# Patient Record
Sex: MALE | Race: WHITE | NOT HISPANIC OR LATINO | Employment: OTHER | URBAN - METROPOLITAN AREA
[De-identification: names, ages, dates, MRNs, and addresses within clinical notes are randomized per-mention and may not be internally consistent; named-entity substitution may affect disease eponyms.]

---

## 2017-04-03 ENCOUNTER — ANESTHESIA (OUTPATIENT)
Dept: PERIOP | Facility: AMBULARY SURGERY CENTER | Age: 82
End: 2017-04-03
Payer: MEDICARE

## 2017-04-03 ENCOUNTER — ANESTHESIA EVENT (OUTPATIENT)
Dept: PERIOP | Facility: AMBULARY SURGERY CENTER | Age: 82
End: 2017-04-03
Payer: MEDICARE

## 2017-04-03 ENCOUNTER — HOSPITAL ENCOUNTER (OUTPATIENT)
Facility: AMBULARY SURGERY CENTER | Age: 82
Setting detail: OUTPATIENT SURGERY
Discharge: HOME | End: 2017-04-03
Attending: OPHTHALMOLOGY | Admitting: OPHTHALMOLOGY
Payer: MEDICARE

## 2017-04-03 VITALS
SYSTOLIC BLOOD PRESSURE: 169 MMHG | WEIGHT: 175 LBS | BODY MASS INDEX: 28.12 KG/M2 | DIASTOLIC BLOOD PRESSURE: 79 MMHG | HEART RATE: 52 BPM | HEIGHT: 66 IN | TEMPERATURE: 97.3 F | RESPIRATION RATE: 18 BRPM | OXYGEN SATURATION: 97 %

## 2017-04-03 PROCEDURE — V2632 POST CHMBR INTRAOCULAR LENS: HCPCS | Performed by: OPHTHALMOLOGY

## 2017-04-03 DEVICE — IOL SN60WF 18.5: Type: IMPLANTABLE DEVICE | Site: EYE | Status: FUNCTIONAL

## 2017-04-03 RX ORDER — BALANCED SALT SOLUTION 6.4; .75; .48; .3; 3.9; 1.7 MG/ML; MG/ML; MG/ML; MG/ML; MG/ML; MG/ML
SOLUTION OPHTHALMIC AS NEEDED
Status: DISCONTINUED | OUTPATIENT
Start: 2017-04-03 | End: 2017-04-03 | Stop reason: HOSPADM

## 2017-04-03 RX ORDER — GATIFLOXACIN 5 MG/ML
1 SOLUTION/ DROPS OPHTHALMIC 2 TIMES DAILY
Qty: 3 ML | Refills: 0
Start: 2017-04-03 | End: 2017-05-03

## 2017-04-03 RX ORDER — LIDOCAINE HYDROCHLORIDE 20 MG/ML
1 JELLY TOPICAL
Status: COMPLETED | OUTPATIENT
Start: 2017-04-03 | End: 2017-04-03

## 2017-04-03 RX ORDER — GATIFLOXACIN 5 MG/ML
SOLUTION/ DROPS OPHTHALMIC AS NEEDED
Status: DISCONTINUED | OUTPATIENT
Start: 2017-04-03 | End: 2017-04-03 | Stop reason: HOSPADM

## 2017-04-03 RX ORDER — TETRACAINE HYDROCHLORIDE 5 MG/ML
1 SOLUTION OPHTHALMIC ONCE
Status: COMPLETED | OUTPATIENT
Start: 2017-04-03 | End: 2017-04-03

## 2017-04-03 RX ORDER — LIDOCAINE HYDROCHLORIDE 10 MG/ML
INJECTION, SOLUTION EPIDURAL; INFILTRATION; INTRACAUDAL; PERINEURAL AS NEEDED
Status: DISCONTINUED | OUTPATIENT
Start: 2017-04-03 | End: 2017-04-03 | Stop reason: HOSPADM

## 2017-04-03 RX ORDER — KETOROLAC TROMETHAMINE 5 MG/ML
1 SOLUTION OPHTHALMIC
Status: DISCONTINUED | OUTPATIENT
Start: 2017-04-04 | End: 2017-04-03 | Stop reason: HOSPADM

## 2017-04-03 RX ORDER — CYCLOPENTOLATE HYDROCHLORIDE 10 MG/ML
1 SOLUTION/ DROPS OPHTHALMIC
Status: COMPLETED | OUTPATIENT
Start: 2017-04-03 | End: 2017-04-03

## 2017-04-03 RX ORDER — TETRACAINE HYDROCHLORIDE 5 MG/ML
SOLUTION OPHTHALMIC AS NEEDED
Status: DISCONTINUED | OUTPATIENT
Start: 2017-04-03 | End: 2017-04-03 | Stop reason: HOSPADM

## 2017-04-03 RX ORDER — KETOROLAC TROMETHAMINE 5 MG/ML
1 SOLUTION OPHTHALMIC 4 TIMES DAILY
Qty: 6 ML | Refills: 0
Start: 2017-04-03 | End: 2018-04-19 | Stop reason: HOSPADM

## 2017-04-03 RX ORDER — MIDAZOLAM HYDROCHLORIDE 1 MG/ML
INJECTION INTRAMUSCULAR; INTRAVENOUS AS NEEDED
Status: DISCONTINUED | OUTPATIENT
Start: 2017-04-03 | End: 2017-04-03 | Stop reason: SURG

## 2017-04-03 RX ORDER — PHENYLEPHRINE HCL 2.5 %
1 DROPS OPHTHALMIC (EYE)
Status: COMPLETED | OUTPATIENT
Start: 2017-04-03 | End: 2017-04-03

## 2017-04-03 RX ADMIN — PHENYLEPHRINE HYDROCHLORIDE 1 DROP: 25 SOLUTION/ DROPS OPHTHALMIC at 07:15

## 2017-04-03 RX ADMIN — KETOROLAC TROMETHAMINE 1 DROP: 5 SOLUTION OPHTHALMIC at 07:57

## 2017-04-03 RX ADMIN — TETRACAINE HYDROCHLORIDE 1 DROP: 5 SOLUTION OPHTHALMIC at 07:15

## 2017-04-03 RX ADMIN — LIDOCAINE HYDROCHLORIDE 1 APPLICATION: 20 JELLY TOPICAL at 07:45

## 2017-04-03 RX ADMIN — LIDOCAINE HYDROCHLORIDE 1 APPLICATION: 20 JELLY TOPICAL at 07:15

## 2017-04-03 RX ADMIN — KETOROLAC TROMETHAMINE 1 DROP: 5 SOLUTION OPHTHALMIC at 07:21

## 2017-04-03 RX ADMIN — MIDAZOLAM HYDROCHLORIDE 2 MG: 1 INJECTION, SOLUTION INTRAMUSCULAR; INTRAVENOUS at 08:08

## 2017-04-03 RX ADMIN — LIDOCAINE HYDROCHLORIDE 1 APPLICATION: 20 JELLY TOPICAL at 07:30

## 2017-04-03 RX ADMIN — CYCLOPENTOLATE HYDROCHLORIDE 1 DROP: 10 SOLUTION/ DROPS OPHTHALMIC at 07:44

## 2017-04-03 RX ADMIN — KETOROLAC TROMETHAMINE 1 DROP: 5 SOLUTION OPHTHALMIC at 07:45

## 2017-04-03 RX ADMIN — KETOROLAC TROMETHAMINE 1 DROP: 5 SOLUTION OPHTHALMIC at 07:14

## 2017-04-03 RX ADMIN — PHENYLEPHRINE HYDROCHLORIDE 1 DROP: 25 SOLUTION/ DROPS OPHTHALMIC at 07:30

## 2017-04-03 RX ADMIN — CYCLOPENTOLATE HYDROCHLORIDE 1 DROP: 10 SOLUTION/ DROPS OPHTHALMIC at 07:29

## 2017-04-03 RX ADMIN — PHENYLEPHRINE HYDROCHLORIDE 1 DROP: 25 SOLUTION/ DROPS OPHTHALMIC at 07:45

## 2017-04-03 RX ADMIN — PHENYLEPHRINE HYDROCHLORIDE 1 DROP: 25 SOLUTION/ DROPS OPHTHALMIC at 07:58

## 2017-04-03 RX ADMIN — LIDOCAINE HYDROCHLORIDE 1 APPLICATION: 20 JELLY TOPICAL at 07:58

## 2017-04-03 RX ADMIN — CYCLOPENTOLATE HYDROCHLORIDE 1 DROP: 10 SOLUTION/ DROPS OPHTHALMIC at 07:14

## 2017-04-03 RX ADMIN — CYCLOPENTOLATE HYDROCHLORIDE 1 DROP: 10 SOLUTION/ DROPS OPHTHALMIC at 07:56

## 2017-05-26 ENCOUNTER — ALLSCRIPTS OFFICE VISIT (OUTPATIENT)
Dept: OTHER | Facility: OTHER | Age: 82
End: 2017-05-26

## 2017-06-12 ENCOUNTER — APPOINTMENT (OUTPATIENT)
Dept: LAB | Facility: MEDICAL CENTER | Age: 82
End: 2017-06-12
Payer: MEDICARE

## 2017-06-12 ENCOUNTER — TRANSCRIBE ORDERS (OUTPATIENT)
Dept: ADMINISTRATIVE | Facility: HOSPITAL | Age: 82
End: 2017-06-12

## 2017-06-12 DIAGNOSIS — A69.20 LYME DISEASE: Primary | ICD-10-CM

## 2017-06-12 DIAGNOSIS — A69.20 LYME DISEASE: ICD-10-CM

## 2017-06-12 PROCEDURE — 36415 COLL VENOUS BLD VENIPUNCTURE: CPT

## 2017-06-12 PROCEDURE — 86618 LYME DISEASE ANTIBODY: CPT

## 2017-06-14 LAB
B BURGDOR IGG SER IA-ACNC: 0.22
B BURGDOR IGM SER IA-ACNC: 0.27

## 2017-11-09 ENCOUNTER — TRANSCRIBE ORDERS (OUTPATIENT)
Dept: ADMINISTRATIVE | Facility: HOSPITAL | Age: 82
End: 2017-11-09

## 2017-11-09 ENCOUNTER — APPOINTMENT (OUTPATIENT)
Dept: LAB | Facility: MEDICAL CENTER | Age: 82
End: 2017-11-09
Payer: MEDICARE

## 2017-11-09 DIAGNOSIS — I10 ESSENTIAL HYPERTENSION: ICD-10-CM

## 2017-11-09 DIAGNOSIS — I48.91 NEW ONSET ATRIAL FIBRILLATION (HCC): Primary | ICD-10-CM

## 2017-11-09 DIAGNOSIS — I48.91 NEW ONSET ATRIAL FIBRILLATION (HCC): ICD-10-CM

## 2017-11-09 LAB
ALBUMIN SERPL BCP-MCNC: 3.6 G/DL (ref 3.5–5)
ALP SERPL-CCNC: 76 U/L (ref 46–116)
ALT SERPL W P-5'-P-CCNC: 76 U/L (ref 12–78)
ANION GAP SERPL CALCULATED.3IONS-SCNC: 6 MMOL/L (ref 4–13)
AST SERPL W P-5'-P-CCNC: 35 U/L (ref 5–45)
BILIRUB SERPL-MCNC: 1.03 MG/DL (ref 0.2–1)
BUN SERPL-MCNC: 19 MG/DL (ref 5–25)
CALCIUM SERPL-MCNC: 8.6 MG/DL (ref 8.3–10.1)
CHLORIDE SERPL-SCNC: 106 MMOL/L (ref 100–108)
CHOLEST SERPL-MCNC: 148 MG/DL (ref 50–200)
CO2 SERPL-SCNC: 28 MMOL/L (ref 21–32)
CREAT SERPL-MCNC: 0.76 MG/DL (ref 0.6–1.3)
ERYTHROCYTE [DISTWIDTH] IN BLOOD BY AUTOMATED COUNT: 17.7 % (ref 11.6–15.1)
GFR SERPL CREATININE-BSD FRML MDRD: 86 ML/MIN/1.73SQ M
GLUCOSE P FAST SERPL-MCNC: 109 MG/DL (ref 65–99)
HCT VFR BLD AUTO: 33.8 % (ref 36.5–49.3)
HDLC SERPL-MCNC: 78 MG/DL (ref 40–60)
HGB BLD-MCNC: 10.8 G/DL (ref 12–17)
LDLC SERPL CALC-MCNC: 53 MG/DL (ref 0–100)
MCH RBC QN AUTO: 20.7 PG (ref 26.8–34.3)
MCHC RBC AUTO-ENTMCNC: 32 G/DL (ref 31.4–37.4)
MCV RBC AUTO: 65 FL (ref 82–98)
PLATELET # BLD AUTO: 187 THOUSANDS/UL (ref 149–390)
PMV BLD AUTO: 9.5 FL (ref 8.9–12.7)
POTASSIUM SERPL-SCNC: 4.1 MMOL/L (ref 3.5–5.3)
PROT SERPL-MCNC: 6.8 G/DL (ref 6.4–8.2)
RBC # BLD AUTO: 5.22 MILLION/UL (ref 3.88–5.62)
SODIUM SERPL-SCNC: 140 MMOL/L (ref 136–145)
TRIGL SERPL-MCNC: 83 MG/DL
TSH SERPL DL<=0.05 MIU/L-ACNC: 1.64 UIU/ML (ref 0.36–3.74)
WBC # BLD AUTO: 6.51 THOUSAND/UL (ref 4.31–10.16)

## 2017-11-09 PROCEDURE — 85027 COMPLETE CBC AUTOMATED: CPT

## 2017-11-09 PROCEDURE — 36415 COLL VENOUS BLD VENIPUNCTURE: CPT

## 2017-11-09 PROCEDURE — 80053 COMPREHEN METABOLIC PANEL: CPT

## 2017-11-09 PROCEDURE — 80061 LIPID PANEL: CPT

## 2017-11-09 PROCEDURE — 84443 ASSAY THYROID STIM HORMONE: CPT

## 2018-01-11 NOTE — PROGRESS NOTES
History of Present Illness  Care Coordination Encounter Information:   Type of Encounter: Telephonic    Spoke to Patient  Care Coordination SL Nurse Claudia August: While speaking with patient regarding his wife's care, as part of BPCI program, patient began discussing his personal concerns regarding hernia and hydrocele  Recently evaluated by surgeon for hernia repair and was found to have hydrocele  Surgeon was ready to schedule patient for hernia repair, but patient has put off surgery until his wife is either placed or has other caregivers available  Patient states surgeon gave him the name of a urologist at [place] [id number]  I advised patient to either call urologist or PCP to schedule visit to evaluate hydrocele and give possible treatment options  Patient states it is usually painless, but does experience acute pain after too much physical activity  Encouraged patient to call and schedule an appointment for himself soon so that he can have treatment options explained to him  Patient stated understanding        Signatures   Electronically signed by : Vipul White RN; Jul 11 2016  2:58PM EST                       (Author)

## 2018-01-14 VITALS
SYSTOLIC BLOOD PRESSURE: 130 MMHG | HEART RATE: 74 BPM | HEIGHT: 64 IN | DIASTOLIC BLOOD PRESSURE: 86 MMHG | WEIGHT: 175.13 LBS | BODY MASS INDEX: 29.9 KG/M2

## 2018-04-14 ENCOUNTER — APPOINTMENT (EMERGENCY)
Dept: RADIOLOGY | Facility: HOSPITAL | Age: 83
DRG: 242 | End: 2018-04-14
Payer: MEDICARE

## 2018-04-14 ENCOUNTER — APPOINTMENT (INPATIENT)
Dept: RADIOLOGY | Facility: HOSPITAL | Age: 83
DRG: 242 | End: 2018-04-14
Payer: MEDICARE

## 2018-04-14 ENCOUNTER — HOSPITAL ENCOUNTER (INPATIENT)
Facility: HOSPITAL | Age: 83
LOS: 5 days | Discharge: HOME HEALTH | DRG: 242 | End: 2018-04-19
Attending: SURGERY | Admitting: SURGERY
Payer: MEDICARE

## 2018-04-14 DIAGNOSIS — G93.89 PNEUMOCEPHALUS: ICD-10-CM

## 2018-04-14 DIAGNOSIS — R00.1 BRADYCARDIA: ICD-10-CM

## 2018-04-14 DIAGNOSIS — I48.0 PAROXYSMAL ATRIAL FIBRILLATION (HCC): ICD-10-CM

## 2018-04-14 DIAGNOSIS — S62.501A CLOSED NONDISPLACED FRACTURE OF PHALANX OF RIGHT THUMB, UNSPECIFIED PHALANX, INITIAL ENCOUNTER: ICD-10-CM

## 2018-04-14 DIAGNOSIS — I48.20 CHRONIC ATRIAL FIBRILLATION (HCC): ICD-10-CM

## 2018-04-14 DIAGNOSIS — S06.5X9A SDH (SUBDURAL HEMATOMA) (HCC): Primary | ICD-10-CM

## 2018-04-14 LAB
ALBUMIN SERPL BCP-MCNC: 3.9 G/DL (ref 3.5–5)
ALP SERPL-CCNC: 83 U/L (ref 46–116)
ALT SERPL W P-5'-P-CCNC: 26 U/L (ref 12–78)
ANION GAP BLD CALC-SCNC: 12 MMOL/L (ref 4–13)
ANION GAP BLD CALC-SCNC: 13 MMOL/L (ref 4–13)
ANION GAP SERPL CALCULATED.3IONS-SCNC: 5 MMOL/L (ref 4–13)
APTT PPP: 31 SECONDS (ref 23–35)
AST SERPL W P-5'-P-CCNC: 32 U/L (ref 5–45)
BASE EXCESS BLDA CALC-SCNC: 5 MMOL/L (ref -2–3)
BASOPHILS # BLD AUTO: 0.01 THOUSANDS/ΜL (ref 0–0.1)
BASOPHILS NFR BLD AUTO: 0 % (ref 0–1)
BILIRUB SERPL-MCNC: 1.04 MG/DL (ref 0.2–1)
BUN BLD-MCNC: 17 MG/DL (ref 5–25)
BUN BLD-MCNC: 17 MG/DL (ref 5–25)
BUN SERPL-MCNC: 14 MG/DL (ref 5–25)
CA-I BLD-SCNC: 1.15 MMOL/L (ref 1.12–1.32)
CA-I BLD-SCNC: 1.18 MMOL/L (ref 1.12–1.32)
CA-I BLD-SCNC: 1.19 MMOL/L (ref 1.12–1.32)
CALCIUM SERPL-MCNC: 9.2 MG/DL
CHLORIDE BLD-SCNC: 103 MMOL/L (ref 100–108)
CHLORIDE BLD-SCNC: 104 MMOL/L (ref 100–108)
CHLORIDE SERPL-SCNC: 109 MMOL/L (ref 100–108)
CK MB SERPL-MCNC: 1.6 % (ref 0–2.5)
CK MB SERPL-MCNC: 7.3 NG/ML (ref 0–5)
CK SERPL-CCNC: 457 U/L (ref 39–308)
CO2 SERPL-SCNC: 28 MMOL/L (ref 21–32)
CREAT BLD-MCNC: 0.9 MG/DL (ref 0.6–1.3)
CREAT SERPL-MCNC: 1 MG/DL (ref 0.6–1.3)
EOSINOPHIL # BLD AUTO: 0.03 THOUSAND/ΜL (ref 0–0.61)
EOSINOPHIL NFR BLD AUTO: 0 % (ref 0–6)
ERYTHROCYTE [DISTWIDTH] IN BLOOD BY AUTOMATED COUNT: 16.4 % (ref 11.6–15.1)
GFR SERPL CREATININE-BSD FRML MDRD: 70 ML/MIN/1.73SQ M
GFR SERPL CREATININE-BSD FRML MDRD: 80 ML/MIN/1.73SQ M
GLUCOSE SERPL-MCNC: 127 MG/DL (ref 65–140)
GLUCOSE SERPL-MCNC: 133 MG/DL (ref 65–140)
GLUCOSE SERPL-MCNC: 136 MG/DL (ref 65–140)
GLUCOSE SERPL-MCNC: 145 MG/DL (ref 65–140)
HCO3 BLDA-SCNC: 29.3 MMOL/L (ref 24–30)
HCT VFR BLD AUTO: 34.5 % (ref 36.5–49.3)
HCT VFR BLD CALC: 39 % (ref 36.5–49.3)
HCT VFR BLD CALC: 39 % (ref 36.5–49.3)
HCT VFR BLD CALC: 40 % (ref 36.5–49.3)
HGB BLD-MCNC: 11.3 G/DL (ref 12–17)
HGB BLDA-MCNC: 13.3 G/DL (ref 12–17)
HGB BLDA-MCNC: 13.3 G/DL (ref 12–17)
HGB BLDA-MCNC: 13.6 G/DL (ref 12–17)
INR PPP: 1.05 (ref 0.86–1.16)
LACTATE SERPL-SCNC: 1.7 MMOL/L (ref 0.5–2)
LYMPHOCYTES # BLD AUTO: 0.99 THOUSANDS/ΜL (ref 0.6–4.47)
LYMPHOCYTES NFR BLD AUTO: 11 % (ref 14–44)
MAGNESIUM SERPL-MCNC: 2.2 MG/DL (ref 1.6–2.6)
MCH RBC QN AUTO: 21 PG (ref 26.8–34.3)
MCHC RBC AUTO-ENTMCNC: 32.8 G/DL (ref 31.4–37.4)
MCV RBC AUTO: 64 FL (ref 82–98)
MONOCYTES # BLD AUTO: 0.87 THOUSAND/ΜL (ref 0.17–1.22)
MONOCYTES NFR BLD AUTO: 10 % (ref 4–12)
NEUTROPHILS # BLD AUTO: 6.96 THOUSANDS/ΜL (ref 1.85–7.62)
NEUTS SEG NFR BLD AUTO: 79 % (ref 43–75)
NRBC BLD AUTO-RTO: 0 /100 WBCS
PCO2 BLD: 29 MMOL/L (ref 21–32)
PCO2 BLD: 30 MMOL/L (ref 21–32)
PCO2 BLD: 31 MMOL/L (ref 21–32)
PCO2 BLD: 41.3 MM HG (ref 42–50)
PH BLD: 7.46 [PH] (ref 7.3–7.4)
PHOSPHATE SERPL-MCNC: 2.6 MG/DL (ref 2.3–4.1)
PLATELET # BLD AUTO: 141 THOUSANDS/UL (ref 149–390)
PMV BLD AUTO: 9.5 FL (ref 8.9–12.7)
PO2 BLD: 31 MM HG (ref 35–45)
POTASSIUM BLD-SCNC: 4.5 MMOL/L (ref 3.5–5.3)
POTASSIUM BLD-SCNC: 4.5 MMOL/L (ref 3.5–5.3)
POTASSIUM BLD-SCNC: 4.7 MMOL/L (ref 3.5–5.3)
POTASSIUM SERPL-SCNC: 4.2 MMOL/L (ref 3.5–5.3)
PROT SERPL-MCNC: 7.4 G/DL (ref 6.4–8.2)
PROTHROMBIN TIME: 13.7 SECONDS (ref 12.1–14.4)
RBC # BLD AUTO: 5.39 MILLION/UL (ref 3.88–5.62)
SAO2 % BLD FROM PO2: 63 % (ref 95–98)
SODIUM BLD-SCNC: 140 MMOL/L (ref 136–145)
SODIUM SERPL-SCNC: 142 MMOL/L (ref 136–145)
SPECIMEN SOURCE: ABNORMAL
SPECIMEN SOURCE: NORMAL
SPECIMEN SOURCE: NORMAL
TROPONIN I SERPL-MCNC: <0.02 NG/ML
WBC # BLD AUTO: 8.88 THOUSAND/UL (ref 4.31–10.16)

## 2018-04-14 PROCEDURE — 85025 COMPLETE CBC W/AUTO DIFF WBC: CPT | Performed by: PHYSICIAN ASSISTANT

## 2018-04-14 PROCEDURE — 85610 PROTHROMBIN TIME: CPT | Performed by: PHYSICIAN ASSISTANT

## 2018-04-14 PROCEDURE — 82550 ASSAY OF CK (CPK): CPT | Performed by: PHYSICIAN ASSISTANT

## 2018-04-14 PROCEDURE — 71260 CT THORAX DX C+: CPT

## 2018-04-14 PROCEDURE — 71045 X-RAY EXAM CHEST 1 VIEW: CPT

## 2018-04-14 PROCEDURE — 84484 ASSAY OF TROPONIN QUANT: CPT | Performed by: EMERGENCY MEDICINE

## 2018-04-14 PROCEDURE — 82553 CREATINE MB FRACTION: CPT | Performed by: PHYSICIAN ASSISTANT

## 2018-04-14 PROCEDURE — 82330 ASSAY OF CALCIUM: CPT

## 2018-04-14 PROCEDURE — 74177 CT ABD & PELVIS W/CONTRAST: CPT

## 2018-04-14 PROCEDURE — 73130 X-RAY EXAM OF HAND: CPT

## 2018-04-14 PROCEDURE — 82803 BLOOD GASES ANY COMBINATION: CPT

## 2018-04-14 PROCEDURE — 80047 BASIC METABLC PNL IONIZED CA: CPT

## 2018-04-14 PROCEDURE — 82947 ASSAY GLUCOSE BLOOD QUANT: CPT

## 2018-04-14 PROCEDURE — 80053 COMPREHEN METABOLIC PANEL: CPT | Performed by: PHYSICIAN ASSISTANT

## 2018-04-14 PROCEDURE — 99285 EMERGENCY DEPT VISIT HI MDM: CPT

## 2018-04-14 PROCEDURE — 84100 ASSAY OF PHOSPHORUS: CPT | Performed by: PHYSICIAN ASSISTANT

## 2018-04-14 PROCEDURE — 83605 ASSAY OF LACTIC ACID: CPT | Performed by: PHYSICIAN ASSISTANT

## 2018-04-14 PROCEDURE — 90715 TDAP VACCINE 7 YRS/> IM: CPT | Performed by: PHYSICIAN ASSISTANT

## 2018-04-14 PROCEDURE — 84295 ASSAY OF SERUM SODIUM: CPT

## 2018-04-14 PROCEDURE — 85730 THROMBOPLASTIN TIME PARTIAL: CPT | Performed by: EMERGENCY MEDICINE

## 2018-04-14 PROCEDURE — 83735 ASSAY OF MAGNESIUM: CPT | Performed by: PHYSICIAN ASSISTANT

## 2018-04-14 PROCEDURE — 85014 HEMATOCRIT: CPT

## 2018-04-14 PROCEDURE — 84132 ASSAY OF SERUM POTASSIUM: CPT

## 2018-04-14 PROCEDURE — 72125 CT NECK SPINE W/O DYE: CPT

## 2018-04-14 PROCEDURE — 70450 CT HEAD/BRAIN W/O DYE: CPT

## 2018-04-14 PROCEDURE — 90471 IMMUNIZATION ADMIN: CPT

## 2018-04-14 PROCEDURE — 2W3GX1Z IMMOBILIZATION OF RIGHT THUMB USING SPLINT: ICD-10-PCS | Performed by: SURGERY

## 2018-04-14 PROCEDURE — 99291 CRITICAL CARE FIRST HOUR: CPT | Performed by: SURGERY

## 2018-04-14 RX ORDER — LABETALOL HYDROCHLORIDE 5 MG/ML
INJECTION, SOLUTION INTRAVENOUS
Status: DISPENSED
Start: 2018-04-14 | End: 2018-04-15

## 2018-04-14 RX ORDER — ONDANSETRON 2 MG/ML
4 INJECTION INTRAMUSCULAR; INTRAVENOUS EVERY 6 HOURS PRN
Status: DISCONTINUED | OUTPATIENT
Start: 2018-04-14 | End: 2018-04-19 | Stop reason: HOSPADM

## 2018-04-14 RX ORDER — OXYCODONE HYDROCHLORIDE 5 MG/1
2.5 TABLET ORAL EVERY 4 HOURS PRN
Status: DISCONTINUED | OUTPATIENT
Start: 2018-04-14 | End: 2018-04-19 | Stop reason: HOSPADM

## 2018-04-14 RX ORDER — ACETAMINOPHEN 325 MG/1
975 TABLET ORAL EVERY 6 HOURS PRN
Status: DISCONTINUED | OUTPATIENT
Start: 2018-04-14 | End: 2018-04-17

## 2018-04-14 RX ORDER — OXYCODONE HYDROCHLORIDE 5 MG/1
5 TABLET ORAL EVERY 4 HOURS PRN
Status: DISCONTINUED | OUTPATIENT
Start: 2018-04-14 | End: 2018-04-19 | Stop reason: HOSPADM

## 2018-04-14 RX ORDER — LABETALOL HYDROCHLORIDE 5 MG/ML
10 INJECTION, SOLUTION INTRAVENOUS ONCE
Status: COMPLETED | OUTPATIENT
Start: 2018-04-14 | End: 2018-04-14

## 2018-04-14 RX ORDER — LABETALOL HYDROCHLORIDE 5 MG/ML
10 INJECTION, SOLUTION INTRAVENOUS EVERY 4 HOURS PRN
Status: DISCONTINUED | OUTPATIENT
Start: 2018-04-14 | End: 2018-04-19 | Stop reason: HOSPADM

## 2018-04-14 RX ORDER — SODIUM CHLORIDE 9 MG/ML
100 INJECTION, SOLUTION INTRAVENOUS CONTINUOUS
Status: DISCONTINUED | OUTPATIENT
Start: 2018-04-14 | End: 2018-04-16

## 2018-04-14 RX ADMIN — SODIUM CHLORIDE 100 ML/HR: 0.9 INJECTION, SOLUTION INTRAVENOUS at 20:56

## 2018-04-14 RX ADMIN — LABETALOL 20 MG/4 ML (5 MG/ML) INTRAVENOUS SYRINGE 10 MG: at 20:52

## 2018-04-14 RX ADMIN — LABETALOL HYDROCHLORIDE 10 MG: 5 INJECTION, SOLUTION INTRAVENOUS at 21:50

## 2018-04-14 RX ADMIN — TETANUS TOXOID, REDUCED DIPHTHERIA TOXOID AND ACELLULAR PERTUSSIS VACCINE, ADSORBED 0.5 ML: 5; 2.5; 8; 8; 2.5 SUSPENSION INTRAMUSCULAR at 20:57

## 2018-04-14 RX ADMIN — IOHEXOL 100 ML: 350 INJECTION, SOLUTION INTRAVENOUS at 21:40

## 2018-04-15 ENCOUNTER — APPOINTMENT (INPATIENT)
Dept: RADIOLOGY | Facility: HOSPITAL | Age: 83
DRG: 242 | End: 2018-04-15
Payer: MEDICARE

## 2018-04-15 ENCOUNTER — APPOINTMENT (INPATIENT)
Dept: NON INVASIVE DIAGNOSTICS | Facility: HOSPITAL | Age: 83
DRG: 242 | End: 2018-04-15
Payer: MEDICARE

## 2018-04-15 PROBLEM — S06.5XAA SUBDURAL HEMATOMA: Status: ACTIVE | Noted: 2018-04-15

## 2018-04-15 PROBLEM — I10 ESSENTIAL HYPERTENSION: Status: ACTIVE | Noted: 2017-11-08

## 2018-04-15 PROBLEM — S06.5X9A SUBDURAL HEMATOMA (HCC): Status: ACTIVE | Noted: 2018-04-15

## 2018-04-15 PROBLEM — S62.524A CLOSED NONDISPLACED FRACTURE OF DISTAL PHALANX OF RIGHT THUMB: Status: ACTIVE | Noted: 2018-04-15

## 2018-04-15 PROBLEM — I48.91 ATRIAL FIBRILLATION (HCC): Status: ACTIVE | Noted: 2018-04-15

## 2018-04-15 PROBLEM — G93.89 PNEUMOCEPHALUS: Status: ACTIVE | Noted: 2018-04-15

## 2018-04-15 PROBLEM — M25.512 ACUTE PAIN OF LEFT SHOULDER: Status: ACTIVE | Noted: 2018-04-15

## 2018-04-15 PROBLEM — S62.509A THUMB FRACTURE: Status: ACTIVE | Noted: 2018-04-15

## 2018-04-15 LAB
ANION GAP SERPL CALCULATED.3IONS-SCNC: 3 MMOL/L (ref 4–13)
ATRIAL RATE: 53 BPM
BASOPHILS # BLD AUTO: 0.01 THOUSANDS/ΜL (ref 0–0.1)
BASOPHILS NFR BLD AUTO: 0 % (ref 0–1)
BUN SERPL-MCNC: 12 MG/DL (ref 5–25)
CALCIUM SERPL-MCNC: 8.4 MG/DL
CHLORIDE SERPL-SCNC: 107 MMOL/L (ref 100–108)
CK MB SERPL-MCNC: 1.2 % (ref 0–2.5)
CK MB SERPL-MCNC: 3.5 NG/ML (ref 0–5)
CK SERPL-CCNC: 304 U/L (ref 39–308)
CO2 SERPL-SCNC: 28 MMOL/L (ref 21–32)
CREAT SERPL-MCNC: 0.86 MG/DL (ref 0.6–1.3)
EOSINOPHIL # BLD AUTO: 0.04 THOUSAND/ΜL (ref 0–0.61)
EOSINOPHIL NFR BLD AUTO: 1 % (ref 0–6)
ERYTHROCYTE [DISTWIDTH] IN BLOOD BY AUTOMATED COUNT: 16.3 % (ref 11.6–15.1)
GFR SERPL CREATININE-BSD FRML MDRD: 81 ML/MIN/1.73SQ M
GLUCOSE SERPL-MCNC: 122 MG/DL (ref 65–140)
HCT VFR BLD AUTO: 33.9 % (ref 36.5–49.3)
HGB BLD-MCNC: 10.7 G/DL (ref 12–17)
LYMPHOCYTES # BLD AUTO: 1.49 THOUSANDS/ΜL (ref 0.6–4.47)
LYMPHOCYTES NFR BLD AUTO: 20 % (ref 14–44)
MAGNESIUM SERPL-MCNC: 2 MG/DL (ref 1.6–2.6)
MCH RBC QN AUTO: 20.6 PG (ref 26.8–34.3)
MCHC RBC AUTO-ENTMCNC: 31.6 G/DL (ref 31.4–37.4)
MCV RBC AUTO: 65 FL (ref 82–98)
MONOCYTES # BLD AUTO: 0.79 THOUSAND/ΜL (ref 0.17–1.22)
MONOCYTES NFR BLD AUTO: 10 % (ref 4–12)
NEUTROPHILS # BLD AUTO: 5.23 THOUSANDS/ΜL (ref 1.85–7.62)
NEUTS SEG NFR BLD AUTO: 69 % (ref 43–75)
NRBC BLD AUTO-RTO: 0 /100 WBCS
P AXIS: 41 DEGREES
PHOSPHATE SERPL-MCNC: 3.1 MG/DL (ref 2.3–4.1)
PLATELET # BLD AUTO: 130 THOUSANDS/UL (ref 149–390)
PMV BLD AUTO: 9 FL (ref 8.9–12.7)
POTASSIUM SERPL-SCNC: 4 MMOL/L (ref 3.5–5.3)
PR INTERVAL: 238 MS
QRS AXIS: 30 DEGREES
QRSD INTERVAL: 88 MS
QT INTERVAL: 454 MS
QTC INTERVAL: 427 MS
RBC # BLD AUTO: 5.19 MILLION/UL (ref 3.88–5.62)
SODIUM SERPL-SCNC: 138 MMOL/L (ref 136–145)
T WAVE AXIS: 15 DEGREES
VENTRICULAR RATE: 53 BPM
WBC # BLD AUTO: 7.58 THOUSAND/UL (ref 4.31–10.16)

## 2018-04-15 PROCEDURE — 73030 X-RAY EXAM OF SHOULDER: CPT

## 2018-04-15 PROCEDURE — 84100 ASSAY OF PHOSPHORUS: CPT | Performed by: EMERGENCY MEDICINE

## 2018-04-15 PROCEDURE — 83735 ASSAY OF MAGNESIUM: CPT | Performed by: EMERGENCY MEDICINE

## 2018-04-15 PROCEDURE — 85025 COMPLETE CBC W/AUTO DIFF WBC: CPT | Performed by: EMERGENCY MEDICINE

## 2018-04-15 PROCEDURE — 99222 1ST HOSP IP/OBS MODERATE 55: CPT | Performed by: ORTHOPAEDIC SURGERY

## 2018-04-15 PROCEDURE — 82550 ASSAY OF CK (CPK): CPT | Performed by: EMERGENCY MEDICINE

## 2018-04-15 PROCEDURE — 93010 ELECTROCARDIOGRAM REPORT: CPT | Performed by: INTERNAL MEDICINE

## 2018-04-15 PROCEDURE — 93306 TTE W/DOPPLER COMPLETE: CPT

## 2018-04-15 PROCEDURE — 82553 CREATINE MB FRACTION: CPT | Performed by: EMERGENCY MEDICINE

## 2018-04-15 PROCEDURE — 80048 BASIC METABOLIC PNL TOTAL CA: CPT | Performed by: EMERGENCY MEDICINE

## 2018-04-15 PROCEDURE — 93005 ELECTROCARDIOGRAM TRACING: CPT

## 2018-04-15 PROCEDURE — 99232 SBSQ HOSP IP/OBS MODERATE 35: CPT | Performed by: SURGERY

## 2018-04-15 PROCEDURE — 26750 TREAT FINGER FRACTURE EACH: CPT | Performed by: ORTHOPAEDIC SURGERY

## 2018-04-15 RX ORDER — ATROPINE SULFATE 1 MG/ML
0.5 INJECTION, SOLUTION INTRAMUSCULAR; INTRAVENOUS; SUBCUTANEOUS AS NEEDED
Status: DISCONTINUED | OUTPATIENT
Start: 2018-04-15 | End: 2018-04-15

## 2018-04-15 RX ORDER — ATROPINE SULFATE 0.1 MG/ML
INJECTION INTRAVENOUS
Status: DISPENSED
Start: 2018-04-15 | End: 2018-04-16

## 2018-04-15 RX ORDER — ATROPINE SULFATE 0.1 MG/ML
0.5 INJECTION INTRAVENOUS AS NEEDED
Status: DISCONTINUED | OUTPATIENT
Start: 2018-04-15 | End: 2018-04-19 | Stop reason: HOSPADM

## 2018-04-15 RX ADMIN — LEVETIRACETAM 500 MG: 100 INJECTION, SOLUTION INTRAVENOUS at 08:18

## 2018-04-15 RX ADMIN — SODIUM CHLORIDE 100 ML/HR: 0.9 INJECTION, SOLUTION INTRAVENOUS at 15:40

## 2018-04-15 RX ADMIN — LEVETIRACETAM 500 MG: 100 INJECTION, SOLUTION INTRAVENOUS at 20:14

## 2018-04-15 RX ADMIN — SODIUM CHLORIDE 100 ML/HR: 0.9 INJECTION, SOLUTION INTRAVENOUS at 04:57

## 2018-04-15 RX ADMIN — OXYCODONE HYDROCHLORIDE 2.5 MG: 5 TABLET ORAL at 22:32

## 2018-04-15 RX ADMIN — OXYCODONE HYDROCHLORIDE 5 MG: 5 TABLET ORAL at 17:41

## 2018-04-15 RX ADMIN — LEVETIRACETAM 500 MG: 100 INJECTION, SOLUTION INTRAVENOUS at 02:30

## 2018-04-15 RX ADMIN — ACETAMINOPHEN 975 MG: 325 TABLET, FILM COATED ORAL at 10:13

## 2018-04-15 RX ADMIN — ACETAMINOPHEN 975 MG: 325 TABLET, FILM COATED ORAL at 20:13

## 2018-04-15 NOTE — PROGRESS NOTES
Patient c/o left shoulder pain most of nite, now laying on  Right side with relief, refusing pain meds   Reported to ortho surgeon, will xray  shoulder
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
## 2018-04-15 NOTE — PHYSICAL THERAPY NOTE
Physical Therapy Cancellation Note      Orders noted and chart reviewed  Pt currently pending neurosurgery consult  Will hold PT evaluation at this time  Will continue to follow and evaluate as appropriate     John Mantilla, PT,DPT
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
## 2018-04-15 NOTE — TERTIARY TRAUMA SURVEY
Progress Note - Tertiary Trauma Survery   April Saavedra 81 y o  male MRN: [id number]  Unit/Bed#: ICU 03 Encounter: [id number]    Summary of Diagnosed Injuries:   - Acute R subdural hematoma w/ pneumocephalus  - R 1st digit phalanx fracture    Clinical Plan:   Neuro:              - GCS 15              - CT Head: Acute R sided subdural hematoma w/ scattered pneumocephalus extending into posterior falx  Several foci of air also seen tracking along styloid process  No mass effect/shift              - CT C-spine negative for fracture  No C spine tenderness  C- collar removed              - neurosurgery consulted, recommend seizure prophylaxis w/ Keppra at 500mg BID              - Q 1 hour neuro checks  CT head if abrupt change in neuro status                  CV:               - hemodynamically stable  - cardiac workup for possible syncope  Troponin < 02  EKG: sinus rhythm w/ sinus arrhythmia at 70 bpm   Incomplete RBBB              - Echocardiogram ordered              - Goal SBP < 185  Labetalol prn  Not on antihypertensives at home                                Lung:               - Patient sating 96% RA              - CT Chest/Abdomen/Pelvis:                           -No acute visceral and/or osseous injury in the chest, abdomen, or pelvis              GI:               - CT chest/abdomen/pelvis: Bowel containing umbilical and left inguinal hernias with no secondary obstruction               - NPO                 FEN:              -  NS 100cc/hr given NPO status and elevated CK at 437  Most recent      - Defer to neurosurgery for advancement of diet  Continue fluids while NPO               - replete electrolytes as needed                  :               - No acute issues              - Monitor I/Os: + 183 (725 cc urine overnight)                 ID:               - No acute issues                  - Monitor temperature and WBC                 Heme: - Hgb  10 7 <11 3  Continue to trend              - PTT 31, INR 1 05              - Hold AC given subdural hematoma                 Endo:               - No acute issues                            Msk/Skin:               -  < 457  NS @ 100/hr  Continue to monitor               - X ray R hand:  Nondisplaced fracture midshaft of the distal 1st phalanx with regional soft tissue swelling                           -Placed in thumb spica splint by ortho  Follow up in office in 2 week                 Disposition:              - ICU    Mechanism of Injury: Other: Unknown     Transfer from:   Outside Films Received: no  Tertiary Exam Due on:4/15/18  Vitals: Blood pressure 144/58, pulse 58, temperature 99 °F (37 2 °C), resp  rate (!) 23, height 5' 6" (1 676 m), weight 76 2 kg (167 lb 15 9 oz), SpO2 97 %  ,Body mass index is 27 11 kg/m²  CT / RADIOGRAPHS: ALL RESULTS MUST BE CONFIRMED BY FACULTY OR PRINTED REPORT    CT HEAD: Acute right subdural hematoma with scattered pneumocephalus throughout the extra-axial collection extending into the posterior falx  Several foci of air also seen tracking along the left styloid process      Midline ventricles with no mass effect or shift     CT CHEST:    CT FACE:  CT CHEST/ABDOMEN / PELVIS:   No acute visceral and/or osseous injury in the chest, abdomen, or pelvis      Bowel containing umbilical and left inguinal hernias with no secondary obstruction  CT CERVICAL SPINE: No cervical spine fracture or traumatic malalignment      Multilevel spondylosis and degenerative disc disease      Several foci of air tracking along the left styloid process     XR PELVIS:    CT THORACIC / LUMBAR SPINE:  CXR CHEST:    OTHER: XR HAND R: Nondisplaced fracture midshaft of the distal 1st phalanx with regional soft tissue swelling      Multifocal osteoarthritis   OTHER:    OTHER:  OTHER:    OTHER: OTHER:    OTHER:  OTHER:    OTHER:  OTHER:      Consultants - List Service/ Faculty and Date: - Neurosurgery  - Surgical critical care     Active medications:           Current Facility-Administered Medications:     acetaminophen (TYLENOL) tablet 975 mg, 975 mg, Oral, Q6H PRN    labetalol (NORMODYNE) injection 10 mg, 10 mg, Intravenous, Q4H PRN, 10 mg at 04/14/18 2150    levETIRAcetam (KEPPRA) 500 mg in sodium chloride 0 9 % 100 mL IVPB, 500 mg, Intravenous, Q12H [address], Stopped at 04/15/18 0300    ondansetron (ZOFRAN) injection 4 mg, 4 mg, Intravenous, Q6H PRN    oxyCODONE (ROXICODONE) IR tablet 2 5 mg, 2 5 mg, Oral, Q4H PRN    oxyCODONE (ROXICODONE) IR tablet 5 mg, 5 mg, Oral, Q4H PRN    sodium chloride 0 9 % infusion, 100 mL/hr, Intravenous, Continuous, 100 mL/hr at 04/15/18 0457      Intake/Output Summary (Last 24 hours) at 04/15/18 0655  Last data filed at 04/15/18 0401   Gross per 24 hour   Intake           908 34 ml   Output              725 ml   Net           183 34 ml       Invasive Devices     Peripheral Intravenous Line            Peripheral IV 04/14/18 Left Antecubital 1 day    Peripheral IV 04/14/18 Right Antecubital less than 1 day                CAGE-AID Questionnaire:    Was the patient able to participate in the CAGE-AID screening questions on admission? Yes    Is the patient 65 years or older: YES:    1  Before the illness or injury that brought you to the Emergency, did you need someone to help you on a regular basis? 0=No   2  Since the illness or injury that brought you to the Emergency, have you needed more help than usual to take care of yourself? 0=No   3  Have you been hospitalized for one or more nights during the past 6 months (excluding a stay in the Emergency Department)? 0=No   4  In general, do you see well? 1=No   5  In general, do you have serious problems with your memory? 0=No   6  Do you take more than three different medications everyday? 0=No   TOTAL   2     Did you order a geriatric consult if the score was 2 or greater?: no    1  GCS:  GCS Total:  15  2   Head: Scrape to L temporal area, blood on external L ear  3  Neck:   d   C-spine cleared radiographically:  yes and e   C-spine cleared clinically:  yes  4  Chest:   WNL  5  Abdomen/Pelvis:   WNL  6  Back (log roll with spinal immobilization unless cleared radiographically):   b   Palpate for tenderness and deformity:  vertebrae (T-L-S spine):  None  7  Extremities:   Lacs, abrasions, swelling, ecchymosis: abrasion to L temporal area   Tenderness, pain with motor, instability: Pain and tenderness to R thumb  8  Peripheral Nerves: WNL    Do NOT use the following abbreviations: DTO, gr, Roberth, MS, MSO4, MgSO4, Nitro, QD, QID, QOD, u, , ?, ?g or trailing zeros   Always use a zero before a decimal     Labs:   CBC:   Lab Results   Component Value Date    WBC 7 58 04/15/2018    HGB 10 7 (L) 04/15/2018    HCT 33 9 (L) 04/15/2018    MCV 65 (L) 04/15/2018     (L) 04/15/2018    MCH 20 6 (L) 04/15/2018    MCHC 31 6 04/15/2018    RDW 16 3 (H) 04/15/2018    MPV 9 0 04/15/2018    NRBC 0 04/15/2018     CMP:   Lab Results   Component Value Date     04/15/2018     04/15/2018    CO2 28 04/15/2018    ANIONGAP 3 (L) 04/15/2018    BUN 12 04/15/2018    CREATININE 0 86 04/15/2018    GLUCOSE 122 04/15/2018    GLUCOSE 136 04/14/2018    CALCIUM 8 4 04/15/2018    AST 32 04/14/2018    ALT 26 04/14/2018    ALKPHOS 83 04/14/2018    PROT 7 4 04/14/2018    BILITOT 1 04 (H) 04/14/2018    EGFR 81 04/15/2018    EGFR 80 04/14/2018     Phosphorus:   Lab Results   Component Value Date    PHOS 3 1 04/15/2018     Magnesium:   Lab Results   Component Value Date    MG 2 0 04/15/2018     Ionized Calcium: No results found for: CAION  Coagulation:   Lab Results   Component Value Date    INR 1 05 04/14/2018     Troponin:   Lab Results   Component Value Date    TROPONINI <0 02 04/14/2018     ABG: No results found for: PHART, RKH2ITY, PO2ART, BUN9GRN, A2UXRQIL, BEART, SOURCE  Radiology review:
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
## 2018-04-15 NOTE — OCCUPATIONAL THERAPY NOTE
Occupational Therapy Cancellation Notice     OT orders received and chart review completed-Pt currently pending neurosurgery consult  Spoke w/ RN Cyndy Calderon  Will hold therapy at this time       Lorna Magaña MS, OTR/L

## 2018-04-15 NOTE — ED PROVIDER NOTES
Emergency Department Airway Evaluation and Management Form    History  Obtained from: EMS and patient   Patient has no known allergies  No chief complaint on file  HPI    No past medical history on file  Past Surgical History:   Procedure Laterality Date    APPENDECTOMY      age 33    COLONOSCOPY      OH REMV CATARACT EXTRACAP,INSERT LENS Right 4/3/2017    Procedure: EXTRACTION EXTRACAPSULAR CATARACT PHACO INTRAOCULAR LENS (IOL); Surgeon: Skyler Rodney MD;  Location: [facility] MAIN OR;  Service: Ophthalmology    TONSILLECTOMY      age 12     Family History   Problem Relation Age of Onset    Cancer Mother      exp age 80    Heart disease Father     Heart disease Brother     Hypertension Brother      Social History   Substance Use Topics    Smoking status: Never Smoker    Smokeless tobacco: Never Used    Alcohol use No     I have reviewed and agree with the history as documented  Review of Systems    Physical Exam  There were no vitals taken for this visit  Physical Exam    ED Medications  Medications - No data to display    Intubation  Procedures    Notes  Patient unclear of trauma, but has bleeding from left ear and brusing along left temperal and parietal region  Slightly confused but ansering questions, no airway intervention necessary at this time    Will continue to monitor along with trauma team     CritCare Time    Final Diagnosis  Final diagnoses:   None       ED Provider  Electronically Signed by     Eli Stokes DO  04/14/18 2022
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
## 2018-04-16 ENCOUNTER — APPOINTMENT (INPATIENT)
Dept: RADIOLOGY | Facility: HOSPITAL | Age: 83
DRG: 242 | End: 2018-04-16
Payer: MEDICARE

## 2018-04-16 LAB
ANION GAP SERPL CALCULATED.3IONS-SCNC: 5 MMOL/L (ref 4–13)
BASOPHILS # BLD AUTO: 0.01 THOUSANDS/ΜL (ref 0–0.1)
BASOPHILS NFR BLD AUTO: 0 % (ref 0–1)
BUN SERPL-MCNC: 12 MG/DL (ref 5–25)
CALCIUM SERPL-MCNC: 8 MG/DL
CHLORIDE SERPL-SCNC: 107 MMOL/L (ref 100–108)
CO2 SERPL-SCNC: 26 MMOL/L (ref 21–32)
CREAT SERPL-MCNC: 0.77 MG/DL (ref 0.6–1.3)
EOSINOPHIL # BLD AUTO: 0.07 THOUSAND/ΜL (ref 0–0.61)
EOSINOPHIL NFR BLD AUTO: 1 % (ref 0–6)
ERYTHROCYTE [DISTWIDTH] IN BLOOD BY AUTOMATED COUNT: 16.5 % (ref 11.6–15.1)
GFR SERPL CREATININE-BSD FRML MDRD: 85 ML/MIN/1.73SQ M
GLUCOSE SERPL-MCNC: 105 MG/DL (ref 65–140)
HCT VFR BLD AUTO: 35 % (ref 36.5–49.3)
HGB BLD-MCNC: 11.2 G/DL (ref 12–17)
LYMPHOCYTES # BLD AUTO: 1.21 THOUSANDS/ΜL (ref 0.6–4.47)
LYMPHOCYTES NFR BLD AUTO: 16 % (ref 14–44)
MAGNESIUM SERPL-MCNC: 2 MG/DL (ref 1.6–2.6)
MCH RBC QN AUTO: 20.5 PG (ref 26.8–34.3)
MCHC RBC AUTO-ENTMCNC: 32 G/DL (ref 31.4–37.4)
MCV RBC AUTO: 64 FL (ref 82–98)
MONOCYTES # BLD AUTO: 0.63 THOUSAND/ΜL (ref 0.17–1.22)
MONOCYTES NFR BLD AUTO: 9 % (ref 4–12)
NEUTROPHILS # BLD AUTO: 5.43 THOUSANDS/ΜL (ref 1.85–7.62)
NEUTS SEG NFR BLD AUTO: 74 % (ref 43–75)
NRBC BLD AUTO-RTO: 0 /100 WBCS
PLATELET # BLD AUTO: 132 THOUSANDS/UL (ref 149–390)
PMV BLD AUTO: 9.2 FL (ref 8.9–12.7)
POTASSIUM SERPL-SCNC: 4.1 MMOL/L (ref 3.5–5.3)
RBC # BLD AUTO: 5.46 MILLION/UL (ref 3.88–5.62)
SODIUM SERPL-SCNC: 138 MMOL/L (ref 136–145)
WBC # BLD AUTO: 7.38 THOUSAND/UL (ref 4.31–10.16)

## 2018-04-16 PROCEDURE — 97167 OT EVAL HIGH COMPLEX 60 MIN: CPT

## 2018-04-16 PROCEDURE — 93306 TTE W/DOPPLER COMPLETE: CPT | Performed by: INTERNAL MEDICINE

## 2018-04-16 PROCEDURE — 99232 SBSQ HOSP IP/OBS MODERATE 35: CPT | Performed by: SURGERY

## 2018-04-16 PROCEDURE — G8978 MOBILITY CURRENT STATUS: HCPCS | Performed by: PHYSICAL THERAPIST

## 2018-04-16 PROCEDURE — G8987 SELF CARE CURRENT STATUS: HCPCS

## 2018-04-16 PROCEDURE — 99222 1ST HOSP IP/OBS MODERATE 55: CPT | Performed by: INTERNAL MEDICINE

## 2018-04-16 PROCEDURE — 97163 PT EVAL HIGH COMPLEX 45 MIN: CPT | Performed by: PHYSICAL THERAPIST

## 2018-04-16 PROCEDURE — 99223 1ST HOSP IP/OBS HIGH 75: CPT | Performed by: PHYSICIAN ASSISTANT

## 2018-04-16 PROCEDURE — 85025 COMPLETE CBC W/AUTO DIFF WBC: CPT | Performed by: NURSE PRACTITIONER

## 2018-04-16 PROCEDURE — 80048 BASIC METABOLIC PNL TOTAL CA: CPT | Performed by: NURSE PRACTITIONER

## 2018-04-16 PROCEDURE — 83735 ASSAY OF MAGNESIUM: CPT | Performed by: NURSE PRACTITIONER

## 2018-04-16 PROCEDURE — G8988 SELF CARE GOAL STATUS: HCPCS

## 2018-04-16 PROCEDURE — 70450 CT HEAD/BRAIN W/O DYE: CPT

## 2018-04-16 PROCEDURE — G8979 MOBILITY GOAL STATUS: HCPCS | Performed by: PHYSICAL THERAPIST

## 2018-04-16 PROCEDURE — 99223 1ST HOSP IP/OBS HIGH 75: CPT | Performed by: INTERNAL MEDICINE

## 2018-04-16 RX ORDER — LEVETIRACETAM 500 MG/1
500 TABLET ORAL EVERY 12 HOURS SCHEDULED
Status: DISCONTINUED | OUTPATIENT
Start: 2018-04-16 | End: 2018-04-19 | Stop reason: HOSPADM

## 2018-04-16 RX ADMIN — SODIUM CHLORIDE 100 ML/HR: 0.9 INJECTION, SOLUTION INTRAVENOUS at 02:17

## 2018-04-16 RX ADMIN — ONDANSETRON 4 MG: 2 INJECTION INTRAMUSCULAR; INTRAVENOUS at 07:57

## 2018-04-16 RX ADMIN — LEVETIRACETAM 500 MG: 100 INJECTION, SOLUTION INTRAVENOUS at 08:13

## 2018-04-16 RX ADMIN — LEVETIRACETAM 500 MG: 500 TABLET ORAL at 21:54

## 2018-04-16 NOTE — CONSULTS
Consultation - Electrophysiology-Cardiology (EP)   Junior Varela 81 y o  male MRN: [id number]  Unit/Bed#: ICU 03 Encounter: [id number]      Inpatient consult to Electrophysiology  Consult performed by: PIPE METZGER  Consult ordered by: Cristi Ramirez          Assessment/Plan     Assessment:  1  Syncope versus mechanical fall/trauma  2  Subdural hematoma with pneumocephalus  3  History of paroxysmal atrial fibrillation,    A ) diagnosed 11/2017   B ) prescribed metoprolol and Eliquis, however had not been taking  4  Sick sinus syndrome,  Sinus bradycardia at rest, with heart rate 38 bpm  5  Hypertension  6  Preserved LV systolic function per echo this admission   A ) mild to moderate pulmonary hypertension, peak PA pressure 48 mmHg  7  Chronotropic incompetence - New exertional intolerance - 6 months - cannot keep up with children when walking to [employer] to meet wife  8  History of falls - history suggestive of narcolepsy; rule out cataplexy- review by sleep medicine         Plan:  Considering Sick sinus syndrome, PAF and need for rate lowering medications, chronotropic incompetence -  patient is recommended a pacemaker    Set up for sometime this week  Left sided device, vancomycin, contrast  He is left handed      Telemetry strips while awake:                  History of Present Illness   Physician Requesting Consult: Lucius Cisneros MD  Reason for Consult / Principal Problem: possible syncope    HPI: Junior Varela is a 81y o  year old male with a history of PAF diagnosed 11/2017 at that time a carpal tunnel surgery and hypertension  He follow up with cardiology at [facility] at the time of his AFib diagnosis, it was recommended that he start on Eliquis and metoprolol however per reports the patient was not taking this at home  He was also ordered a stress test and echocardiogram, however this was also not completed  He presented to the hospital under unclear circumstances    Per reports, his daughter called him earlier on the day of admission and he stated that he had bleeding from his left ear however could not recall what happened  He then reportedly left his house when he came home he remained confused  His daughter saw him later that day and also noted blood on his ear  He was then brought to the emergency room for further evaluation  He was found to have an acute right traumatic whole hemispheric subdural hematoma with scattered pneumocephalus, as well as right thumb distal phalanx fracture  Well be monitored on telemetry, he was found to have resting sinus bradycardia with heart rate of 35 beats per minute for which he was asymptomatic  He was seen in consultation by Cardiology, we have been asked to see him now in consultation regarding possible EP study or pacemaker  His son and daughter are present today, and her helping to provide history  His daughter called him at approximately 2:45 a m  on Saturday afternoon and he did not answer  He called her back several minutes later and she states he sounded confused  She asked him to take out her brothers dogs and he replied that he did not remember where he lived  He also told her that his ear was bleeding, but cannot recall why  Upon piecing together history, it appears that he was out at a residence dropping off a vehicle at the time of this phone call  The person at that resident was a nurse, and saw him when she gave him her keys  She denies that he seemed confused and she noticed no visible trauma  It appears that he suffered some injury in between then and when he called his daughter back a short time later  He does not recall talking to his daughter at that time  He then drove home and went inside  Several hours later he called his daughter again, and he told her that he thought he was beaten up    He also thought his thumb have been run over    He then told her again that his ear was bleeding, at which time his daughter came home and brought him to the hospital  They deny history of syncope, but state that he falls asleep very easily, and has fallen out of his kitchen chairs in the past  He denies symptoms throughout this hospitalization  Review of Systems  ROS as noted above, otherwise 12 point review of systems was performed and is negative  Historical Information   Past Medical History:   Diagnosis Date    A-fib ([address] )     Hernia, abdominal     Hypertension      Past Surgical History:   Procedure Laterality Date    APPENDECTOMY      age 33   Lacy [place] CARPAL TUNNEL RELEASE      COLONOSCOPY       [facility] [address] CATARACT EXTRACAP,INSERT LENS Right 4/3/2017    Procedure: EXTRACTION EXTRACAPSULAR CATARACT PHACO INTRAOCULAR LENS (IOL);   Surgeon: Jessica Quiroga MD;  Location: [facility] MAIN OR;  Service: Ophthalmology    TONSILLECTOMY      age 12     History   Alcohol Use No     History   Drug Use No     History   Smoking Status    Never Smoker   Smokeless Tobacco    Never Used     Family History:   Family History   Problem Relation Age of Onset    Cancer Mother      exp age 80    Heart disease Father     Heart disease Brother     Hypertension Brother        Meds/Allergies   Hospital Medications: Current Facility-Administered Medications   Medication Dose Route Frequency    acetaminophen (TYLENOL) tablet 975 mg  975 mg Oral Q6H PRN    atropine 1 mg/10 mL injection 0 5 mg  0 5 mg Intravenous PRN    labetalol (NORMODYNE) injection 10 mg  10 mg Intravenous Q4H PRN    levETIRAcetam (KEPPRA) 500 mg in sodium chloride 0 9 % 100 mL IVPB  500 mg Intravenous Q12H [address]    ondansetron (ZOFRAN) injection 4 mg  4 mg Intravenous Q6H PRN    oxyCODONE (ROXICODONE) IR tablet 2 5 mg  2 5 mg Oral Q4H PRN    oxyCODONE (ROXICODONE) IR tablet 5 mg  5 mg Oral Q4H PRN    sodium chloride 0 9 % infusion  100 mL/hr Intravenous Continuous     Home Medications:   Prescriptions Prior to Admission   Medication    ketorolac (ACULAR) 0 5 % ophthalmic solution       No Known Allergies    Objective   Vitals: Blood pressure (!) 173/83, pulse 82, temperature 97 5 °F (36 4 °C), temperature source Oral, resp  rate 14, height 5' 6" (1 676 m), weight 76 5 kg (168 lb 10 4 oz), SpO2 97 %  Orthostatic Blood Pressures    Flowsheet Row Most Recent Value   Blood Pressure   173/83 filed at 04/16/2018 1000   Patient Position - Orthostatic VS  Lying filed at 04/16/2018 0800            Intake/Output Summary (Last 24 hours) at 04/16/18 1139  Last data filed at 04/16/18 1001   Gross per 24 hour   Intake          2818 33 ml   Output             1060 ml   Net          1758 33 ml       Invasive Devices     Peripheral Intravenous Line            Peripheral IV 04/14/18 Left Antecubital 2 days                Physical Exam       The patient is currently not in any acute distress  No pallor cyanosis or icterus  Large cut over left ear with scar and ecchymosis over temple  Normal oral mucosa, mucosa is moist, no central cyanosis or icterus, posterior pharynx is not well visualized  Neck examined, no jugular lymphadenopathy, no thyromegaly,short thick neck  No significant chest wall deformity  Bilateral air entry is normal, id somewhat diminished at the bases, no significant crackles, no rhonchi  S1-S2 currently regular, no S3-S4 or murmur, no rubs or gallops  Abdomen is soft and nontender, normal bowel sounds,   Awake alert and oriented, facial symmetry is retained, extraocular movements are full and symmetric, head neck down and palate movement is bilateral and symmetric, speech is normal, follows commands and moves limbs  Normal mood and affect  No peripheral cyanosis or clubbing, no significant peripheral edema  No obvious rash ulcer nodules on exposed area of the skin  No jugular lymphadenopathy          Lab Results: I have personally reviewed pertinent lab results        Results from last 7 days  Lab Units 04/16/18  0439 04/15/18  0439 04/14/18  2156   WBC Thousand/uL 7 38 7 58 8 88 HEMOGLOBIN g/dL 11 2* 10 7* 11 3*   HEMATOCRIT % 35 0* 33 9* 34 5*   PLATELETS Thousands/uL 132* 130* 141*       Results from last 7 days  Lab Units 04/16/18  0439 04/15/18  0439 04/14/18  2156   SODIUM mmol/L 138 138 142   POTASSIUM mmol/L 4 1 4 0 4 2   CHLORIDE mmol/L 107 107 109*   CO2 mmol/L 26 28 28   BUN mg/dL 12 12 14   CREATININE mg/dL 0 77 0 86 1 00   GLUCOSE RANDOM mg/dL 105 122 127   CALCIUM mg/dL 8 0 8 4 9 2       Results from last 7 days  Lab Units 18   INR  1 05   PTT seconds 31       Results from last 7 days  Lab Units 04/16/18  0439 04/15/18  0439 04/14/18  2156   MAGNESIUM mg/dL 2 0 2 0 2 2       Imaging: I have personally reviewed pertinent reports  ECHO:   Results for orders placed during the hospital encounter of 18   Echo complete with contrast if indicated    Narrative [address] [facility], [address] [facility]  [phone]    Transthoracic Echocardiogram  2D, M-mode, Doppler, and Color Doppler    Study date:  15-Apr-2018    Patient: Jose Lockhart  MR number: [id number]  Account number: [de-identified]  : [id number]  Age: 81 years  Gender: Male  Status: Inpatient  Location: Bedside  Height:  Weight:  BP: 125/ 52 mmHg    Indications: Syncope  Diagnoses: R55  - Syncope and collapse    Sonographer:  Alfonso Lyles RDCS  Primary Physician:  DO Juan Miguel  Referring Physician:  Myriam Hankins MD  Group:  [facility]  Cardiology Fellow:  Aung Cage MD  Interpreting Physician:  Parmjit Parra MD    SUMMARY    LEFT VENTRICLE:  Systolic function was normal  Ejection fraction was estimated to be 55 %  There were no regional wall motion abnormalities  Wall thickness was mildly to moderately increased  The changes were consistent with concentric remodeling (increased wall thickness with normal wall mass)  MITRAL VALVE:  There was mild annular calcification    There was mild regurgitation  AORTIC VALVE:  The valve was trileaflet  Leaflets exhibited sclerosis  There was mild regurgitation  TRICUSPID VALVE:  There was mild to moderate regurgitation  Estimated peak PA pressure was 48 mmHg  The findings suggest mild to moderate pulmonary hypertension  IVC, HEPATIC VEINS:  Respirophasic changes were blunted (less than 50% variation)  HISTORY: PRIOR HISTORY: Atrial fibrillation, Hypertension  PROCEDURE: The procedure was performed at the bedside  This was a routine study  The transthoracic approach was used  The study included complete 2D imaging, M-mode, complete spectral Doppler, and color Doppler  The heart rate was 50 bpm,  at the start of the study  Images were obtained from the parasternal, apical, subcostal, and suprasternal notch acoustic windows  Image quality was adequate  LEFT VENTRICLE: Size was normal  Systolic function was normal  Ejection fraction was estimated to be 55 %  There were no regional wall motion abnormalities  Wall thickness was mildly to moderately increased  The changes were consistent  with concentric remodeling (increased wall thickness with normal wall mass)  DOPPLER: Left ventricular diastolic function parameters were normal     RIGHT VENTRICLE: The size was normal  Systolic function was normal     LEFT ATRIUM: Size was normal     RIGHT ATRIUM: Size was normal     MITRAL VALVE: There was mild annular calcification  Valve structure was normal  There was normal leaflet separation  DOPPLER: The transmitral velocity was within the normal range  There was no evidence for stenosis  There was mild  regurgitation  AORTIC VALVE: The valve was trileaflet  Leaflets exhibited sclerosis  DOPPLER: Transaortic velocity was within the normal range  There was no evidence for stenosis  There was mild regurgitation  TRICUSPID VALVE: The valve structure was normal  There was normal leaflet separation   DOPPLER: The transtricuspid velocity was within the normal range  There was no evidence for stenosis  There was mild to moderate regurgitation  The  regurgitant jet was directed centrally  Estimated peak PA pressure was 48 mmHg  The findings suggest mild to moderate pulmonary hypertension  PULMONIC VALVE: DOPPLER: The transpulmonic velocity was within the normal range  There was no evidence for stenosis  There was trace regurgitation  PERICARDIUM: There was no pericardial effusion  AORTA: The root exhibited normal size  SYSTEMIC VEINS: IVC: The inferior vena cava was normal in size  Respirophasic changes were blunted (less than 50% variation)      SYSTEM MEASUREMENT TABLES    2D  %FS: 27 33 %  Ao Diam: 3 74 cm  EDV(Teich): 95 08 ml  EF(Teich): 53 26 %  ESV(Teich): 44 44 ml  IVSd: 1 31 cm  LA Area: 18 96 cm2  LA Diam: 3 73 cm  LVEDV MOD A4C: 97 77 ml  LVEF MOD A4C: 57 52 %  LVESV MOD A4C: 41 53 ml  LVIDd: 4 55 cm  LVIDs: 3 31 cm  LVLd A4C: 8 11 cm  LVLs A4C: 6 6 cm  LVOT Diam: 2 21 cm  LVPWd: 0 92 cm  RA Area: 15 37 cm2  RVIDd: 3 7 cm  SV MOD A4C: 56 23 ml  SV(Teich): 50 63 ml    CW  AR Dec Owsley: 2 11 m/s2  AR Dec Time: 1731 3 ms  AR PHT: 502 08 ms  AR Vmax: 3 66 m/s  AR maxP 49 mmHg  TR Vmax: 3 09 m/s  TR maxP 29 mmHg    MM  TAPSE: 2 42 cm    PW  E': 0 07 m/s  E/E': 14 56  MV A Ilan: 0 69 m/s  MV Dec Owsley: 4 62 m/s2  MV DecT: 228 93 ms  MV E Ilan: 1 06 m/s  MV E/A Ratio: 1 53  MV PHT: 66 39 ms  MVA By PHT: 3 31 cm2    [employer] Accredited Echocardiography Laboratory    Prepared and electronically signed by    Vel Liu MD  Signed 2018 11:02:16         CATH/STRESS TEST: No prior studies     EKG:  Sinus bradycardia with heart rate 53 beats per minute, PAC noted, prolonged KS interval      VTE Prophylaxis: Sequential compression device (Venodyne)

## 2018-04-16 NOTE — PROGRESS NOTES
Pt transported to [facility], [place] room 906 with RN and PCA on monitor  Report given to Mir Musa RN
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
## 2018-04-16 NOTE — PLAN OF CARE
Problem: DISCHARGE PLANNING - CARE MANAGEMENT  Goal: Discharge to post-acute care or home with appropriate resources  INTERVENTIONS:  - Conduct assessment to determine patient/family and health care team treatment goals, and need for post-acute services based on payer coverage, community resources, and patient preferences, and barriers to discharge  - Address psychosocial, clinical, and financial barriers to discharge as identified in assessment in conjunction with the patient/family and health care team  - Arrange appropriate level of post-acute services according to patient's   needs and preference and payer coverage in collaboration with the physician and health care team  - Communicate with and update the patient/family, physician, and health care team regarding progress on the discharge plan  - Arrange appropriate transportation to post-acute venues  Assist pt and family with referrals to appropriate rehab facilities  Outcome: Progressing
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
## 2018-04-16 NOTE — PLAN OF CARE
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
## 2018-04-16 NOTE — PLAN OF CARE
SAFETY,RESTRAINT: NV/NON-SELF DESTRUCTIVE BEHAVIOR     Remains free of harm/injury (restraint for non violent/non self-detsructive behavior) Completed     Returns to optimal restraint-free functioning Completed          DISCHARGE PLANNING     Discharge to home or other facility with appropriate resources Progressing        DISCHARGE PLANNING - CARE MANAGEMENT     Discharge to post-acute care or home with appropriate resources Progressing        INFECTION - ADULT     Absence or prevention of progression during hospitalization Progressing     Absence of fever/infection during neutropenic period Progressing        Knowledge Deficit     Patient/family/caregiver demonstrates understanding of disease process, treatment plan, medications, and discharge instructions Progressing        PAIN - ADULT     Verbalizes/displays adequate comfort level or baseline comfort level Progressing        Potential for Falls     Patient will remain free of falls Progressing        Prexisting or High Potential for Compromised Skin Integrity     Skin integrity is maintained or improved Progressing        SAFETY ADULT     Patient will remain free of falls Progressing     Maintain or return to baseline ADL function Progressing     Maintain or return mobility status to optimal level Progressing
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
## 2018-04-16 NOTE — SOCIAL WORK
SOULEYMANE met with pt pts daughter and pts son to discuss d/c planning  CM informed pt and family that PT and OT's recommendation is STR  CM gave pt a list of STR referral  Pt stated that he is not interested but will think about it  SOULEYMANE updated SOULEYMANE RN Elen

## 2018-04-17 ENCOUNTER — APPOINTMENT (INPATIENT)
Dept: RADIOLOGY | Facility: HOSPITAL | Age: 83
DRG: 242 | End: 2018-04-17
Payer: MEDICARE

## 2018-04-17 PROCEDURE — 99232 SBSQ HOSP IP/OBS MODERATE 35: CPT | Performed by: INTERNAL MEDICINE

## 2018-04-17 PROCEDURE — 97535 SELF CARE MNGMENT TRAINING: CPT

## 2018-04-17 PROCEDURE — 99232 SBSQ HOSP IP/OBS MODERATE 35: CPT | Performed by: PHYSICIAN ASSISTANT

## 2018-04-17 PROCEDURE — 70480 CT ORBIT/EAR/FOSSA W/O DYE: CPT

## 2018-04-17 PROCEDURE — 99233 SBSQ HOSP IP/OBS HIGH 50: CPT | Performed by: PHYSICIAN ASSISTANT

## 2018-04-17 PROCEDURE — 99232 SBSQ HOSP IP/OBS MODERATE 35: CPT | Performed by: EMERGENCY MEDICINE

## 2018-04-17 RX ORDER — LIDOCAINE 50 MG/G
1 PATCH TOPICAL DAILY
Status: DISCONTINUED | OUTPATIENT
Start: 2018-04-17 | End: 2018-04-17

## 2018-04-17 RX ORDER — VANCOMYCIN HYDROCHLORIDE 1 G/200ML
1000 INJECTION, SOLUTION INTRAVENOUS ONCE
Status: COMPLETED | OUTPATIENT
Start: 2018-04-18 | End: 2018-04-18

## 2018-04-17 RX ORDER — LIDOCAINE 50 MG/G
2 PATCH TOPICAL DAILY
Status: DISCONTINUED | OUTPATIENT
Start: 2018-04-18 | End: 2018-04-19 | Stop reason: HOSPADM

## 2018-04-17 RX ORDER — LIDOCAINE 50 MG/G
1 PATCH TOPICAL ONCE
Status: COMPLETED | OUTPATIENT
Start: 2018-04-17 | End: 2018-04-17

## 2018-04-17 RX ORDER — ACETAMINOPHEN 325 MG/1
975 TABLET ORAL EVERY 6 HOURS SCHEDULED
Status: DISCONTINUED | OUTPATIENT
Start: 2018-04-17 | End: 2018-04-19 | Stop reason: HOSPADM

## 2018-04-17 RX ORDER — AMLODIPINE BESYLATE 5 MG/1
5 TABLET ORAL DAILY
Status: DISCONTINUED | OUTPATIENT
Start: 2018-04-17 | End: 2018-04-19 | Stop reason: HOSPADM

## 2018-04-17 RX ORDER — HEPARIN SODIUM 5000 [USP'U]/ML
5000 INJECTION, SOLUTION INTRAVENOUS; SUBCUTANEOUS EVERY 8 HOURS SCHEDULED
Status: DISCONTINUED | OUTPATIENT
Start: 2018-04-17 | End: 2018-04-18

## 2018-04-17 RX ADMIN — ACETAMINOPHEN 975 MG: 325 TABLET, FILM COATED ORAL at 18:05

## 2018-04-17 RX ADMIN — LIDOCAINE 1 PATCH: 50 PATCH TOPICAL at 08:52

## 2018-04-17 RX ADMIN — HEPARIN SODIUM 5000 UNITS: 5000 INJECTION, SOLUTION INTRAVENOUS; SUBCUTANEOUS at 21:26

## 2018-04-17 RX ADMIN — ACETAMINOPHEN 975 MG: 325 TABLET, FILM COATED ORAL at 11:16

## 2018-04-17 RX ADMIN — LEVETIRACETAM 500 MG: 500 TABLET ORAL at 21:26

## 2018-04-17 RX ADMIN — LIDOCAINE 1 PATCH: 50 PATCH TOPICAL at 14:47

## 2018-04-17 RX ADMIN — AMLODIPINE BESYLATE 5 MG: 5 TABLET ORAL at 10:09

## 2018-04-17 RX ADMIN — LEVETIRACETAM 500 MG: 500 TABLET ORAL at 08:03

## 2018-04-17 RX ADMIN — HEPARIN SODIUM 5000 UNITS: 5000 INJECTION, SOLUTION INTRAVENOUS; SUBCUTANEOUS at 14:02
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
## 2018-04-17 NOTE — PROGRESS NOTES
Progress Note - Electrophysiology-Cardiology (EP)   April Saavedra 81 y o  male MRN: [id number]  Unit/Bed#: [facility] 906-01 Encounter: [id number]      Assessment:  1  Syncope versus mechanical fall/trauma  2  Subdural hematoma with pneumocephalus  3  History of paroxysmal atrial fibrillation              A ) diagnosed 11/2017              B ) prescribed metoprolol and Eliquis, however had not been taking  4  Sick sinus syndrome,  Sinus bradycardia at rest, with heart rate 38 bpm  5  Hypertension  6  Preserved LV systolic function per echo this admission              A ) mild to moderate pulmonary hypertension, peak PA pressure 48 mmHg  7  Chronotropic incompetence - New exertional intolerance - 6 months - cannot keep up with children when walking to [facility] to meet wife  8  History of falls - history suggestive of narcolepsy; rule out cataplexy- review by sleep medicine     Plan:  1  Pacemaker has been recommended given his history of sinus bradycardia and chronotropic incompetence  Will plan to do this tomorrow, NPO at midnight, check a m  labs  2   Patient is left handed, and prefers a right sided pacemaker implant  Right-sided IV, IV Vanco on-call to be given at time of procedure tomorrow  Please hold a m  blood thinners in preparation for procedure  3   All questions answered, patient in agreement to proceed  Subjective/Objective   Chief Complaint: no complaints    Subjective: Patient states he is currently feeling well, denies chest pain, dyspnea, palpitations, dizziness or lightheadedness  His pain is well controlled       Objective:     Vitals: /60 (BP Location: Right arm)   Pulse 74   Temp 98 4 °F (36 9 °C) (Oral)   Resp 18   Ht 5' 6" (1 676 m)   Wt 76 5 kg (168 lb 10 4 oz)   SpO2 96%   BMI 27 22 kg/m²   Vitals:    04/16/18 0600 04/16/18 1446   Weight: 76 5 kg (168 lb 10 4 oz) 76 5 kg (168 lb 10 4 oz)     Orthostatic Blood Pressures    Flowsheet Row Most Recent Value   Blood Pressure  160/60 filed at 04/17/2018 1100   Patient Position - Orthostatic VS  Sitting filed at 04/17/2018 1100            Intake/Output Summary (Last 24 hours) at 04/17/18 1335  Last data filed at 04/17/18 0900   Gross per 24 hour   Intake              420 ml   Output              200 ml   Net              220 ml       Invasive Devices     Peripheral Intravenous Line            Peripheral IV 04/14/18 Left Antecubital 3 days                            Scheduled Meds:  Current Facility-Administered Medications:  acetaminophen 975 mg Oral Q6H [address] Kassy Thakkar MD   amLODIPine 5 mg Oral Daily Kassy Thakkar MD   atropine 0 5 mg Intravenous PRN ARPIT Vicente   heparin (porcine) 5,000 Units Subcutaneous Q8H [address] Kassy Thakkar MD   labetalol 10 mg Intravenous Q4H PRN Lucina Turner MD   levETIRAcetam 500 mg Oral Q12H [address] Tl Collins PA-C   [START ON 4/18/2018] lidocaine 2 patch Transdermal Daily [place] Tim Flaherty PA-C   ondansetron 4 mg Intravenous Q6H PRN Lucina Turner MD   oxyCODONE 2 5 mg Oral Q4H PRN Lucina Turner MD   oxyCODONE 5 mg Oral Q4H PRN Lucina Turner MD     Continuous Infusions:   PRN Meds: atropine    labetalol    ondansetron    oxyCODONE    oxyCODONE    Review of Systems: Cardiovascular ROS: negative for - chest pain, dyspnea on exertion or palpitations    Physical Exam:   GEN: NAD, alert and oriented, well appearing  SKIN: dry without significant lesions or rashes  HEENT: PERRL, EOMs intact  NECK: No JVD appreciated  CARDIOVASCULAR: RRR, normal S1, S2 without murmurs, rubs, or gallops appreciated  LUNGS: Clear to auscultation bilaterally without wheezes, rhonchi, or rales  ABDOMEN: Soft, nontender, nondistended  EXTREMITIES/VASCULAR: LE perfused without clubbing, cyanosis, or edema b/l  PSYCH: Normal mood and affect  NEURO: CN ll-Xll grossly intact                Lab Results: I have personally reviewed pertinent lab results        Results from last 7 days  Lab Units 189 04/15/18  0439 18  2156   WBC Thousand/uL 7 38 7 58 8 88   HEMOGLOBIN g/dL 11 2* 10 7* 11 3*   HEMATOCRIT % 35 0* 33 9* 34 5*   PLATELETS Thousands/uL 132* 130* 141*       Results from last 7 days  Lab Units 18  0439 04/15/18  0439 18  2156   SODIUM mmol/L 138 138 142   POTASSIUM mmol/L 4 1 4 0 4 2   CHLORIDE mmol/L 107 107 109*   CO2 mmol/L 26 28 28   BUN mg/dL 12 12 14   CREATININE mg/dL 0 77 0 86 1 00   GLUCOSE RANDOM mg/dL 105 122 127   CALCIUM mg/dL 8 0 8 4 9 2       Results from last 7 days  Lab Units 18  215   INR  1 05   PTT seconds 31       Results from last 7 days  Lab Units 04/16/18  0439 04/15/18  0439 04/14/18  2156   MAGNESIUM mg/dL 2 0 2 0 2 2         Imaging: I have personally reviewed pertinent reports  Results for orders placed during the hospital encounter of 18   Echo complete with contrast if indicated    Narrative [facility] [address]  [facility] [place], [address] [facility]  [phone]    Transthoracic Echocardiogram  2D, M-mode, Doppler, and Color Doppler    Study date:  15-Apr-2018    Patient: Jovi Johnson  MR number: [id number]  Account number: [de-identified]  : [id number]  Age: 81 years  Gender: Male  Status: Inpatient  Location: Bedside  Height:  Weight:  BP: 125/ 52 mmHg    Indications: Syncope  Diagnoses: R55  - Syncope and collapse    Sonographer:  Salina Michelle RDCS  Primary Physician:  Melia Ching DO  Referring Physician:  Lissette Mcdonough MD  Group:  [facility]  Cardiology Fellow:  Jarrett Reynolds MD  Interpreting Physician:  Adrián Elias MD    SUMMARY    LEFT VENTRICLE:  Systolic function was normal  Ejection fraction was estimated to be 55 %  There were no regional wall motion abnormalities  Wall thickness was mildly to moderately increased  The changes were consistent with concentric remodeling (increased wall thickness with normal wall mass)      MITRAL VALVE:  There was mild annular calcification  There was mild regurgitation  AORTIC VALVE:  The valve was trileaflet  Leaflets exhibited sclerosis  There was mild regurgitation  TRICUSPID VALVE:  There was mild to moderate regurgitation  Estimated peak PA pressure was 48 mmHg  The findings suggest mild to moderate pulmonary hypertension  IVC, HEPATIC VEINS:  Respirophasic changes were blunted (less than 50% variation)  HISTORY: PRIOR HISTORY: Atrial fibrillation, Hypertension  PROCEDURE: The procedure was performed at the bedside  This was a routine study  The transthoracic approach was used  The study included complete 2D imaging, M-mode, complete spectral Doppler, and color Doppler  The heart rate was 50 bpm,  at the start of the study  Images were obtained from the parasternal, apical, subcostal, and suprasternal notch acoustic windows  Image quality was adequate  LEFT VENTRICLE: Size was normal  Systolic function was normal  Ejection fraction was estimated to be 55 %  There were no regional wall motion abnormalities  Wall thickness was mildly to moderately increased  The changes were consistent  with concentric remodeling (increased wall thickness with normal wall mass)  DOPPLER: Left ventricular diastolic function parameters were normal     RIGHT VENTRICLE: The size was normal  Systolic function was normal     LEFT ATRIUM: Size was normal     RIGHT ATRIUM: Size was normal     MITRAL VALVE: There was mild annular calcification  Valve structure was normal  There was normal leaflet separation  DOPPLER: The transmitral velocity was within the normal range  There was no evidence for stenosis  There was mild  regurgitation  AORTIC VALVE: The valve was trileaflet  Leaflets exhibited sclerosis  DOPPLER: Transaortic velocity was within the normal range  There was no evidence for stenosis  There was mild regurgitation      TRICUSPID VALVE: The valve structure was normal  There was normal leaflet separation  DOPPLER: The transtricuspid velocity was within the normal range  There was no evidence for stenosis  There was mild to moderate regurgitation  The  regurgitant jet was directed centrally  Estimated peak PA pressure was 48 mmHg  The findings suggest mild to moderate pulmonary hypertension  PULMONIC VALVE: DOPPLER: The transpulmonic velocity was within the normal range  There was no evidence for stenosis  There was trace regurgitation  PERICARDIUM: There was no pericardial effusion  AORTA: The root exhibited normal size  SYSTEMIC VEINS: IVC: The inferior vena cava was normal in size  Respirophasic changes were blunted (less than 50% variation)      SYSTEM MEASUREMENT TABLES    2D  %FS: 27 33 %  Ao Diam: 3 74 cm  EDV(Teich): 95 08 ml  EF(Teich): 53 26 %  ESV(Teich): 44 44 ml  IVSd: 1 31 cm  LA Area: 18 96 cm2  LA Diam: 3 73 cm  LVEDV MOD A4C: 97 77 ml  LVEF MOD A4C: 57 52 %  LVESV MOD A4C: 41 53 ml  LVIDd: 4 55 cm  LVIDs: 3 31 cm  LVLd A4C: 8 11 cm  LVLs A4C: 6 6 cm  LVOT Diam: 2 21 cm  LVPWd: 0 92 cm  RA Area: 15 37 cm2  RVIDd: 3 7 cm  SV MOD A4C: 56 23 ml  SV(Teich): 50 63 ml    CW  AR Dec Grady: 2 11 m/s2  AR Dec Time: 1731 3 ms  AR PHT: 502 08 ms  AR Vmax: 3 66 m/s  AR maxP 49 mmHg  TR Vmax: 3 09 m/s  TR maxP 29 mmHg    MM  TAPSE: 2 42 cm    PW  E': 0 07 m/s  E/E': 14 56  MV A Ilan: 0 69 m/s  MV Dec Grady: 4 62 m/s2  MV DecT: 228 93 ms  MV E Lian: 1 06 m/s  MV E/A Ratio: 1 53  MV PHT: 66 39 ms  MVA By PHT: 3 31 cm2    [employer] Accredited Echocardiography Laboratory    Prepared and electronically signed by    Sarahy Saeed MD  Signed 2018 11:02:16         EKG/telemetry: NSR with periods of sinus bradycardia    VTE Pharmacologic Prophylaxis: Heparin subQ

## 2018-04-17 NOTE — ASSESSMENT & PLAN NOTE
PRN labetalol    -see if pt   Has home regimen of antihypertensives    -attempt to swtich to po meds

## 2018-04-17 NOTE — ASSESSMENT & PLAN NOTE
Seen by EP  Will get pacer later this week for sick sinus syndrome     -discuss with cardiology if pt   Should be on anticoagulation long term 2/2 afib

## 2018-04-17 NOTE — PLAN OF CARE
Problem: OCCUPATIONAL THERAPY ADULT  Goal: Performs self-care activities at highest level of function for planned discharge setting  See evaluation for individualized goals  Treatment Interventions: ADL retraining, Functional transfer training, Endurance training, Cognitive reorientation, Patient/family training, Equipment evaluation/education, Compensatory technique education, Continued evaluation, Activityengagement, Energy conservation          See flowsheet documentation for full assessment, interventions and recommendations  Outcome: Progressing  Limitation: Decreased ADL status, Decreased cognition, Decreased Safe judgement during ADL, Decreased endurance, Decreased self-care trans, Decreased high-level ADLs (BALANCE, MEDICAL STATUS)  Prognosis: Fair  Assessment: PT SEEN FOR OT TX SESSION FOCUSING ON LB ADLS, FUNCTIONAL TRANSFERS/MOBILITY AND BED MOBILITY  PT REQUIRED MOD A TO DON R-SOCK WITH UNSAFE TECHNIQUE, THROWING BODY BACKWARDS ONTO BED  PT EDUCATED ON SAFE COMPENSATORY TECHNIQUE OF BRINGING FOOT TO OPPOSITE KNEE  PT DEMOSNTRATED G CARRY OVER TO DON L-SOCK  PT REQUIRES MIN A FOR SIT<->STAND TRANSFERS AND LIMITED FUNCTIONAL MOBILITY WITHOUT AD  PT IMPULSIVE WITH LIMITED SAFETY AWARENESS, REQUIRING CUES FOR SLOWING OF PACE T/O SESSION  OT SESSION LIMITED 2' PT BEING TRANSPORTED TO CAT SCAN  PT REQUIRED MOD A FOR SIT->SUPINE TRANSFER ONTO STRETCHER FOR TRANSPORT  PT IS PROGRESSING, HOWEVER REMAINS LIMITED  CONT TO RECOMMEND INPT REHAB UPON D/C  IF PT CONTS TO REFUSE, RECOMMEND HOME OT, INCREASED FAMILY SUPPORT AND FORMAL COGNITIVE ASSESSMENT PRIOR TO D/C  WILL CONT TO FOLLOW        OT Discharge Recommendation: Short Term Rehab  OT - OK to Discharge:  (TO STR AT THIS TIME)

## 2018-04-17 NOTE — OCCUPATIONAL THERAPY NOTE
OccupationalTherapy Progress Note     Patient Name: Junior REY Date: 4/17/2018  Problem List  Patient Active Problem List   Diagnosis    Subdural hematoma ([facility] [address] )    Pneumocephalus    Closed nondisplaced fracture of distal phalanx of right thumb    Essential hypertension    Atrial fibrillation ([facility] [address] )    Acute pain of left shoulder           04/17/18 1721   Restrictions/Precautions   Weight Bearing Precautions Per Order Yes   RUE Weight Bearing Per Order NWB   Other Precautions Cognitive; Chair Alarm; Bed Alarm;WBS;Fall Risk;Pain   Pain Assessment   Pain Assessment 0-10   Pain Score 7   Pain Location Rib cage; Shoulder   Pain Orientation [facility] Pain Intervention(s) Repositioned; Ambulation/increased activity; Distraction; Emotional support   Response to Interventions TOLERATED    ADL   LB Dressing Assistance 3  Moderate Assistance   LB Dressing Deficit Don/doff R sock;Steadying   Bed Mobility   Supine to Sit 3  Moderate assistance   Additional items Assist x 1; Increased time required;Verbal cues;LE management   Transfers   Sit to Stand 4  Minimal assistance   Additional items Assist x 1; Increased time required;Verbal cues   Stand to Sit 4  Minimal assistance   Additional items Assist x 1; Increased time required;Verbal cues   Functional Mobility   Functional Mobility 4  Minimal assistance   Additional Comments WITHOUT AD    Cognition   Overall Cognitive Status Impaired   Arousal/Participation Alert; Cooperative   Attention Attends with cues to redirect   Orientation Level Oriented to person;Oriented to place;Oriented to time;Disoriented to situation   Memory Decreased recall of precautions;Decreased recall of recent events;Decreased short term memory   Following Commands Follows one step commands without difficulty   Activity Tolerance   Activity Tolerance Patient limited by fatigue;Treatment limited secondary to medical complications (Comment)   Assessment   Assessment PT SEEN FOR OT TX SESSION FOCUSING ON LB ADLS, FUNCTIONAL TRANSFERS/MOBILITY AND BED MOBILITY  PT REQUIRED MOD A TO DON R-SOCK WITH UNSAFE TECHNIQUE, THROWING BODY BACKWARDS ONTO BED  PT EDUCATED ON SAFE COMPENSATORY TECHNIQUE OF BRINGING FOOT TO OPPOSITE KNEE  PT DEMOSNTRATED G CARRY OVER TO DON L-SOCK  PT REQUIRES MIN A FOR SIT<->STAND TRANSFERS AND LIMITED FUNCTIONAL MOBILITY WITHOUT AD  PT IMPULSIVE WITH LIMITED SAFETY AWARENESS, REQUIRING CUES FOR SLOWING OF PACE T/O SESSION  OT SESSION LIMITED 2' PT BEING TRANSPORTED TO CAT SCAN  PT REQUIRED MOD A FOR SIT->SUPINE TRANSFER ONTO STRETCHER FOR TRANSPORT  PT IS PROGRESSING, HOWEVER REMAINS LIMITED  CONT TO RECOMMEND INPT REHAB UPON D/C  IF PT CONTS TO REFUSE, RECOMMEND HOME OT, INCREASED FAMILY SUPPORT AND FORMAL COGNITIVE ASSESSMENT PRIOR TO D/C  WILL CONT TO FOLLOW  Plan   Treatment Interventions ADL retraining;Functional transfer training; Endurance training;Cognitive reorientation;Equipment evaluation/education;Patient/family training; Compensatory technique education; Energy conservation   Goal Expiration Date 04/30/18   Treatment Day 1   OT Frequency 3-5x/wk   Barthel Index   Feeding 10   Bathing 0   Grooming Score 5   Dressing Score 5   Bladder Score 10   Bowels Score 10   Toilet Use Score 5   Transfers (Bed/Chair) Score 10   Mobility (Level Surface) Score 0   Stairs Score 0   Barthel Index Score 55   Modified Luce Scale   Modified Luce Scale 4       Documentation completed by ZENON Berkowitz, OTR/L

## 2018-04-17 NOTE — SOCIAL WORK
CM met with pt to discuss d/c plan  Pt was recommended for IP rehab but is refusing  He and his dtr would prefer the pt go home with [facility] rather than IP rehab   CM will place referrals

## 2018-04-17 NOTE — PROGRESS NOTES
Progress Note - Junior Varela 1935, 81 y o  male MRN: [id number]    Unit/Bed#: [facility] 906-01 Encounter: [id number]    Primary Care Provider: Jasmin Copeland DO   Date and time admitted to hospital: 4/14/2018  8:13 PM        Acute pain of left shoulder   Assessment & Plan    - no fracture  -seen by ortho  -outpatient f/u        Atrial fibrillation [facility])   Assessment & Plan    Seen by EP  Will get pacer later this week for sick sinus syndrome     -discuss with cardiology if pt  Should be on anticoagulation long term 2/2 afib        Essential hypertension   Assessment & Plan    PRN labetalol    -see if pt  Has home regimen of antihypertensives    -attempt to swtich to po meds        Closed nondisplaced fracture of distal phalanx of right thumb   Assessment & Plan    Maintain finger splint  Outpatient f/u ortho  Pneumocephalus   Assessment & Plan    Stable  Continue to monitor        * Subdural hematoma (HCC)   Assessment & Plan    -stable on repeat ct  -keppra x1 week  -start chem prophylaxis                  Subjective/Objective   Chief Complaint: L shoulder pain    Subjective/Objective: no issues overnight    Meds/Allergies   Prescriptions Prior to Admission   Medication Sig Dispense Refill Last Dose    ketorolac (ACULAR) 0 5 % ophthalmic solution Administer 1 drop to the right eye 4 (four) times a day for 30 days 6 mL 0        Vitals: Blood pressure 162/78, pulse 65, temperature 98 6 °F (37 °C), temperature source Oral, resp  rate 18, height 5' 6" (1 676 m), weight 76 5 kg (168 lb 10 4 oz), SpO2 96 %  Body mass index is 27 22 kg/m²   SpO2: SpO2: 96 %    ABG: No results found for: PHART, JJP2BCP, PO2ART, XJP2OUO, D7BQRKTN, BEART, SOURCE      Intake/Output Summary (Last 24 hours) at 04/17/18 0935  Last data filed at 04/17/18 0900   Gross per 24 hour   Intake           621 67 ml   Output              200 ml   Net           421 67 ml       Invasive Devices     Peripheral Intravenous Line Peripheral IV 04/14/18 Left Antecubital 3 days                Nutrition/GI Proph/Bowel Reg: diet    Physical Exam:   GENERAL APPEARANCE: nad aaox3  HEENT: ncat GUEVARA MMM  CV: irreg irreg  No m/r/g  LUNGS: cta bl  ABD: soft nt nd bs+  EXT: no c/c/e  Intact distal pulses  NEURO: GCS15   Nonfocal  Normal speech  SKIN: warm, dry    Lab Results: BMP/CMP: No results found for: NA, K, CL, CO2, ANIONGAP, BUN, CREATININE, GLUCOSE, CALCIUM, AST, ALT, ALKPHOS, PROT, ALBUMIN, BILITOT, EGFR and CBC: No results found for: WBC, HGB, HCT, MCV, PLT, ADJUSTEDWBC, MCH, MCHC, RDW, MPV, NRBC  Imaging/EKG Studies:   Other Studies:   VTE Prophylaxis: SQH

## 2018-04-17 NOTE — PROGRESS NOTES
ENT    Chart reviewed  Understand there is a question of hemotympanum/pneumocephalus  Please obtained dedicated temporal bone CT  We will see and evaluate the patient after this study is completed

## 2018-04-18 ENCOUNTER — APPOINTMENT (OUTPATIENT)
Dept: NON INVASIVE DIAGNOSTICS | Facility: HOSPITAL | Age: 83
DRG: 242 | End: 2018-04-18
Payer: MEDICARE

## 2018-04-18 ENCOUNTER — APPOINTMENT (INPATIENT)
Dept: RADIOLOGY | Facility: HOSPITAL | Age: 83
DRG: 242 | End: 2018-04-18
Payer: MEDICARE

## 2018-04-18 PROBLEM — S02.19XA TEMPORAL BONE FRACTURE (HCC): Status: ACTIVE | Noted: 2018-04-18

## 2018-04-18 LAB
ANION GAP SERPL CALCULATED.3IONS-SCNC: 7 MMOL/L (ref 4–13)
BUN SERPL-MCNC: 13 MG/DL (ref 5–25)
CALCIUM SERPL-MCNC: 8.4 MG/DL (ref 8.3–10.1)
CHLORIDE SERPL-SCNC: 108 MMOL/L (ref 100–108)
CO2 SERPL-SCNC: 27 MMOL/L (ref 21–32)
CREAT SERPL-MCNC: 0.74 MG/DL (ref 0.6–1.3)
ERYTHROCYTE [DISTWIDTH] IN BLOOD BY AUTOMATED COUNT: 16.3 % (ref 11.6–15.1)
GFR SERPL CREATININE-BSD FRML MDRD: 87 ML/MIN/1.73SQ M
GLUCOSE SERPL-MCNC: 111 MG/DL (ref 65–140)
HCT VFR BLD AUTO: 32.7 % (ref 36.5–49.3)
HGB BLD-MCNC: 10.8 G/DL (ref 12–17)
MCH RBC QN AUTO: 21.1 PG (ref 26.8–34.3)
MCHC RBC AUTO-ENTMCNC: 33 G/DL (ref 31.4–37.4)
MCV RBC AUTO: 64 FL (ref 82–98)
PLATELET # BLD AUTO: 153 THOUSANDS/UL (ref 149–390)
PMV BLD AUTO: 9.5 FL (ref 8.9–12.7)
POTASSIUM SERPL-SCNC: 3.5 MMOL/L (ref 3.5–5.3)
RBC # BLD AUTO: 5.13 MILLION/UL (ref 3.88–5.62)
SODIUM SERPL-SCNC: 142 MMOL/L (ref 136–145)
WBC # BLD AUTO: 4.28 THOUSAND/UL (ref 4.31–10.16)

## 2018-04-18 PROCEDURE — C1892 INTRO/SHEATH,FIXED,PEEL-AWAY: HCPCS | Performed by: PHYSICIAN ASSISTANT

## 2018-04-18 PROCEDURE — C1898 LEAD, PMKR, OTHER THAN TRANS: HCPCS

## 2018-04-18 PROCEDURE — 02HK3JZ INSERTION OF PACEMAKER LEAD INTO RIGHT VENTRICLE, PERCUTANEOUS APPROACH: ICD-10-PCS | Performed by: INTERNAL MEDICINE

## 2018-04-18 PROCEDURE — G0515 COGNITIVE SKILLS DEVELOPMENT: HCPCS

## 2018-04-18 PROCEDURE — 33208 INSRT HEART PM ATRIAL & VENT: CPT | Performed by: INTERNAL MEDICINE

## 2018-04-18 PROCEDURE — 02H63JZ INSERTION OF PACEMAKER LEAD INTO RIGHT ATRIUM, PERCUTANEOUS APPROACH: ICD-10-PCS | Performed by: INTERNAL MEDICINE

## 2018-04-18 PROCEDURE — 71045 X-RAY EXAM CHEST 1 VIEW: CPT

## 2018-04-18 PROCEDURE — 80048 BASIC METABOLIC PNL TOTAL CA: CPT | Performed by: PHYSICIAN ASSISTANT

## 2018-04-18 PROCEDURE — 93005 ELECTROCARDIOGRAM TRACING: CPT | Performed by: PHYSICIAN ASSISTANT

## 2018-04-18 PROCEDURE — C1785 PMKR, DUAL, RATE-RESP: HCPCS

## 2018-04-18 PROCEDURE — 33208 INSRT HEART PM ATRIAL & VENT: CPT | Performed by: PHYSICIAN ASSISTANT

## 2018-04-18 PROCEDURE — 0JH606Z INSERTION OF PACEMAKER, DUAL CHAMBER INTO CHEST SUBCUTANEOUS TISSUE AND FASCIA, OPEN APPROACH: ICD-10-PCS | Performed by: INTERNAL MEDICINE

## 2018-04-18 PROCEDURE — 85027 COMPLETE CBC AUTOMATED: CPT | Performed by: PHYSICIAN ASSISTANT

## 2018-04-18 PROCEDURE — 99232 SBSQ HOSP IP/OBS MODERATE 35: CPT | Performed by: SURGERY

## 2018-04-18 PROCEDURE — 97530 THERAPEUTIC ACTIVITIES: CPT

## 2018-04-18 RX ORDER — PROPOFOL 10 MG/ML
INJECTION, EMULSION INTRAVENOUS AS NEEDED
Status: DISCONTINUED | OUTPATIENT
Start: 2018-04-18 | End: 2018-04-18 | Stop reason: SURG

## 2018-04-18 RX ORDER — FENTANYL CITRATE 50 UG/ML
INJECTION, SOLUTION INTRAMUSCULAR; INTRAVENOUS AS NEEDED
Status: DISCONTINUED | OUTPATIENT
Start: 2018-04-18 | End: 2018-04-18 | Stop reason: SURG

## 2018-04-18 RX ORDER — GENTAMICIN SULFATE 40 MG/ML
INJECTION, SOLUTION INTRAMUSCULAR; INTRAVENOUS CODE/TRAUMA/SEDATION MEDICATION
Status: COMPLETED | OUTPATIENT
Start: 2018-04-18 | End: 2018-04-18

## 2018-04-18 RX ORDER — HEPARIN SODIUM 5000 [USP'U]/ML
5000 INJECTION, SOLUTION INTRAVENOUS; SUBCUTANEOUS EVERY 8 HOURS SCHEDULED
Status: DISCONTINUED | OUTPATIENT
Start: 2018-04-18 | End: 2018-04-19 | Stop reason: HOSPADM

## 2018-04-18 RX ORDER — LIDOCAINE HYDROCHLORIDE 10 MG/ML
INJECTION, SOLUTION INFILTRATION; PERINEURAL CODE/TRAUMA/SEDATION MEDICATION
Status: COMPLETED | OUTPATIENT
Start: 2018-04-18 | End: 2018-04-18

## 2018-04-18 RX ORDER — PROPOFOL 10 MG/ML
INJECTION, EMULSION INTRAVENOUS CONTINUOUS PRN
Status: DISCONTINUED | OUTPATIENT
Start: 2018-04-18 | End: 2018-04-18 | Stop reason: SURG

## 2018-04-18 RX ORDER — SODIUM CHLORIDE 9 MG/ML
INJECTION, SOLUTION INTRAVENOUS CONTINUOUS PRN
Status: DISCONTINUED | OUTPATIENT
Start: 2018-04-18 | End: 2018-04-18 | Stop reason: SURG

## 2018-04-18 RX ADMIN — FENTANYL CITRATE 25 MCG: 50 INJECTION, SOLUTION INTRAMUSCULAR; INTRAVENOUS at 16:21

## 2018-04-18 RX ADMIN — GENTAMICIN SULFATE 80 MG: 40 INJECTION, SOLUTION INTRAMUSCULAR; INTRAVENOUS at 17:06

## 2018-04-18 RX ADMIN — ACETAMINOPHEN 975 MG: 325 TABLET, FILM COATED ORAL at 11:43

## 2018-04-18 RX ADMIN — PROPOFOL 70 MCG/KG/MIN: 10 INJECTION, EMULSION INTRAVENOUS at 16:22

## 2018-04-18 RX ADMIN — LEVETIRACETAM 500 MG: 500 TABLET ORAL at 08:16

## 2018-04-18 RX ADMIN — PROPOFOL 40 MG: 10 INJECTION, EMULSION INTRAVENOUS at 16:21

## 2018-04-18 RX ADMIN — IOHEXOL 20 ML: 350 INJECTION, SOLUTION INTRAVENOUS at 16:42

## 2018-04-18 RX ADMIN — LIDOCAINE 2 PATCH: 50 PATCH TOPICAL at 08:16

## 2018-04-18 RX ADMIN — LIDOCAINE HYDROCHLORIDE 17 ML: 10 INJECTION, SOLUTION INFILTRATION; PERINEURAL at 16:38

## 2018-04-18 RX ADMIN — VANCOMYCIN HYDROCHLORIDE 1000 MG: 1 INJECTION, SOLUTION INTRAVENOUS at 15:21

## 2018-04-18 RX ADMIN — LEVETIRACETAM 500 MG: 500 TABLET ORAL at 21:45

## 2018-04-18 RX ADMIN — AMLODIPINE BESYLATE 5 MG: 5 TABLET ORAL at 08:16

## 2018-04-18 RX ADMIN — SODIUM CHLORIDE: 0.9 INJECTION, SOLUTION INTRAVENOUS at 16:03

## 2018-04-18 RX ADMIN — ACETAMINOPHEN 975 MG: 325 TABLET, FILM COATED ORAL at 18:52

## 2018-04-18 RX ADMIN — ACETAMINOPHEN 975 MG: 325 TABLET, FILM COATED ORAL at 05:26

## 2018-04-18 NOTE — PROGRESS NOTES
Progress Note - Jin Chappell 1935, 81 y o  male MRN: [id number]    Unit/Bed#: [facility] 906-01 Encounter: [id number]    Primary Care Provider: Rosalind Grier DO   Date and time admitted to hospital: 4/14/2018  8:13 PM        Temporal bone fracture [facility])   Assessment & Plan    Outpatient ENT f/u        Acute pain of left shoulder   Assessment & Plan    - no fracture  -seen by ortho  -outpatient f/u        Atrial fibrillation [facility])   Assessment & Plan    Seen by EP  Will get pacer today for sick sinus syndrome  -NPO sips with meds        Essential hypertension   Assessment & Plan    norvasc        Closed nondisplaced fracture of distal phalanx of right thumb   Assessment & Plan    Maintain finger splint  Outpatient f/u ortho  Pneumocephalus   Assessment & Plan    -likely 2/2 temporal bone fx  Outpatient ENT f/u for audiogram        * Subdural hematoma (HCC)   Assessment & Plan    -stable on repeat ct  -keppra x1 week  -SQH                  Subjective/Objective   Chief Complaint: no complaints    Subjective/Objective: pt scheduled for pacer today  NPO sips with meds    Meds/Allergies   Prescriptions Prior to Admission   Medication Sig Dispense Refill Last Dose    ketorolac (ACULAR) 0 5 % ophthalmic solution Administer 1 drop to the right eye 4 (four) times a day for 30 days 6 mL 0        Vitals: Blood pressure 145/69, pulse (!) 54, temperature 98 °F (36 7 °C), temperature source Oral, resp  rate 18, height 5' 6" (1 676 m), weight 76 5 kg (168 lb 10 4 oz), SpO2 96 %  Body mass index is 27 22 kg/m²   SpO2: SpO2: 96 %    ABG: No results found for: PHART, YNG4AFC, PO2ART, HPX9MPG, M2HOGJAZ, BEART, SOURCE      Intake/Output Summary (Last 24 hours) at 04/18/18 0737  Last data filed at 04/18/18 0634   Gross per 24 hour   Intake              900 ml   Output                0 ml   Net              900 ml       Invasive Devices     Peripheral Intravenous Line            Peripheral IV 04/14/18 Left Antecubital 4 days                Nutrition/GI Proph/Bowel Reg: diet    Physical Exam:   GENERAL APPEARANCE: nad aaox3  HEENT: NCAT  Scar on L ear c/d/i  L hemotympanum stable  CV: rrr no m/r/g  LUNGS: cta bl  ABD: soft nt nd bs+  EXT: no c/c/e  Intact distal pulses  NEURO: GCS 15  Nonfocal  Normal strength  SKIN: warm, dry    Lab Results:   BMP/CMP:   Lab Results   Component Value Date     04/18/2018    K 3 5 04/18/2018     04/18/2018    CO2 27 04/18/2018    ANIONGAP 7 04/18/2018    BUN 13 04/18/2018    CREATININE 0 74 04/18/2018    GLUCOSE 111 04/18/2018    CALCIUM 8 4 04/18/2018    EGFR 87 04/18/2018    and CBC:   Lab Results   Component Value Date    WBC 4 28 (L) 04/18/2018    HGB 10 8 (L) 04/18/2018    HCT 32 7 (L) 04/18/2018    MCV 64 (L) 04/18/2018     04/18/2018    MCH 21 1 (L) 04/18/2018    MCHC 33 0 04/18/2018    RDW 16 3 (H) 04/18/2018    MPV 9 5 04/18/2018     Imaging/EKG Studies:   Other Studies:   VTE Prophylaxis: SQH  Will hold in prep of pacer later today

## 2018-04-18 NOTE — OCCUPATIONAL THERAPY NOTE
Occupational Therapy Treatment Note:       04/18/18 1059   Pain Assessment   Pain Assessment No/denies pain   Pain Score No Pain   ADL   Where Assessed Supine, bed   Grooming Assistance 5  Supervision/Setup   Grooming Deficit Setup; Wash/dry hands; Wash/dry face   UB Bathing Assistance 4  Minimal Assistance   UB Bathing Deficit Setup;Supervision/safety; Increased time to complete   LB Bathing Assistance 4  Minimal Assistance   LB Bathing Deficit Setup;Verbal cueing;Supervision/safety; Increased time to complete   Transfers   Sit to Stand 4  Minimal assistance   Additional items Assist x 1; Increased time required;Verbal cues   Stand to Sit 4  Minimal assistance   Additional items Assist x 1; Increased time required;Verbal cues   Cognition   Overall Cognitive Status Impaired   Arousal/Participation Alert; Cooperative   Attention Attends with cues to redirect   Orientation Level Oriented X4   Memory Decreased short term memory;Decreased recall of recent events;Decreased recall of precautions   Following Commands Follows one step commands without difficulty   Comments Decreased insight into results of "injury"; decreased judgment with dynamic functional balance   Cognition Assessment Tools ACLS   Score 5 4   Activity Tolerance   Activity Tolerance Patient tolerated treatment well   Medical Staff Made Aware ok to see per MAKENNA Preciado   Assessment   Assessment Patient participated in skilled OT with focus on formal cognitive skills assessment utilzing the ACLS  Patient scored a 5 4 with results as indicated with attachment  Patient cooperative and patient throughtout with some repetition regarding "what happened that day"  Patient focused on "I can walk fine, my house is set up, I can do what I need to"  Some decreased insight noted with residual deficits with "head injury" and decreased judgment into need to utilize some compensatory techniques for higher level functional task performance and dynamic functional mobility   Patient would benefit from continued rehab on either an inpt or Home OT pending final PT recommendation regarding functional mobility ie stairs, etc  See detailed results of ACLS attached below  Plan   Treatment Interventions ADL retraining;Functional transfer training; Endurance training;Cognitive reorientation;Patient/family training; Compensatory technique education   Goal Expiration Date 04/30/18   Treatment Day 2   OT Frequency 3-5x/wk   Recommendation   OT Discharge Recommendation Short Term Rehab   OT - OK to Discharge Yes  (when medically cleared)   Barthel Index   Feeding 10   Bathing 0   Grooming Score 5   Dressing Score 5   Bladder Score 10   Bowels Score 10   Toilet Use Score 5   Transfers (Bed/Chair) Score 10   Mobility (Level Surface) Score 0   Stairs Score 0   Barthel Index Score 55   Modified Charles Scale   Modified Charles Scale 4   5 4    Administered Herb Cognitive Level Screen (ACLS)  The patient scored 5 4/6 0 indicating that they may live alone and work in  a job with a wide margin of error  Behavior:  Insists on own methods  may be inflexible  Chooses to follow or disregard schedules  Argues with request to change  May choose to abandon tasks if too tedious  May act self centered  May be perceived as being difficult to work with  Grooming:  Makes adjustments with new grooming tasks  Varies routines and products for new results  May not consider social standards  Dressing:  Amy Tammi items freely however results may be odd or socially inappropriate  Effectively manages zippers or buttons in hard to reach places  Bathing:  Independent in all routine bathing  May not read labels on products  May fail to consider passage of time or anticipate hazards of new environment  Walking/exercising: Ambulates to familiar and new locations independently  Discovers new routines and remembers them  May refer to a map for guidance but may not use effectively      Eating:  May be aware of socially acceptable table manners but choose not to alter behavior  May seek variety in diet  May strongly express food preferences  Toileting:  Independently performs all toileting  Can explore a new environment to locate a bathroom without assistance  Medication:  May consider the effects of medication on reducing functional difficulties  May elect not to comply with medical advice  May alter dose to personal understanding of needs  Use of adaptive equipment:  Able to maneuver wheelchair in tight spaces  May not anticipate routine maintenance of equipment  May chose not to use equipment when it requires too much energy  Housekeeping:  Solves problems in housekeeping  Searches in drawers and moves objects when cleaning  Puts things away  Remembers new solutions to problems  May abandon tedious tasks  Sets own standards for cleanliness and organization  Food preparation:  May state importance of food budget but may not follow it  Searches cabinet for food needs  May alter combinations of ingredients without consideration of consequences  Spending money:  Generates a monthly budget including routine fees and may purposefully vary methods of banking  Problems may be excused or minimized  Does not anticipate changes in expenses but adjusts as soon as they occur  Shopping:  May investigate new stores or purchase new products to see what they are like  Searches in stores to locate desired items  May compare prices  May have to return purchases frequently  May overspend  Laundry:  Varies from usual routine to explore better benefits  May question the need to do tedious steps  Aware of spatial capacity of washing machine and adjusts load size  May anticipate effects of bleach  Traveling:  Considers spatial relations of the streets  aware of distance traveled  Has trouble estimating time requirements to reach destination  May get lost frequently    May try to use a map without success  Telephone:  May consider telephone etiquette including use of special services such as 911  May use directories successfully  May not anticipate telephone needs in special situations such as travel  Driving:  Restrict access to driving    JAS Spears

## 2018-04-18 NOTE — PLAN OF CARE
Problem: OCCUPATIONAL THERAPY ADULT  Goal: Performs self-care activities at highest level of function for planned discharge setting  See evaluation for individualized goals  Treatment Interventions: ADL retraining, Functional transfer training, Endurance training, Cognitive reorientation, Patient/family training, Equipment evaluation/education, Compensatory technique education, Continued evaluation, Activityengagement, Energy conservation          See flowsheet documentation for full assessment, interventions and recommendations  Outcome: Progressing  Limitation: Decreased ADL status, Decreased cognition, Decreased Safe judgement during ADL, Decreased endurance, Decreased self-care trans, Decreased high-level ADLs (BALANCE, MEDICAL STATUS)  Prognosis: Fair  Assessment: Patient participated in skilled OT with focus on formal cognitive skills assessment utilzing the ACLS  Patient scored a 5 4 with results as indicated with attachment  Patient cooperative and patient throughtout with some repetition regarding "what happened that day"  Patient focused on "I can walk fine, my house is set up, I can do what I need to"  Some decreased insight noted with residual deficits with "head injury" and decreased judgment into need to utilize some compensatory techniques for higher level functional task performance and dynamic functional mobility  Patient would benefit from continued rehab on either an inpt or Home OT pending final PT recommendation regarding functional mobility ie stairs, etc  See detailed results of ACLS attached below       OT Discharge Recommendation: Short Term Rehab vs HOME OT   OT - OK to Discharge: Yes (when medically cleared)   Sima Nolasco

## 2018-04-18 NOTE — ANESTHESIA POSTPROCEDURE EVALUATION
Post-Op Assessment Note      CV Status:  Stable    Mental Status:  Awake (Drowsy but awakens easily to voice and follows commands)    Hydration Status:  Euvolemic and stable    PONV Controlled:  None    Airway Patency:  Patent    Post Op Vitals Reviewed: Yes          Staff: Anesthesiologist           /85 (04/18/18 1741)    Temp 98 3 °F (36 8 °C) (04/18/18 1741)    Pulse 73 (04/18/18 1741)   Resp 16 (04/18/18 1741)    SpO2 100 % (04/18/18 1741)

## 2018-04-18 NOTE — DISCHARGE INSTRUCTIONS
Do not take any blood thinning medications (ie  No Advil  No motrin  No ibuprofen  No Aleve  No Aspirin  No fishoil  No heparin  No antiplatelet / no anticoagulation medication)  Refrain from activity that increases chance of trauma to head or falls  Recommend you take fall precaution  No strenuous activity or sports  Follow up with the neurosurgical group in 2 weeks with a repeat CT scan completed 2-3 days prior to your appointment  To schedule a CT scan call [phone]    Return to hospital Emergency Room if you experience worsening / new headache, nausea/vomiting, speech/vision change, seizure, confusion / mental status change, weakness, or other neurological changes  Please take amiodarone 200 mg 3 times daily for 2 weeks, then reduce to 200 mg daily thereafter  Please contact Dr Mishel Salmeron office with any questions or concerns  PACEMAKER INSTRUCTIONS    Please refer to post pacemaker implantation discharge instructions and restrictions and your pacemaker booklet/temporary card  Keep incision dry for one week  Do not use lotions/powders/creams on incision  Remove outer bandage 48 hours after implantation  Leave underlying steri-strips in place, they will either fall off on their own or will be removed at 2 week follow up appointment  No overhead reaching/pushing/pulling/lifting greater than 5-10lbs with right arm for one month  Please call the office if you notice redness, swelling, bleeding, or drainage from incision or if you develop fevers  AFTER PACEMAKER CARE:    If you have any questions, please call [phone] to speak with a nurse (8:30am-4pm, or [phone] after hours)  For appointments, please call [phone]  WHAT YOU SHOULD KNOW:   A pacemaker is a small, battery-powered device that is placed under your skin in your upper chest area with wires placed through a vein that lead directly into the heart   It helps regulate your heart rate and prevent your heart from beating too slowly                  AFTER YOU LEAVE:     Medicines:     · Pain medicine: You may need medicine to take away or decrease pain  ¨ Learn how to take your medicine  Ask what medicine and how much you should take  Be sure you know how, when, and how often to take it  Usually Over the counter pain medicine is sufficient to control pain (Acetominophen or Ibuprofen) Ask your doctor if you may take these  If this does not control your pain, narcotic pain killers may be prescribed, please call if you need prescription  ¨ Do not wait until the pain is severe before you take your medicine  Tell caregivers if your pain does not decrease  ¨ Pain medicine can make you dizzy or sleepy  Prevent falls by calling someone when you get out of bed or if you need help  Take your medicine as directed  Call your healthcare provider if you think your medicine is not helping or if you have side effects  Tell him if you are allergic to any medicine  Follow up with your cardiologist after your procedure: You will need a follow-up visit approximately 2 weeks after you leave the hospital  Your cardiologist will check your wound and make sure that your pacemaker is working correctly  Follow the instructions to check your pacemaker: Your cardiologist or primary healthcare provider will check your pacemaker and the battery regularly  He will use a computer to check your pacemaker over the telephone or wireless device which will be given to you  Pacemaker batteries usually last 5 to 10 years  The pacemaker unit will be replaced when the battery gets low  This is a simpler procedure than the original one to implant your pacemaker  Wound care:  Keep your incision dry for one week  Sponge/tub baths are preferred, try to avoid a shower for 7 days  Do not use lotions/powders/creams on incision  Remove outer bandage 48 hours after implantation   Leave underlying steri-strips in place, they will either fall off on their own or will be removed at 2 week follow up appointment  Please call the office if you notice redness, swelling, bleeding, or drainage from incision or if you develop fevers  Activity:   · Arm movement and lifting:  Be careful using the arm on the side of your pacemaker  Do not move your arm for the first 24 hours after your procedure  Do not  lift your arm above your shoulder or lift more than 10 pounds for one month after your procedure  Avoid pushing, pulling, or repetitive arm movements for one month  This helps the leads stay in place and helps your wound heal  Ask your caregiver when you can drive after your procedure  You may move your arm side to side without lifting above your shoulder, and do not need to wear a sling at home  · Typically driving is allowed after 1 week post pacemaker if you are stable, however in some cases it may be longer and you should discuss with your doctor before proceeding  · Sports:  Ask your caregiver when it is okay to play tennis, golf, basketball, or any sport that requires you to lift your arms  Do not play full contact sports, such as football, that could damage your pacemaker  Ask your cardiologist or primary healthcare provider how much and what kinds of physical activity are safe for you  Living with a pacemaker:   · Tell all caregivers you have a pacemaker: This includes surgeons, radiologists, and medical technicians  You may want to wear a medical alert ID bracelet or necklace that states that you have a pacemaker  · Carry your pacemaker ID card: Make sure you receive a pacemaker ID card  Carry it with you at all times  It lists important information about your pacemaker  Show it to airport security if you travel  · Avoid electrical interference:  Avoid welding equipment and other equipment with large magnets or electric fields  These things could interfere with how your pacemaker works   Use your cell phone on the ear opposite from your pacemaker  Do not carry your cell phone in your shirt pocket over your chest      · Some Pacemakers are MRI safe  Ask you doctor if it is safe to proceed with MRI and let the radiologist and staff know you have a pacemaker  · Do not touch the skin around your pacemaker: This can cause damage to the lead wires or move the pacemaker unit from where it should be  Contact your cardiologist or primary healthcare provider if:   · The area around your pacemaker has increasing amount of pain after surgery  The pain should improve over first few days after implantation  · The skin around your stitches has increasing redness, swelling, or has drainage  This may mean that you have an infection  · You have a fever  · You have chills, a cough, and feel weak or achy  These are also signs of infection  · Your feet or ankles are more swollen than your baseline  · Your Heart rate is less than 50 beats per minute     Seek care immediately if:   · Your bandage becomes soaked with blood  · Your pacemaker is swelling rapidly    · Your stitches open up  · You feel your heart suddenly beating very slowly or quickly  · You become too weak or dizzy to stand, or you pass out  · Your arm or leg feels warm, tender, and painful  It may look swollen and red  · You have chest pain that does not go away with rest or medicine  · You feel lightheaded, short of breath, and have chest pain  · You cough up blood  © 2014 [address] is for End User's use only and may not be sold, redistributed or otherwise used for commercial purposes  All illustrations and images included in CareNotes® are the copyrighted property of [employer]  or Bruce Edwards  The above information is an  only  It is not intended as medical advice for individual conditions or treatments   Talk to your doctor, nurse or pharmacist before following any medical regimen to see if it is safe and effective for you

## 2018-04-18 NOTE — ANESTHESIA PREPROCEDURE EVALUATION
Review of Systems/Medical History  Patient summary reviewed  Chart reviewed  No history of anesthetic complications     Cardiovascular  EKG reviewed, Hypertension controlled, Valvular heart disease (mild-moderate TR) , tricuspid regurgitation, Dysrhythmias (paroxysmal A  fib; SSS), atrial fibrillation and shiela dysrhythmia,    Pulmonary  Negative pulmonary ROS        GI/Hepatic  Negative GI/hepatic ROS          Negative  ROS        Endo/Other    Comment: Impaired fasting glucose (A1C 5 8 on 4/12/18)    GYN       Hematology  Anemia ,     Musculoskeletal    Comment: Right thumb fracture  Left temporal bone fracture      Neurology    Cerebral bleeding with side effects (acute right SDH) , subdural hemorrhage,    Psychology   Negative psychology ROS              Physical Exam    Airway    Mallampati score: III  TM Distance: >3 FB  Neck ROM: full     Dental   Comment: Edentulous upper and lower,     Cardiovascular  Rhythm: irregular, Rate: normal,     Pulmonary  Pulmonary exam normal Breath sounds clear to auscultation,     Other Findings        Anesthesia Plan  ASA Score- 3     Anesthesia Type- IV sedation with anesthesia with ASA Monitors  Additional Monitors:   Airway Plan:         Plan Factors-    Induction- intravenous  Postoperative Plan-     Informed Consent- Anesthetic plan and risks discussed with patient  I personally reviewed this patient with the CRNA  Discussed and agreed on the Anesthesia Plan with the CRNA             NPO verified  NKDA  Plan:  IV sedation; GA as backup    Risks and benefits discussed with pt  Questions answered  Pt consented

## 2018-04-18 NOTE — OCCUPATIONAL THERAPY NOTE
Occupational Therapy Treatment Note:       04/18/18 1059   Pain Assessment   Pain Assessment No/denies pain   Pain Score No Pain   ADL   Where Assessed Supine, bed   Grooming Assistance 5  Supervision/Setup   Grooming Deficit Setup; Wash/dry hands; Wash/dry face   UB Bathing Assistance 4  Minimal Assistance   UB Bathing Deficit Setup;Supervision/safety; Increased time to complete   LB Bathing Assistance 4  Minimal Assistance   LB Bathing Deficit Setup;Verbal cueing;Supervision/safety; Increased time to complete   Transfers   Sit to Stand 4  Minimal assistance   Additional items Assist x 1; Increased time required;Verbal cues   Stand to Sit 4  Minimal assistance   Additional items Assist x 1; Increased time required;Verbal cues   Cognition   Overall Cognitive Status Impaired   Arousal/Participation Alert; Cooperative   Attention Attends with cues to redirect   Orientation Level Oriented X4   Memory Decreased short term memory;Decreased recall of recent events;Decreased recall of precautions   Following Commands Follows one step commands without difficulty   Comments Decreased insight into results of "injury"; decreased judgment with dynamic functional balance   Cognition Assessment Tools ACLS   Score 5 4   Activity Tolerance   Activity Tolerance Patient tolerated treatment well   Medical Staff Made Aware ok to see per MAKENNA Marlow   Assessment   Assessment Patient participated in skilled OT with focus on formal cognitive skills assessment utilzing the ACLS  Patient scored a 5 4 with results as indicated with attachment  Patient cooperative and patient throughtout with some repetition regarding "what happened that day"  Patient focused on "I can walk fine, my house is set up, I can do what I need to"  Some decreased insight noted with residual deficits with "head injury" and decreased judgment into need to utilize some compensatory techniques for higher level functional task performance and dynamic functional mobility   Patient would benefit from continued rehab on either an inpt or Home OT pending final PT recommendation regarding functional mobility ie stairs, etc  See detailed results of ACLS attached below  Plan   Treatment Interventions ADL retraining;Functional transfer training; Endurance training;Cognitive reorientation;Patient/family training; Compensatory technique education   Goal Expiration Date 04/30/18   Treatment Day 2   OT Frequency 3-5x/wk   Recommendation   OT Discharge Recommendation Home OT  (vs STR pending PT assessment with stairs, etc)   OT - OK to Discharge Yes  (when medically cleared)   Barthel Index   Feeding 10   Bathing 0   Grooming Score 5   Dressing Score 5   Bladder Score 10   Bowels Score 10   Toilet Use Score 5   Transfers (Bed/Chair) Score 10   Mobility (Level Surface) Score 0   Stairs Score 0   Barthel Index Score 55   Modified Wheatland Scale   Modified Wheatland Scale 4   5 4    Administered Herb Cognitive Level Screen (ACLS)  The patient scored 5 4/6 0 indicating that they may live alone and work in  a job with a wide margin of error  Behavior:  Insists on own methods - may be inflexible  Chooses to follow or disregard schedules  Argues with request to change  May choose to abandon tasks if too tedious  May act self centered  May be perceived as being difficult to work with  Grooming:  Makes adjustments with new grooming tasks  Varies routines and products for new results  May not consider social standards  Dressing:  Geradine Grim items freely however results may be odd or socially inappropriate  Effectively manages zippers or buttons in hard to reach places  Bathing:  Independent in all routine bathing  May not read labels on products  May fail to consider passage of time or anticipate hazards of new environment  Walking/exercising: Ambulates to familiar and new locations independently  Discovers new routines and remembers them    May refer to a map for guidance but may not use effectively  Eating:  May be aware of socially acceptable table manners but choose not to alter behavior  May seek variety in diet  May strongly express food preferences  Toileting:  Independently performs all toileting  Can explore a new environment to locate a bathroom without assistance  Medication:  May consider the effects of medication on reducing functional difficulties  May elect not to comply with medical advice  May alter dose to personal understanding of needs  Use of adaptive equipment:  Able to maneuver wheelchair in tight spaces  May not anticipate routine maintenance of equipment  May chose not to use equipment when it requires too much energy  Housekeeping:  Solves problems in housekeeping  Searches in drawers and moves objects when cleaning  Puts things away  Remembers new solutions to problems  May abandon tedious tasks  Sets own standards for cleanliness and organization  Food preparation:  May state importance of food budget but may not follow it  Searches cabinet for food needs  May alter combinations of ingredients without consideration of consequences  Spending money:  Generates a monthly budget including routine fees and may purposefully vary methods of banking  Problems may be excused or minimized  Does not anticipate changes in expenses but adjusts as soon as they occur  Shopping:  May investigate new stores or purchase new products to see what they are like  Searches in stores to locate desired items  May compare prices  May have to return purchases frequently  May overspend  Laundry:  Varies from usual routine to explore better benefits  May question the need to do tedious steps  Aware of spatial capacity of washing machine and adjusts load size  May anticipate effects of bleach  Traveling:  Considers spatial relations of the streets - aware of distance traveled  Has trouble estimating time requirements to reach destination  May get lost frequently  May try to use a map without success  Telephone:  May consider telephone etiquette including use of special services such as 911  May use directories successfully  May not anticipate telephone needs in special situations such as travel  Driving:  Restrict access to driving    JAS Samuel

## 2018-04-18 NOTE — PHYSICAL THERAPY NOTE
PT CANCEL    PATIENT UNAVAILABLE FOR PT TREATMENT SESSION AS HE IS IN CARDIAC CATH LAB FOR PACEMAKER  SPOKE WITH Tr Orr RN WHO REPORTS PATIENT LIKELY UNABLE TO BE SEEN THIS DATE  WILL CHECK BACK TOMORROW      Juan Mejia PT

## 2018-04-18 NOTE — CASE MANAGEMENT
Continued Stay Review    Date: 4-18=18     Vital Signs: /72 (BP Location: Left arm)   Pulse 64   Temp 98 2 °F (36 8 °C) (Oral)   Resp 18   Ht 5' 6" (1 676 m)   Wt 76 5 kg (168 lb 10 4 oz)   SpO2 96%   BMI 27 22 kg/m²     Medications:   Scheduled Meds:   Current Facility-Administered Medications:  acetaminophen 975 mg Oral Q6H [address]   amLODIPine 5 mg Oral Daily   atropine 0 5 mg Intravenous PRN   heparin (porcine) 5,000 Units Subcutaneous Q8H [address]   labetalol 10 mg Intravenous Q4H PRN   levETIRAcetam 500 mg Oral Q12H STELLA   lidocaine 2 patch Transdermal Daily   ondansetron 4 mg Intravenous Q6H PRN   oxyCODONE 2 5 mg Oral Q4H PRN   oxyCODONE 5 mg Oral Q4H PRN   vancomycin 1,000 mg Intravenous Once     Continuous Infusions:    PRN Meds: atropine    labetalol    ondansetron    oxyCODONE    oxyCODONE    Abnormal Labs/Diagnostic Results:   Component Value Date     WBC 4 28 (L) 04/18/2018     HGB 10 8 (L) 04/18/2018     HCT 32 7 (L) 04/18/2018     MCV 64 (L) 04/18/2018      04/18/2018     MCH 21 1 (L) 04/18/2018     MCHC 33 0 04/18/2018     RDW 16 3 (H) 04/18/2018     MPV 9 5 04/18/2018           Age/Sex: 81 y o  male     Assessment/Plan:              Temporal bone fracture (HCC)   Assessment & Plan     Outpatient ENT f/u          Acute pain of left shoulder   Assessment & Plan     - no fracture  -seen by ortho  -outpatient f/u          Atrial fibrillation (HCC)   Assessment & Plan     Seen by EP  Will get pacer today for sick sinus syndrome      -NPO sips with meds          Essential hypertension   Assessment & Plan     norvasc          Closed nondisplaced fracture of distal phalanx of right thumb   Assessment & Plan     Maintain finger splint  Outpatient f/u ortho             Pneumocephalus   Assessment & Plan     -likely 2/2 temporal bone fx  Outpatient ENT f/u for audiogram          * Subdural hematoma (HCC)   Assessment & Plan     -stable on repeat ct  -keppra x1 week  -SQH        Discharge Plan:  HOME
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
## 2018-04-19 ENCOUNTER — APPOINTMENT (INPATIENT)
Dept: RADIOLOGY | Facility: HOSPITAL | Age: 83
DRG: 242 | End: 2018-04-19
Payer: MEDICARE

## 2018-04-19 VITALS
OXYGEN SATURATION: 93 % | TEMPERATURE: 97.6 F | HEIGHT: 66 IN | HEART RATE: 69 BPM | RESPIRATION RATE: 18 BRPM | SYSTOLIC BLOOD PRESSURE: 154 MMHG | DIASTOLIC BLOOD PRESSURE: 80 MMHG | BODY MASS INDEX: 27.1 KG/M2 | WEIGHT: 168.65 LBS

## 2018-04-19 PROBLEM — M25.512 ACUTE PAIN OF LEFT SHOULDER: Status: RESOLVED | Noted: 2018-04-15 | Resolved: 2018-04-19

## 2018-04-19 LAB
ANION GAP SERPL CALCULATED.3IONS-SCNC: 8 MMOL/L (ref 4–13)
ATRIAL RATE: 61 BPM
BUN SERPL-MCNC: 8 MG/DL (ref 5–25)
CALCIUM SERPL-MCNC: 8.6 MG/DL (ref 8.3–10.1)
CHLORIDE SERPL-SCNC: 107 MMOL/L (ref 100–108)
CO2 SERPL-SCNC: 26 MMOL/L (ref 21–32)
CREAT SERPL-MCNC: 0.73 MG/DL (ref 0.6–1.3)
ERYTHROCYTE [DISTWIDTH] IN BLOOD BY AUTOMATED COUNT: 16.3 % (ref 11.6–15.1)
GFR SERPL CREATININE-BSD FRML MDRD: 87 ML/MIN/1.73SQ M
GLUCOSE SERPL-MCNC: 138 MG/DL (ref 65–140)
HCT VFR BLD AUTO: 35.8 % (ref 36.5–49.3)
HGB BLD-MCNC: 11.9 G/DL (ref 12–17)
MCH RBC QN AUTO: 21 PG (ref 26.8–34.3)
MCHC RBC AUTO-ENTMCNC: 33.2 G/DL (ref 31.4–37.4)
MCV RBC AUTO: 63 FL (ref 82–98)
P AXIS: 64 DEGREES
PLATELET # BLD AUTO: 152 THOUSANDS/UL (ref 149–390)
PMV BLD AUTO: 8.9 FL (ref 8.9–12.7)
POTASSIUM SERPL-SCNC: 3.3 MMOL/L (ref 3.5–5.3)
PR INTERVAL: 172 MS
QRS AXIS: -74 DEGREES
QRSD INTERVAL: 162 MS
QT INTERVAL: 460 MS
QTC INTERVAL: 499 MS
RBC # BLD AUTO: 5.67 MILLION/UL (ref 3.88–5.62)
SODIUM SERPL-SCNC: 141 MMOL/L (ref 136–145)
T WAVE AXIS: 97 DEGREES
VENTRICULAR RATE: 71 BPM
WBC # BLD AUTO: 4.85 THOUSAND/UL (ref 4.31–10.16)

## 2018-04-19 PROCEDURE — 71046 X-RAY EXAM CHEST 2 VIEWS: CPT

## 2018-04-19 PROCEDURE — 99024 POSTOP FOLLOW-UP VISIT: CPT | Performed by: PHYSICIAN ASSISTANT

## 2018-04-19 PROCEDURE — 85027 COMPLETE CBC AUTOMATED: CPT | Performed by: PHYSICIAN ASSISTANT

## 2018-04-19 PROCEDURE — 93010 ELECTROCARDIOGRAM REPORT: CPT | Performed by: INTERNAL MEDICINE

## 2018-04-19 PROCEDURE — 99238 HOSP IP/OBS DSCHRG MGMT 30/<: CPT | Performed by: SURGERY

## 2018-04-19 PROCEDURE — 80048 BASIC METABOLIC PNL TOTAL CA: CPT | Performed by: PHYSICIAN ASSISTANT

## 2018-04-19 RX ORDER — LIDOCAINE 50 MG/G
2 PATCH TOPICAL DAILY
Qty: 6 PATCH | Refills: 0 | Status: SHIPPED | OUTPATIENT
Start: 2018-04-20 | End: 2018-05-09

## 2018-04-19 RX ORDER — AMIODARONE HYDROCHLORIDE 200 MG/1
200 TABLET ORAL
Qty: 42 TABLET | Refills: 0 | Status: SHIPPED | OUTPATIENT
Start: 2018-04-19 | End: 2018-04-30

## 2018-04-19 RX ORDER — LEVETIRACETAM 500 MG/1
500 TABLET ORAL EVERY 12 HOURS SCHEDULED
Qty: 8 TABLET | Refills: 0 | Status: SHIPPED | OUTPATIENT
Start: 2018-04-19 | End: 2018-04-30

## 2018-04-19 RX ORDER — POTASSIUM CHLORIDE 20 MEQ/1
40 TABLET, EXTENDED RELEASE ORAL ONCE
Status: COMPLETED | OUTPATIENT
Start: 2018-04-19 | End: 2018-04-19

## 2018-04-19 RX ORDER — OXYCODONE HYDROCHLORIDE 5 MG/1
2.5 TABLET ORAL EVERY 4 HOURS PRN
Qty: 15 TABLET | Refills: 0 | Status: CANCELLED | OUTPATIENT
Start: 2018-04-19 | End: 2018-04-29

## 2018-04-19 RX ORDER — AMIODARONE HYDROCHLORIDE 200 MG/1
200 TABLET ORAL DAILY
Qty: 30 TABLET | Refills: 3 | Status: SHIPPED | OUTPATIENT
Start: 2018-05-03 | End: 2018-05-02

## 2018-04-19 RX ORDER — AMLODIPINE BESYLATE 5 MG/1
5 TABLET ORAL DAILY
Qty: 30 TABLET | Refills: 0 | Status: SHIPPED | OUTPATIENT
Start: 2018-04-20 | End: 2018-05-16 | Stop reason: SDUPTHER

## 2018-04-19 RX ORDER — AMIODARONE HYDROCHLORIDE 200 MG/1
200 TABLET ORAL
Status: DISCONTINUED | OUTPATIENT
Start: 2018-04-19 | End: 2018-04-19 | Stop reason: HOSPADM

## 2018-04-19 RX ADMIN — AMLODIPINE BESYLATE 5 MG: 5 TABLET ORAL at 08:01

## 2018-04-19 RX ADMIN — LEVETIRACETAM 500 MG: 500 TABLET ORAL at 08:01

## 2018-04-19 RX ADMIN — LIDOCAINE 2 PATCH: 50 PATCH TOPICAL at 08:01

## 2018-04-19 RX ADMIN — POTASSIUM CHLORIDE 40 MEQ: 1500 TABLET, EXTENDED RELEASE ORAL at 12:30

## 2018-04-19 RX ADMIN — HEPARIN SODIUM 5000 UNITS: 5000 INJECTION, SOLUTION INTRAVENOUS; SUBCUTANEOUS at 05:31

## 2018-04-19 RX ADMIN — OXYCODONE HYDROCHLORIDE 5 MG: 5 TABLET ORAL at 12:31

## 2018-04-19 RX ADMIN — ACETAMINOPHEN 975 MG: 325 TABLET, FILM COATED ORAL at 05:31

## 2018-04-19 NOTE — ASSESSMENT & PLAN NOTE
-Repeat head CT in 2 weeks prior to f/u with neurosurgery   -Continue Keppra for seizure prophylaxis
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
## 2018-04-19 NOTE — ASSESSMENT & PLAN NOTE
-Dedicated CT confirming temporal bone fracture with mastoid sinus involvement  -Will f/u with ENT as outpatient

## 2018-04-19 NOTE — PROGRESS NOTES
Progress Note - Bartolo Lockhart 1935, 81 y o  male MRN: [id number]    Unit/Bed#: [facility] 906-01 Encounter: [id number]    Primary Care Provider: Robin Cobian DO   Date and time admitted to hospital: 4/14/2018  8:13 PM    Temporal bone fracture [facility])   Assessment & Plan    -Dedicated CT confirming temporal bone fracture with mastoid sinus involvement  -Will f/u with ENT as outpatient         Atrial fibrillation [facility])   Assessment & Plan    - with SSS dual chamber PPM placed yesterday by EP  -Will f/u with Cardiology Dr Alee Ambrose as outpatient  -Patient rec to be on Eliquis by his PCP but refused at that time, will discuss risks/benefits of full [facility] with patient and neurosurgery        Closed nondisplaced fracture of distal phalanx of right thumb   Assessment & Plan    -Maintain finger splint  -Outpatient f/u ortho  Pneumocephalus   Assessment & Plan    -likely 2/2 temporal bone fx  -Will f/u with ENT as outpatient for audiogram         * Subdural hematoma (HCC)   Assessment & Plan    -stable on repeat ct  -keppra x1 week  -SQH        Acute pain of left shoulder-resolved as of 4/19/2018   Assessment & Plan    - no fracture  -seen by ortho  -outpatient f/u            Subjective/Objective   Chief Complaint: "I want to go home"    Subjective: 80 y/o with SDH, pneumocephalus, and temporal bone fracture now with SSS  Patient had pacemaker placed by EP yesterday  OT cleared for discharge to home with home OT physical therapy will eval patient today  The patient has no complaints at this time  Objective:     Meds/Allergies   Prescriptions Prior to Admission   Medication Sig Dispense Refill Last Dose    ketorolac (ACULAR) 0 5 % ophthalmic solution Administer 1 drop to the right eye 4 (four) times a day for 30 days 6 mL 0        Vitals: Blood pressure (!) 178/92, pulse 71, temperature 98 1 °F (36 7 °C), temperature source Oral, resp   rate 18, height 5' 6" (1 676 m), weight 76 5 kg (168 lb 10 4 oz), SpO2 94 %  Body mass index is 27 22 kg/m²  SpO2: SpO2: 93 %    ABG: No results found for: PHART, KMA5KRC, PO2ART, CEC7SSU, W5BUZFCT, BEART, SOURCE      Intake/Output Summary (Last 24 hours) at 04/19/18 0711  Last data filed at 04/19/18 0500   Gross per 24 hour   Intake              438 ml   Output              300 ml   Net              138 ml       Invasive Devices     Peripheral Intravenous Line            Peripheral IV 04/14/18 Left Antecubital 5 days    Peripheral IV 04/18/18 Right Forearm less than 1 day                Nutrition/GI Proph/Bowel Reg: Regular diet     Physical Exam:   GENERAL APPEARANCE: WNWD, NAD   HEENT: PERRLA, EOMs intact   CV: Irregularly irregular, no m/r/g   LUNGS: CTAB full and equal expansion bilaterally   ABD: Soft, non-tender, non-distended   EXT: Warm, dry, 5/5 strength, L thumb in static splint   NEURO: AAOx4, DTR and sensation intact   SKIN: Warm, dry, strong peripheral pulses     Lab Results:   Results: I have personally reviewed pertinent reports  and I have personally reviewed pertinent films in PACS, BMP/CMP:   Lab Results   Component Value Date     04/19/2018    K 3 3 (L) 04/19/2018     04/19/2018    CO2 26 04/19/2018    ANIONGAP 8 04/19/2018    BUN 8 04/19/2018    CREATININE 0 73 04/19/2018    GLUCOSE 138 04/19/2018    CALCIUM 8 6 04/19/2018    EGFR 87 04/19/2018   , CBC:   Lab Results   Component Value Date    WBC 4 85 04/19/2018    HGB 11 9 (L) 04/19/2018    HCT 35 8 (L) 04/19/2018    MCV 63 (L) 04/19/2018     04/19/2018    MCH 21 0 (L) 04/19/2018    MCHC 33 2 04/19/2018    RDW 16 3 (H) 04/19/2018    MPV 8 9 04/19/2018    and Coagulation: No results found for: PT, INR, APTT  Imaging/EKG Studies: Results: I have personally reviewed pertinent reports     and I have personally reviewed pertinent films in PACS  Other Studies: None   VTE Prophylaxis: SQH

## 2018-04-19 NOTE — PROCEDURES
DEVICE -  DUAL  CHAMBER    History and physical were reviewed  Patient was examined and history was reviewed  No change in patient's condition Since history and physical has been completed        The pre- operative diagnosis:  Sick sinus syndrome  Symptomatic sinus bradycardia  Chronotropic incompetence      Postoperative diagnosis:  Sick sinus syndrome  Symptomatic sinus bradycardia  Chronotropic incompetence      Procedure:  Dual chamber PPM        Surgeon: [id number] [facility] [address] -none    Specimens - none    Estimated blood loss- 10 ml    Findings-none    Complications none    Anesthesia-  Anesthesia by anaesthesiologist , local lidocaine by myself      Details of the device          Description of procedure: The patient was seen before the procedure  The details of the procedure was explained and patient agreed to the same  Appropriate consent was signed  The patient was brought to the electrophysiologic laboratory  Proper time out was done  Sterile dressing and draping was done  Local lidocaine was infiltrated, In the infraclavicular region at the site of device implant  Device implantation on right side as he is left handed    Initial incision was made by a sharp number 10 blade  Thereafter electrocautery and blunt dissection was used to form a prepectoral pocket  Appropriate hemostasis was taken care of      20 mL of radiocontrast, followed by 20 mL of saline, was injected in a peripheral vein on the side of the implant  Under direct visualization, the axillary vein was  Punctured,extra-thoracic  A single guidewire was inserted, and taking all the way to the inferior vena cava to confirm that it is on the right side  We also confirmed by the left anterior oblique view that the wire is in the right side  Thereafter a second access was obtained using the fluoroscopic image of the venogram as a roadmap   Wire was once again taken to the inferior vena cava, and position checked in Syriac views To confirm the appropriate position  A sheath was passed over the wire  The right ventricular lead was taken through the sheath  In MILLER view the lead was taken to the right ventricular outflow tract  It was then dropped to the apex  Position of the lead was confirmed in both MILLER and B[place]udian views that it was apical and septal  Appropriate sensing and thresholds were noted  The lead was screwed in  Once again the lead was tested for appropriate sensing, impedance and threshold  The sheath was removed  The lead was sutured to the prepectoral fascia and muscle  A 7 Gabonese sheath was passed over the second wire  The dilator and wire were removed  An atrial lead with the curved stylet in it was taken through this sheath into the right atrium  It was maneuvered into the right atrial appendage  Appropriate sensing and thresholds were noted  The lead was screwed in  The sheath was removed  The lead was sutured to the pectoral muscle and fascia    The pocket was washed with large amounts of antibiotic saline  Appropriate hemostasis was taken care of  The lead was connected to the generator  The generator and leads were placed inside the pocket  The generator was tied down  Thereafter the device was interrogated and proper sensing and thresholds through the device were confirmed  The pocket was closed in 3 separate layers with intermittent sutures  Dressing was done with Steri-Strips, Telfa and Tegaderm        Summary of the procedure: The patient came in to the laboratory for dual -chamber device placement   The device has been placed and patient tolerated the procedure well

## 2018-04-19 NOTE — ASSESSMENT & PLAN NOTE
- with SSS dual chamber PPM placed yesterday by EP  -Will f/u with Cardiology Dr Marcia Davis as outpatient  -Patient rec to be on Eliquis by his PCP but patient refuses, will defer [facility] 2/2 frequent falls

## 2018-04-19 NOTE — ASSESSMENT & PLAN NOTE
- with SSS dual chamber PPM placed yesterday by EP  -Will f/u with Cardiology Dr Smith Loya as outpatient  -Patient rec to be on Eliquis by his PCP but refused at that time, will discuss risks/benefits of full [facility] with patient and neurosurgery

## 2018-04-19 NOTE — PROGRESS NOTES
Progress Note - Electrophysiology-Cardiology (EP)   Lew Crystal 81 y o  male MRN: [id number]  Unit/Bed#: [facility] 906-01 Encounter: [id number]      Assessment:  1   Syncope versus mechanical fall/trauma  2   Subdural hematoma with pneumocephalus  3   History of paroxysmal atrial fibrillation              A ) diagnosed 11/2017              B ) prescribed metoprolol and Eliquis, however had not been taking  4  Sick sinus syndrome,  Sinus bradycardia at rest, with heart rate 38 bpm  5   Hypertension  6   Preserved LV systolic function per echo this admission              A ) mild to moderate pulmonary hypertension, peak PA pressure 48 mmHg  7  Chronotropic incompetence - New exertional intolerance - 6 months - cannot keep up [facility] children [facility]  8  History of falls - history suggestive of narcolepsy; rule out cataplexy- review by sleep medicine     Plan:  1  Chest x-ray shows appropriate lead placement, no pneumothorax  2   Device interrogation shows appropriate device function including lead sensing, thresholds, impedances  He went back into atrial fibrillation earlier this morning  As he is unable to be on a blood thinners given his recent subdural hematoma, recommend starting amiodarone 200 mg t i d  x2 weeks then 200 mg daily try to maintain normal sinus rhythm  He had a CMP this admission that showed stable LFTs, and TSH 11/2017 was normal   The studies can be repeated as an outpatient for ongoing monitoring  3   Discharge instructions and restrictions reviewed with patient and his daughter in detail  All questions answered  Follow-up has been arranged  4   Recommend repleting potassium prior to discharge  5   Otherwise, stable for discharge from an EP standpoint  Subjective/Objective   Chief Complaint:  No complaints    Subjective:  Patient currently feeling well, denies significant incisional pain    Denies chest pain, shortness of breath, dizziness, or lightheadedness  He is unaware of palpitations      Objective:     Vitals: /80 (BP Location: Left arm)   Pulse 69   Temp 97 6 °F (36 4 °C) (Oral)   Resp 18   Ht 5' 6" (1 676 m)   Wt 76 5 kg (168 lb 10 4 oz)   SpO2 93%   BMI 27 22 kg/m²   Vitals:    04/16/18 0600 04/16/18 1446   Weight: 76 5 kg (168 lb 10 4 oz) 76 5 kg (168 lb 10 4 oz)     Orthostatic Blood Pressures    Flowsheet Row Most Recent Value   Blood Pressure  154/80 filed at 04/19/2018 1275   Patient Position - Orthostatic VS  Lying filed at 04/19/2018 7642            Intake/Output Summary (Last 24 hours) at 04/19/18 1108  Last data filed at 04/19/18 1001   Gross per 24 hour   Intake              678 ml   Output              600 ml   Net               78 ml       Invasive Devices     Peripheral Intravenous Line            Peripheral IV 04/18/18 Right Forearm less than 1 day                            Scheduled Meds:  Current Facility-Administered Medications:  acetaminophen 975 mg Oral Q6H [address] Marisela Mcmillan MD   amiodarone 200 mg Oral TID With Meals Sanket Gamboa PA-C   amLODIPine 5 mg Oral Daily Marisela Mcmillan MD   atropine 0 5 mg Intravenous PRN ARPIT Holt   heparin (porcine) 5,000 Units Subcutaneous Q8H [address] Marisela Mcmillan MD   labetalol 10 mg Intravenous Q4H PRN Jan Agustin MD   levETIRAcetam 500 mg Oral Q12H [address] Germain Palacios PA-C   lidocaine 2 patch Transdermal Daily Ramona Flaherty PA-C   ondansetron 4 mg Intravenous Q6H PRN Jan Agustin MD   oxyCODONE 2 5 mg Oral Q4H PRN Jan Agustin MD   oxyCODONE 5 mg Oral Q4H PRN Jan Agustin MD     Continuous Infusions:   PRN Meds: atropine    labetalol    ondansetron    oxyCODONE    oxyCODONE    Review of Systems: Cardiovascular ROS: negative for - chest pain, dyspnea on exertion or palpitations    Physical Exam:   GEN: NAD, alert and oriented, well appearing  SKIN: dry without significant lesions or rashes  HEENT: NCAT, PERRL, EOMs intact  NECK: No JVD appreciated  CARDIOVASCULAR:  Irregular, normal S1, S2 without murmurs, rubs, or gallops appreciated  LUNGS: Clear to auscultation bilaterally without wheezes, rhonchi, or rales  ABDOMEN: Soft, nontender, nondistended  EXTREMITIES/VASCULAR: perfused without clubbing, cyanosis, or edema b/l  PSYCH: Normal mood and affect  NEURO: CN ll-Xll grossly intact                Lab Results: I have personally reviewed pertinent lab results  Results from last 7 days  Lab Units 18  0439 183 18  0439   WBC Thousand/uL 4 85 4 28* 7 38   HEMOGLOBIN g/dL 11 9* 10 8* 11 2*   HEMATOCRIT % 35 8* 32 7* 35 0*   PLATELETS Thousands/uL 152 153 132*       Results from last 7 days  Lab Units 18  0439 18  0443 18  0439   SODIUM mmol/L 141 142 138   POTASSIUM mmol/L 3 3* 3 5 4 1   CHLORIDE mmol/L 107 108 107   CO2 mmol/L 26 27 26   BUN mg/dL 8 13 12   CREATININE mg/dL 0 73 0 74 0 77   GLUCOSE RANDOM mg/dL 138 111 105   CALCIUM mg/dL 8 6 8 4 8 0       Results from last 7 days  Lab Units 18  2156   INR  1 05   PTT seconds 31       Results from last 7 days  Lab Units 18  0439 04/15/18  0439 18  2156   MAGNESIUM mg/dL 2 0 2 0 2 2         Imaging: I have personally reviewed pertinent reports  Results for orders placed during the hospital encounter of 18   Echo complete with contrast if indicated    Narrative [address]  [phone]    Transthoracic Echocardiogram  2D, M-mode, Doppler, and Color Doppler    Study date:  15-Apr-2018    Patient: Maryse Giron  MR number: [id number]  Account number: [de-identified]  : [id number]  Age: 81 years  Gender: Male  Status: Inpatient  Location: Bedside  Height:  Weight:  BP: 125/ 52 mmHg    Indications: Syncope      Diagnoses: R55  - Syncope and collapse    Sonographer:  Az Dsouza RDCS  Primary Physician:  Micky Syed DO  Referring Physician:  Lizy Solares Rosalind Sanon MD  Group:  [id number]  Cardiology Fellow:  Nikky Burroughs MD  Interpreting Physician:  Dustin Linn MD    SUMMARY    LEFT VENTRICLE:  Systolic function was normal  Ejection fraction was estimated to be 55 %  There were no regional wall motion abnormalities  Wall thickness was mildly to moderately increased  The changes were consistent with concentric remodeling (increased wall thickness with normal wall mass)  MITRAL VALVE:  There was mild annular calcification  There was mild regurgitation  AORTIC VALVE:  The valve was trileaflet  Leaflets exhibited sclerosis  There was mild regurgitation  TRICUSPID VALVE:  There was mild to moderate regurgitation  Estimated peak PA pressure was 48 mmHg  The findings suggest mild to moderate pulmonary hypertension  IVC, HEPATIC VEINS:  Respirophasic changes were blunted (less than 50% variation)  HISTORY: PRIOR HISTORY: Atrial fibrillation, Hypertension  PROCEDURE: The procedure was performed at the bedside  This was a routine study  The transthoracic approach was used  The study included complete 2D imaging, M-mode, complete spectral Doppler, and color Doppler  The heart rate was 50 bpm,  at the start of the study  Images were obtained from the parasternal, apical, subcostal, and suprasternal notch acoustic windows  Image quality was adequate  LEFT VENTRICLE: Size was normal  Systolic function was normal  Ejection fraction was estimated to be 55 %  There were no regional wall motion abnormalities  Wall thickness was mildly to moderately increased  The changes were consistent  with concentric remodeling (increased wall thickness with normal wall mass)   DOPPLER: Left ventricular diastolic function parameters were normal     RIGHT VENTRICLE: The size was normal  Systolic function was normal     LEFT ATRIUM: Size was normal     RIGHT ATRIUM: Size was normal     MITRAL VALVE: There was mild annular calcification  Valve structure was normal  There was normal leaflet separation  DOPPLER: The transmitral velocity was within the normal range  There was no evidence for stenosis  There was mild  regurgitation  AORTIC VALVE: The valve was trileaflet  Leaflets exhibited sclerosis  DOPPLER: Transaortic velocity was within the normal range  There was no evidence for stenosis  There was mild regurgitation  TRICUSPID VALVE: The valve structure was normal  There was normal leaflet separation  DOPPLER: The transtricuspid velocity was within the normal range  There was no evidence for stenosis  There was mild to moderate regurgitation  The  regurgitant jet was directed centrally  Estimated peak PA pressure was 48 mmHg  The findings suggest mild to moderate pulmonary hypertension  PULMONIC VALVE: DOPPLER: The transpulmonic velocity was within the normal range  There was no evidence for stenosis  There was trace regurgitation  PERICARDIUM: There was no pericardial effusion  AORTA: The root exhibited normal size  SYSTEMIC VEINS: IVC: The inferior vena cava was normal in size  Respirophasic changes were blunted (less than 50% variation)      SYSTEM MEASUREMENT TABLES    2D  %FS: 27 33 %  Ao Diam: 3 74 cm  EDV(Teich): 95 08 ml  EF(Teich): 53 26 %  ESV(Teich): 44 44 ml  IVSd: 1 31 cm  LA Area: 18 96 cm2  LA Diam: 3 73 cm  LVEDV MOD A4C: 97 77 ml  LVEF MOD A4C: 57 52 %  LVESV MOD A4C: 41 53 ml  LVIDd: 4 55 cm  LVIDs: 3 31 cm  LVLd A4C: 8 11 cm  LVLs A4C: 6 6 cm  LVOT Diam: 2 21 cm  LVPWd: 0 92 cm  RA Area: 15 37 cm2  RVIDd: 3 7 cm  SV MOD A4C: 56 23 ml  SV(Teich): 50 63 ml    CW  AR Dec [place]: 2 11 m/s2  AR Dec Time: 1731 3 ms  AR PHT: 502 08 ms  AR Vmax: 3 66 m/s  AR maxP 49 mmHg  TR Vmax: 3 09 m/s  TR maxP 29 mmHg    MM  TAPSE: 2 42 cm    PW  E': 0 07 m/s  E/E': 14 56  MV A Ilna: 0 69 m/s  MV Dec [place]: 4 62 m/s2  MV DecT: 228 93 ms  MV E Ilan: 1 06 m/s  MV E/A Ratio: 1 53  MV PHT: 66 39 ms  MVA By PHT: 3 31 cm2    [facility]    Prepared and electronically signed by    Jossie Lantigua MD  Signed 16-Apr-2018 11:02:16         EKG/telemetry: It appears he was in atrial fibrillation yesterday for a period prior to the procedure, then converted back to normal sinus rhythm  It appears he went back into atrial fibrillation earlier this morning      VTE Pharmacologic Prophylaxis: Reason for no pharmacologic prophylaxis subdural hematoma  VTE Mechanical Prophylaxis: sequential compression device

## 2018-04-19 NOTE — SOCIAL WORK
Pt cleared to d/c home today  Pt will d/c home with [facility] providing [facility] services   Pt's dtr will transport

## 2018-04-26 ENCOUNTER — TELEPHONE (OUTPATIENT)
Dept: CARDIOLOGY CLINIC | Facility: CLINIC | Age: 83
End: 2018-04-26

## 2018-04-26 RX ORDER — KETOROLAC TROMETHAMINE 5 MG/ML
1 SOLUTION OPHTHALMIC
COMMUNITY
Start: 2017-04-03 | End: 2018-04-30

## 2018-04-26 RX ORDER — HYDROCODONE BITARTRATE AND ACETAMINOPHEN 5; 325 MG/1; MG/1
1 TABLET ORAL
COMMUNITY
Start: 2017-11-06 | End: 2018-04-30

## 2018-04-26 NOTE — TELEPHONE ENCOUNTER
Patient's daughter called, patient having GI upset from Amiodarone, started while I/P by Dr Kena Del Toro at 200mg t I d  X2 week and then to 200mg daily  At this point, per Dr Kena Del Toro, patient to decrease Amiodarone to 200mg q d  Patient's daughter aware and will notify patient  Told them to call office Monday if no improvement

## 2018-04-30 ENCOUNTER — APPOINTMENT (OUTPATIENT)
Dept: RADIOLOGY | Facility: CLINIC | Age: 83
End: 2018-04-30
Payer: MEDICARE

## 2018-04-30 ENCOUNTER — OFFICE VISIT (OUTPATIENT)
Dept: OBGYN CLINIC | Facility: CLINIC | Age: 83
End: 2018-04-30
Payer: MEDICARE

## 2018-04-30 ENCOUNTER — OFFICE VISIT (OUTPATIENT)
Dept: OCCUPATIONAL THERAPY | Facility: CLINIC | Age: 83
End: 2018-04-30
Payer: MEDICARE

## 2018-04-30 VITALS
HEIGHT: 65 IN | WEIGHT: 170 LBS | BODY MASS INDEX: 28.32 KG/M2 | DIASTOLIC BLOOD PRESSURE: 76 MMHG | SYSTOLIC BLOOD PRESSURE: 163 MMHG | HEART RATE: 71 BPM

## 2018-04-30 DIAGNOSIS — S62.524A CLOSED NONDISPLACED FRACTURE OF DISTAL PHALANX OF RIGHT THUMB, INITIAL ENCOUNTER: Primary | ICD-10-CM

## 2018-04-30 DIAGNOSIS — T14.8XXA FRACTURE: ICD-10-CM

## 2018-04-30 PROCEDURE — G8991 OTHER PT/OT GOAL STATUS: HCPCS | Performed by: OCCUPATIONAL THERAPIST

## 2018-04-30 PROCEDURE — G8992 OTHER PT/OT  D/C STATUS: HCPCS | Performed by: OCCUPATIONAL THERAPIST

## 2018-04-30 PROCEDURE — 73140 X-RAY EXAM OF FINGER(S): CPT

## 2018-04-30 PROCEDURE — G8990 OTHER PT/OT CURRENT STATUS: HCPCS | Performed by: OCCUPATIONAL THERAPIST

## 2018-04-30 PROCEDURE — 97760 ORTHOTIC MGMT&TRAING 1ST ENC: CPT | Performed by: OCCUPATIONAL THERAPIST

## 2018-04-30 PROCEDURE — 99024 POSTOP FOLLOW-UP VISIT: CPT | Performed by: ORTHOPAEDIC SURGERY

## 2018-04-30 NOTE — PROGRESS NOTES
Splint     Diagnosis:   1  Closed nondisplaced fracture of distal phalanx of right thumb, initial encounter       Indication: Fracture    Location: Right  thumb  Supplies: NA  Splint type: custom stax  Wearing Schedule: Remove for hygiene only  Describe Position: resting    Precautions: Fracture    Patient or Caregiver expresses understanding of wearing Schedule and Precautions? Yes  Patient or Caregiver able to don/doff orthotic independently? Yes    Written orders provided to patient?  Yes  Orders Obtained: Written  Orders Obtained from: Dr Kendrick Montiel      Pt seen for orthosis only - d/c

## 2018-04-30 NOTE — PROGRESS NOTES
ASSESSMENT/PLAN:    Diagnoses and all orders for this visit:    Closed nondisplaced fracture of distal phalanx of right thumb, initial encounter    Fracture  -     XR thumb right first digit-min 2v; Future    Other orders  -     Discontinue: apixaban (ELIQUIS) 5 mg; Take 5 mg by mouth  -     Discontinue: HYDROcodone-acetaminophen (NORCO) 5-325 mg per tablet; Take 1 tablet by mouth  -     Discontinue: ketorolac (ACULAR) 0 5 % ophthalmic solution; Apply 1 drop to eye  -     metoprolol tartrate (LOPRESSOR) 25 mg tablet; Take 25 mg by mouth        Assessment:   Closed nondisplaced fracture of distal phalanx of R thumb    Plan:   stax splint will be supplied to the patient today in the OT office today  Pt was advised to wear this all the time expect for hygiene purposes  Follow Up:  3  week(s)    To Do Next Visit:  X-rays of the  right  thumb    General Discussions:  Fracture - Nonoperative Care: The physiology of a fractured bone was discussed with the patient today  With nondisplaced or minimally displaced fractures, conservative treatment often results in a functional recovery  Typically, these fractures are immobilized in either a cast or splint depending on the pattern  Radiographs are typically taken at intervals throughout the fracture healing to ensure that muscular forces do not cause loss of reduction or alignment  If the fracture loses its alignment, surgical intervention may be required to stabilize it  Medical conditions such as diabetes, osteoporosis, vitamin D deficiency, and a history of or exposure to smoking may delay or prevent fracture healing  Operative Discussions:         _____________________________________________________  CHIEF COMPLAINT:  Chief Complaint   Patient presents with    Right Thumb - Fracture         SUBJECTIVE:  Jon Willson is a 81y o  year old male who presents with Fracture to the right thumb  This started  11 day(s) ago as Sudden    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  Pt states that is when he sustained his R thumb fracture  Radiation: None  Previous Treatments: splinting with only partial relief  Associated symptoms: No Complaints    PAST MEDICAL HISTORY:  Past Medical History:   Diagnosis Date    A-fib ([address] )     Hernia, abdominal     Hypertension        PAST SURGICAL HISTORY:  Past Surgical History:   Procedure Laterality Date    APPENDECTOMY      age 33   Elisabeth Kwon CARPAL TUNNEL RELEASE      COLONOSCOPY       [facility] CATARACT EXTRACAP,INSERT LENS Right 4/3/2017    Procedure: EXTRACTION EXTRACAPSULAR CATARACT PHACO INTRAOCULAR LENS (IOL); Surgeon: Adrian Stoll MD;  Location: [facility] MAIN OR;  Service: Ophthalmology    TONSILLECTOMY      age 12       FAMILY HISTORY:  Family History   Problem Relation Age of Onset    Cancer Mother      exp age 80    Heart disease Father     Heart disease Brother     Hypertension Brother        SOCIAL HISTORY:  Social History   Substance Use Topics    Smoking status: Never Smoker    Smokeless tobacco: Never Used    Alcohol use No       MEDICATIONS:    Current Outpatient Prescriptions:     [START ON 5/3/2018] amiodarone 200 mg tablet, Take 1 tablet (200 mg total) by mouth daily, Disp: 30 tablet, Rfl: 3    amLODIPine (NORVASC) 5 mg tablet, Take 1 tablet (5 mg total) by mouth daily, Disp: 30 tablet, Rfl: 0    lidocaine (LIDODERM) 5 %, Place 2 patches on the skin daily Remove & Discard patch within 12 hours or as directed by MD, Disp: 6 patch, Rfl: 0    metoprolol tartrate (LOPRESSOR) 25 mg tablet, Take 25 mg by mouth, Disp: , Rfl:     ALLERGIES:  No Known Allergies    REVIEW OF SYSTEMS:  Pertinent items are noted in HPI      LABS:  HgA1c:   Lab Results   Component Value Date    HGBA1C 5 8 (H) 04/12/2016     BMP:   Lab Results   Component Value Date    GLUCOSE 138 04/19/2018    CALCIUM 8 6 04/19/2018     04/19/2018    K 3 3 (L) 04/19/2018    CO2 26 04/19/2018     04/19/2018    BUN 8 04/19/2018    CREATININE 0 73 04/19/2018         _____________________________________________________  PHYSICAL EXAMINATION:  General: well developed and well nourished, alert, oriented times 3 and appears comfortable  Psychiatric: Normal  HEENT: Trachea Midline, No torticollis  Cardiovascular: No discernable arrhythmia  Pulmonary: No wheezing or stridor  Skin: No masses, erthema, lacerations, fluctation, ulcerations  Neurovascular: Sensation Intact to the Median, Ulnar, Radial Nerve, Motor Intact to the Median, Ulnar, Radial Nerve and Pulses Intact    MUSCULOSKELETAL EXAMINATION:  RIGHT SIDE:  Finger:  minimal tenderness  fpl and epl are intact      _____________________________________________________  STUDIES REVIEWED:  I have personally reviewed pertinent films in PACS and my interpretation is nondisplaced fracture of R thumb distal phalanx  PROCEDURES PERFORMED:  Fracture / Dislocation Treatment  Date/Time: 4/30/2018 2:49 PM  Performed by: Merline Cuellar  Authorized by: Merline Cuellar   Consent: Verbal consent obtained    Consent given by: patient  Injury location: finger  Location details: right thumb  Injury type: fracture  Fracture type: distal phalanx  MCP joint involved: no  IP joint involved: no  Pre-procedure neurovascular assessment: neurovascularly intact  Manipulation performed: no  Immobilization: splint  Splint type: finger splint, static  Post-procedure neurovascular assessment: post-procedure neurovascularly intact  Patient tolerance: Patient tolerated the procedure well with no immediate complications            Scribe Attestation    I,:   Grace Nunez am acting as a scribe while in the presence of the attending physician :        I,:   Lian Zuniga MD personally performed the services described in this documentation    as scribed in my presence :

## 2018-05-01 ENCOUNTER — TELEPHONE (OUTPATIENT)
Dept: NEUROSURGERY | Facility: CLINIC | Age: 83
End: 2018-05-01

## 2018-05-02 ENCOUNTER — HOSPITAL ENCOUNTER (EMERGENCY)
Facility: HOSPITAL | Age: 83
Discharge: HOME | End: 2018-05-02
Attending: EMERGENCY MEDICINE
Payer: MEDICARE

## 2018-05-02 ENCOUNTER — HOSPITAL ENCOUNTER (OUTPATIENT)
Dept: RADIOLOGY | Facility: MEDICAL CENTER | Age: 83
Discharge: HOME | End: 2018-05-02
Payer: MEDICARE

## 2018-05-02 ENCOUNTER — TELEPHONE (OUTPATIENT)
Dept: NEUROSURGERY | Facility: CLINIC | Age: 83
End: 2018-05-02

## 2018-05-02 VITALS
OXYGEN SATURATION: 97 % | BODY MASS INDEX: 28.32 KG/M2 | DIASTOLIC BLOOD PRESSURE: 69 MMHG | SYSTOLIC BLOOD PRESSURE: 135 MMHG | HEART RATE: 71 BPM | WEIGHT: 170 LBS | TEMPERATURE: 97.9 F | HEIGHT: 65 IN | RESPIRATION RATE: 19 BRPM

## 2018-05-02 DIAGNOSIS — G93.5 BRAIN COMPRESSION (HCC): ICD-10-CM

## 2018-05-02 DIAGNOSIS — S06.5X9A SDH (SUBDURAL HEMATOMA) (HCC): ICD-10-CM

## 2018-05-02 DIAGNOSIS — S06.5X9A SUBDURAL HEMATOMA (HCC): Primary | ICD-10-CM

## 2018-05-02 PROCEDURE — 99284 EMERGENCY DEPT VISIT MOD MDM: CPT

## 2018-05-02 PROCEDURE — 99215 OFFICE O/P EST HI 40 MIN: CPT | Performed by: NEUROLOGICAL SURGERY

## 2018-05-02 PROCEDURE — 70450 CT HEAD/BRAIN W/O DYE: CPT

## 2018-05-02 NOTE — DISCHARGE INSTR - AVS FIRST PAGE
Neurosurgical discharge instructions:     Do not take any blood thinning medications (ie  No Advil  No motrin  No ibuprofen  No Aleve  No Aspirin  No fishoil  No heparin  No antiplatelet / no anticoagulation medication)  Refrain from activity that increases chance of trauma to head or falls  Recommend you take fall precaution  No strenuous activity or sports  No driving    Return to hospital Emergency Room if you experience worsening / new headache, nausea/vomiting, speech/vision change, seizure, confusion / mental status change, increased fatigue, walking difficulties, weakness, or other neurological changes  Recommend frequent checks from family at least 3 times per day

## 2018-05-02 NOTE — CONSULTS
Consultation - Neurosurgery   Corin Pulse 81 y o  male MRN: [id number]  Unit/Bed#: ED 06 Encounter: [id number]      Inpatient consult to Neurosurgery  Consult performed by: Nancy Mccabe ordered by: Harjeet Singh          Assessment/Plan     Assessment:  1  Chronic right traumatic holohemispheric subdural hematoma - increased  2  Brain compression evident by 9 mm midline shift and effacement of right lateral ventricle  3  Sick sinus syndrome status post PPM  4  Hypertension  5  Atrial fibrillation    Plan:  · Exam reveal GCS 15  Non focal  RIOS 5/5  No drift  Good coordination  · Images reviewed personally and by attending  Final result as below  · CT head without contrast 5/2/18: Right holohemispheric with evolution  Less dense collection but now measuring about 9mm with new 9mm MLS and effacement of right lateral ventricle  No areas of hyperdensity concerning for acute hemorrhage  · Patient states his energy level and headaches are significantly improved today along with a nonfocal neurological exam  We discussed close monitoring versus surgical intervention  · Discussed in detail with patient and his son surgical options along with postoperative course including lucia holes for subdural evacuation versus mini craniotomy  · Patient would prefer to defer surgery if able  · Given nonfocal exam risk of surgery at this time outweigh benefit  · Will plan discharge home from emergency department with close interval follow-up  Patient should have a repeat CT head completed in one week with follow-up on Wednesday May 9th  · Discussed restrictions with patient along with his daughter Germain Blake and son Dc Celestin  He is to refrain from any heavy lifting or strenuous activities  Patient is not to drive until cleared by neurosurgery  · Patient lives alone but has three children who live close  They will provide frequent checks    · Signs and symptoms increasing subdural hematoma and brain compression reviewed with the patient and his children  · All questions were answered  They are to call the office or present to the emergency room with any neurological changes, questions or concerns  History of Present Illness     HPI: Doroteo Ochoa is a 81 y o  male with PMH including hypertension, atrial fibrillation, recent syncope with known right-sided subdural hematoma secondary to sick sinus syndrome requiring permanent pacemaker 4/2018 who presents with worsening CT scan  Patient was admitted to the hospital April 14th April 18th for was diagnosed as syncope secondary to sick sinus syndrome requiring permanent pacemaker  Patient was found to have a right whole hemispheric been acute subdural hematoma  He was GCS 15 and nonfocal   Repeat CT imaging during the admission showed redistribution of hemorrhage  Patient was discharged home with close neurosurgical follow-up  Today he went for his routine surveillance CT head which revealed increased subdural hematoma and new 9 mm midline shift  For this reason, he was sent to the emergency room for evaluation  Patient admits to a fairly constant waxing and weaning right frontal achy mild-to-moderate headache  He admits to ongoing fatigue and lack of energy to complete chores around the house  He is also complaining of nausea with one episode of vomiting at least one week ago  He felt that this reason the lead to his cardiac medication and called for changes  He has been contact with his cardiology team would decrease his amiodarone from 200 mg t i d  to 200 mg daily  Patient states today was the 1st day he had increased energy and has been without headache  He is currently without complaints  As discussed with patient's son and daughter, patient has been at his baseline  They had no concerns for confusion or mental status changes  Patient lives home alone but has three children who live within 10 minutes    They admit to frequent checks daily  Patient remains ambulatory without the use of assistive device and independent of all ADLs  Review of Systems   Constitutional: Positive for fatigue  Negative for activity change and fever  HENT: Negative for trouble swallowing and voice change  Eyes: Negative for photophobia and visual disturbance  Respiratory: Negative for cough and shortness of breath  Cardiovascular: Negative for chest pain and leg swelling  Gastrointestinal: Positive for nausea (felt to be related to cardiac pills)  Negative for abdominal pain and vomiting  Genitourinary: Negative for decreased urine volume and difficulty urinating  Musculoskeletal: Negative for back pain, gait problem and neck pain  Skin: Negative for pallor, rash and wound  Neurological: Positive for headaches  Negative for dizziness, tremors, seizures, speech difficulty, weakness, light-headedness and numbness  Chronic memory difficulties with names   Psychiatric/Behavioral: Negative for agitation and confusion  Historical Information   Past Medical History:   Diagnosis Date    A-fib ([address] )     Hernia, abdominal     Hypertension      Past Surgical History:   Procedure Laterality Date    APPENDECTOMY      age 33    CARDIAC PACEMAKER PLACEMENT      CARPAL TUNNEL RELEASE      COLONOSCOPY       [facility] CATARACT EXTRACAP,INSERT LENS Right 4/3/2017    Procedure: EXTRACTION EXTRACAPSULAR CATARACT PHACO INTRAOCULAR LENS (IOL);   Surgeon: Mariella Vargas MD;  Location: [facility] MAIN OR;  Service: Ophthalmology    TONSILLECTOMY      age 12     History   Alcohol Use No     History   Drug Use No     History   Smoking Status    Never Smoker   Smokeless Tobacco    Never Used     Family History   Problem Relation Age of Onset    Cancer Mother      exp age 80    Heart disease Father     Heart disease Brother     Hypertension Brother        Meds/Allergies   all current active meds have been reviewed, current meds:   No current facility-administered medications for this encounter  and PTA meds:   Prior to Admission Medications   Prescriptions Last Dose Informant Patient Reported? Taking? amLODIPine (NORVASC) 5 mg tablet   No Yes   Sig: Take 1 tablet (5 mg total) by mouth daily   lidocaine (LIDODERM) 5 %   No Yes   Sig: Place 2 patches on the skin daily Remove & Discard patch within 12 hours or as directed by MD   metoprolol tartrate (LOPRESSOR) 25 mg tablet   Yes Yes   Sig: Take 25 mg by mouth      Facility-Administered Medications: None     No Known Allergies    Objective   I/O     None          Physical Exam   Constitutional: He is oriented to person, place, and time  He appears well-developed and well-nourished  No distress  HENT:   Head: Normocephalic  Nose: Nose normal    Eyes: Conjunctivae and EOM are normal  Pupils are equal, round, and reactive to light  No scleral icterus  Neck: Normal range of motion  Neck supple  Cardiovascular: Normal rate  Pulmonary/Chest: Effort normal  No respiratory distress  Abdominal: Soft  He exhibits no distension  There is no tenderness  Musculoskeletal: He exhibits no tenderness  Neurological: He is oriented to person, place, and time  He has normal strength  He has a normal Finger-Nose-Finger Test    Reflex Scores:       Bicep reflexes are 2+ on the right side and 2+ on the left side  Brachioradialis reflexes are 2+ on the right side and 2+ on the left side  Patellar reflexes are 1+ on the right side and 1+ on the left side  Skin: Skin is warm and dry  Psychiatric: He has a normal mood and affect  His speech is normal and behavior is normal  Judgment and thought content normal      Neurologic Exam     Mental Status   Oriented to person, place, and time  Follows 3 step commands  Attention: normal  Concentration: normal    Speech: speech is normal   Level of consciousness: alert  Knowledge: good  Able to perform simple calculations  Normal comprehension  Cranial Nerves     CN II   Visual fields full to confrontation  CN III, IV, VI   Pupils are equal, round, and reactive to light  Extraocular motions are normal    Right pupil: Size: 3 mm  Shape: regular  Reactivity: brisk  Left pupil: Size: 3 mm  Shape: regular  Reactivity: brisk  Nystagmus: none   Upgaze: normal  Conjugate gaze absent: Intermittent dysconjugate gaze  CN V   Facial sensation intact  CN VII   Facial expression full, symmetric  CN VIII   Hearing: intact    CN XI   Right trapezius strength: normal  Left trapezius strength: normal    CN XII   Tongue: not atrophic  Fasciculations: absent  Tongue deviation: none    Motor Exam   Muscle bulk: normal  Overall muscle tone: normal  Right arm pronator drift: absent  Left arm pronator drift: absent    Strength   Strength 5/5 throughout  Sensory Exam   Light touch normal    Pinprick normal      Gait, Coordination, and Reflexes     Coordination   Finger to nose coordination: normal    Tremor   Resting tremor: absent  Intention tremor: absent  Action tremor: absent    Reflexes   Right brachioradialis: 2+  Left brachioradialis: 2+  Right biceps: 2+  Left biceps: 2+  Right patellar: 1+  Left patellar: 1+  Right Mullins: absent  Left Mullins: absent  Right ankle clonus: absent  Left ankle clonus: absent      Vitals:Blood pressure 138/85, pulse 77, temperature 97 9 °F (36 6 °C), temperature source Oral, resp  rate 19, height 5' 5" (1 651 m), weight 77 1 kg (170 lb), SpO2 97 %  ,Body mass index is 28 29 kg/m²  Lab Results:         Invalid input(s):  EOSPCT        Invalid input(s): LABALBU              No results found for: TROPONINT  ABG:No results found for: PHART, BQC2NJM, PO2ART, XND3ZCX, Q2XOECIR, BEART, SOURCE    Imaging Studies: I have personally reviewed pertinent reports     and I have personally reviewed pertinent films in PACS    Xr Chest Portable    Result Date: 4/18/2018  Narrative: CHEST INDICATION:   Patient s/p Pacemaker/ICD Insertion  COMPARISON:  None EXAM PERFORMED/VIEWS:  XR CHEST PORTABLE FINDINGS:  Right ICD with leads overlying the right atrium and right ventricle  No pneumothorax  Cardiomediastinal silhouette appears unremarkable  The lungs are clear  No pneumothorax or pleural effusion  Osseous structures appear within normal limits for patient age  Impression: Right ICD with leads overlying the right atrium and right ventricle  No pneumothorax  Workstation performed: [id number]     Xr Chest 2 Views    Result Date: 4/19/2018  Narrative: CHEST INDICATION:   Patient s/p Pacemaker/ICD Insertion  COMPARISON:  4/14/2018 EXAM PERFORMED/VIEWS:  XR CHEST PA & LATERAL  The frontal view was performed utilizing dual energy radiographic technique  Images: 4 FINDINGS:  Right-sided chest wall pacemaker is identified  Pacemaker leads are intact  Cardiomediastinal silhouette appears unremarkable  Minimal right basilar atelectasis  No lobar consolidation or interstitial edema  No pneumothorax or pleural effusion  Osseous structures appear within normal limits for patient age  Impression: No pneumothorax status post pacemaker insertion  Workstation performed: [id number]     Xr Shoulder 2+ Vw Left    Result Date: 4/15/2018  Narrative: LEFT SHOULDER INDICATION:   Left shoulder pain  COMPARISON:  None VIEWS:  XR SHOULDER 2+ VW LEFT FINDINGS: There is no acute fracture or dislocation  There is mild acromioclavicular joint degenerative change  No lytic or blastic lesions are seen  Soft tissues are unremarkable  Impression: No acute osseous abnormality  Mild acromioclavicular joint degenerative change  Workstation performed: [id number]     Xr Hand 3+ Vw Right    Result Date: 4/14/2018  Narrative: RIGHT HAND INDICATION:   R hand pain   COMPARISON:  None VIEWS:  XR HAND 3+ VW RIGHT Images: 3 For the purposes of institution wide universal language the following terms will apply: (thumb=1st digit/finger, index finger=2nd digit/finger, long finger=3rd digit/finger, ring=4th digit/finger and small finger=5th digit/finger) FINDINGS: A nondisplaced fracture through the midshaft of the distal 1st phalanx noted  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  No lytic or blastic lesions are seen  Soft tissue swelling of the 1st digit noted  Impression: Nondisplaced fracture midshaft of the distal 1st phalanx with regional soft tissue swelling  Multifocal osteoarthritis  Workstation performed: [id number]     Ct Head Wo Contrast    Result Date: 5/2/2018  Narrative: CT BRAIN - WITHOUT CONTRAST INDICATION:   I62 00: Nontraumatic subdural hemorrhage, unspecified  COMPARISON:  CT head 4/16/2018 TECHNIQUE:  CT examination of the brain was performed  In addition to axial images, coronal 2D reformatted images were created and submitted for interpretation  Radiation dose length product (DLP) for this visit:  1002 mGy-cm   This examination, like all CT scans performed in the [facility], was performed utilizing techniques to minimize radiation dose exposure, including the use of iterative reconstruction and automated exposure control  IMAGE QUALITY:  Diagnostic  FINDINGS: PARENCHYMA:  There is interval enlargement of the right subdural collection with increasing mass effect upon the underlying brain parenchyma  This now measures 14 mm in maximum transverse diameter (versus 7 mm on prior examination)  There is interval development of 9 mm of right-to-left midline shift with partial effacement of the right lateral ventricle  The collection is predominantly hypodense without significant high density component to suggest recent hemorrhage  There are no new areas of intraparenchymal hemorrhage  VENTRICLES AND EXTRA-AXIAL SPACES:  There is mass effect upon the right lateral ventricle with complete effacement of the posterior horn of the lateral ventricle on the right   VISUALIZED ORBITS AND PARANASAL SINUSES:  No acute abnormality involving the orbits  Mild scattered sinus mucosal thickening is noted  No fluid levels are seen  CALVARIUM AND EXTRACRANIAL SOFT TISSUES:  Normal      Impression: Enlarging right subdural collection with increasing mass effect on the underlying Brain parenchyma and interval development of 9 mm of right to left midline shift with complete effacement of the posterior horn of the right lateral ventricle  Findings are concerning for subfalcine herniation  Neurosurgical is recommended     I personally discussed this study with physician assistant Dieter Holland on 5/2/2018 at 12:02 PM  Workstation performed: [id number]     Ct Head Wo Contrast    Result Date: 4/16/2018  Narrative: CT BRAIN - WITHOUT CONTRAST INDICATION:   Right subdural hematoma  COMPARISON:  April 14, 2018 TECHNIQUE:  CT examination of the brain was performed  In addition to axial images, coronal 2D reformatted images were created and submitted for interpretation  Radiation dose length product (DLP) for this visit:  1046 44 mGy-cm   This examination, like all CT scans performed in the [facility], was performed utilizing techniques to minimize radiation dose exposure, including the use of iterative reconstruction and automated exposure control  IMAGE QUALITY:  Diagnostic  FINDINGS: PARENCHYMA:  Again noted is a right subdural hematoma in the right frontal, parietal and temporal region with extension of the hemorrhage along the right tentorial leaf and within the falx  The maximal thickness of this hematoma is 5 mm  On the previous study the maximal thickness was about 7 3 mm   The pneumocephalus seen on the previous study is resolving VENTRICLES AND EXTRA-AXIAL SPACES:  There is no increase in mass effect seen VISUALIZED ORBITS AND PARANASAL SINUSES:  There is partial opacification of the left mastoid air maneuver from the previous study Mild mucosal thickening seen in the left ethmoidal air cells and left maxillary sinus CALVARIUM AND EXTRACRANIAL SOFT TISSUES:  Normal      Impression: Acute subdural hematoma in the right frontal, temporal and parietal region with extension of hemorrhage within the falx and along the right tentorial leaf is decreasing  Resolving right-sided pneumocephalus No worsening seen Partial opacification of the left mastoid air cells, new from the previous study Workstation performed: [id number]     Trauma - Ct Head Wo Contrast    Result Date: 4/14/2018  Narrative: CT BRAIN - WITHOUT CONTRAST INDICATION:   trauma  COMPARISON:  None  TECHNIQUE:  CT examination of the brain was performed  In addition to axial images, coronal 2D reformatted images were created and submitted for interpretation  Radiation dose length product (DLP) for this visit:  1103 mGy-cm   This examination, like all CT scans performed in the [facility], was performed utilizing techniques to minimize radiation dose exposure, including the use of iterative reconstruction and automated exposure control  IMAGE QUALITY:  Diagnostic  FINDINGS: PARENCHYMA:  A small to moderate-sized right subdural hematoma is identified with a maximum thickness of 7 mm  In addition, there are scattered foci of air throughout the extra-axial collection extending along the posterior falx consistent with pneumocephalus  The ventricles are midline without mass effect or shift with minimal scattered small vessel ischemic changes  VENTRICLES AND EXTRA-AXIAL SPACES:  Normal for the patient's age  VISUALIZED ORBITS AND PARANASAL SINUSES:  No acute abnormality involving the orbits  Mild scattered sinus mucosal thickening is noted  No fluid levels are seen  CALVARIUM AND EXTRACRANIAL SOFT TISSUES:  There are scattered foci of air seen coursing along the left styloid process with no depressed skull fracture including at the base of the skull       Impression: Acute right subdural hematoma with scattered pneumocephalus throughout the extra-axial collection extending into the posterior falx  Several foci of air also seen tracking along the left styloid process  Midline ventricles with no mass effect or shift  I personally discussed this study with Dr Daphne Pruitt on 4/14/2018 at 8:44 PM  Workstation performed: [id number]       EKG, Pathology, and Other Studies: I have personally reviewed pertinent reports  VTE Prophylaxis: Reason for no pharmacologic prophylaxis increased SDH    Code Status: Prior  Advance Directive and Living Will:      Power of :    POLST:      Counseling / Coordination of Care  I spent 45 minutes with the patient

## 2018-05-02 NOTE — ED RE-EVALUATION NOTE
Pt seen by neurosurgery, recommend outpt f/u and repeat CT scan as scheduled by them   Will d/c     Deborah Aly DO  05/02/18 [id number]

## 2018-05-02 NOTE — TELEPHONE ENCOUNTER
Dr Ina Buckley (Radiology) called with results of CT head 5/2/18, he reports worsening subdural hematoma on the right, midline shift of about 6 mm, compression of the left lateral ventricle, concerns for herniation  Patient study was done at [address] [facility]  Patient has already left  He Advises additional evaluation of this patient at this time  Patient will be contacted go to emergency department [facility] for reassessment by Neurosurgery  [address] Neurosurgery nursing will attempt to contact patient/daughter SONYA MORENO JA [place], [place]
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
## 2018-05-02 NOTE — TELEPHONE ENCOUNTER
Unable to contact pt    Spoke with daughter Kemi Damian who will continue to try to contact the pt and will bring him to [facility] ED

## 2018-05-02 NOTE — ED RE-EVALUATION NOTE
Spoke to trauma surgery, will defer management to neurosurgery       Radha Or Sheeba,   05/02/18 8407

## 2018-05-03 ENCOUNTER — IN-CLINIC DEVICE VISIT (OUTPATIENT)
Dept: CARDIOLOGY CLINIC | Facility: CLINIC | Age: 83
End: 2018-05-03
Payer: MEDICARE

## 2018-05-03 DIAGNOSIS — Z95.0 PRESENCE OF PERMANENT CARDIAC PACEMAKER: ICD-10-CM

## 2018-05-03 DIAGNOSIS — I49.5 SSS (SICK SINUS SYNDROME) (HCC): Primary | ICD-10-CM

## 2018-05-03 PROCEDURE — 93280 PM DEVICE PROGR EVAL DUAL: CPT | Performed by: INTERNAL MEDICINE

## 2018-05-03 NOTE — PROGRESS NOTES
MDT DUALCHAMBER PACEMAKER  PACEMAKER INTERROGATED IN THE [place] OFFICE  BATTERY VOLTAGE ADEQUATE (10 1 YRS)  AP 66 4%;  86 6%  ALL LEAD PARAMETERS WITHIN NORMAL LIMITS  5 AF EPISODES LASTING UP TO 9 HRS ON 4/19/18 (0 9%)  SEEN BY EP PA NO AC DUE TO RECENT SUBDURAL HEMATOMA  LONGEST EPISODE SINCE 2 3 MNTS  PATIENT ON NORVASC AND LOPRESSOR   NO PROGRAMMING CHANGES MADE TO DEVICE PARAMETERS  NORMAL DEVICE FUNCTION  WOUND CHECK: INCISION CLEAN AND DRY WITH EDGES APPROXIMATED; WOUND CARE AND RESTRICTIONS REVIEWED WITH PATIENT   PL/RG

## 2018-05-09 ENCOUNTER — HOSPITAL ENCOUNTER (OUTPATIENT)
Dept: RADIOLOGY | Facility: MEDICAL CENTER | Age: 83
Discharge: HOME | End: 2018-05-09
Payer: MEDICARE

## 2018-05-09 ENCOUNTER — OFFICE VISIT (OUTPATIENT)
Dept: NEUROSURGERY | Facility: CLINIC | Age: 83
End: 2018-05-09
Payer: MEDICARE

## 2018-05-09 VITALS
HEIGHT: 65 IN | WEIGHT: 166.2 LBS | HEART RATE: 73 BPM | TEMPERATURE: 97.7 F | SYSTOLIC BLOOD PRESSURE: 130 MMHG | DIASTOLIC BLOOD PRESSURE: 70 MMHG | BODY MASS INDEX: 27.69 KG/M2 | RESPIRATION RATE: 16 BRPM

## 2018-05-09 DIAGNOSIS — S06.5X9A SUBDURAL HEMATOMA (HCC): Primary | ICD-10-CM

## 2018-05-09 DIAGNOSIS — S06.5X9A SUBDURAL HEMATOMA (HCC): ICD-10-CM

## 2018-05-09 DIAGNOSIS — G93.5 BRAIN COMPRESSION (HCC): ICD-10-CM

## 2018-05-09 PROCEDURE — 70450 CT HEAD/BRAIN W/O DYE: CPT

## 2018-05-09 PROCEDURE — 99213 OFFICE O/P EST LOW 20 MIN: CPT | Performed by: PHYSICIAN ASSISTANT
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
## 2018-05-09 NOTE — PATIENT INSTRUCTIONS
Patient advised not to take any antiplatelet / anticoagulants or other blood thinning medication (ie  No Warfarin, No Plavix, No Aspirin  No Fishoil,  No NSAIDs - ie  No Advil, No Ibuprofen, No Motrin, No Naproxen  Etc)  Patient is to refrain from strenuous activity  Patient advised to take fall precautions and refrain from activity that increases chance of fall or injury to head  Recommend patient continue to refrain from driving  Recommend you continue to follow up with ENT specialist for history of temporal bone fracture  Patient is to call our office or present to Emergency room if experience new or worsening headache, mental status change, seizure,  nausea /vomiting, speech / vision change, motor or sensory change, or other neurological changes

## 2018-05-09 NOTE — PROGRESS NOTES
Assessment/Plan:     Diagnoses and all orders for this visit:    Subdural hematoma ([address] )  -     CT head without contrast; Future          Discussion / Summary  This is a 81 y o  male who presents for hospital consult follow up (5/2/18; 4/16/18) for subdural hematoma  Mr Luis Houston reports he "feels good"  He denies taking any blood thinning medications  CT head ( 5/9/18) was reviewed in detail by Dr Cee Gonzalez and demonstrates slightly diminishished right subacute-chronic subdural hemorrhage and diminishing mass effect on right lateral ventricle and diminishing subfalcine shift in comparison to previous CT head (5/9/18)    Continued surveillance of SDH is advised with follow up in 2 weeks with CT head  (Of note -- patient reports he does not want any surgical intervention unless absolutely necessary)  Patient advised not to take any antiplatelet / anticoagulants or other blood thinning medication (ie  No Warfarin, No Plavix, No Aspirin  No Fishoil,  No NSAIDs - ie  No Advil, No Ibuprofen, No Motrin, No Naproxen  Etc)  Patient is to refrain from strenuous activity  Patient advised to take fall precautions and refrain from activity that increases chance of fall or injury to head  Recommend patient continue to refrain from driving  Recommend patient continue follow up with ENT specialist for history of temporal bone fracture  He reports he saw Dr Steffanie Powell of ENT a little over a week ago and underwent audiology testing  Patient denies otorrhea  Patient advised to call our office or present to Emergency room if experience new or worsening headache, mental status change, seizure,  nausea /vomiting, speech / vision change, motor or sensory change, or other neurological changes        Patient and grandson Stephanie Hurst) expressed understanding and agreement     ____________________________________________________________________  Subjective:      Patient ID: Ignacia Villa is a 81 y o  male who presents for hospital consult follow up (5/2/18; 4/16/18) for subdural hematoma  Mr Estuardo Doe is accompanied with his grandson Martina Posada)  HPI   In review -- this is an 81year old male who was hospitalized at [place] [address] 4/14/18 following likely syncopal episode and fall  He was amnestic for the exact event  He was found by his daughter confused with blood coming from the left ear  Apparently he treatment option earlier in the day  Did not remember how he had gotten back  Workup showed acute right-sided frontotemporoparietal subdural hematoma  He had left temporal fracture and likely counter to subdural hematoma  He was found to have bradycardia and sick sinus syndrome require permanent pacemaker during that admission in April 2018  He was discharged home  He had routine surveillance CT head 5/2/18 that reported enlarged subdural collection  He was directed to ER on 5/2/18 and was seen by Dr Leslie Nguyen who noted CT head demonstrated expected breakdown and evolution in acute subdural to chronic isodense subdural with mass effect and midline shift  It was noted patient's exam was non-focal    Patient did not want any surgical intervention unless absolutely necessary  Patient did not require neurosurgical intervention at that juncture  He was discharged from ER and advised follow up in 1 week with CT head  He had CT head completed today (5/9/18) and presents for follow up  Patient reports he "feels good"  Patient denies taking any blood thinning medications in the interval since hospital discharge  Patient reports he has been doing well  He reported "slight" headache this morning for about 1 minute and then headache resolved  He reports headaches have overall improved since hospitalization  He denies headache currently  Reports he feels like he is on a "see-saw" when he first lays down but reports this resolves when he closes eyes     He denies changes chronic hearing difficulty of both ears which proceeded his head injury for several years  Patient reports he was evaluated by Dr Franklyn Cleaning (ENT) a little over a week ago and underwent audiology testing and indicates he may get hearing aids  He denies otorrhea  He denies visual complaints at this time  No blurry vision currently as he experienced in past   Reports had right eye cataract surgery but no cataract surgery on left eye  Reports he vomited once when he was initially discharge hospital   He denies any nausea or vomiting currently  He denies any speech dysfunction  Reports cognitive status/memory is at his chronic baseline with history of memory difficulty which proceeded his head trauma  He denies numbness, tingling, weakness  He ambulates independent  He denies any fall or injury to the head in interval since hospital discharge  The following portions of the patient's history were reviewed and updated as appropriate: allergies, current medications, past family history, past medical history, past social history and past surgical history  Review of Systems   Constitutional: Negative for fatigue  HENT:        Denies otorrhea   Eyes: Negative for visual disturbance  Respiratory: Negative for shortness of breath  Gastrointestinal: Negative for nausea and vomiting  Genitourinary: Negative for difficulty urinating  Neurological: Negative for speech difficulty, weakness and numbness  See HPI   Psychiatric/Behavioral: Negative for agitation  Objective:      /70 (BP Location: Left arm, Patient Position: Sitting, Cuff Size: Standard)   Pulse 73   Temp 97 7 °F (36 5 °C) (Oral)   Resp 16   Ht 5' 5" (1 651 m)   Wt 75 4 kg (166 lb 3 2 oz)   BMI 27 66 kg/m²          Physical Exam   Constitutional: He is oriented to person, place, and time  He appears well-developed and well-nourished  No distress     HENT:   Mouth/Throat: Oropharynx is clear and moist    Eyes: Conjunctivae and EOM are normal  Pupils are equal, round, and reactive to light  Pulmonary/Chest: Effort normal    Neurological: He is alert and oriented to person, place, and time  He has a normal Finger-Nose-Finger Test  Gait normal    Psychiatric: He has a normal mood and affect  His speech is normal        Neurologic Exam     Mental Status   Oriented to person, place, and time  Speech: speech is normal   Level of consciousness: alert  Alert, oriented 3, thought content appropriate  GCS15  Briskly follows commands  Id's pen, the point, and function to write correct  Id's colors x 3 correct  Names 3 cities in [place] correct  Counts fingers correct  Shows correct number fingers both hands  Names [place] president x 3 correct in descending order correct and recalled Mane w/ option  Cranial Nerves     CN III, IV, VI   Pupils are equal, round, and reactive to light  Extraocular motions are normal      CN V   Facial sensation intact  CN VII   Facial expression full, symmetric       CN IX, X   CN IX normal      CN XI   CN XI normal      CN XII   CN XII normal      Motor Exam   Right arm pronator drift: absent  Left arm pronator drift: absent    Strength   Right deltoid: 5/5  Left deltoid: 5/5  Right biceps: 5/5  Left biceps: 5/5  Right triceps: 5/5  Left triceps: 5/5  Right wrist flexion: 5/5  Left wrist flexion: 5/5  Right wrist extension: 5/5  Left wrist extension: 5/5  Right interossei: 5/5  Left interossei: 5/5  Right iliopsoas: 5/5  Left iliopsoas: 5/5  Right quadriceps: 5/5  Left quadriceps: 5/5  Right hamstrin/5  Left hamstrin/5  Right anterior tibial: 5/5  Left anterior tibial: 5/5  Right gastroc: 5/5  Left gastroc: 5/5    Sensory Exam   Light touch normal      Gait, Coordination, and Reflexes     Gait  Gait: normal (Independent  )    Coordination   Finger to nose coordination: normal      Imaging study:    18 [facility] CT head -- per report: "  CT BRAIN - WITHOUT CONTRAST     INDICATION:   I62 00: Nontraumatic subdural hemorrhage, unspecified  G93 5: Compression of brain      COMPARISON:  Follow-up right subdural collection      TECHNIQUE:  CT examination of the brain was performed  In addition to axial images, coronal 2D reformatted images were created and submitted for interpretation        Radiation dose length product (DLP) for this visit:  1002 mGy-cm   This examination, like all CT scans performed in the [facility], was performed utilizing techniques to minimize radiation dose exposure, including the use of iterative   reconstruction and automated exposure control        IMAGE QUALITY:  Diagnostic      FINDINGS:     PARENCHYMA, VENTRICLES AND EXTRA-AXIAL SPACES:   On the current exam, there is again an intermediate attenuation subdural collection along the entire right cerebral hemisphere  On coronal reconstructed images, the collection measures up to 9 mm in depth   image 400/44, previously 12 mm in depth image 400/43  There continues to be mass effect on the right cerebral hemisphere with some effacement of the sulci, very similar to prior exam   5 mm leftward subfalcine shift observed on current exam, previously   9 mm  The collection is very slightly denser in the right frontal region, similar to prior exam      No acute intraparenchymal hemorrhage, subarachnoid hemorrhage, epidural hemorrhage, or territorial infarct  Mild chronic microvascular white matter ischemic changes are stable  Mild underlying atrophy is also stable      The right lateral ventricle is less effaced on the current exam, right frontal horn measuring up to 8mm in transverse dimension, previously 5 mm  The atrium of right lateral ventricle continues to be effaced, the temporal horn is not entrapped  3rd   ventricle and 4th ventricle are patent  No tonsillar herniation  The right uncus protrudes medially but does not appear grossly herniated    No hydrocephalus      VISUALIZED ORBITS AND PARANASAL SINUSES:  No acute abnormality involving the orbits  Mild scattered sinus mucosal thickening is noted  No fluid levels are seen      CALVARIUM AND EXTRACRANIAL SOFT TISSUES:  Calvarium is intact         IMPRESSION:  Diminishing right subacute-chronic subdural hemorrhage  Diminishing mass effect on right lateral ventricle and diminishing subfalcine shift    The right uncus protrudes medially slightly, but is not herniating      The study was marked in [facility] for immediate notification      Workstation performed: [id number] "

## 2018-05-16 DIAGNOSIS — S06.5X9A SDH (SUBDURAL HEMATOMA) (HCC): ICD-10-CM

## 2018-05-16 RX ORDER — AMLODIPINE BESYLATE 5 MG/1
TABLET ORAL
Qty: 30 TABLET | Refills: 0 | Status: SHIPPED | OUTPATIENT
Start: 2018-05-16 | End: 2018-07-02 | Stop reason: SDUPTHER

## 2018-05-22 ENCOUNTER — OFFICE VISIT (OUTPATIENT)
Dept: CARDIOLOGY CLINIC | Facility: CLINIC | Age: 83
End: 2018-05-22
Payer: MEDICARE

## 2018-05-22 VITALS
HEIGHT: 65 IN | BODY MASS INDEX: 28.76 KG/M2 | WEIGHT: 172.6 LBS | SYSTOLIC BLOOD PRESSURE: 120 MMHG | DIASTOLIC BLOOD PRESSURE: 84 MMHG | HEART RATE: 71 BPM

## 2018-05-22 DIAGNOSIS — Z79.899 ON AMIODARONE THERAPY: ICD-10-CM

## 2018-05-22 DIAGNOSIS — S06.5X9A SUBDURAL HEMATOMA (HCC): ICD-10-CM

## 2018-05-22 DIAGNOSIS — I48.91 ATRIAL FIBRILLATION, UNSPECIFIED TYPE (HCC): Primary | ICD-10-CM

## 2018-05-22 DIAGNOSIS — I10 ESSENTIAL HYPERTENSION: ICD-10-CM

## 2018-05-22 PROCEDURE — 93000 ELECTROCARDIOGRAM COMPLETE: CPT | Performed by: INTERNAL MEDICINE

## 2018-05-22 PROCEDURE — 99024 POSTOP FOLLOW-UP VISIT: CPT | Performed by: INTERNAL MEDICINE

## 2018-05-22 RX ORDER — AMIODARONE HYDROCHLORIDE 200 MG/1
200 TABLET ORAL DAILY
COMMUNITY
End: 2018-05-22 | Stop reason: SDUPTHER

## 2018-05-22 NOTE — PROGRESS NOTES
Cardiology Follow Up    Silvia Shah  1935  [id number]  [address] [facility]  [address] [place] [address] Street [address]    1  Atrial fibrillation, unspecified type ([facility] )  POCT ECG    Spirometry with diffusing capacity    TSH, 3rd generation with T4 reflex    Comprehensive metabolic panel    Ambulatory referral to Ophthalmology    metoprolol succinate (TOPROL-XL) 25 mg 24 hr tablet   2  On amiodarone therapy  Ambulatory referral to Ophthalmology   3  Essential hypertension     4  Subdural hematoma (HCC)         Interval History:    Patient well known to me from hospital stay  Had syncope, fall, subdural bleed  Has sick sinus syndrome, and underwent PPM      Had worsening headache in the midst and was found to have worsening hematoma  This is currently getting better       The patient is not complaining of angina like chest pain or chest pressure  There is no worsening orthopnea, paroxysmal nocturnal dyspnea  There is no leg swelling    There is no history of presyncope or syncope  There is no history of transient  Lightheadedness after PPM  There is no exertional intolerance      There is history of snoring at night  There is history of morning fatigability  There is occasional history of daytime sleepiness            Patient Active Problem List   Diagnosis    Subdural hematoma (HCC)    Pneumocephalus    Closed nondisplaced fracture of distal phalanx of right thumb    Essential hypertension    Atrial fibrillation (HCC)    Temporal bone fracture (HCC)     Past Medical History:   Diagnosis Date    A-fib ([facility] )     Hernia, abdominal     Hypertension      Social History     Social History    Marital status: /Civil Union     Spouse name: N/A    Number of children: N/A    Years of education: N/A     Occupational History    Not on file       Social History Main Topics    Smoking status: Never Smoker    Smokeless tobacco: Never Used    Alcohol use No    Drug use: No    Sexual activity: Not on file     Other Topics Concern    Not on file     Social History Narrative    No narrative on file      Family History   Problem Relation Age of Onset    Cancer Mother      exp age 80    Heart disease Father     Heart disease Brother     Hypertension Brother      Past Surgical History:   Procedure Laterality Date    APPENDECTOMY      age 33    CARDIAC PACEMAKER PLACEMENT      CARPAL TUNNEL RELEASE      COLONOSCOPY       [facility] CATARACT EXTRACAP,INSERT LENS Right 4/3/2017    Procedure: EXTRACTION EXTRACAPSULAR CATARACT PHACO INTRAOCULAR LENS (IOL); Surgeon: Fannie Gu MD;  Location: [facility] MAIN OR;  Service: Ophthalmology    TONSILLECTOMY      age 12       Current Outpatient Prescriptions:     amiodarone 200 mg tablet, Take 200 mg by mouth daily, Disp: , Rfl:     amLODIPine (NORVASC) 5 mg tablet, TAKE ONE TABLET BY MOUTH DAILY, Disp: 30 tablet, Rfl: 0  No Known Allergies    Labs:  Lab Results   Component Value Date     04/19/2018    K 3 3 (L) 04/19/2018     04/19/2018    CO2 26 04/19/2018    BUN 8 04/19/2018    CREATININE 0 73 04/19/2018    GLUCOSE 138 04/19/2018    GLUCOSE 136 04/14/2018    CALCIUM 8 6 04/19/2018     Lab Results   Component Value Date    CKTOTAL 304 04/15/2018    CKMBINDEX 1 2 04/15/2018    TROPONINI <0 02 04/14/2018     Lab Results   Component Value Date    WBC 4 85 04/19/2018    HGB 11 9 (L) 04/19/2018    HCT 35 8 (L) 04/19/2018    MCV 63 (L) 04/19/2018     04/19/2018     Lab Results   Component Value Date    CHOL 148 11/09/2017    TRIG 83 11/09/2017    HDL 78 (H) 11/09/2017     Imaging:   Ct Head Wo Contrast  Result Date: 5/9/2018  Narrative: CT BRAIN - WITHOUT CONTRAST INDICATION:   I62 00: Nontraumatic subdural hemorrhage, unspecified G93 5: Compression of brain   Impression: Diminishing right subacute-chronic subdural hemorrhage Diminishing mass effect on right lateral ventricle and diminishing subfalcine shift  The right uncus protrudes medially slightly, but is not herniating  The study was marked in [facility] for immediate notification  Workstation performed: [id number]         Ct Head Wo Contrast  Result Date: 5/2/2018  Narrative: CT BRAIN - WITHOUT CONTRAST INDICATION:   I62 00: Nontraumatic subdural hemorrhage, unspecified  COMPARISON:  Impression: Enlarging right subdural collection with increasing mass effect on the underlying Brain parenchyma and interval development of 9 mm of right to left midline shift with complete effacement of the posterior horn of the right lateral ventricle  Findings are concerning for subfalcine herniation  Neurosurgical is recommended     I personally discussed this study with physician assistant Anay Palacios on 5/2/2018 at 12:02 PM  Workstation performed: [id number]       Review of Systems:  Review of Systems   All other systems reviewed and are negative  as described in my history of present illness        Physical Exam:  Physical Exam   Constitutional: He is oriented to person, place, and time  He appears well-developed and well-nourished  No distress  Not in any distress at the current time   HENT:   Head: Normocephalic and atraumatic  Right Ear: External ear normal    Left Ear: External ear normal    Nose: Nose normal    Mouth/Throat: Uvula is midline and mucous membranes are normal    Posterior pharynx is crowded   Eyes: Conjunctivae, EOM and lids are normal  Pupils are equal, round, and reactive to light  No scleral icterus  No pallor  No cyanosis  No icterus   Neck: Trachea normal and normal range of motion  No JVD present  Carotid bruit is not present  No thyromegaly present  No jugular lymphadenopathy   Cardiovascular: Normal rate, regular rhythm, S1 normal, S2 normal, normal heart sounds, intact distal pulses and normal pulses  PMI is not displaced  Exam reveals no gallop, no S3, no S4 and no friction rub      No murmur heard   Pulmonary/Chest: Effort normal and breath sounds normal  No accessory muscle usage  No respiratory distress  He has no decreased breath sounds  He has no wheezes  He has no rhonchi  He has no rales  He exhibits no tenderness  Abdominal: Soft  Normal appearance and bowel sounds are normal  He exhibits no distension and no mass  There is no splenomegaly or hepatomegaly  There is no tenderness  Musculoskeletal: Normal range of motion  He exhibits no edema, tenderness or deformity  Lymphadenopathy:     He has no cervical adenopathy  Neurological: He is alert and oriented to person, place, and time  Facial symmetry is retained  Extraocular movements are retained  Head neck tongue and palate movement are retained and symmetric   Skin: Skin is intact  No abrasion, no lesion and no rash noted  No erythema  Nails show no clubbing  Psychiatric: He has a normal mood and affect  His speech is normal and behavior is normal  Thought content normal        Discussion/Summary:    1  Syncope, sick sinus syndrome, chronotropic incompetence   Falls  Post PPM  CHB, 100% paced  No further episodes          2   PAF  Has subdural hematoma - secondary to fall due to syncope - recent worsening but now resolving  No anticoagulation  Currently in rhythm    We had a detailed discussion about atrial fibrillation        1 - natural history of disease   atrial fibrillation is a disease of age   prevalence is 1% in the 4th decade , 2-3% by age 72 and 12-13% by age 80   since it increases with age , definitive therapy is indicated         2 - sleep apnea   untreated sleep apnea is the common cause of failure of medication as well as ablation   success rate of paroxysmal atrial fibrillation ablation falls from 80% in the 1st year, to 15% in the 1st year, for untreated severe sleep apnea   the patient has a history of snoring, morning fatigue at times and daytime sleepiness at times  physical examination for short thick neck and crowded posterior pharynx is -positive  patient is recommended to follow up with Pulmonary and Sleep Medicine - for BETH        3 - thyroid function   hyperthyroidism is a common precipitator of atrial fibrillation   Check TSH        4 -Aggressive management of  hypertension           5 - anticoagulation   patient's chads Vasc score is -3  hypertension   age   But has head bleed and cannot be on anticoagulation till cleared by neuro-surgery        6 - rate control medication   beta-blocker           7 - rhythm control medication      class 1 C agent - flecainide and propafenone   patient will need a stress study to rule out any underlying ischemia before these can be started   flecainide will necessitate use of an additional beta-blocker -   propafenone has intrinsic beta-blocker activity          class 3 agent - Sotalol and dofetilide   both will need hospital admission to monitor first 5 doses over the initial 2 and half days     sotalol has intrinsic beta-blocker properties   dofetilide may need an additional beta-blocker along with it for rate control  Keep potassium between 4 and 4 5   keep magnesium between 2 and 2 5   follow QTC   Follow creatinine          amiodarone   around this is very effective antiarrhythmic but has significantly long term toxicity   Hence certain basic studies are needed at baseline and thereafter at yearly intervals or   -hypo or hyperthyroidism , Check TSH    -can precipitate significant interstitial lung disease and hence DLCO at baseline and thereafter yearly   -long-term use causes cumulative toxicity in the liver- check  CMP   -corneal deposits advice and hence is ophthalmology evaluation        On amiodarone as most effective medication to control PAF, since cannot be on anticoagulation     8 - ablation   this is the most effective method to achieve and maintain sinus rhythm      paroxysmal atrial fibrillation - 70-80% chance in the 1st year  and falls to 50% by 5 years persistent atrial fibrillation - 50-60% chance in the 1st year and falls to 30% or less by 5 years      we use a combination of cryo and radiofrequency ablation      there is a 2-5% chance of serious complication which include - bleeding around access site, heart attack , stroke , bleeding around the heart requiring drainage , perforation requiring open heart surgery , phrenic nerve paralysis causing diaphragmatic paralysis, atrial esophageal fistula   we do deployed multiple methods to prevent such complication :  - heparin anticoagulation with ACT between 350 and 400s to prevent stroke   - coronary angiography if we are going to ablate close to any major blood vessel   -access to the pericardial drain if pericardial effusion were to happen   - pressure sensing catheters to reduce excessive pressure and chances of perforation   - esophageal temperature monitoring to reduce chances of injury            Summary of recommendation:  - check for amiodarone toxicity  - evaluate for BETH  - once neurosurgery clears for anticoagulation can start same

## 2018-05-22 NOTE — LETTER
May 27, 2018     Juan Miguel Ander [address] [address]    Patient: Herminia Moore   YOB: 1935   Date of Visit: 5/22/2018       Dear Dr Nevaeh Peña:    Thank you for referring Warren Estes to me for evaluation  Below are my notes for this consultation  If you have questions, please do not hesitate to call me  I look forward to following your patient along with you  Sincerely,        Girish Mon MD        CC: Fernando Betancur MD  5/27/2018  7:45 AM  Incomplete                                             Cardiology Follow Up    Herminia Moore  1935  [id number]  [address] [facility] [address]    1  Atrial fibrillation, unspecified type ([address] )  POCT ECG    Spirometry with diffusing capacity    TSH, 3rd generation with T4 reflex    Comprehensive metabolic panel    Ambulatory referral to Ophthalmology    metoprolol succinate (TOPROL-XL) 25 mg 24 hr tablet   2  On amiodarone therapy  Ambulatory referral to Ophthalmology   3  Essential hypertension     4   Subdural hematoma (HCC)         Interval History:    Patient well known to me from hospital stay  Had syncope, fall, subdural bleed  Has sick sinus syndrome, and underwent PPM      Had worsening headache in the midst and was found to have worsening hematoma  This is currently getting better       The patient is not complaining of angina like chest pain or chest pressure  There is no worsening orthopnea, paroxysmal nocturnal dyspnea  There is no leg swelling    There is no history of presyncope or syncope  There is no history of transient  Lightheadedness after PPM  There is no exertional intolerance      There is history of snoring at night  There is history of morning fatigability  There is occasional history of daytime sleepiness            Patient Active Problem List   Diagnosis    Subdural hematoma (HCC)    Pneumocephalus    Closed nondisplaced fracture of distal phalanx of right thumb    Essential hypertension    Atrial fibrillation (HCC)    Temporal bone fracture (HCC)     Past Medical History:   Diagnosis Date    A-fib ([address] )     Hernia, abdominal     Hypertension      Social History     Social History    Marital status: /Civil Union     Spouse name: N/A    Number of children: N/A    Years of education: N/A     Occupational History    Not on file  Social History Main Topics    Smoking status: Never Smoker    Smokeless tobacco: Never Used    Alcohol use No    Drug use: No    Sexual activity: Not on file     Other Topics Concern    Not on file     Social History Narrative    No narrative on file      Family History   Problem Relation Age of Onset    Cancer Mother      exp age 80    Heart disease Father     Heart disease Brother     Hypertension Brother      Past Surgical History:   Procedure Laterality Date    APPENDECTOMY      age 33    CARDIAC PACEMAKER PLACEMENT      CARPAL TUNNEL RELEASE      COLONOSCOPY       [facility] CATARACT EXTRACAP,INSERT LENS Right 4/3/2017    Procedure: EXTRACTION EXTRACAPSULAR CATARACT PHACO INTRAOCULAR LENS (IOL);   Surgeon: Josh Chong MD;  Location: [facility] MAIN OR;  Service: Ophthalmology    TONSILLECTOMY      age 12       Current Outpatient Prescriptions:     amiodarone 200 mg tablet, Take 200 mg by mouth daily, Disp: , Rfl:     amLODIPine (NORVASC) 5 mg tablet, TAKE ONE TABLET BY MOUTH DAILY, Disp: 30 tablet, Rfl: 0  No Known Allergies    Labs:  Lab Results   Component Value Date     04/19/2018    K 3 3 (L) 04/19/2018     04/19/2018    CO2 26 04/19/2018    BUN 8 04/19/2018    CREATININE 0 73 04/19/2018    GLUCOSE 138 04/19/2018    GLUCOSE 136 04/14/2018    CALCIUM 8 6 04/19/2018     Lab Results   Component Value Date    CKTOTAL 304 04/15/2018    CKMBINDEX 1 2 04/15/2018    TROPONINI <0 02 04/14/2018     Lab Results   Component Value Date    WBC 4 85 04/19/2018    HGB 11 9 (L) 04/19/2018    HCT 35 8 (L) 04/19/2018    MCV 63 (L) 04/19/2018     04/19/2018     Lab Results   Component Value Date    CHOL 148 11/09/2017    TRIG 83 11/09/2017    HDL 78 (H) 11/09/2017     Imaging:   Ct Head Wo Contrast  Result Date: 5/9/2018  Narrative: CT BRAIN - WITHOUT CONTRAST INDICATION:   I62 00: Nontraumatic subdural hemorrhage, unspecified G93 5: Compression of brain  Impression: Diminishing right subacute-chronic subdural hemorrhage Diminishing mass effect on right lateral ventricle and diminishing subfalcine shift  The right uncus protrudes medially slightly, but is not herniating  The study was marked in [facility] for immediate notification  Workstation performed: [id number]         Ct Head Wo Contrast  Result Date: 5/2/2018  Narrative: CT BRAIN - WITHOUT CONTRAST INDICATION:   I62 00: Nontraumatic subdural hemorrhage, unspecified  COMPARISON:  Impression: Enlarging right subdural collection with increasing mass effect on the underlying Brain parenchyma and interval development of 9 mm of right to left midline shift with complete effacement of the posterior horn of the right lateral ventricle  Findings are concerning for subfalcine herniation  Neurosurgical is recommended     I personally discussed this study with physician assistant Natanael Maynard on 5/2/2018 at 12:02 PM  Workstation performed: [id number]       Review of Systems:  Review of Systems   All other systems reviewed and are negative  as described in my history of present illness        Physical Exam:  Physical Exam   Constitutional: He is oriented to person, place, and time  He appears well-developed and well-nourished  No distress  Not in any distress at the current time   HENT:   Head: Normocephalic and atraumatic     Right Ear: External ear normal    Left Ear: External ear normal    Nose: Nose normal    Mouth/Throat: Uvula is midline and mucous membranes are normal    Posterior pharynx is crowded   Eyes: Conjunctivae, EOM and lids are normal  Pupils are equal, round, and reactive to light  No scleral icterus  No pallor  No cyanosis  No icterus   Neck: Trachea normal and normal range of motion  No JVD present  Carotid bruit is not present  No thyromegaly present  No jugular lymphadenopathy   Cardiovascular: Normal rate, regular rhythm, S1 normal, S2 normal, normal heart sounds, intact distal pulses and normal pulses  PMI is not displaced  Exam reveals no gallop, no S3, no S4 and no friction rub  No murmur heard  Pulmonary/Chest: Effort normal and breath sounds normal  No accessory muscle usage  No respiratory distress  He has no decreased breath sounds  He has no wheezes  He has no rhonchi  He has no rales  He exhibits no tenderness  Abdominal: Soft  Normal appearance and bowel sounds are normal  He exhibits no distension and no mass  There is no splenomegaly or hepatomegaly  There is no tenderness  Musculoskeletal: Normal range of motion  He exhibits no edema, tenderness or deformity  Lymphadenopathy:     He has no cervical adenopathy  Neurological: He is alert and oriented to person, place, and time  Facial symmetry is retained  Extraocular movements are retained  Head neck tongue and palate movement are retained and symmetric   Skin: Skin is intact  No abrasion, no lesion and no rash noted  No erythema  Nails show no clubbing  Psychiatric: He has a normal mood and affect  His speech is normal and behavior is normal  Thought content normal        Discussion/Summary:    1  Syncope, sick sinus syndrome, chronotropic incompetence   Falls  Post PPM  CHB, 100% paced  No further episodes          2   PAF  Has subdural hematoma - secondary to fall due to syncope - recent worsening but now resolving  No anticoagulation  Currently in rhythm    We had a detailed discussion about atrial fibrillation        1 - natural history of disease   atrial fibrillation is a disease of age   prevalence is 1% in the 4th decade , 2-3% by age 72 and 12-13% by age 80   since it increases with age , definitive therapy is indicated         2 - sleep apnea   untreated sleep apnea is the common cause of failure of medication as well as ablation   success rate of paroxysmal atrial fibrillation ablation falls from 80% in the 1st year, to 15% in the 1st year, for untreated severe sleep apnea   the patient has a history of snoring, morning fatigue at times and daytime sleepiness at times  physical examination for short thick neck and crowded posterior pharynx is -positive  patient is recommended to follow up with Pulmonary and Sleep Medicine - for BETH        3 - thyroid function   hyperthyroidism is a common precipitator of atrial fibrillation   Check TSH        4 -Aggressive management of  hypertension           5 - anticoagulation   patient's chads Vasc score is -3  hypertension   age   But has head bleed and cannot be on anticoagulation till cleared by neuro-surgery        6 - rate control medication   beta-blocker           7 - rhythm control medication      class 1 C agent - flecainide and propafenone   patient will need a stress study to rule out any underlying ischemia before these can be started   flecainide will necessitate use of an additional beta-blocker -   propafenone has intrinsic beta-blocker activity          class 3 agent - Sotalol and dofetilide   both will need hospital admission to monitor first 5 doses over the initial 2 and half days     sotalol has intrinsic beta-blocker properties   dofetilide may need an additional beta-blocker along with it for rate control  Keep potassium between 4 and 4 5   keep magnesium between 2 and 2 5   follow QTC   Follow creatinine          amiodarone   around this is very effective antiarrhythmic but has significantly long term toxicity   Hence certain basic studies are needed at baseline and thereafter at yearly intervals or   -hypo or hyperthyroidism , Check TSH    -can precipitate significant interstitial lung disease and hence DLCO at baseline and thereafter yearly   -long-term use causes cumulative toxicity in the liver- check  CMP   -corneal deposits advice and hence is ophthalmology evaluation        On amiodarone as most effective medication to control PAF, since cannot be on anticoagulation     8 - ablation   this is the most effective method to achieve and maintain sinus rhythm      paroxysmal atrial fibrillation - 70-80% chance in the 1st year  and falls to 50% by 5 years   persistent atrial fibrillation - 50-60% chance in the 1st year and falls to 30% or less by 5 years      we use a combination of cryo and radiofrequency ablation      there is a 2-5% chance of serious complication which include - bleeding around access site, heart attack , stroke , bleeding around the heart requiring drainage , perforation requiring open heart surgery , phrenic nerve paralysis causing diaphragmatic paralysis, atrial esophageal fistula   we do deployed multiple methods to prevent such complication :  - heparin anticoagulation with ACT between 350 and 400s to prevent stroke   - coronary angiography if we are going to ablate close to any major blood vessel   -access to the pericardial drain if pericardial effusion were to happen   - pressure sensing catheters to reduce excessive pressure and chances of perforation   - esophageal temperature monitoring to reduce chances of injury            Summary of recommendation:  - check for amiodarone toxicity  - evaluate for BETH  - once neurosurgery clears for anticoagulation can start same       Fidel Templeton MD  5/22/2018  1:36 PM  Incomplete                                             Cardiology Follow Up    Rahda Johnson  1935  [id number]  HEART & VASCULAR [facility] [address] [facility] [address]    1   Atrial fibrillation, unspecified type ([address] )  POCT ECG    Spirometry with diffusing capacity    TSH, 3rd generation with T4 reflex    Comprehensive metabolic panel    Ambulatory referral to Ophthalmology    metoprolol succinate (TOPROL-XL) 25 mg 24 hr tablet   2  On amiodarone therapy  Ambulatory referral to Ophthalmology   3  Essential hypertension     4  Subdural hematoma (HCC)         Interval History: ***    Patient Active Problem List   Diagnosis    Subdural hematoma (HCC)    Pneumocephalus    Closed nondisplaced fracture of distal phalanx of right thumb    Essential hypertension    Atrial fibrillation (HCC)    Temporal bone fracture (HCC)     Past Medical History:   Diagnosis Date    [facility] [address]     Hernia, abdominal     Hypertension      Social History     Social History    Marital status: /Civil Union     Spouse name: N/A    Number of children: N/A    Years of education: N/A     Occupational History    Not on file  Social History Main Topics    Smoking status: Never Smoker    Smokeless tobacco: Never Used    Alcohol use No    Drug use: No    Sexual activity: Not on file     Other Topics Concern    Not on file     Social History Narrative    No narrative on file      Family History   Problem Relation Age of Onset    Cancer Mother      exp age 80    Heart disease Father     Heart disease Brother     Hypertension Brother      Past Surgical History:   Procedure Laterality Date    APPENDECTOMY      age 33    CARDIAC PACEMAKER PLACEMENT      CARPAL TUNNEL RELEASE      COLONOSCOPY       [facility] CATARACT EXTRACAP,INSERT LENS Right 4/3/2017    Procedure: EXTRACTION EXTRACAPSULAR CATARACT PHACO INTRAOCULAR LENS (IOL);   Surgeon: Skyler Rodney MD;  Location: [facility] MAIN OR;  Service: Ophthalmology    TONSILLECTOMY      age 12       Current Outpatient Prescriptions:     amiodarone 200 mg tablet, Take 200 mg by mouth daily, Disp: , Rfl:     amLODIPine (NORVASC) 5 mg tablet, TAKE ONE TABLET BY MOUTH DAILY, Disp: 30 tablet, Rfl: 0  No Known Allergies    Labs:  Lab Results   Component Value Date     04/19/2018    K 3 3 (L) 04/19/2018     04/19/2018    CO2 26 04/19/2018    BUN 8 04/19/2018    CREATININE 0 73 04/19/2018    GLUCOSE 138 04/19/2018    GLUCOSE 136 04/14/2018    CALCIUM 8 6 04/19/2018     Lab Results   Component Value Date    CKTOTAL 304 04/15/2018    CKMBINDEX 1 2 04/15/2018    TROPONINI <0 02 04/14/2018     Lab Results   Component Value Date    WBC 4 85 04/19/2018    HGB 11 9 (L) 04/19/2018    HCT 35 8 (L) 04/19/2018    MCV 63 (L) 04/19/2018     04/19/2018     Lab Results   Component Value Date    CHOL 148 11/09/2017    TRIG 83 11/09/2017    HDL 78 (H) 11/09/2017     Imaging:   Ct Head Wo Contrast  Result Date: 5/9/2018  Narrative: CT BRAIN - WITHOUT CONTRAST INDICATION:   I62 00: Nontraumatic subdural hemorrhage, unspecified G93 5: Compression of brain  Impression: Diminishing right subacute-chronic subdural hemorrhage Diminishing mass effect on right lateral ventricle and diminishing subfalcine shift  The right uncus protrudes medially slightly, but is not herniating  The study was marked in [facility] for immediate notification  Workstation performed: [id number]         Ct Head Wo Contrast  Result Date: 5/2/2018  Narrative: CT BRAIN - WITHOUT CONTRAST INDICATION:   I62 00: Nontraumatic subdural hemorrhage, unspecified  COMPARISON:  Impression: Enlarging right subdural collection with increasing mass effect on the underlying Brain parenchyma and interval development of 9 mm of right to left midline shift with complete effacement of the posterior horn of the right lateral ventricle  Findings are concerning for subfalcine herniation  Neurosurgical is recommended     I personally discussed this study with physician assistant Danny Breaux on 5/2/2018 at 12:02 PM  Workstation performed: [id number]       Review of Systems:  Review of Systems    Physical Exam:  Physical Exam    Discussion/Summary:***

## 2018-05-23 ENCOUNTER — HOSPITAL ENCOUNTER (OUTPATIENT)
Dept: RADIOLOGY | Facility: MEDICAL CENTER | Age: 83
Discharge: HOME | End: 2018-05-23
Payer: MEDICARE

## 2018-05-23 DIAGNOSIS — S06.5X9A SUBDURAL HEMATOMA (HCC): ICD-10-CM

## 2018-05-23 PROCEDURE — 70450 CT HEAD/BRAIN W/O DYE: CPT

## 2018-05-25 ENCOUNTER — OFFICE VISIT (OUTPATIENT)
Dept: NEUROSURGERY | Facility: CLINIC | Age: 83
End: 2018-05-25
Payer: MEDICARE

## 2018-05-25 VITALS
SYSTOLIC BLOOD PRESSURE: 128 MMHG | HEIGHT: 65 IN | TEMPERATURE: 96 F | WEIGHT: 171.6 LBS | RESPIRATION RATE: 18 BRPM | DIASTOLIC BLOOD PRESSURE: 58 MMHG | BODY MASS INDEX: 28.59 KG/M2 | HEART RATE: 78 BPM

## 2018-05-25 DIAGNOSIS — S06.5X9A SUBDURAL HEMATOMA (HCC): Primary | ICD-10-CM

## 2018-05-25 DIAGNOSIS — S02.19XD CLOSED FRACTURE OF TEMPORAL BONE WITH ROUTINE HEALING, SUBSEQUENT ENCOUNTER: ICD-10-CM

## 2018-05-25 PROCEDURE — 99213 OFFICE O/P EST LOW 20 MIN: CPT | Performed by: PHYSICIAN ASSISTANT

## 2018-05-25 RX ORDER — METOPROLOL SUCCINATE 25 MG/1
25 TABLET, EXTENDED RELEASE ORAL DAILY
COMMUNITY
End: 2018-11-13 | Stop reason: SDUPTHER

## 2018-05-25 NOTE — PROGRESS NOTES
Assessment/Plan:    Very pleasant 61-year-old male, accompanied by his daughter, returns for follow-up, approximately 1 month, history of a probable syncopal episode, resulting in a fall, with subdural hematoma, and left temporal bone fracture  Patient returns today with updated CT head 5/23/18  This study is carefully reviewed in detail by Dr Vjiaya Aviles and compared with prior studies of 5/9/18, 5/2/18, and 4/14/18 the prior identified subdural he hematoma noted on the right with minimal interval decrease in volume/size  A minimal right-to-left midline shift also persists  Clinically the patient remains asymptomatic of this finding, and according to his daughter has return to baseline  On examination there are no focal neurologic deficits appreciated  Motor and sensory examination the upper lower extremities is 5 x 5, gait and balance is within normal limits, Romberg is negative, cranial nerves are intact throughout  He had history of bleeding from his left ear that has since discontinued, he has followed with Dr Richardson Vargas of ENT for his temporal bone fracture  He had been advised by cardiology in the fall of 2017 to start on anti-platelet agent (Eliquis) however he never started that medication  He has recently had a pacemaker placed for atrial fibrillation  It is planned he and his daughter report when cleared to initiate anti-platelet agents  He does understand at this time he is to continue to abstain from aspirin, NSAIDs or other anti-platelet agents  He expresses understanding of this  In addition he is advised not to drive at this time  He understands to continue to remain on restricted activities, lifting no more than 10 lb, ambulation as tolerated, and avoid frequent bending is relative to fall risk  Further follow-up with Neurosurgery is planned in approximately 1 month with repeat CT head prior to visit      These findings, impressions and recommendations are reviewed in great detail with the patient, he expressed understanding and agreement, his questions were answered completely and to his satisfaction  Follow up has been scheduled  Diagnoses and all orders for this visit:    Subdural hematoma ([address] )  -     CT head without contrast; Future    Closed fracture of temporal bone with routine healing, subsequent encounter          Return in about 4 weeks (around 6/22/2018)  Subjective:      Patient ID: Jin Chappell is a 81 y o  male  Very pleasant 46-year-old male, accompanied by his daughter, returns for 2 week follow-up, with updated CT head 5/23/18 as requested  He reports he is doing very well he offers no specific complaints he denies headache  According to his daughter he has returned to baseline  He has a history of a unwitnessed fall thought to be related to a syncopal episode 4/14/18  His daughter reports she called him on the telephone earlier in that day and he was confused at that time, a couple hours later she called him back and at that point he said he felt as though he had beat up  The ambulance squad was called and the patient was transported to [facility] Emergency Department for evaluation  Imaging at that time revealed a subdural hematoma, in additional on examination was bleeding from his left ear subsequent examination revealed a temporal bone fracture  He also had a fracture of 1 of his fingers  He had inpatient hospital stay from 4/14/18 through 4/19/18 at which point he was discharged to home, where he remains  His daughter reports that in the fall of 2017 he had been seen by cardiology and irregular heartbeat, and probable atrial fibrillation was diagnosed, Eliquis was advised however the patient was very resistant to medication it was never started  He has continued to follow with Cardiology  And has had a pacemaker placed 4/18/18, during his admission      At this point he continues reports not taking aspirin, other NSAIDs or anti-platelet agents  He takes very occasional Tylenol  He has follow-up with ENT Dr Sully Parikh for his temporal bone fracture, he reports he has been discharged however he has planned follow-up in the future for hearing aid evaluation  No other interval changes in medical surgical history reported  The following portions of the patient's history were reviewed and updated as appropriate: allergies, current medications, past family history, past medical history, past social history and past surgical history  Review of Systems   Constitutional: Negative  Eyes: Negative  Respiratory: Negative  Cardiovascular: Negative  Gastrointestinal: Negative  Endocrine: Negative  Genitourinary: Negative  Musculoskeletal: Negative  Skin: Negative  Allergic/Immunologic: Negative  Neurological: Positive for headaches  Negative for dizziness, tremors, seizures, syncope, facial asymmetry, speech difficulty, weakness, light-headedness and numbness  Hematological: Negative  Psychiatric/Behavioral: Negative  Objective:    Physical Exam   Constitutional: He is oriented to person, place, and time  He appears well-developed and well-nourished  HENT:   Head: Normocephalic and atraumatic  Left and right tympanic membranes are intact with no evidence of hemotympanum or other effusion  Eyes: Pupils are equal, round, and reactive to light  Cardiovascular: Normal rate, regular rhythm and normal heart sounds  Pulmonary/Chest: Effort normal and breath sounds normal    Musculoskeletal: Normal range of motion  Neurological: He is alert and oriented to person, place, and time  He has a normal Finger-Nose-Finger Test, a normal Romberg Test and a normal Tandem Gait Test  Gait normal    Reflex Scores:       Patellar reflexes are 2+ on the right side and 2+ on the left side  Skin: Skin is warm and dry  Psychiatric: He has a normal mood and affect  Neurologic Exam     Mental Status   Oriented to person, place, and time  Level of consciousness: alert    Cranial Nerves   Cranial nerves II through XII intact  CN III, IV, VI   Pupils are equal, round, and reactive to light  Motor Exam   Muscle bulk: normal  Overall muscle tone: normal  Right arm pronator drift: absent  Left arm pronator drift: absent    Strength   Right neck flexion: 5/5  Left neck flexion: 5/5  Right neck extension: 5/5  Left neck extension: 5/5  Right deltoid: 5/5  Left deltoid: 5/5  Right biceps: 5/5  Left biceps: 5/5  Right triceps: 5/5  Left triceps: 5/5  Right wrist flexion: 5/5  Left wrist flexion: 5/5  Right wrist extension: 5/5  Left wrist extension: 5/5  Right interossei: 5/5  Left interossei: 5/5  Right abdominals: 5/5  Left abdominals: 5/5  Right iliopsoas: 5/5  Left iliopsoas: 5/5  Right quadriceps: 5/5  Left quadriceps: 5/5  Right hamstrin/5  Left hamstrin/5  Right glutei: 5/5  Left glutei: 5/5  Right anterior tibial: 5/5  Left anterior tibial: 5/5  Right posterior tibial: 5/5  Left posterior tibial: 5/5  Right peroneal: 5/5  Left peroneal: 5/5  Right gastroc: 5/5  Left gastroc: 5/5    Sensory Exam   Light touch normal      Gait, Coordination, and Reflexes     Gait  Gait: normal    Coordination   Romberg: negative  Finger to nose coordination: normal  Tandem walking coordination: normal    Reflexes   Right patellar: 2+  Left patellar: 2+     CT BRAIN - WITHOUT CONTRAST   18     INDICATION:   I62 00: Nontraumatic subdural hemorrhage, unspecified  Follow-up      COMPARISON:  CT brain dated May 9, 2018      TECHNIQUE:  CT examination of the brain was performed  In addition to axial images, coronal 2D reformatted images were created and submitted for interpretation        Radiation dose length product (DLP) for this visit:  1004 mGy-cm     This examination, like all CT scans performed in the [facility], was performed utilizing techniques to minimize radiation dose exposure, including the use of iterative   reconstruction and automated exposure control        IMAGE QUALITY:  Diagnostic      FINDINGS:     PARENCHYMA:  Again noted is a subacute to chronic appearing subdural collection at the right cerebral hemisphere which measures 7 mm in greatest width, minimally decreased in size from the prior exam   There is also 4 mm of right-to-left midline shift   also decreased from the prior exam   There are stable mass effect on the right lateral ventricular system  There is stable effacement of the sulci involving the right cerebral hemisphere  No new hemorrhage or ischemia is identified  There is mild periventricular white matter low attenuation which is nonspecific and most likely related to chronic small vessel ischemic changes      VENTRICLES AND EXTRA-AXIAL SPACES:  Stable  No hydrocephalus      VISUALIZED ORBITS AND PARANASAL SINUSES:  There is mild mucosal thickening of the bilateral ethmoid air cells and left frontal sinus      CALVARIUM AND EXTRACRANIAL SOFT TISSUES:  Normal      IMPRESSION:     Minimal interval decrease in size of a previously described subacute to chronic appearing right subdural hematoma  Minimal interval decrease in right to left midline shift      No new hemorrhage or ischemia       Mild chronic small vessel ischemic changes

## 2018-05-25 NOTE — PATIENT INSTRUCTIONS
Continue with restricted activities, lifting no more than 10 lb, ambulation as tolerated, avoid frequent bending  Operation of a motor vehicle discouraged at this time  And the patient does understand when released from Neurosurgery he will need to discuss this with his primary care provider  Further follow-up with Neurosurgery is planned in approximately 1 month with repeat CT head prior to visit  And as noted he is to continue to avoid aspirin, NSAIDs or other anti-platelet agents

## 2018-05-25 NOTE — LETTER
May 25, 2018     [place] [id number]    Patient: Twyla Ace   YOB: 1935   Date of Visit: 5/25/2018       Dear Dr Cornel Irene:    Thank you for referring Dallin Queen to me for evaluation  Below are my notes for this consultation  If you have questions, please do not hesitate to call me  I look forward to following your patient along with you  Sincerely,        Zulema Rene MD        CC: MD Hever Ross PA-C  5/25/2018 12:11 PM  Sign at close encounter  Assessment/Plan:    Very pleasant 23-year-old male, accompanied by his daughter, returns for follow-up, approximately 1 month, history of a probable syncopal episode, resulting in a fall, with subdural hematoma, and left temporal bone fracture  Patient returns today with updated CT head 5/23/18  This study is carefully reviewed in detail by Dr Fabian Reyes and compared with prior studies of 5/9/18, 5/2/18, and 4/14/18 the prior identified subdural he hematoma noted on the right with minimal interval decrease in volume/size  A minimal right-to-left midline shift also persists  Clinically the patient remains asymptomatic of this finding, and according to his daughter has return to baseline  On examination there are no focal neurologic deficits appreciated  Motor and sensory examination the upper lower extremities is 5 x 5, gait and balance is within normal limits, Romberg is negative, cranial nerves are intact throughout  He had history of bleeding from his left ear that has since discontinued, he has followed with Dr Fareed Hernandez of ENT for his temporal bone fracture  He had been advised by cardiology in the fall of 2017 to start on anti-platelet agent (Eliquis) however he never started that medication  He has recently had a pacemaker placed for atrial fibrillation  It is planned he and his daughter report when cleared to initiate anti-platelet agents      He does understand at this time he is to continue to abstain from aspirin, NSAIDs or other anti-platelet agents  He expresses understanding of this  In addition he is advised not to drive at this time  He understands to continue to remain on restricted activities, lifting no more than 10 lb, ambulation as tolerated, and avoid frequent bending is relative to fall risk  Further follow-up with Neurosurgery is planned in approximately 1 month with repeat CT head prior to visit  These findings, impressions and recommendations are reviewed in great detail with the patient, he expressed understanding and agreement, his questions were answered completely and to his satisfaction  Follow up has been scheduled  Diagnoses and all orders for this visit:    Subdural hematoma ([address] )  -     CT head without contrast; Future    Closed fracture of temporal bone with routine healing, subsequent encounter          Return in about 4 weeks (around 6/22/2018)  Subjective:      Patient ID: Leo Chamorro is a 81 y o  male  Very pleasant 50-year-old male, accompanied by his daughter, returns for 2 week follow-up, with updated CT head 5/23/18 as requested  He reports he is doing very well he offers no specific complaints he denies headache  According to his daughter he has returned to baseline  He has a history of a unwitnessed fall thought to be related to a syncopal episode 4/14/18  His daughter reports she called him on the telephone earlier in that day and he was confused at that time, a couple hours later she called him back and at that point he said he felt as though he had beat up  The ambulance squad was called and the patient was transported to [facility] Emergency Department for evaluation  Imaging at that time revealed a subdural hematoma, in additional on examination was bleeding from his left ear subsequent examination revealed a temporal bone fracture    He also had a fracture of 1 of his fingers  He had inpatient hospital stay from 4/14/18 through 4/19/18 at which point he was discharged to home, where he remains  His daughter reports that in the fall of 2017 he had been seen by cardiology and irregular heartbeat, and probable atrial fibrillation was diagnosed, Eliquis was advised however the patient was very resistant to medication it was never started  He has continued to follow with Cardiology  And has had a pacemaker placed 4/18/18, during his admission  At this point he continues reports not taking aspirin, other NSAIDs or anti-platelet agents  He takes very occasional Tylenol  He has follow-up with ENT Dr Alejandrina Rosa for his temporal bone fracture, he reports he has been discharged however he has planned follow-up in the future for hearing aid evaluation  No other interval changes in medical surgical history reported  The following portions of the patient's history were reviewed and updated as appropriate: allergies, current medications, past family history, past medical history, past social history and past surgical history  Review of Systems   Constitutional: Negative  Eyes: Negative  Respiratory: Negative  Cardiovascular: Negative  Gastrointestinal: Negative  Endocrine: Negative  Genitourinary: Negative  Musculoskeletal: Negative  Skin: Negative  Allergic/Immunologic: Negative  Neurological: Positive for headaches  Negative for dizziness, tremors, seizures, syncope, facial asymmetry, speech difficulty, weakness, light-headedness and numbness  Hematological: Negative  Psychiatric/Behavioral: Negative  Objective:    Physical Exam   Constitutional: He is oriented to person, place, and time  He appears well-developed and well-nourished  HENT:   Head: Normocephalic and atraumatic  Left and right tympanic membranes are intact with no evidence of hemotympanum or other effusion     Eyes: Pupils are equal, round, and reactive to light  Cardiovascular: Normal rate, regular rhythm and normal heart sounds  Pulmonary/Chest: Effort normal and breath sounds normal    Musculoskeletal: Normal range of motion  Neurological: He is alert and oriented to person, place, and time  He has a normal Finger-Nose-Finger Test, a normal Romberg Test and a normal Tandem Gait Test  Gait normal    Reflex Scores:       Patellar reflexes are 2+ on the right side and 2+ on the left side  Skin: Skin is warm and dry  Psychiatric: He has a normal mood and affect  Neurologic Exam     Mental Status   Oriented to person, place, and time  Level of consciousness: alert    Cranial Nerves   Cranial nerves II through XII intact  CN III, IV, VI   Pupils are equal, round, and reactive to light      Motor Exam   Muscle bulk: normal  Overall muscle tone: normal  Right arm pronator drift: absent  Left arm pronator drift: absent    Strength   Right neck flexion: 5/5  Left neck flexion: 5/5  Right neck extension: 5/5  Left neck extension: 5/5  Right deltoid: 5/5  Left deltoid: 5/5  Right biceps: 5/5  Left biceps: 5/5  Right triceps: 5/5  Left triceps: 5/5  Right wrist flexion: 5/5  Left wrist flexion: 5/5  Right wrist extension: 5/5  Left wrist extension: 5/5  Right interossei: 5/5  Left interossei: 5/5  Right abdominals: 5/5  Left abdominals: 5/5  Right iliopsoas: 5/5  Left iliopsoas: 5/5  Right quadriceps: 5/5  Left quadriceps: 5/5  Right hamstrin/5  Left hamstrin/5  Right glutei: 5/5  Left glutei: 5/5  Right anterior tibial: 5/5  Left anterior tibial: 5/5  Right posterior tibial: 5/5  Left posterior tibial: 5/5  Right peroneal: 5/5  Left peroneal: 5/5  Right gastroc: 5/5  Left gastroc: 5/5    Sensory Exam   Light touch normal      Gait, Coordination, and Reflexes     Gait  Gait: normal    Coordination   Romberg: negative  Finger to nose coordination: normal  Tandem walking coordination: normal    Reflexes   Right patellar: 2+  Left patellar: 2+     CT BRAIN - WITHOUT CONTRAST   5/23/18     INDICATION:   I62 [id number]: Nontraumatic subdural hemorrhage, unspecified  Follow-up      COMPARISON:  CT brain dated May 9, 2018      TECHNIQUE:  CT examination of the brain was performed  In addition to axial images, coronal 2D reformatted images were created and submitted for interpretation        Radiation dose length product (DLP) for this visit:  1004 mGy-cm   This examination, like all CT scans performed in the [facility], was performed utilizing techniques to minimize radiation dose exposure, including the use of iterative   reconstruction and automated exposure control        IMAGE QUALITY:  Diagnostic      FINDINGS:     PARENCHYMA:  Again noted is a subacute to chronic appearing subdural collection at the right cerebral hemisphere which measures 7 mm in greatest width, minimally decreased in size from the prior exam   There is also 4 mm of right-to-left midline shift   also decreased from the prior exam   There are stable mass effect on the right lateral ventricular system  There is stable effacement of the sulci involving the right cerebral hemisphere  No new hemorrhage or ischemia is identified  There is mild periventricular white matter low attenuation which is nonspecific and most likely related to chronic small vessel ischemic changes      VENTRICLES AND EXTRA-AXIAL SPACES:  Stable  No hydrocephalus      VISUALIZED ORBITS AND PARANASAL SINUSES:  There is mild mucosal thickening of the bilateral ethmoid air cells and left frontal sinus      CALVARIUM AND EXTRACRANIAL SOFT TISSUES:  Normal      IMPRESSION:     Minimal interval decrease in size of a previously described subacute to chronic appearing right subdural hematoma  Minimal interval decrease in right to left midline shift      No new hemorrhage or ischemia       Mild chronic small vessel ischemic changes

## 2018-05-27 PROBLEM — Z79.899 ON AMIODARONE THERAPY: Status: ACTIVE | Noted: 2018-05-27

## 2018-05-27 RX ORDER — METOPROLOL SUCCINATE 25 MG/1
25 TABLET, EXTENDED RELEASE ORAL DAILY
Qty: 30 TABLET | Refills: 5 | OUTPATIENT
Start: 2018-05-27 | End: 2018-11-25 | Stop reason: SDUPTHER

## 2018-05-27 RX ORDER — AMIODARONE HYDROCHLORIDE 200 MG/1
200 TABLET ORAL DAILY
Qty: 30 TABLET | Refills: 5 | OUTPATIENT
Start: 2018-05-27 | End: 2018-11-13 | Stop reason: ALTCHOICE

## 2018-05-29 ENCOUNTER — APPOINTMENT (OUTPATIENT)
Dept: LAB | Facility: MEDICAL CENTER | Age: 83
End: 2018-05-29
Payer: MEDICARE

## 2018-05-29 LAB
ALBUMIN SERPL BCP-MCNC: 4 G/DL (ref 3.5–5)
ALP SERPL-CCNC: 101 U/L (ref 46–116)
ALT SERPL W P-5'-P-CCNC: 27 U/L (ref 12–78)
ANION GAP SERPL CALCULATED.3IONS-SCNC: 7 MMOL/L (ref 4–13)
AST SERPL W P-5'-P-CCNC: 20 U/L (ref 5–45)
BILIRUB SERPL-MCNC: 0.67 MG/DL (ref 0.2–1)
BUN SERPL-MCNC: 23 MG/DL (ref 5–25)
CALCIUM SERPL-MCNC: 8.8 MG/DL (ref 8.3–10.1)
CHLORIDE SERPL-SCNC: 105 MMOL/L (ref 100–108)
CO2 SERPL-SCNC: 27 MMOL/L (ref 21–32)
CREAT SERPL-MCNC: 1.13 MG/DL (ref 0.6–1.3)
GFR SERPL CREATININE-BSD FRML MDRD: 61 ML/MIN/1.73SQ M
GLUCOSE P FAST SERPL-MCNC: 105 MG/DL (ref 65–99)
POTASSIUM SERPL-SCNC: 4.2 MMOL/L (ref 3.5–5.3)
PROT SERPL-MCNC: 7.1 G/DL (ref 6.4–8.2)
SODIUM SERPL-SCNC: 139 MMOL/L (ref 136–145)
T4 FREE SERPL-MCNC: 1.01 NG/DL (ref 0.76–1.46)
TSH SERPL DL<=0.05 MIU/L-ACNC: 3.91 UIU/ML (ref 0.36–3.74)

## 2018-05-29 PROCEDURE — 80053 COMPREHEN METABOLIC PANEL: CPT | Performed by: INTERNAL MEDICINE

## 2018-05-29 PROCEDURE — 36415 COLL VENOUS BLD VENIPUNCTURE: CPT | Performed by: INTERNAL MEDICINE

## 2018-05-29 PROCEDURE — 84439 ASSAY OF FREE THYROXINE: CPT | Performed by: INTERNAL MEDICINE

## 2018-05-29 PROCEDURE — 84443 ASSAY THYROID STIM HORMONE: CPT | Performed by: INTERNAL MEDICINE

## 2018-05-30 ENCOUNTER — HOSPITAL ENCOUNTER (OUTPATIENT)
Dept: PULMONOLOGY | Facility: HOSPITAL | Age: 83
Discharge: HOME | End: 2018-05-30
Attending: INTERNAL MEDICINE
Payer: MEDICARE

## 2018-05-30 DIAGNOSIS — I48.91 ATRIAL FIBRILLATION, UNSPECIFIED TYPE (HCC): ICD-10-CM

## 2018-05-30 PROCEDURE — 94729 DIFFUSING CAPACITY: CPT

## 2018-05-30 PROCEDURE — 94729 DIFFUSING CAPACITY: CPT | Performed by: INTERNAL MEDICINE

## 2018-05-30 PROCEDURE — 94010 BREATHING CAPACITY TEST: CPT | Performed by: INTERNAL MEDICINE

## 2018-05-30 PROCEDURE — 94760 N-INVAS EAR/PLS OXIMETRY 1: CPT

## 2018-05-30 PROCEDURE — 94010 BREATHING CAPACITY TEST: CPT

## 2018-05-31 ENCOUNTER — TELEPHONE (OUTPATIENT)
Dept: CARDIOLOGY CLINIC | Facility: CLINIC | Age: 83
End: 2018-05-31

## 2018-05-31 NOTE — TELEPHONE ENCOUNTER
Pt called and stated that he needed to stop the amiodarone because he was having to many side effects from it  He complains of muscle aches especially in his shoulders, had a hard time walking with pain in his ankles and couldn't get his eyes to focus to read the daily paper  He stopped the amiodarone 2 days ago on 05/29/2018  Spoke to Romelia DILLARD, she stated pt should F/U with Dr Ankur Cardenas at next available appt and monitor pacer  Pt is scheduled for July 2nd for F/U

## 2018-05-31 NOTE — TELEPHONE ENCOUNTER
Called pt, stated he was having too many side effects from the amiodarone and needed to stop taking it  Will speak to Dr Eva Mulligan or Perez DILLARD to find out what we will do next

## 2018-06-04 DIAGNOSIS — I48.91 ATRIAL FIBRILLATION, UNSPECIFIED TYPE (HCC): Primary | ICD-10-CM

## 2018-06-22 ENCOUNTER — HOSPITAL ENCOUNTER (OUTPATIENT)
Dept: RADIOLOGY | Facility: MEDICAL CENTER | Age: 83
Discharge: HOME | End: 2018-06-22
Payer: MEDICARE

## 2018-06-22 DIAGNOSIS — S06.5X9A SUBDURAL HEMATOMA (HCC): ICD-10-CM

## 2018-06-22 PROCEDURE — 70450 CT HEAD/BRAIN W/O DYE: CPT

## 2018-06-25 ENCOUNTER — OFFICE VISIT (OUTPATIENT)
Dept: NEUROSURGERY | Facility: CLINIC | Age: 83
End: 2018-06-25
Payer: MEDICARE

## 2018-06-25 VITALS
RESPIRATION RATE: 16 BRPM | HEART RATE: 76 BPM | SYSTOLIC BLOOD PRESSURE: 120 MMHG | TEMPERATURE: 98.1 F | DIASTOLIC BLOOD PRESSURE: 74 MMHG | HEIGHT: 65 IN | BODY MASS INDEX: 28.82 KG/M2 | WEIGHT: 173 LBS

## 2018-06-25 DIAGNOSIS — S06.5X9A SUBDURAL HEMATOMA (HCC): Primary | ICD-10-CM

## 2018-06-25 PROCEDURE — 99213 OFFICE O/P EST LOW 20 MIN: CPT | Performed by: PHYSICIAN ASSISTANT

## 2018-06-25 RX ORDER — ENALAPRIL MALEATE 5 MG/1
5 TABLET ORAL DAILY
Qty: 30 TABLET | Refills: 0 | Status: SHIPPED | OUTPATIENT
Start: 2018-06-25 | End: 2018-07-27 | Stop reason: SDUPTHER

## 2018-06-25 NOTE — LETTER
June 25, 2018     Ander Bullard [address] [place], [place] [id number]    Patient: Leo Chamorro   YOB: 1935   Date of Visit: 6/25/2018       Dear Dr Herminio Yin:    Thank you for referring Cailin Moses to me for evaluation  Below are my notes for this consultation  If you have questions, please do not hesitate to call me  I look forward to following your patient along with you  Sincerely,        Lavonne Aase, MD        CC: MD Anny Lima PA-C  6/25/2018  3:41 PM  Sign at close encounter  Assessment/Plan:    Very pleasant 79-year-old male, returns for follow-up approximately 1 month has had updated CT head as requested 6/22/18  The study was carefully reviewed in detail by Dr Barbara Campuzano and compared with prior studies of 5/23/18, 5/9/18, and 4/16/18, the subdural hematoma is slightly increased when compared with the most recent study of 5/23/18  The patient clinically remains asymptomatic of these findings, he reports he continues with all usual activities, he denies gait or balance disturbance bowel or bladder incontinence, difficulty starting his stream or stopping stream, incomplete emptying  Or difficulty with memory or mentation,, or executive function       There are no focal neurologic deficits appreciated on exam, there is no pronator drift, or neglect  Motor examination the upper lower extremities is 5 x 5, gait and balance is unremarkable  Reflexes are intact and symmetric for the upper lower extremities  At this time options for management was reviewed with the patient, lucia holes for evacuation and drainage, versus continued observation  The patient this time would like to continue with observation as he remains asymptomatic of this  I did discuss with him the thought that more than likely this eventually will creep up in size to the point where it would be large enough, that he would be symptomatic      At this time we will start Enalapril 5 mg once daily as an adjunct to assist with resolution  A prescription was called to his pharmacy and when gap 1 tablet daily and 30 tablets were dispensed  Further follow-up is planned 1 month with repeat CT head, and clinical visit  He does have a history of a recently placed pacemaker although cardiology would still like to have an started back on blood thinners such as Coumadin or similar, he understands the need to avoid all these medications including aspirin, NSAIDs or other anti-platelet agents  This would be pending of his subdural hematoma  These findings, impressions and recommendations are reviewed in great detail with the patient, he expressed understanding and agreement, his questions were answered completely and to his satisfaction  Follow up has been scheduled  Diagnoses and all orders for this visit:    Subdural hematoma ([address] )  -     CT head without contrast; Future  -     enalapril (VASOTEC) 5 mg tablet; Take 1 tablet (5 mg total) by mouth daily            Return in about 4 weeks (around 7/23/2018) with CT head a few days prior to follow-up  Subjective:      Patient ID: Frances Martin is a 81 y o  male  HPI    The following portions of the patient's history were reviewed and updated as appropriate: allergies, current medications, past family history, past medical history, past social history and past surgical history  Review of Systems   Constitutional: Negative  HENT: Negative  Eyes: Negative  Respiratory: Negative  Cardiovascular: Negative  Gastrointestinal: Negative  Endocrine: Negative  Genitourinary: Negative  Musculoskeletal: Negative  Skin: Negative  Allergic/Immunologic: Negative  Neurological: Negative  Hematological: Negative  Psychiatric/Behavioral: Negative  Objective:    Physical Exam   Constitutional: He is oriented to person, place, and time  He appears well-developed and well-nourished     HENT:   Head: Normocephalic and atraumatic  Cardiovascular: Normal rate, regular rhythm and normal heart sounds  Pulmonary/Chest: Effort normal and breath sounds normal    Musculoskeletal: Normal range of motion  Neurological: He is alert and oriented to person, place, and time  He has normal reflexes  He has a normal Finger-Nose-Finger Test and a normal Romberg Test  Gait normal    Reflex Scores:       Bicep reflexes are 2+ on the right side and 2+ on the left side  Skin: Skin is warm and dry  Neurologic Exam     Mental Status   Oriented to person, place, and time  Level of consciousness: alert    Motor Exam   Muscle bulk: normal  Right arm tone: normal  Right arm pronator drift: absent  Left arm pronator drift: absent    Strength   Right deltoid: 5/5  Left deltoid: 5/5  Right biceps: 5/5  Left biceps: 5/5  Right quadriceps: 5/5  Left quadriceps: 5/5  Right hamstrin/5  Left hamstrin/5    Sensory Exam   Light touch normal      Gait, Coordination, and Reflexes     Gait  Gait: normal    Coordination   Romberg: negative  Finger to nose coordination: normal    Reflexes   Right biceps: 2+  Left biceps: 2+     CT BRAIN - WITHOUT CONTRAST   18     INDICATION:   Reassessment of right subdural hematoma      COMPARISON:  2018     TECHNIQUE:  CT examination of the brain was performed  In addition to axial images, coronal 2D reformatted images were created and submitted for interpretation        Radiation dose length product (DLP) for this visit:  454 2949 mGy-cm   This examination, like all CT scans performed in the [facility], was performed utilizing techniques to minimize radiation dose exposure, including the use of iterative   reconstruction and automated exposure control        IMAGE QUALITY:  Diagnostic      FINDINGS:     PARENCHYMA:  Persistent right subdural hematoma identified with subacute to chronic appearing density  The collection is mildly enlarged from the prior study    For example on image 2/21 measured posteriorly, the thickness is 13 mm and earlier 11 mm  However, there is no evidence of acute hemorrhage within the collection  Slight right to left midline shift has not significantly changed from the prior study  Degree of right lateral ventricular effacement has not significantly changed      VENTRICLES AND EXTRA-AXIAL SPACES:  Unchanged in size     VISUALIZED ORBITS AND PARANASAL SINUSES:  There is mild mucosal thickening within the ethmoid air cells without air-fluid levels present      CALVARIUM AND EXTRACRANIAL SOFT TISSUES:  Normal      IMPRESSION:     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

## 2018-06-25 NOTE — PATIENT INSTRUCTIONS
Continue with restricted activities, advised not to lift greater than 10 lb, ambulate as tolerated  Continue off aspirin, NSAIDs or other anti-platelet agents  Proceed for CT head without contrast approximately 2-3 days prior to follow-up visit  Further follow-up with Neurosurgery is planned in approximately 4 weeks, Dr Helga Kim, [facility])    Start enalapril, 5 mg 1 tablet daily, prescription has been called to your pharmacy is [facility] for 30 days

## 2018-06-25 NOTE — PROGRESS NOTES
Assessment/Plan:    Very pleasant 27-year-old male, returns for follow-up approximately 1 month has had updated CT head as requested 6/22/18  The study was carefully reviewed in detail by Dr Kinga English and compared with prior studies of 5/23/18, 5/9/18, and 4/16/18, the subdural hematoma is slightly increased when compared with the most recent study of 5/23/18  The patient clinically remains asymptomatic of these findings, he reports he continues with all usual activities, he denies gait or balance disturbance bowel or bladder incontinence, difficulty starting his stream or stopping stream, incomplete emptying  Or difficulty with memory or mentation,, or executive function       There are no focal neurologic deficits appreciated on exam, there is no pronator drift, or neglect  Motor examination the upper lower extremities is 5 x 5, gait and balance is unremarkable  Reflexes are intact and symmetric for the upper lower extremities  At this time options for management was reviewed with the patient, lucia holes for evacuation and drainage, versus continued observation  The patient this time would like to continue with observation as he remains asymptomatic of this  I did discuss with him the thought that more than likely this eventually will creep up in size to the point where it would be large enough, that he would be symptomatic  At this time we will start Enalapril 5 mg once daily as an adjunct to assist with resolution  A prescription was called to his pharmacy and when gap 1 tablet daily and 30 tablets were dispensed  Further follow-up is planned 1 month with repeat CT head, and clinical visit  He does have history of a recently placed pacemaker, although cardiology would also like to have him started back on a blood thinners such as Coumadin or similar, he understands the need to avoid all these medications including aspirin, NSAIDs or other anti-platelet agents    This would be pending resolution of his subdural hematoma  These findings, impressions and recommendations are reviewed in great detail with the patient, he expressed understanding and agreement, his questions were answered completely and to his satisfaction  Follow up has been scheduled  Diagnoses and all orders for this visit:    Subdural hematoma ([address] )  -     CT head without contrast; Future  -     enalapril (VASOTEC) 5 mg tablet; Take 1 tablet (5 mg total) by mouth daily            Return in about 4 weeks (around 7/23/2018) with CT head a few days prior to follow-up  Subjective:      Patient ID: Doroteo Ochoa is a 81 y o  male  Very pleasant 56-year-old male, returns for follow-up  Has had updated CT head as requested  He has a history of an unwitnessed fall 4/14/18 this was thought to be related to a syncopal episode  As noted in the past his daughter reports she contacted him by telephone earlier in the day and she reports he was confused at that time  Couple hours later she called him back at that point he said he felt as though he had been beat up  He was transferred by ambulance to [facility] for further evaluation  He had inpatient hospital stay from 4/14/18 through 4/19/18 which time he was discharged to home  He has been followed by cardiology for an irregular heartbeat probably secondary to atrial fibrillation he was advised to start Eliquis however the patient was very resistant to this medication and was never started  He does continue to follow with Cardiology and has recently had a pacemaker placed for 18 18 during that admission  He reports no interval changes in medical or surgical history  He reports a he has return to all usual activities including splitting wood          The following portions of the patient's history were reviewed and updated as appropriate: allergies, current medications, past family history, past medical history, past social history and past surgical history  Review of Systems   Constitutional: Negative  HENT: Negative  Eyes: Negative  Respiratory: Negative  Cardiovascular: Negative  Gastrointestinal: Negative  Endocrine: Negative  Genitourinary: Negative  Musculoskeletal: Negative  Skin: Negative  Allergic/Immunologic: Negative  Neurological: Negative  Hematological: Negative  Psychiatric/Behavioral: Negative  Objective:    Physical Exam   Constitutional: He is oriented to person, place, and time  He appears well-developed and well-nourished  HENT:   Head: Normocephalic and atraumatic  Cardiovascular: Normal rate, regular rhythm and normal heart sounds  Pulmonary/Chest: Effort normal and breath sounds normal    Musculoskeletal: Normal range of motion  Neurological: He is alert and oriented to person, place, and time  He has normal reflexes  He has a normal Finger-Nose-Finger Test and a normal Romberg Test  Gait normal    Reflex Scores:       Bicep reflexes are 2+ on the right side and 2+ on the left side  Skin: Skin is warm and dry  Neurologic Exam     Mental Status   Oriented to person, place, and time  Level of consciousness: alert    Motor Exam   Muscle bulk: normal  Right arm tone: normal  Right arm pronator drift: absent  Left arm pronator drift: absent    Strength   Right deltoid: 5/5  Left deltoid: 5/5  Right biceps: 5/5  Left biceps: 5/5  Right quadriceps: 5/5  Left quadriceps: 5/5  Right hamstrin/5  Left hamstrin/5    Sensory Exam   Light touch normal      Gait, Coordination, and Reflexes     Gait  Gait: normal    Coordination   Romberg: negative  Finger to nose coordination: normal    Reflexes   Right biceps: 2+  Left biceps: 2+     CT BRAIN - WITHOUT CONTRAST   18     INDICATION:   Reassessment of right subdural hematoma      COMPARISON:  2018     TECHNIQUE:  CT examination of the brain was performed    In addition to axial images, coronal 2D reformatted images were created and submitted for interpretation        Radiation dose length product (DLP) for this visit:  454 2949 mGy-cm   This examination, like all CT scans performed in the [facility], was performed utilizing techniques to minimize radiation dose exposure, including the use of iterative   reconstruction and automated exposure control        IMAGE QUALITY:  Diagnostic      FINDINGS:     PARENCHYMA:  Persistent right subdural hematoma identified with subacute to chronic appearing density  The collection is mildly enlarged from the prior study  For example on image 2/21 measured posteriorly, the thickness is 13 mm and earlier 11 mm  However, there is no evidence of acute hemorrhage within the collection  Slight right to left midline shift has not significantly changed from the prior study  Degree of right lateral ventricular effacement has not significantly changed      VENTRICLES AND EXTRA-AXIAL SPACES:  Unchanged in size     VISUALIZED ORBITS AND PARANASAL SINUSES:  There is mild mucosal thickening within the ethmoid air cells without air-fluid levels present      CALVARIUM AND EXTRACRANIAL SOFT TISSUES:  Normal      IMPRESSION:     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

## 2018-06-25 NOTE — LETTER
June 25, 2018     [place] [id number]    Patient: Alecia Villasenor   YOB: 1935   Date of Visit: 6/25/2018       Dear Dr Sophie Rodriguez:    Thank you for referring Tamiko Rivas to me for evaluation  Below are my notes for this consultation  If you have questions, please do not hesitate to call me  I look forward to following your patient along with you  Sincerely,        Luciana Upton MD        CC: MD Pablo Wheatley PA-C  6/25/2018  3:46 PM  Sign at close encounter  Assessment/Plan:    Very pleasant 60-year-old male, returns for follow-up approximately 1 month has had updated CT head as requested 6/22/18  The study was carefully reviewed in detail by Dr Anabela Wayne and compared with prior studies of 5/23/18, 5/9/18, and 4/16/18, the subdural hematoma is slightly increased when compared with the most recent study of 5/23/18  The patient clinically remains asymptomatic of these findings, he reports he continues with all usual activities, he denies gait or balance disturbance bowel or bladder incontinence, difficulty starting his stream or stopping stream, incomplete emptying  Or difficulty with memory or mentation,, or executive function       There are no focal neurologic deficits appreciated on exam, there is no pronator drift, or neglect  Motor examination the upper lower extremities is 5 x 5, gait and balance is unremarkable  Reflexes are intact and symmetric for the upper lower extremities  At this time options for management was reviewed with the patient, lucia holes for evacuation and drainage, versus continued observation  The patient this time would like to continue with observation as he remains asymptomatic of this  I did discuss with him the thought that more than likely this eventually will creep up in size to the point where it would be large enough, that he would be symptomatic      At this time we will start Enalapril 5 mg once daily as an adjunct to assist with resolution  A prescription was called to his pharmacy and when gap 1 tablet daily and 30 tablets were dispensed  Further follow-up is planned 1 month with repeat CT head, and clinical visit  He does have history of a recently placed pacemaker, although cardiology would also like to have him started back on a blood thinners such as Coumadin or similar, he understands the need to avoid all these medications including aspirin, NSAIDs or other anti-platelet agents  This would be pending resolution of his subdural hematoma  These findings, impressions and recommendations are reviewed in great detail with the patient, he expressed understanding and agreement, his questions were answered completely and to his satisfaction  Follow up has been scheduled  Diagnoses and all orders for this visit:    Subdural hematoma ([address] )  -     CT head without contrast; Future  -     enalapril (VASOTEC) 5 mg tablet; Take 1 tablet (5 mg total) by mouth daily            Return in about 4 weeks (around 7/23/2018) with CT head a few days prior to follow-up  Subjective:      Patient ID: Johanna Schreiber is a 81 y o  male  Very pleasant 60-year-old male, returns for follow-up  Has had updated CT head as requested  He has a history of an unwitnessed fall 4/14/18 this was thought to be related to a syncopal episode  As noted in the past his daughter reports she contacted him by telephone earlier in the day and she reports he was confused at that time  Couple hours later she called him back at that point he said he felt as though he had been beat up  He was transferred by ambulance to [facility] for further evaluation  He had inpatient hospital stay from 4/14/18 through 4/19/18 which time he was discharged to home      He has been followed by cardiology for an irregular heartbeat probably secondary to atrial fibrillation he was advised to start Eliquis however the patient was very resistant to this medication and was never started  He does continue to follow with Cardiology and has recently had a pacemaker placed for 18 18 during that admission  He reports no interval changes in medical or surgical history  He reports a he has return to all usual activities including splitting wood  The following portions of the patient's history were reviewed and updated as appropriate: allergies, current medications, past family history, past medical history, past social history and past surgical history  Review of Systems   Constitutional: Negative  HENT: Negative  Eyes: Negative  Respiratory: Negative  Cardiovascular: Negative  Gastrointestinal: Negative  Endocrine: Negative  Genitourinary: Negative  Musculoskeletal: Negative  Skin: Negative  Allergic/Immunologic: Negative  Neurological: Negative  Hematological: Negative  Psychiatric/Behavioral: Negative  Objective:    Physical Exam   Constitutional: He is oriented to person, place, and time  He appears well-developed and well-nourished  HENT:   Head: Normocephalic and atraumatic  Cardiovascular: Normal rate, regular rhythm and normal heart sounds  Pulmonary/Chest: Effort normal and breath sounds normal    Musculoskeletal: Normal range of motion  Neurological: He is alert and oriented to person, place, and time  He has normal reflexes  He has a normal Finger-Nose-Finger Test and a normal Romberg Test  Gait normal    Reflex Scores:       Bicep reflexes are 2+ on the right side and 2+ on the left side  Skin: Skin is warm and dry  Neurologic Exam     Mental Status   Oriented to person, place, and time     Level of consciousness: alert    Motor Exam   Muscle bulk: normal  Right arm tone: normal  Right arm pronator drift: absent  Left arm pronator drift: absent    Strength   Right deltoid: 5/5  Left deltoid: 5/5  Right biceps: 5/5  Left biceps: 5/5  Right quadriceps: 5/5  Left quadriceps: 5/5  Right hamstrin/5  Left hamstrin/5    Sensory Exam   Light touch normal      Gait, Coordination, and Reflexes     Gait  Gait: normal    Coordination   Romberg: negative  Finger to nose coordination: normal    Reflexes   Right biceps: 2+  Left biceps: 2+     CT BRAIN - WITHOUT CONTRAST   18     INDICATION:   Reassessment of right subdural hematoma      COMPARISON:  2018     TECHNIQUE:  CT examination of the brain was performed  In addition to axial images, coronal 2D reformatted images were created and submitted for interpretation        Radiation dose length product (DLP) for this visit:  454 2949 mGy-cm   This examination, like all CT scans performed in the [facility], was performed utilizing techniques to minimize radiation dose exposure, including the use of iterative   reconstruction and automated exposure control        IMAGE QUALITY:  Diagnostic      FINDINGS:     PARENCHYMA:  Persistent right subdural hematoma identified with subacute to chronic appearing density  The collection is mildly enlarged from the prior study  For example on image 2/21 measured posteriorly, the thickness is 13 mm and earlier 11 mm  However, there is no evidence of acute hemorrhage within the collection  Slight right to left midline shift has not significantly changed from the prior study  Degree of right lateral ventricular effacement has not significantly changed      VENTRICLES AND EXTRA-AXIAL SPACES:  Unchanged in size     VISUALIZED ORBITS AND PARANASAL SINUSES:  There is mild mucosal thickening within the ethmoid air cells without air-fluid levels present      CALVARIUM AND EXTRACRANIAL SOFT TISSUES:  Normal      IMPRESSION:     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

## 2018-06-26 ENCOUNTER — TRANSCRIBE ORDERS (OUTPATIENT)
Dept: ADMINISTRATIVE | Facility: HOSPITAL | Age: 83
End: 2018-06-26

## 2018-06-26 ENCOUNTER — APPOINTMENT (OUTPATIENT)
Dept: LAB | Facility: MEDICAL CENTER | Age: 83
End: 2018-06-26
Payer: MEDICARE

## 2018-06-26 DIAGNOSIS — I48.91 ATRIAL FIBRILLATION, UNSPECIFIED TYPE (HCC): ICD-10-CM

## 2018-06-26 LAB — TSH SERPL DL<=0.05 MIU/L-ACNC: 2.76 UIU/ML (ref 0.36–3.74)

## 2018-06-26 PROCEDURE — 36415 COLL VENOUS BLD VENIPUNCTURE: CPT

## 2018-06-26 PROCEDURE — 84443 ASSAY THYROID STIM HORMONE: CPT

## 2018-07-02 ENCOUNTER — OFFICE VISIT (OUTPATIENT)
Dept: CARDIOLOGY CLINIC | Facility: CLINIC | Age: 83
End: 2018-07-02
Payer: MEDICARE

## 2018-07-02 VITALS
DIASTOLIC BLOOD PRESSURE: 82 MMHG | WEIGHT: 173.7 LBS | HEIGHT: 65 IN | SYSTOLIC BLOOD PRESSURE: 130 MMHG | HEART RATE: 70 BPM | BODY MASS INDEX: 28.94 KG/M2

## 2018-07-02 DIAGNOSIS — S06.5X9A SDH (SUBDURAL HEMATOMA) (HCC): ICD-10-CM

## 2018-07-02 DIAGNOSIS — S06.5X9A SUBDURAL HEMATOMA (HCC): ICD-10-CM

## 2018-07-02 DIAGNOSIS — I48.91 ATRIAL FIBRILLATION, UNSPECIFIED TYPE (HCC): Primary | ICD-10-CM

## 2018-07-02 DIAGNOSIS — I10 ESSENTIAL HYPERTENSION: ICD-10-CM

## 2018-07-02 PROCEDURE — 99024 POSTOP FOLLOW-UP VISIT: CPT | Performed by: INTERNAL MEDICINE

## 2018-07-02 PROCEDURE — 93000 ELECTROCARDIOGRAM COMPLETE: CPT | Performed by: INTERNAL MEDICINE

## 2018-07-02 NOTE — LETTER
July 7, 2018     Referral [address] [facility] [address]    Patient: April Saavedra   YOB: 1935   Date of Visit: 7/2/2018       Dear Dr Momin :    Thank you for referring Hardik Toribio to me for evaluation  Below are my notes for this consultation  If you have questions, please do not hesitate to call me  I look forward to following your patient along with you  Sincerely,        Nicolas Fuentes MD        CC: DO Kelechi Bullard MD  7/7/2018  6:11 PM  Sign at close encounter                                             Cardiology Follow Up    April Saavedra  1935  [id number]  [facility]  [address] [place] [address] [address]    1  Atrial fibrillation, unspecified type ([facility] )  POCT ECG   2  SDH (subdural hematoma) (HCC)  amLODIPine (NORVASC) 5 mg tablet   3  Essential hypertension     4   Subdural hematoma (HCC)         Interval History:    New worsening of subdural on imaging  No worsening symptoms  Was lifting logs as heavy as 200 pounds    Patient well known to me from hospital stay  Had syncope, fall, subdural bleed  Has sick sinus syndrome, and underwent PPM     The patient is not complaining of angina like chest pain or chest pressure  There is no worsening orthopnea, paroxysmal nocturnal dyspnea  There is no leg swelling     There is no history of presyncope or syncope  There is no history of transient  Lightheadedness after PPM  There is no exertional intolerance        There is history of snoring at night  There is history of morning fatigability  There is occasional history of daytime sleepiness       Patient Active Problem List   Diagnosis    Subdural hematoma (HCC)    Pneumocephalus    Closed nondisplaced fracture of distal phalanx of right thumb    Essential hypertension    Atrial fibrillation (HCC)    Temporal bone fracture ([facility] )    On amiodarone therapy     Past Medical History:   Diagnosis Date    A-fib ([facility] )     Hernia, abdominal     Hypertension      Social History     Social History    Marital status: /Civil Union     Spouse name: N/A    Number of children: N/A    Years of education: N/A     Occupational History    Not on file  Social History Main Topics    Smoking status: Never Smoker    Smokeless tobacco: Never Used    Alcohol use No    Drug use: No    Sexual activity: Not on file     Other Topics Concern    Not on file     Social History Narrative    No narrative on file      Family History   Problem Relation Age of Onset    Cancer Mother         exp age 80    Heart disease Father     Heart disease Brother     Hypertension Brother      Past Surgical History:   Procedure Laterality Date    APPENDECTOMY      age 33    CARDIAC PACEMAKER PLACEMENT      CARPAL TUNNEL RELEASE      COLONOSCOPY       [facility] [address] CATARACT EXTRACAP,INSERT LENS Right 4/3/2017    Procedure: EXTRACTION EXTRACAPSULAR CATARACT PHACO INTRAOCULAR LENS (IOL);   Surgeon: Jaret Wills MD;  Location: [facility] MAIN OR;  Service: Ophthalmology    TONSILLECTOMY      age 12       Current Outpatient Prescriptions:     amiodarone 200 mg tablet, Take 1 tablet (200 mg total) by mouth daily, Disp: 30 tablet, Rfl: 5    enalapril (VASOTEC) 5 mg tablet, Take 1 tablet (5 mg total) by mouth daily, Disp: 30 tablet, Rfl: 0    metoprolol succinate (TOPROL-XL) 25 mg 24 hr tablet, Take 1 tablet (25 mg total) by mouth daily, Disp: 30 tablet, Rfl: 5    amLODIPine (NORVASC) 5 mg tablet, TAKE ONE TABLET BY MOUTH DAILY, Disp: 30 tablet, Rfl: 0    metoprolol succinate (TOPROL-XL) 25 mg 24 hr tablet, Take 25 mg by mouth daily, Disp: , Rfl:   No Known Allergies    Labs:  Lab Results   Component Value Date     05/29/2018    K 4 2 05/29/2018     05/29/2018    CO2 27 05/29/2018    BUN 23 05/29/2018    CREATININE 1 13 05/29/2018    GLUCOSE 138 04/19/2018    GLUCOSE 136 04/14/2018    CALCIUM 8 8 05/29/2018     Lab Results   Component Value Date    CKTOTAL 304 04/15/2018    CKMBINDEX 1 2 04/15/2018    TROPONINI <0 02 04/14/2018     Lab Results   Component Value Date    WBC 4 85 04/19/2018    HGB 11 9 (L) 04/19/2018    HCT 35 8 (L) 04/19/2018    MCV 63 (L) 04/19/2018     04/19/2018     Lab Results   Component Value Date    CHOL 148 11/09/2017    TRIG 83 11/09/2017    HDL 78 (H) 11/09/2017     Imaging: Ct Head Without Contrast    Result Date: 6/22/2018  Narrative: CT BRAIN - WITHOUT CONTRAST INDICATION:   Reassessment of right subdural hematoma  COMPARISON:  5/13/2018 TECHNIQUE:  CT examination of the brain was performed  In addition to axial images, coronal 2D reformatted images were created and submitted for interpretation  Radiation dose length product (DLP) for this visit:  1047 mGy-cm   This examination, like all CT scans performed in the [facility], was performed utilizing techniques to minimize radiation dose exposure, including the use of iterative reconstruction and automated exposure control  IMAGE QUALITY:  Diagnostic  FINDINGS: PARENCHYMA:  Persistent right subdural hematoma identified with subacute to chronic appearing density  The collection is mildly enlarged from the prior study  For example on image 2/21 measured posteriorly, the thickness is 13 mm and earlier 11 mm  However, there is no evidence of acute hemorrhage within the collection  Slight right to left midline shift has not significantly changed from the prior study  Degree of right lateral ventricular effacement has not significantly changed  VENTRICLES AND EXTRA-AXIAL SPACES:  Unchanged in size VISUALIZED ORBITS AND PARANASAL SINUSES:  There is mild mucosal thickening within the ethmoid air cells without air-fluid levels present   CALVARIUM AND EXTRACRANIAL SOFT TISSUES:  Normal      Impression: 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, mildly increased in size since prior study but without evidence of acute hemorrhage  Degree of right to left midline shift and effacement of right lateral ventricle has not significantly changed  The examination demonstrates a significant  finding and was documented as such in [facility] for liaison and referring practitioner notification  Workstation performed: [id number]       Review of Systems:  Review of Systems   All other systems reviewed and are negative  as described in my history of present illness        Physical Exam:  Physical Exam   Constitutional: He is oriented to person, place, and time  He appears well-developed and well-nourished  No distress  Not in any distress at the current time   HENT:   Head: Normocephalic and atraumatic  Right Ear: External ear normal    Left Ear: External ear normal    Nose: Nose normal    Mouth/Throat: Uvula is midline and mucous membranes are normal    Posterior pharynx is crowded   Eyes: Conjunctivae, EOM and lids are normal  Pupils are equal, round, and reactive to light  No scleral icterus  No pallor  No cyanosis  No icterus   Neck: Trachea normal and normal range of motion  Neck supple  No JVD present  Carotid bruit is not present  No thyromegaly present  No jugular lymphadenopathy   Cardiovascular: Normal rate, regular rhythm, S1 normal, S2 normal, normal heart sounds, intact distal pulses and normal pulses  PMI is not displaced  Exam reveals no gallop, no S3, no S4 and no friction rub  No murmur heard  Pulmonary/Chest: Effort normal and breath sounds normal  No accessory muscle usage  No respiratory distress  He has no decreased breath sounds  He has no wheezes  He has no rhonchi  He has no rales  He exhibits no tenderness  Abdominal: Soft  Normal appearance and bowel sounds are normal  He exhibits no distension and no mass  There is no splenomegaly or hepatomegaly  There is no tenderness  Musculoskeletal: Normal range of motion  He exhibits no edema, tenderness or deformity     Lymphadenopathy:     He has no cervical adenopathy  Neurological: He is alert and oriented to person, place, and time  Facial symmetry is retained  Extraocular movements are retained  Head neck tongue and palate movement are retained and symmetric   Skin: Skin is intact  No abrasion, no lesion and no rash noted  No erythema  Nails show no clubbing  Psychiatric: He has a normal mood and affect  His speech is normal and behavior is normal  Thought content normal        Discussion/Summary:  1  Syncope, sick sinus syndrome, chronotropic incompetence   Falls  Post PPM  CHB, 100% paced  No further episodes         2  Subdural hematoma  Off anticoagulation  Some increase in size  Was lifting 200 lb logs  Advised to stop all such lifting  Avoid valsalva maneuvers  No symptoms from same          3   PAF  Has subdural hematoma - secondary to fall due to syncope - recent worsening   No anticoagulation  Currently in rhythm     We had a detailed discussion about atrial fibrillation        1 - natural history of disease   since it increases with age , definitive therapy is indicated         2 - sleep apnea   patient is recommended to follow up with Pulmonary and Sleep Medicine - for BETH        3 - thyroid function   hyperthyroidism is a common precipitator of atrial fibrillation   Check TSH        4 -Aggressive management of  hypertension            5 - anticoagulation   patient's chads Vasc score is -3  hypertension   age   But has head bleed and cannot be on anticoagulation till cleared by neuro-surgery        6 - rate control medication   beta-blocker            7 - rhythm control medication      class 1 C agent - flecainide and propafenone   patient will need a stress study to rule out any underlying ischemia before these can be started   flecainide will necessitate use of an additional beta-blocker -   propafenone has intrinsic beta-blocker activity          class 3 agent - Sotalol and dofetilide   both will need hospital admission to monitor first 5 doses over the initial 2 and half days     sotalol has intrinsic beta-blocker properties   dofetilide may need an additional beta-blocker along with it for rate control  Keep potassium between 4 and 4 5   keep magnesium between 2 and 2 5   follow QTC   Follow creatinine          amiodarone   around this is very effective antiarrhythmic but has significantly long term toxicity   Hence certain basic studies are needed at baseline and thereafter at yearly intervals or   -hypo or hyperthyroidism , Check TSH    -can precipitate significant interstitial lung disease and hence DLCO at baseline and thereafter yearly   -long-term use causes cumulative toxicity in the liver- check  CMP   -corneal deposits advice and hence is ophthalmology evaluation         On amiodarone as most effective medication to control PAF, since cannot be on anticoagulation     8 - ablation   this is the most effective method to achieve and maintain sinus rhythm      paroxysmal atrial fibrillation - 70-80% chance in the 1st year  and falls to 50% by 5 years   persistent atrial fibrillation - 50-60% chance in the 1st year and falls to 30% or less by 5 years      we use a combination of cryo and radiofrequency ablation      there is a 2-5% chance of serious complication which include - bleeding around access site, heart attack , stroke , bleeding around the heart requiring drainage , perforation requiring open heart surgery , phrenic nerve paralysis causing diaphragmatic paralysis, atrial esophageal fistula   we do deployed multiple methods to prevent such complication :  - heparin anticoagulation with ACT between 350 and 400s to prevent stroke   - coronary angiography if we are going to ablate close to any major blood vessel   -access to the pericardial drain if pericardial effusion were to happen   - pressure sensing catheters to reduce excessive pressure and chances of perforation   - esophageal temperature monitoring to reduce chances of injury            Summary of recommendation:  - check for amiodarone toxicity  - evaluate for BETH  - once neurosurgery clears for anticoagulation can start same

## 2018-07-03 DIAGNOSIS — I48.91 ATRIAL FIBRILLATION, UNSPECIFIED TYPE (HCC): ICD-10-CM

## 2018-07-04 RX ORDER — AMLODIPINE BESYLATE 5 MG/1
TABLET ORAL
Qty: 30 TABLET | Refills: 0 | Status: SHIPPED | OUTPATIENT
Start: 2018-07-04 | End: 2018-11-13 | Stop reason: SDUPTHER

## 2018-07-07 RX ORDER — AMLODIPINE BESYLATE 5 MG/1
5 TABLET ORAL DAILY
Qty: 30 TABLET | Refills: 11 | Status: SHIPPED | OUTPATIENT
Start: 2018-07-07 | End: 2019-07-26 | Stop reason: SDUPTHER

## 2018-07-07 NOTE — PROGRESS NOTES
Cardiology Follow Up    Gianna Lyon  1935  [id number]  [address] [facility]  [address] [place] [address] [address]    1  At[facility], unspecified type ([facility] )  POCT ECG   2  SDH (subdural hematoma) (HCC)  amLODIPine (NORVASC) 5 mg tablet   3  Essential hypertension     4  Subdural hematoma (HCC)         Interval History:    New worsening of subdural on imaging  No worsening symptoms  Was lifting logs as heavy as 200 pounds    Patient well known to me from hospital stay  Had syncope, fall, subdural bleed  Has sick sinus syndrome, and underwent PPM     The patient is not complaining of angina like chest pain or chest pressure  There is no worsening orthopnea, paroxysmal nocturnal dyspnea  There is no leg swelling     There is no history of presyncope or syncope  There is no history of transient  Lightheadedness after PPM  There is no exertional intolerance        There is history of snoring at night  There is history of morning fatigability  There is occasional history of daytime sleepiness       Patient Active Problem List   Diagnosis    Subdural hematoma (HCC)    Pneumocephalus    Closed nondisplaced fracture of distal phalanx of right thumb    Essential hypertension    Atrial fibrillation (HCC)    Temporal bone fracture (HCC)    On amiodarone therapy     Past Medical History:   Diagnosis Date    A-fib ([place] [address] )     Hernia, abdominal     Hypertension      Social History     Social History    Marital status: /Civil Union     Spouse name: N/A    Number of children: N/A    Years of education: N/A     Occupational History    Not on file       Social History Main Topics    Smoking status: Never Smoker    Smokeless tobacco: Never Used    Alcohol use No    Drug use: No    Sexual activity: Not on file     Other Topics Concern    Not on file     Social History Narrative    No narrative on file Family History   Problem Relation Age of Onset    Cancer Mother         exp age 80    Heart disease Father     Heart disease Brother     Hypertension Brother      Past Surgical History:   Procedure Laterality Date    APPENDECTOMY      age 33    CARDIAC PACEMAKER PLACEMENT      CARPAL TUNNEL RELEASE      COLONOSCOPY       [address] CATARACT EXTRACAP,INSERT LENS Right 4/3/2017    Procedure: EXTRACTION EXTRACAPSULAR CATARACT PHACO INTRAOCULAR LENS (IOL);   Surgeon: Cullen Helms MD;  Location: [facility] MAIN OR;  Service: Ophthalmology    TONSILLECTOMY      age 12       Current Outpatient Prescriptions:     amiodarone 200 mg tablet, Take 1 tablet (200 mg total) by mouth daily, Disp: 30 tablet, Rfl: 5    enalapril (VASOTEC) 5 mg tablet, Take 1 tablet (5 mg total) by mouth daily, Disp: 30 tablet, Rfl: 0    metoprolol succinate (TOPROL-XL) 25 mg 24 hr tablet, Take 1 tablet (25 mg total) by mouth daily, Disp: 30 tablet, Rfl: 5    amLODIPine (NORVASC) 5 mg tablet, TAKE ONE TABLET BY MOUTH DAILY, Disp: 30 tablet, Rfl: 0    metoprolol succinate (TOPROL-XL) 25 mg 24 hr tablet, Take 25 mg by mouth daily, Disp: , Rfl:   No Known Allergies    Labs:  Lab Results   Component Value Date     05/29/2018    K 4 2 05/29/2018     05/29/2018    CO2 27 05/29/2018    BUN 23 05/29/2018    CREATININE 1 13 05/29/2018    GLUCOSE 138 04/19/2018    GLUCOSE 136 04/14/2018    CALCIUM 8 8 05/29/2018     Lab Results   Component Value Date    CKTOTAL 304 04/15/2018    CKMBINDEX 1 2 04/15/2018    TROPONINI <0 02 04/14/2018     Lab Results   Component Value Date    WBC 4 85 04/19/2018    HGB 11 9 (L) 04/19/2018    HCT 35 8 (L) 04/19/2018    MCV 63 (L) 04/19/2018     04/19/2018     Lab Results   Component Value Date    CHOL 148 11/09/2017    TRIG 83 11/09/2017    HDL 78 (H) 11/09/2017     Imaging: Ct Head Without Contrast    Result Date: 6/22/2018  Narrative: CT BRAIN - WITHOUT CONTRAST INDICATION:   Reassessment of right subdural hematoma  COMPARISON:  5/13/2018 TECHNIQUE:  CT examination of the brain was performed  In addition to axial images, coronal 2D reformatted images were created and submitted for interpretation  Radiation dose length product (DLP) for this visit:  1047 mGy-cm   This examination, like all CT scans performed in the [facility], was performed utilizing techniques to minimize radiation dose exposure, including the use of iterative reconstruction and automated exposure control  IMAGE QUALITY:  Diagnostic  FINDINGS: PARENCHYMA:  Persistent right subdural hematoma identified with subacute to chronic appearing density  The collection is mildly enlarged from the prior study  For example on image 2/21 measured posteriorly, the thickness is 13 mm and earlier 11 mm  However, there is no evidence of acute hemorrhage within the collection  Slight right to left midline shift has not significantly changed from the prior study  Degree of right lateral ventricular effacement has not significantly changed  VENTRICLES AND EXTRA-AXIAL SPACES:  Unchanged in size VISUALIZED ORBITS AND PARANASAL SINUSES:  There is mild mucosal thickening within the ethmoid air cells without air-fluid levels present  CALVARIUM AND EXTRACRANIAL SOFT TISSUES:  Normal      Impression: 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, mildly increased in size since prior study but without evidence of acute hemorrhage  Degree of right to left midline shift and effacement of right lateral ventricle has not significantly changed  The examination demonstrates a significant  finding and was documented as such in [facility] for liaison and referring practitioner notification  Workstation performed: [id number]       Review of Systems:  Review of Systems   All other systems reviewed and are negative    as described in my history of present illness        Physical Exam:  Physical Exam   Constitutional: He is oriented to person, place, and time  He appears well-developed and well-nourished  No distress  Not in any distress at the current time   HENT:   Head: Normocephalic and atraumatic  Right Ear: External ear normal    Left Ear: External ear normal    Nose: Nose normal    Mouth/Throat: Uvula is midline and mucous membranes are normal    Posterior pharynx is crowded   Eyes: Conjunctivae, EOM and lids are normal  Pupils are equal, round, and reactive to light  No scleral icterus  No pallor  No cyanosis  No icterus   Neck: Trachea normal and normal range of motion  Neck supple  No JVD present  Carotid bruit is not present  No thyromegaly present  No jugular lymphadenopathy   Cardiovascular: Normal rate, regular rhythm, S1 normal, S2 normal, normal heart sounds, intact distal pulses and normal pulses  PMI is not displaced  Exam reveals no gallop, no S3, no S4 and no friction rub  No murmur heard  Pulmonary/Chest: Effort normal and breath sounds normal  No accessory muscle usage  No respiratory distress  He has no decreased breath sounds  He has no wheezes  He has no rhonchi  He has no rales  He exhibits no tenderness  Abdominal: Soft  Normal appearance and bowel sounds are normal  He exhibits no distension and no mass  There is no splenomegaly or hepatomegaly  There is no tenderness  Musculoskeletal: Normal range of motion  He exhibits no edema, tenderness or deformity  Lymphadenopathy:     He has no cervical adenopathy  Neurological: He is alert and oriented to person, place, and time  Facial symmetry is retained  Extraocular movements are retained  Head neck tongue and palate movement are retained and symmetric   Skin: Skin is intact  No abrasion, no lesion and no rash noted  No erythema  Nails show no clubbing  Psychiatric: He has a normal mood and affect  His speech is normal and behavior is normal  Thought content normal        Discussion/Summary:  1   Syncope, sick sinus syndrome, chronotropic incompetence   Falls  Post PPM  CHB, 100% paced  No further episodes         2  Subdural hematoma  Off anticoagulation  Some increase in size  Was lifting 200 lb logs  Advised to stop all such lifting  Avoid valsalva maneuvers  No symptoms from same          3   PAF  Has subdural hematoma - secondary to fall due to syncope - recent worsening   No anticoagulation  Currently in rhythm     We had a detailed discussion about atrial fibrillation        1 - natural history of disease   since it increases with age , definitive therapy is indicated         2 - sleep apnea   patient is recommended to follow up with Pulmonary and Sleep Medicine - for BETH        3 - thyroid function   hyperthyroidism is a common precipitator of atrial fibrillation   Check TSH        4 -Aggressive management of  hypertension            5 - anticoagulation   patient's chads Vasc score is -3  hypertension   age   But has head bleed and cannot be on anticoagulation till cleared by neuro-surgery        6 - rate control medication   beta-blocker            7 - rhythm control medication      class 1 C agent - flecainide and propafenone   patient will need a stress study to rule out any underlying ischemia before these can be started   flecainide will necessitate use of an additional beta-blocker -   propafenone has intrinsic beta-blocker activity          class 3 agent - Sotalol and dofetilide   both will need hospital admission to monitor first 5 doses over the initial 2 and half days     sotalol has intrinsic beta-blocker properties   dofetilide may need an additional beta-blocker along with it for rate control  Keep potassium between 4 and 4 5   keep magnesium between 2 and 2 5   follow QTC   Follow creatinine          amiodarone   around this is very effective antiarrhythmic but has significantly long term toxicity   Hence certain basic studies are needed at baseline and thereafter at yearly intervals or   -hypo or hyperthyroidism , Check TSH    -can precipitate significant interstitial lung disease and hence DLCO at baseline and thereafter yearly   -long-term use causes cumulative toxicity in the liver- check  CMP   -corneal deposits advice and hence is ophthalmology evaluation         On amiodarone as most effective medication to control PAF, since cannot be on anticoagulation     8 - ablation   this is the most effective method to achieve and maintain sinus rhythm      paroxysmal atrial fibrillation - 70-80% chance in the 1st year  and falls to 50% by 5 years   persistent atrial fibrillation - 50-60% chance in the 1st year and falls to 30% or less by 5 years      we use a combination of cryo and radiofrequency ablation      there is a 2-5% chance of serious complication which include - bleeding around access site, heart attack , stroke , bleeding around the heart requiring drainage , perforation requiring open heart surgery , phrenic nerve paralysis causing diaphragmatic paralysis, atrial esophageal fistula   we do deployed multiple methods to prevent such complication :  - heparin anticoagulation with ACT between 350 and 400s to prevent stroke   - coronary angiography if we are going to ablate close to any major blood vessel   -access to the pericardial drain if pericardial effusion were to happen   - pressure sensing catheters to reduce excessive pressure and chances of perforation   - esophageal temperature monitoring to reduce chances of injury            Summary of recommendation:  - check for amiodarone toxicity  - evaluate for BETH  - once neurosurgery clears for anticoagulation can start same

## 2018-07-18 ENCOUNTER — IN-CLINIC DEVICE VISIT (OUTPATIENT)
Dept: CARDIOLOGY CLINIC | Facility: CLINIC | Age: 83
End: 2018-07-18
Payer: MEDICARE

## 2018-07-18 DIAGNOSIS — Z95.0 CARDIAC PACEMAKER IN SITU: ICD-10-CM

## 2018-07-18 DIAGNOSIS — I48.0 PAROXYSMAL ATRIAL FIBRILLATION (HCC): Primary | ICD-10-CM

## 2018-07-18 DIAGNOSIS — I49.5 SICK SINUS SYNDROME (HCC): ICD-10-CM

## 2018-07-18 DIAGNOSIS — Z45.018 ADJUSTMENT AND MANAGEMENT OF CARDIAC PACEMAKER: ICD-10-CM

## 2018-07-18 PROCEDURE — 93280 PM DEVICE PROGR EVAL DUAL: CPT | Performed by: INTERNAL MEDICINE

## 2018-07-18 NOTE — PROGRESS NOTES
Results for orders placed or performed in visit on 07/18/18   Cardiac EP device report    Narrative    HAILEY Charles 33 INTERROGATED IN THE [facility] OFFICE  BATTERY VOLTAGE ADEQUATE (15 YRS)  AP-82%, -98% (>40% MVP ON @ 70PPM)  ALL AVAILABLE LEAD PARAMETERS WITHIN NORMAL LIMITS  ALL OTHER TESTING WITHIN NORMAL LIMITS  10 AF EPISODES TREATED W/ REACTIVE ATP MAX DURATION 25 MINS-70% PACE TERMINATED  7 MONITORED AT/AF EPISODES  PT ASYMPTOMATIC  AF BURDEN<0 1%  HX: PAF & ON AMIO & METOPROLOL; NO ASA/ANTICOAGULATION DUE TO SUBDURAL HEMATOMA  ST @ 104 BPM @ BEGINNING OF INTERRO; HR DECREASED TO 72 BPM AFTER A FEW MINS  DECREASE MADE TO RA & RV AMPLITUDE TO PROMOTE DEVICE LONGEVITY WHILE MAINTAINING AN APPROPRIATE SAFETY MARGIN  NORMAL DEVICE FUNCTION   GV

## 2018-07-25 ENCOUNTER — HOSPITAL ENCOUNTER (OUTPATIENT)
Dept: RADIOLOGY | Facility: MEDICAL CENTER | Age: 83
Discharge: HOME | End: 2018-07-25
Payer: MEDICARE

## 2018-07-25 DIAGNOSIS — S06.5X9A SUBDURAL HEMATOMA (HCC): ICD-10-CM

## 2018-07-25 PROCEDURE — 70450 CT HEAD/BRAIN W/O DYE: CPT

## 2018-07-27 ENCOUNTER — OFFICE VISIT (OUTPATIENT)
Dept: NEUROSURGERY | Facility: CLINIC | Age: 83
End: 2018-07-27
Payer: MEDICARE

## 2018-07-27 VITALS
HEART RATE: 95 BPM | WEIGHT: 172 LBS | HEIGHT: 65 IN | TEMPERATURE: 97.2 F | SYSTOLIC BLOOD PRESSURE: 111 MMHG | DIASTOLIC BLOOD PRESSURE: 66 MMHG | BODY MASS INDEX: 28.66 KG/M2 | RESPIRATION RATE: 16 BRPM

## 2018-07-27 DIAGNOSIS — S06.5X9A SUBDURAL HEMATOMA (HCC): Primary | ICD-10-CM

## 2018-07-27 PROCEDURE — 99213 OFFICE O/P EST LOW 20 MIN: CPT | Performed by: PHYSICIAN ASSISTANT

## 2018-07-27 RX ORDER — ENALAPRIL MALEATE 5 MG/1
5 TABLET ORAL DAILY
Qty: 30 TABLET | Refills: 0 | Status: SHIPPED | OUTPATIENT
Start: 2018-07-27 | End: 2018-08-26 | Stop reason: SDUPTHER

## 2018-07-27 NOTE — PATIENT INSTRUCTIONS
Continue enalapril 1 tablet daily  Planned follow-up with Neurosurgery in approximately 1 month, Dr Sherryle Court  CT head 2 or 3 days prior to visit  Again restricted activities as were reviewed, lifting no more than 10 lb, avoid frequent bending, ambulation as tolerated  In addition avoid medications such as aspirin, Motrin, Advil, Aleve, ibuprofen, facial well or other similar blood thinners      Return to emergency department should there be any new symptoms

## 2018-07-27 NOTE — LETTER
July 27, 2018     [place] [id number]    Patient: Farshad Shannon   YOB: 1935   Date of Visit: 7/27/2018       Dear Dr Montel Ormond:    Thank you for referring Citlaly Camilo to me for evaluation  Below are my notes for this consultation  If you have questions, please do not hesitate to call me  I look forward to following your patient along with you  Sincerely,        Rao Walters MD        CC: MD Suly Hester, PA-C  7/27/2018 11:26 AM  Sign at close encounter  Assessment/Plan:    Pleasant 69-year-old male, accompanied by his daughter, returns for one-month follow-up, history of a right-sided subdural hematoma  He has had updated CT head as requested 7/25/18, the study was carefully reviewed in detail by Dr Ravi Merchant, and compared with prior studies of 6/22/18, 5/23/18, and 4/14/18, over the last 3 visits there is essentially no change in his subdural hematoma  Clinically the patient remains asymptomatic of this finding  On examination there is no pronator drift, heel and toe gait is intact, there is no drift with ambulation, Romberg is negative, finger-nose is intact, reflexes are intact and symmetric, power to the upper lower extremities is 5 x 5, sensation is intact and symmetric  He again denies alcohol use, aspirin, NSAIDs, or fish oil  He does admit to continuing to work at home, he reports it is not unusual to 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  And he has plans to cut a tree down tomorrow  He does continue to drive  He denies headache, gait or balance disturbance, motor sensory difficulties in the upper lower extremities, difficulty with memory or mentation  His daughter confirms that she has noted no changes over the last 2 months    She does reported he falls asleep very easily when seated in a chair, particularly when he visits his wife at [address], although she admits this is not a change for him, he has always been this way    He continues to take enalapril 5 mg once daily as directed  He reports he needs a refill as is current Rx is completed this was provided today electronically  Management options including observation, and craniotomy/bur hole were discussed with the patient  At this time the patient is adamant that as he is not symptomatic and he will have no surgery  Further follow-up with Neurosurgery is planned in approximately 1 month with repeat CT head prior to visit  He was once again advised to try and decrease his work level as this may be part of the reason for persisted subdural   His daughter reports his cardiologist made the same recommendation  Diagnoses and all orders for this visit:    Subdural hematoma ([address] )  -     CT head without contrast; Future  -     enalapril (VASOTEC) 5 mg tablet; Take 1 tablet (5 mg total) by mouth daily          Return in about 4 weeks (around 8/24/2018) With CT head a few days prior to follow-up  Subjective:      Patient ID: Radha Johnson is a 81 y o  male  HPI    The following portions of the patient's history were reviewed and updated as appropriate: allergies, current medications, past family history, past medical history, past social history and past surgical history  Review of Systems   Constitutional: Negative for chills, fatigue and fever  Eyes: Negative for pain and visual disturbance  Respiratory: Negative for cough, shortness of breath and wheezing  Cardiovascular: Negative for chest pain and palpitations  Gastrointestinal: Negative for abdominal pain and nausea  Musculoskeletal: Negative for arthralgias, back pain, gait problem, neck pain and neck stiffness  Neurological: Negative for dizziness, speech difficulty, weakness, numbness and headaches         Objective:    Physical Exam    Neurologic Exam

## 2018-07-27 NOTE — PROGRESS NOTES
Assessment/Plan:    Pleasant 61-year-old male, accompanied by his daughter, returns for one-month follow-up, history of a right-sided subdural hematoma  He has had updated CT head as requested 7/25/18, the study was carefully reviewed in detail by Dr Marbella Lake, and compared with prior studies of 6/22/18, 5/23/18, and 4/14/18, over the last 3 visits there is essentially no change in his subdural hematoma  Clinically the patient remains asymptomatic of this finding  On examination there is no pronator drift, heel and toe gait is intact, there is no drift with ambulation, Romberg is negative, finger-nose is intact, reflexes are intact and symmetric, power to the upper lower extremities is 5 x 5, sensation is intact and symmetric  He again denies alcohol use, aspirin, NSAIDs, or fish oil  He does admit to continuing to work at home, he reports it is not unusual to 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  And he has plans to cut a tree down tomorrow  He does continue to drive  He denies headache, gait or balance disturbance, motor sensory difficulties in the upper lower extremities, difficulty with memory or mentation  His daughter confirms that she has noted no changes over the last 2 months  She does reported he falls asleep very easily when seated in a chair, particularly when he visits his wife at [address]  [facility], although she admits this is not a change for him, he has always been this way    He continues to take enalapril 5 mg once daily as directed  He reports he needs a refill as is current Rx is completed this was provided today electronically  Management options including observation, and craniotomy/bur hole were discussed with the patient  At this time the patient is adamant that as he is not symptomatic and he will have no surgery  Further follow-up with Neurosurgery is planned in approximately 1 month with repeat CT head prior to visit      He was once again advised to try and decrease his work level as this may be part of the reason for persisted subdural   His daughter reports his cardiologist made the same recommendation  Diagnoses and all orders for this visit:    Subdural hematoma ([address] )  -     CT head without contrast; Future  -     enalapril (VASOTEC) 5 mg tablet; Take 1 tablet (5 mg total) by mouth daily          Return in about 4 weeks (around 8/24/2018) With CT head a few days prior to follow-up  Subjective:      Patient ID: Luciano Lugo is a 81 y o  male  HPI    The following portions of the patient's history were reviewed and updated as appropriate: allergies, current medications, past family history, past medical history, past social history and past surgical history  Review of Systems   Constitutional: Negative for chills, fatigue and fever  Eyes: Negative for pain and visual disturbance  Respiratory: Negative for cough, shortness of breath and wheezing  Cardiovascular: Negative for chest pain and palpitations  Gastrointestinal: Negative for abdominal pain and nausea  Musculoskeletal: Negative for arthralgias, back pain, gait problem, neck pain and neck stiffness  Neurological: Negative for dizziness, speech difficulty, weakness, numbness and headaches         Objective:    Physical Exam    Neurologic Exam

## 2018-08-24 ENCOUNTER — HOSPITAL ENCOUNTER (OUTPATIENT)
Dept: RADIOLOGY | Facility: MEDICAL CENTER | Age: 83
Discharge: HOME | End: 2018-08-24
Payer: MEDICARE

## 2018-08-24 DIAGNOSIS — S06.5X9A SUBDURAL HEMATOMA (HCC): ICD-10-CM

## 2018-08-24 PROCEDURE — 70450 CT HEAD/BRAIN W/O DYE: CPT

## 2018-08-26 DIAGNOSIS — S06.5X9A SUBDURAL HEMATOMA (HCC): ICD-10-CM

## 2018-08-27 RX ORDER — ENALAPRIL MALEATE 5 MG/1
TABLET ORAL
Qty: 30 TABLET | Refills: 0 | Status: SHIPPED | OUTPATIENT
Start: 2018-08-27 | End: 2020-06-19 | Stop reason: ALTCHOICE

## 2018-10-23 ENCOUNTER — REMOTE DEVICE CLINIC VISIT (OUTPATIENT)
Dept: CARDIOLOGY CLINIC | Facility: CLINIC | Age: 83
End: 2018-10-23
Payer: MEDICARE

## 2018-10-23 DIAGNOSIS — Z95.0 CARDIAC PACEMAKER IN SITU: ICD-10-CM

## 2018-10-23 DIAGNOSIS — I48.0 PAROXYSMAL ATRIAL FIBRILLATION (HCC): Primary | ICD-10-CM

## 2018-10-23 PROCEDURE — 93294 REM INTERROG EVL PM/LDLS PM: CPT | Performed by: INTERNAL MEDICINE

## 2018-10-23 PROCEDURE — 93296 REM INTERROG EVL PM/IDS: CPT | Performed by: INTERNAL MEDICINE

## 2018-10-23 NOTE — PROGRESS NOTES
Results for orders placed or performed in visit on 10/23/18   Cardiac EP device report    Narrative    MDT [address]: BATTERY STATUS "OK"  AP 85%  99%  ALL AVAILABLE LEAD PARAMETERS WITHIN NORMAL LIMITS  1 TREATED AT/AF NOTED, 4:50 MINS LONG  EGRAM SHOWS AF  NO AC DUE TO SUBDURAL HEMATOMA  ON AMIO & METO SUCC  NORMAL DEVICE FUNCTION   NC

## 2018-11-13 ENCOUNTER — OFFICE VISIT (OUTPATIENT)
Dept: CARDIOLOGY CLINIC | Facility: CLINIC | Age: 83
End: 2018-11-13
Payer: MEDICARE

## 2018-11-13 VITALS
WEIGHT: 170.7 LBS | SYSTOLIC BLOOD PRESSURE: 120 MMHG | DIASTOLIC BLOOD PRESSURE: 78 MMHG | BODY MASS INDEX: 28.44 KG/M2 | HEIGHT: 65 IN | HEART RATE: 101 BPM | OXYGEN SATURATION: 99 %

## 2018-11-13 DIAGNOSIS — I48.0 PAROXYSMAL ATRIAL FIBRILLATION (HCC): Primary | ICD-10-CM

## 2018-11-13 DIAGNOSIS — S06.5X9A SUBDURAL HEMATOMA (HCC): ICD-10-CM

## 2018-11-13 DIAGNOSIS — Z79.899 ON AMIODARONE THERAPY: ICD-10-CM

## 2018-11-13 DIAGNOSIS — I10 ESSENTIAL HYPERTENSION: ICD-10-CM

## 2018-11-13 PROCEDURE — 99213 OFFICE O/P EST LOW 20 MIN: CPT | Performed by: INTERNAL MEDICINE

## 2018-11-13 PROCEDURE — 93000 ELECTROCARDIOGRAM COMPLETE: CPT | Performed by: INTERNAL MEDICINE

## 2018-11-13 NOTE — PROGRESS NOTES
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 in [employer]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   He is grateful for life and enjoys doing the same      Patient well known to me from hospital stay  Had syncope, fall, subdural bleed  Has sick sinus syndrome, and underwent PPM     The patient is not complaining of angina like chest pain or chest pressure  There is no worsening orthopnea, paroxysmal nocturnal dyspnea  There is no leg swelling     There is no history of presyncope or syncope  There is no history of transient  Lightheadedness after PPM  There is no exertional intolerance        There is history of snoring at night  There is history of morning fatigability  There is occasional history of daytime sleepiness            Patient Active Problem List   Diagnosis    Subdural hematoma (HCC)    Pneumocephalus    Closed nondisplaced fracture of distal phalanx of right thumb    Essential hypertension    Atrial fibrillation (HCC)    Temporal bone fracture (HCC)    On amiodarone therapy     Past Medical History:   Diagnosis Date    [facility]     Hernia, abdominal     Hypertension      Social History     Social History    Marital status: /Civil Union     Spouse name: N/A    Number of children: N/A    Years of education: N/A     Occupational History    Not on file  Social History Main Topics    Smoking status: Never Smoker    Smokeless tobacco: Never Used    Alcohol use No    Drug use:  No  Sexual activity: Not on file     Other Topics Concern    Not on file     Social History Narrative    No narrative on file      Family History   Problem Relation Age of Onset    Cancer Mother         exp age 80    Heart disease Father     Heart disease Brother     Hypertension Brother      Past Surgical History:   Procedure Laterality Date    APPENDECTOMY      age 33    CARDIAC PACEMAKER PLACEMENT      CARPAL TUNNEL RELEASE      COLONOSCOPY       [facility] [address] CATARACT EXTRACAP,INSERT LENS Right 4/3/2017    Procedure: EXTRACTION EXTRACAPSULAR CATARACT PHACO INTRAOCULAR LENS (IOL);   Surgeon: Bela Ortiz MD;  [facility] MAIN OR;  Service: Ophthalmology    TONSILLECTOMY      age 12       Current Outpatient Prescriptions:     amiodarone 200 mg tablet, Take 1 tablet (200 mg total) by mouth daily, Disp: 30 tablet, Rfl: 5    amLODIPine (NORVASC) 5 mg tablet, Take 1 tablet (5 mg total) by mouth daily, Disp: 30 tablet, Rfl: 11    amLODIPine (NORVASC) 5 mg tablet, TAKE ONE TABLET BY MOUTH DAILY, Disp: 30 tablet, Rfl: 0    enalapril (VASOTEC) 5 mg tablet, TAKE 1 TABLET BY MOUTH EVERY DAY, Disp: 30 tablet, Rfl: 0    metoprolol succinate (TOPROL-XL) 25 mg 24 hr tablet, Take 1 tablet (25 mg total) by mouth daily, Disp: 30 tablet, Rfl: 5    metoprolol succinate (TOPROL-XL) 25 mg 24 hr tablet, Take 25 mg by mouth daily, Disp: , Rfl:   No Known Allergies    Labs:  Lab Results   Component Value Date    K 4 2 05/29/2018     05/29/2018    CO2 27 05/29/2018    CO2 29 04/14/2018    BUN 23 05/29/2018    CREATININE 1 13 05/29/2018    GLUCOSE 136 04/14/2018    CALCIUM 8 8 05/29/2018     Lab Results   Component Value Date    CKTOTAL 304 04/15/2018    CKMB 3 5 04/15/2018    CKMBINDEX 1 2 04/15/2018    TROPONINI <0 02 04/14/2018     Lab Results   Component Value Date    WBC 4 85 04/19/2018    HGB 11 9 (L) 04/19/2018    HCT 35 8 (L) 04/19/2018    MCV 63 (L) 04/19/2018     04/19/2018     Lab Results   Component Value Date    TRIG 83 11/09/2017    HDL 78 (H) 11/09/2017     Imaging: No results found  Review of Systems:  Review of Systems   All other systems reviewed and are negative  as described in my history of present illness        Physical Exam:  Physical Exam   Constitutional: He is oriented to person, place, and time  He appears well-developed and well-nourished  No distress  Not in any distress at the current time   HENT:   Head: Normocephalic and atraumatic  Right Ear: External ear normal    Left Ear: External ear normal    Nose: Nose normal    Mouth/Throat: Uvula is midline and mucous membranes are normal    Posterior pharynx is crowded   Eyes: Pupils are equal, round, and reactive to light  Conjunctivae, EOM and lids are normal  No scleral icterus  No pallor  No cyanosis  No icterus   Neck: Trachea normal and normal range of motion  No JVD present  Carotid bruit is not present  No thyromegaly present  No jugular lymphadenopathy   Cardiovascular: Normal rate, regular rhythm, S1 normal, S2 normal, normal heart sounds, intact distal pulses and normal pulses  PMI is not displaced  Exam reveals no gallop, no S3, no S4 and no friction rub  No murmur heard  Pulmonary/Chest: Effort normal and breath sounds normal  No accessory muscle usage  No respiratory distress  He has no decreased breath sounds  He has no wheezes  He has no rhonchi  He has no rales  He exhibits no tenderness  Abdominal: Soft  Normal appearance and bowel sounds are normal  He exhibits no distension and no mass  There is no splenomegaly or hepatomegaly  There is no tenderness  Musculoskeletal: Normal range of motion  He exhibits no edema, tenderness or deformity  Lymphadenopathy:     He has no cervical adenopathy  Neurological: He is alert and oriented to person, place, and time     Facial symmetry is retained  Extraocular movements are retained  Head neck tongue and palate movement are retained and symmetric   Skin: Skin is intact  No abrasion, no lesion and no rash noted  No erythema  Nails show no clubbing  Psychiatric: He has a normal mood and affect  His speech is normal and behavior is normal  Thought content normal                   Discussion/Summary:  1  Syncope, sick sinus syndrome, chronotropic incompetence   Falls  Post PPM  CHB, 100% paced  No further episodes         2  Subdural hematoma  Off anticoagulation  Same size as last visit as per neurosurgery note   Does not lift as much, rolls heavier objects ,  for 60 years  Avoid valsalva maneuvers  No symptoms from same            3   PAF  Has subdural hematoma - secondary to fall due to syncope - recent worsening   No anticoagulation  Currently in rhythm     We had a detailed discussion about atrial fibrillation        1 - natural history of disease   since it increases with age , definitive therapy is indicated         2 - sleep apnea   patient is recommended to follow up with Pulmonary and Sleep Medicine - for BETH        3 - thyroid function   hyperthyroidism is a common precipitator of atrial fibrillation   Check TSH        4 -Aggressive management of  hypertension            5 - anticoagulation   patient's chads Vasc score is -3  hypertension   age   But has head bleed and cannot be on anticoagulation till cleared by neuro-surgery        6 - rate control medication   beta-blocker            7 - rhythm control medication      class 1 C agent - flecainide and propafenone   patient will need a stress study to rule out any underlying ischemia before these can be started   flecainide will necessitate use of an additional beta-blocker -   propafenone has intrinsic beta-blocker activity          class 3 agent - Sotalol and dofetilide   both will need hospital admission to monitor first 5 doses over the initial 2 and half days     sotalol has intrinsic beta-blocker properties   dofetilide may need an additional beta-blocker along with it for rate control  Keep potassium between 4 and 4 5   keep magnesium between 2 and 2 5   follow QTC   Follow creatinine          amiodarone   around this is very effective antiarrhythmic but has significantly long term toxicity   Hence certain basic studies are needed at baseline and thereafter at yearly intervals or   -hypo or hyperthyroidism , Check TSH    -can precipitate significant interstitial lung disease and hence DLCO at baseline and thereafter yearly   -long-term use causes cumulative toxicity in the liver- check  CMP   -corneal deposits advice and hence is ophthalmology evaluation     On amiodarone as most effective medication to control PAF, since cannot be on anticoagulation         8 - ablation   this is the most effective method to achieve and maintain sinus rhythm      paroxysmal atrial fibrillation - 70-80% chance in the 1st year  and falls to 50% by 5 years   persistent atrial fibrillation - 50-60% chance in the 1st year and falls to 30% or less by 5 years      we use a combination of cryo and radiofrequency ablation      there is a 2-5% chance of serious complication which include - bleeding around access site, heart attack , stroke , bleeding around the heart requiring drainage , perforation requiring open heart surgery , phrenic nerve paralysis causing diaphragmatic paralysis, atrial esophageal fistula   we do deployed multiple methods to prevent such complication :  - heparin anticoagulation with ACT between 350 and 400s to prevent stroke   - coronary angiography if we are going to ablate close to any major blood vessel   -access to the pericardial drain if pericardial effusion were to happen   - pressure sensing catheters to reduce excessive pressure and chances of perforation   - esophageal temperature monitoring to reduce chances of injury            Summary of recommendation:  - check for amiodarone toxicity at follow up   - evaluate for BETH  - once neurosurgery clears for anticoagulation can start same

## 2018-11-13 NOTE — LETTER
November 13, 2018     Ander Bullard [address] [place] [id number]    Patient: Raghu Ya   YOB: 1935   Date of Visit: 11/13/2018       Dear Dr Gemma Alexandre:    Thank you for referring Sunny Colmenares to me for evaluation  Below are my notes for this consultation  If you have questions, please do not hesitate to call me  I look forward to following your patient along with you  Sincerely,        Pierre Black MD        CC: Adelina Hart MD  11/13/2018 10:40 AM  Sign at close encounter                                             Cardiology Follow Up    Raghu Ya  1935  [id number]  [address] [place] [address]  [phone]    1  Paroxysmal atrial fibrillation (HCC)     2  Essential hypertension     3  Subdural hematoma ([address] )     4   On amiodarone therapy         Interval History:     New worsening of subdural on imaging - remains same size as per neurosurgery note  No worsening symptoms    His wife of 62 years has dementia and a stroke  He visits her everyday in [facility]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   He is grateful for life and enjoys doing the same      Patient well known to me from hospital stay  Had syncope, fall, subdural bleed  Has sick sinus syndrome, and underwent PPM     The patient is not complaining of angina like chest pain or chest pressure  There is no worsening orthopnea, paroxysmal nocturnal dyspnea  There is no leg swelling     There is no history of presyncope or syncope  There is no history of transient  Lightheadedness after PPM  There is no exertional intolerance        There is history of snoring at night  There is history of morning fatigability  There is occasional history of daytime sleepiness            Patient Active Problem List   Diagnosis    Subdural hematoma (HCC)    Pneumocephalus    Closed nondisplaced fracture of distal phalanx of right thumb    Essential hypertension    Atrial fibrillation (HCC)    Temporal bone fracture (HCC)    On amiodarone therapy     Past Medical History:   Diagnosis Date    A-fib ([facility] )     Hernia, abdominal     Hypertension      Social History     Social History    Marital status: /Civil Union     Spouse name: N/A    Number of children: N/A    Years of education: N/A     Occupational History    Not on file  Social History Main Topics    Smoking status: Never Smoker    Smokeless tobacco: Never Used    Alcohol use No    Drug use: No    Sexual activity: Not on file     Other Topics Concern    Not on file     Social History Narrative    No narrative on file      Family History   Problem Relation Age of Onset    Cancer Mother         exp age 80    Heart disease Father     Heart disease Brother     Hypertension Brother      Past Surgical History:   Procedure Laterality Date    APPENDECTOMY      age 33    CARDIAC PACEMAKER PLACEMENT      CARPAL TUNNEL RELEASE      COLONOSCOPY       [place] [facility] CATARACT EXTRACAP,INSERT LENS Right 4/3/2017    Procedure: EXTRACTION EXTRACAPSULAR CATARACT PHACO INTRAOCULAR LENS (IOL);   Surgeon: Shan Hall MD;  Location: [facility] MAIN OR;  Service: Ophthalmology    TONSILLECTOMY      age 12       Current Outpatient Prescriptions:     amiodarone 200 mg tablet, Take 1 tablet (200 mg total) by mouth daily, Disp: 30 tablet, Rfl: 5    amLODIPine (NORVASC) 5 mg tablet, Take 1 tablet (5 mg total) by mouth daily, Disp: 30 tablet, Rfl: 11    amLODIPine (NORVASC) 5 mg tablet, TAKE ONE TABLET BY MOUTH DAILY, Disp: 30 tablet, Rfl: 0    enalapril (VASOTEC) 5 mg tablet, TAKE 1 TABLET BY MOUTH EVERY DAY, Disp: 30 tablet, Rfl: 0    metoprolol succinate (TOPROL-XL) 25 mg 24 hr tablet, Take 1 tablet (25 mg total) by mouth daily, Disp: 30 tablet, Rfl: 5    metoprolol succinate (TOPROL-XL) 25 mg 24 hr tablet, Take 25 mg by mouth daily, Disp: , Rfl:   No Known Allergies    Labs:  Lab Results Component Value Date    K 4 2 05/29/2018     05/29/2018    CO2 27 05/29/2018    CO2 29 04/14/2018    BUN 23 05/29/2018    CREATININE 1 13 05/29/2018    GLUCOSE 136 04/14/2018    CALCIUM 8 8 05/29/2018     Lab Results   Component Value Date    CKTOTAL 304 04/15/2018    CKMB 3 5 04/15/2018    CKMBINDEX 1 2 04/15/2018    TROPONINI <0 02 04/14/2018     Lab Results   Component Value Date    WBC 4 85 04/19/2018    HGB 11 9 (L) 04/19/2018    HCT 35 8 (L) 04/19/2018    MCV 63 (L) 04/19/2018     04/19/2018     Lab Results   Component Value Date    TRIG 83 11/09/2017    HDL 78 (H) 11/09/2017     Imaging: No results found  Review of Systems:  Review of Systems   All other systems reviewed and are negative  as described in my history of present illness        Physical Exam:  Physical Exam   Constitutional: He is oriented to person, place, and time  He appears well-developed and well-nourished  No distress  Not in any distress at the current time   HENT:   Head: Normocephalic and atraumatic  Right Ear: External ear normal    Left Ear: External ear normal    Nose: Nose normal    Mouth/Throat: Uvula is midline and mucous membranes are normal    Posterior pharynx is crowded   Eyes: Pupils are equal, round, and reactive to light  Conjunctivae, EOM and lids are normal  No scleral icterus  No pallor  No cyanosis  No icterus   Neck: Trachea normal and normal range of motion  No JVD present  Carotid bruit is not present  No thyromegaly present  No jugular lymphadenopathy   Cardiovascular: Normal rate, regular rhythm, S1 normal, S2 normal, normal heart sounds, intact distal pulses and normal pulses  PMI is not displaced  Exam reveals no gallop, no S3, no S4 and no friction rub  No murmur heard  Pulmonary/Chest: Effort normal and breath sounds normal  No accessory muscle usage  No respiratory distress  He has no decreased breath sounds  He has no wheezes  He has no rhonchi  He has no rales   He exhibits no tenderness  Abdominal: Soft  Normal appearance and bowel sounds are normal  He exhibits no distension and no mass  There is no splenomegaly or hepatomegaly  There is no tenderness  Musculoskeletal: Normal range of motion  He exhibits no edema, tenderness or deformity  Lymphadenopathy:     He has no cervical adenopathy  Neurological: He is alert and oriented to person, place, and time  Facial symmetry is retained  Extraocular movements are retained  Head neck tongue and palate movement are retained and symmetric   Skin: Skin is intact  No abrasion, no lesion and no rash noted  No erythema  Nails show no clubbing  Psychiatric: He has a normal mood and affect  His speech is normal and behavior is normal  Thought content normal                   Discussion/Summary:  1  Syncope, sick sinus syndrome, chronotropic incompetence   Falls  Post PPM  CHB, 100% paced  No further episodes         2  Subdural hematoma  Off anticoagulation  Same size as last visit as per neurosurgery note   Does not lift as much, rolls heavier objects ,  for 60 years  Avoid valsalva maneuvers  No symptoms from same            3   PAF  Has subdural hematoma - secondary to fall due to syncope - recent worsening   No anticoagulation  Currently in rhythm     We had a detailed discussion about atrial fibrillation        1 - natural history of disease   since it increases with age , definitive therapy is indicated         2 - sleep apnea   patient is recommended to follow up with Pulmonary and Sleep Medicine - for BETH        3 - thyroid function   hyperthyroidism is a common precipitator of atrial fibrillation   Check TSH        4 -Aggressive management of  hypertension            5 - anticoagulation   patient's chads Vasc score is -3  hypertension   age   But has head bleed and cannot be on anticoagulation till cleared by neuro-surgery        6 - rate control medication   beta-blocker            7 - rhythm control medication    class 1 C agent - flecainide and propafenone   patient will need a stress study to rule out any underlying ischemia before these can be started   flecainide will necessitate use of an additional beta-blocker -   propafenone has intrinsic beta-blocker activity          class 3 agent - Sotalol and dofetilide   both will need hospital admission to monitor first 5 doses over the initial 2 and half days     sotalol has intrinsic beta-blocker properties   dofetilide may need an additional beta-blocker along with it for rate control  Keep potassium between 4 and 4 5   keep magnesium between 2 and 2 5   follow QTC   Follow creatinine          amiodarone   around this is very effective antiarrhythmic but has significantly long term toxicity   Hence certain basic studies are needed at baseline and thereafter at yearly intervals or   -hypo or hyperthyroidism , Check TSH    -can precipitate significant interstitial lung disease and hence DLCO at baseline and thereafter yearly   -long-term use causes cumulative toxicity in the liver- check  CMP   -corneal deposits advice and hence is ophthalmology evaluation     On amiodarone as most effective medication to control PAF, since cannot be on anticoagulation         8 - ablation   this is the most effective method to achieve and maintain sinus rhythm      paroxysmal atrial fibrillation - 70-80% chance in the 1st year  and falls to 50% by 5 years   persistent atrial fibrillation - 50-60% chance in the 1st year and falls to 30% or less by 5 years      we use a combination of cryo and radiofrequency ablation      there is a 2-5% chance of serious complication which include - bleeding around access site, heart attack , stroke , bleeding around the heart requiring drainage , perforation requiring open heart surgery , phrenic nerve paralysis causing diaphragmatic paralysis, atrial esophageal fistula   we do deployed multiple methods to prevent such complication :  - heparin anticoagulation with ACT between 350 and 400s to prevent stroke   - coronary angiography if we are going to ablate close to any major blood vessel   -access to the pericardial drain if pericardial effusion were to happen   - pressure sensing catheters to reduce excessive pressure and chances of perforation   - esophageal temperature monitoring to reduce chances of injury            Summary of recommendation:  - check for amiodarone toxicity at follow up   - evaluate for BETH  - once neurosurgery clears for anticoagulation can start same

## 2018-11-25 DIAGNOSIS — I48.91 ATRIAL FIBRILLATION, UNSPECIFIED TYPE (HCC): ICD-10-CM

## 2018-11-26 RX ORDER — METOPROLOL SUCCINATE 25 MG/1
TABLET, EXTENDED RELEASE ORAL
Qty: 30 TABLET | Refills: 5 | Status: SHIPPED | OUTPATIENT
Start: 2018-11-26 | End: 2022-04-22 | Stop reason: HOSPADM

## 2018-12-08 NOTE — ASSESSMENT & PLAN NOTE
-Maintain finger splint  -Outpatient f/u ortho Telephone Encounter by Tanna Wright at 05/26/17 01:07 PM     Author:  Tanna Wright Service:  (none) Author Type:  Patient      Filed:  05/26/17 01:08 PM Encounter Date:  5/25/2017 Status:  Signed     :  Tanna Wright (Patient )            PSR informed patient of information below.[KL1.1T]        Revision History        User Key Date/Time User Provider Type Action    > KL1.1 05/26/17 01:08 PM Tanna Wright Patient  Sign    T - Template

## 2019-01-14 ENCOUNTER — HOSPITAL ENCOUNTER (OUTPATIENT)
Facility: HOSPITAL | Age: 84
Setting detail: OBSERVATION
Discharge: LEFT BEFORE DISCHARGE | End: 2019-01-14
Attending: EMERGENCY MEDICINE | Admitting: INTERNAL MEDICINE
Payer: MEDICARE

## 2019-01-14 ENCOUNTER — APPOINTMENT (EMERGENCY)
Dept: RADIOLOGY | Facility: HOSPITAL | Age: 84
End: 2019-01-14
Payer: MEDICARE

## 2019-01-14 ENCOUNTER — TELEPHONE (OUTPATIENT)
Dept: CARDIOLOGY CLINIC | Facility: CLINIC | Age: 84
End: 2019-01-14

## 2019-01-14 VITALS
RESPIRATION RATE: 18 BRPM | OXYGEN SATURATION: 98 % | TEMPERATURE: 97.6 F | HEIGHT: 65 IN | SYSTOLIC BLOOD PRESSURE: 143 MMHG | BODY MASS INDEX: 29.31 KG/M2 | HEART RATE: 72 BPM | WEIGHT: 175.93 LBS | DIASTOLIC BLOOD PRESSURE: 78 MMHG

## 2019-01-14 DIAGNOSIS — M79.602 PAIN IN LEFT ARM: ICD-10-CM

## 2019-01-14 DIAGNOSIS — R07.9 CHEST PAIN, UNSPECIFIED TYPE: Primary | ICD-10-CM

## 2019-01-14 DIAGNOSIS — R07.9 INTERMITTENT CHEST PAIN: Primary | ICD-10-CM

## 2019-01-14 LAB
ALBUMIN SERPL BCP-MCNC: 3.6 G/DL (ref 3.5–5)
ALP SERPL-CCNC: 83 U/L (ref 46–116)
ALT SERPL W P-5'-P-CCNC: 33 U/L (ref 12–78)
ANION GAP SERPL CALCULATED.3IONS-SCNC: 8 MMOL/L (ref 4–13)
APTT PPP: 36 SECONDS (ref 26–38)
AST SERPL W P-5'-P-CCNC: 22 U/L (ref 5–45)
ATRIAL RATE: 76 BPM
BASOPHILS # BLD AUTO: 0.03 THOUSANDS/ΜL (ref 0–0.1)
BASOPHILS NFR BLD AUTO: 1 % (ref 0–1)
BILIRUB SERPL-MCNC: 0.5 MG/DL (ref 0.2–1)
BUN SERPL-MCNC: 18 MG/DL (ref 5–25)
CALCIUM SERPL-MCNC: 8.5 MG/DL (ref 8.3–10.1)
CHLORIDE SERPL-SCNC: 104 MMOL/L (ref 100–108)
CO2 SERPL-SCNC: 28 MMOL/L (ref 21–32)
CREAT SERPL-MCNC: 1.12 MG/DL (ref 0.6–1.3)
EOSINOPHIL # BLD AUTO: 0.11 THOUSAND/ΜL (ref 0–0.61)
EOSINOPHIL NFR BLD AUTO: 3 % (ref 0–6)
ERYTHROCYTE [DISTWIDTH] IN BLOOD BY AUTOMATED COUNT: 17.7 % (ref 11.6–15.1)
GFR SERPL CREATININE-BSD FRML MDRD: 61 ML/MIN/1.73SQ M
GLUCOSE SERPL-MCNC: 135 MG/DL (ref 65–140)
HCT VFR BLD AUTO: 38.5 % (ref 36.5–49.3)
HGB BLD-MCNC: 12.1 G/DL (ref 12–17)
IMM GRANULOCYTES # BLD AUTO: 0.03 THOUSAND/UL (ref 0–0.2)
IMM GRANULOCYTES NFR BLD AUTO: 1 % (ref 0–2)
INR PPP: 1 (ref 0.86–1.17)
LYMPHOCYTES # BLD AUTO: 0.84 THOUSANDS/ΜL (ref 0.6–4.47)
LYMPHOCYTES NFR BLD AUTO: 21 % (ref 14–44)
MCH RBC QN AUTO: 20.5 PG (ref 26.8–34.3)
MCHC RBC AUTO-ENTMCNC: 31.4 G/DL (ref 31.4–37.4)
MCV RBC AUTO: 65 FL (ref 82–98)
MONOCYTES # BLD AUTO: 0.39 THOUSAND/ΜL (ref 0.17–1.22)
MONOCYTES NFR BLD AUTO: 10 % (ref 4–12)
NEUTROPHILS # BLD AUTO: 2.64 THOUSANDS/ΜL (ref 1.85–7.62)
NEUTS SEG NFR BLD AUTO: 64 % (ref 43–75)
NRBC BLD AUTO-RTO: 0 /100 WBCS
P AXIS: 44 DEGREES
PLATELET # BLD AUTO: 169 THOUSANDS/UL (ref 149–390)
PMV BLD AUTO: 8.7 FL (ref 8.9–12.7)
POTASSIUM SERPL-SCNC: 4.1 MMOL/L (ref 3.5–5.3)
PR INTERVAL: 280 MS
PROT SERPL-MCNC: 6.9 G/DL (ref 6.4–8.2)
PROTHROMBIN TIME: 12.9 SECONDS (ref 11.8–14.2)
QRS AXIS: -67 DEGREES
QRSD INTERVAL: 180 MS
QT INTERVAL: 602 MS
QTC INTERVAL: 664 MS
RBC # BLD AUTO: 5.9 MILLION/UL (ref 3.88–5.62)
SODIUM SERPL-SCNC: 140 MMOL/L (ref 136–145)
T WAVE AXIS: 92 DEGREES
TROPONIN I SERPL-MCNC: <0.02 NG/ML
VENTRICULAR RATE: 73 BPM
WBC # BLD AUTO: 4.04 THOUSAND/UL (ref 4.31–10.16)

## 2019-01-14 PROCEDURE — 85730 THROMBOPLASTIN TIME PARTIAL: CPT | Performed by: EMERGENCY MEDICINE

## 2019-01-14 PROCEDURE — 99236 HOSP IP/OBS SAME DATE HI 85: CPT | Performed by: INTERNAL MEDICINE

## 2019-01-14 PROCEDURE — 93005 ELECTROCARDIOGRAM TRACING: CPT

## 2019-01-14 PROCEDURE — 36415 COLL VENOUS BLD VENIPUNCTURE: CPT

## 2019-01-14 PROCEDURE — 71046 X-RAY EXAM CHEST 2 VIEWS: CPT

## 2019-01-14 PROCEDURE — 85025 COMPLETE CBC W/AUTO DIFF WBC: CPT | Performed by: EMERGENCY MEDICINE

## 2019-01-14 PROCEDURE — 80053 COMPREHEN METABOLIC PANEL: CPT | Performed by: EMERGENCY MEDICINE

## 2019-01-14 PROCEDURE — 85610 PROTHROMBIN TIME: CPT | Performed by: EMERGENCY MEDICINE

## 2019-01-14 PROCEDURE — 93010 ELECTROCARDIOGRAM REPORT: CPT | Performed by: INTERNAL MEDICINE

## 2019-01-14 PROCEDURE — 99285 EMERGENCY DEPT VISIT HI MDM: CPT

## 2019-01-14 PROCEDURE — 84484 ASSAY OF TROPONIN QUANT: CPT | Performed by: EMERGENCY MEDICINE

## 2019-01-14 RX ORDER — ASPIRIN 325 MG
325 TABLET ORAL ONCE
Status: COMPLETED | OUTPATIENT
Start: 2019-01-14 | End: 2019-01-14

## 2019-01-14 RX ORDER — DOCUSATE SODIUM 100 MG/1
100 CAPSULE, LIQUID FILLED ORAL 2 TIMES DAILY
Status: DISCONTINUED | OUTPATIENT
Start: 2019-01-14 | End: 2019-01-14 | Stop reason: HOSPADM

## 2019-01-14 RX ORDER — METOPROLOL SUCCINATE 25 MG/1
25 TABLET, EXTENDED RELEASE ORAL DAILY
Status: DISCONTINUED | OUTPATIENT
Start: 2019-01-15 | End: 2019-01-14 | Stop reason: HOSPADM

## 2019-01-14 RX ORDER — LISINOPRIL 5 MG/1
5 TABLET ORAL DAILY
Status: DISCONTINUED | OUTPATIENT
Start: 2019-01-15 | End: 2019-01-14 | Stop reason: HOSPADM

## 2019-01-14 RX ORDER — ACETAMINOPHEN 325 MG/1
650 TABLET ORAL EVERY 6 HOURS PRN
Status: DISCONTINUED | OUTPATIENT
Start: 2019-01-14 | End: 2019-01-14 | Stop reason: HOSPADM

## 2019-01-14 RX ORDER — AMLODIPINE BESYLATE 5 MG/1
5 TABLET ORAL DAILY
Status: DISCONTINUED | OUTPATIENT
Start: 2019-01-15 | End: 2019-01-14 | Stop reason: HOSPADM

## 2019-01-14 RX ORDER — DOCUSATE SODIUM 100 MG/1
100 CAPSULE, LIQUID FILLED ORAL 2 TIMES DAILY PRN
Status: DISCONTINUED | OUTPATIENT
Start: 2019-01-14 | End: 2019-01-14 | Stop reason: HOSPADM

## 2019-01-14 RX ORDER — ONDANSETRON 2 MG/ML
4 INJECTION INTRAMUSCULAR; INTRAVENOUS EVERY 6 HOURS PRN
Status: DISCONTINUED | OUTPATIENT
Start: 2019-01-14 | End: 2019-01-14 | Stop reason: HOSPADM

## 2019-01-14 RX ADMIN — NITROGLYCERIN 1 INCH: 20 OINTMENT TOPICAL at 12:56

## 2019-01-14 RX ADMIN — ASPIRIN 325 MG: 325 TABLET ORAL at 12:56

## 2019-01-14 NOTE — ASSESSMENT & PLAN NOTE
· Patient has been monitored with serial CT scans of head by [facility] Neurosurgery outpatient  Patient currently still not permitted to use blood thinners for antiplatelets of any kind  · Currently stable  · Will recommend patient follow up with Neurosurgery for CT scan of head after discharge

## 2019-01-14 NOTE — TELEPHONE ENCOUNTER
Pt's daughter called, stated father called her this morning stating he has been having chest pain since last week, about Thursday or Friday  No other cardiac symptoms or SOB  Pt can not describe pain, just says he has pain  Daughter states pt increased activity this weekend with helping son  Advised daughter to take pt to ER to be evaluated for the chest pain  Will  father as he is sitting in [facility] parking lot  Will proceed to [address] [place] ER

## 2019-01-14 NOTE — ED NOTES
Pt resting comfortably on stretcher  Denies any needs or complaints  [facility], RN  01/14/19 0516

## 2019-01-14 NOTE — ASSESSMENT & PLAN NOTE
· Patient with 4 days of intermittent substernal chest pain  Pain notes no consistent exacerbations however he states sometimes is worse when he takes a deep breath  He also notes mild muscle aching after helping with some home renovations  · Nitro patch in place  One aspirin was given in ER  · Troponin 1 less than 0 02  Will trend  No ischemic changes on EKG  · Will repeat EKG in a m  Will consider outpatient stress test   · Will admit and monitor on telemetry  · Patient is afebrile  No leukocytosis  Vital signs stable
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
## 2019-01-14 NOTE — ED PROVIDER NOTES
History  Chief Complaint   Patient presents with    Chest Pain     Pt  reports intermittant midsternal chest pain with sharp pains in left arm     71-year-old male presents to the emergency department for evaluation of chest pain  Patient has a difficult time describing the pain  It 1st started to bother him on Friday 4 days ago  It has been coming and going does not seem to be related to exertion and/or rest   Patient does not note any association with food intake  He has not had fevers or chills  He does have pain that is described as a throbbing sensation in the left forearm  This started prior to the onset of the chest pain  Patient describes the pain as being substernal   It is not reproducible  No radiation into the back or neck  He has had normal appetite  Patient has a history of 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  At the time of his syncopal episode he is sustained a large subdural hematoma  Patient denies diaphoresis  Patient's daughter did note the patient to seem slightly short of breath today after or walking a short distance  The patient states that his breathing is normal   He has a constant tickle in his throat and feels that he has to clear his throat frequently          History provided by:  Patient, relative and medical records   used: No    Chest Pain   Pain location:  Substernal area  Pain quality: pressure    Pain radiates to:  L arm  Pain severity:  Moderate  Onset quality:  Sudden  Date/time of last known well:  1/4/2019 11:00 AM  Timing:  Intermittent  Progression:  Waxing and waning  Chronicity:  New  Context: movement and at rest    Context: not breathing and not eating    Relieved by:  Nothing  Worsened by:  Nothing tried  Ineffective treatments:  None tried  Associated symptoms: fatigue and shortness of breath    Associated symptoms: no abdominal pain, no altered mental status, no back pain, no cough, no diaphoresis, no dizziness, no heartburn, no nausea, no palpitations, not vomiting and no weakness    Risk factors: hypertension and male sex    Risk factors: no coronary artery disease, no diabetes mellitus, no prior DVT/PE and no smoking        Prior to Admission Medications   Prescriptions Last Dose Informant Patient Reported? Taking? amLODIPine (NORVASC) 5 mg tablet  Child No Yes   Sig: Take 1 tablet (5 mg total) by mouth daily   enalapril (VASOTEC) 5 mg tablet  Child No Yes   Sig: TAKE 1 TABLET BY MOUTH EVERY DAY   metoprolol succinate (TOPROL-XL) 25 mg 24 hr tablet   No Yes   Sig: TAKE 1 TABLET BY MOUTH EVERY DAY      Facility-Administered Medications: None       Past Medical History:   Diagnosis Date    A-fib ([address] )     Hernia, abdominal     Hypertension     Subdural hematoma (HCC)        Past Surgical History:   Procedure Laterality Date    A-V CARDIAC PACEMAKER INSERTION      APPENDECTOMY      age 33    CARDIAC PACEMAKER PLACEMENT      CARPAL TUNNEL RELEASE      COLONOSCOPY       [facility] CATARACT EXTRACAP,INSERT LENS Right 4/3/2017    Procedure: EXTRACTION EXTRACAPSULAR CATARACT PHACO INTRAOCULAR LENS (IOL); Surgeon: Ramandeep Muller MD;  Location: [facility] MAIN OR;  Service: Ophthalmology    TONSILLECTOMY      age 12       Family History   Problem Relation Age of Onset    Cancer Mother         exp age 80    Heart disease Father     Heart disease Brother     Hypertension Brother      I have reviewed and agree with the history as documented  Social History   Substance Use Topics    Smoking status: Never Smoker    Smokeless tobacco: Never Used    Alcohol use No        Review of Systems   Constitutional: Positive for fatigue  Negative for appetite change and diaphoresis  Respiratory: Positive for shortness of breath  Negative for cough and chest tightness  Cardiovascular: Positive for chest pain  Negative for palpitations     Gastrointestinal: Negative for abdominal pain, heartburn, nausea and vomiting  Genitourinary: Negative for dysuria  Musculoskeletal: Negative for back pain, gait problem, joint swelling, myalgias and neck pain  Skin: Negative for rash  Neurological: Negative for dizziness and weakness  All other systems reviewed and are negative  Physical Exam  Physical Exam   Constitutional: He is oriented to person, place, and time  Vital signs are normal  He appears well-developed and well-nourished  HENT:   Head: Normocephalic and atraumatic  Eyes: Pupils are equal, round, and reactive to light  Conjunctivae and EOM are normal    Neck: Normal range of motion  Neck supple  Cardiovascular: Normal rate, regular rhythm, normal heart sounds and intact distal pulses  Right chest wall pacemaker   Pulmonary/Chest: Effort normal and breath sounds normal  No accessory muscle usage  No respiratory distress  He exhibits no tenderness  Abdominal: Soft  Normal appearance and bowel sounds are normal  He exhibits no distension  There is no tenderness  There is no rebound and no guarding  Musculoskeletal: Normal range of motion  He exhibits edema (trace ankle) and tenderness  He exhibits no deformity  Right shoulder: Normal         Left wrist: Normal         Left forearm: He exhibits tenderness  He exhibits no bony tenderness, no swelling, no edema and no deformity  Lymphadenopathy:     He has no cervical adenopathy  Neurological: He is alert and oriented to person, place, and time  He has normal strength and normal reflexes  Coordination normal    Skin: Skin is warm, dry and intact  No rash noted  Psychiatric: He has a normal mood and affect  His behavior is normal  Judgment and thought content normal    Nursing note and vitals reviewed        Vital Signs  ED Triage Vitals [01/14/19 1057]   Temperature Pulse Respirations Blood Pressure SpO2   97 6 °F (36 4 °C) 76 18 162/72 98 %      Temp Source Heart Rate Source Patient Position - Orthostatic VS BP Location FiO2 (%)   Oral Monitor Lying Right arm --      Pain Score       No Pain           Vitals:    01/14/19 1115 01/14/19 1215 01/14/19 1323 01/14/19 1400   BP:  133/74 150/77 155/76   Pulse: 70 68 71 72   Patient Position - Orthostatic VS:           Visual Acuity      ED Medications  Medications   aspirin tablet 325 mg (325 mg Oral Given 1/14/19 1256)   nitroglycerin (NITRO-BID) 2 % TD ointment 1 inch (1 inch Topical Given 1/14/19 1256)       Diagnostic Studies  Results Reviewed     Procedure Component Value Units Date/Time    Troponin I [[id number]]  (Normal) Collected:  01/14/19 1108    Lab Status:  Final result Specimen:  Blood from Arm, Left Updated:  01/14/19 1214     Troponin I <0 02 ng/mL     Comprehensive metabolic panel [[id number]] Collected:  01/14/19 1108    Lab Status:  Final result Specimen:  Blood from Arm, Left Updated:  01/14/19 1130     Sodium 140 mmol/L      Potassium 4 1 mmol/L      Chloride 104 mmol/L      CO2 28 mmol/L      ANION GAP 8 mmol/L      BUN 18 mg/dL      Creatinine 1 12 mg/dL      Glucose 135 mg/dL      Calcium 8 5 mg/dL      AST 22 U/L      ALT 33 U/L      Alkaline Phosphatase 83 U/L      Total Protein 6 9 g/dL      Albumin 3 6 g/dL      Total Bilirubin 0 50 mg/dL      eGFR 61 ml/min/1 73sq m     Narrative:         National Kidney Disease Education Program recommendations are as follows:  GFR calculation is accurate only with a steady state creatinine  Chronic Kidney disease less than 60 ml/min/1 73 sq  meters  Kidney failure less than 15 ml/min/1 73 sq  meters      Protime-INR [[id number]]  (Normal) Collected:  01/14/19 1108    Lab Status:  Final result Specimen:  Blood from Arm, Left Updated:  01/14/19 1126     Protime 12 9 seconds      INR 1 00    APTT [[id number]]  (Normal) Collected:  01/14/19 1108    Lab Status:  Final result Specimen:  Blood from Arm, Left Updated:  01/14/19 1126     PTT 36 seconds     CBC and differential [[id number]]  (Abnormal) Collected:  01/14/19 1108 Lab Status:  Final result Specimen:  Blood from Arm, Left Updated:  01/14/19 1114     WBC 4 04 (L) Thousand/uL      RBC 5 90 (H) Million/uL      Hemoglobin 12 1 g/dL      Hematocrit 38 5 %      MCV 65 (L) fL      MCH 20 5 (L) pg      MCHC 31 4 g/dL      RDW 17 7 (H) %      MPV 8 7 (L) fL      Platelets 070 Thousands/uL      nRBC 0 /100 WBCs      Neutrophils Relative 64 %      Immat GRANS % 1 %      Lymphocytes Relative 21 %      Monocytes Relative 10 %      Eosinophils Relative 3 %      Basophils Relative 1 %      Neutrophils Absolute 2 64 Thousands/µL      Immature Grans Absolute 0 03 Thousand/uL      Lymphocytes Absolute 0 84 Thousands/µL      Monocytes Absolute 0 39 Thousand/µL      Eosinophils Absolute 0 11 Thousand/µL      Basophils Absolute 0 03 Thousands/µL                  X-ray chest 2 views   ED Interpretation by Nelli Ramos DO (01/14 1227)   No acute disease      Final Result by Garry Cuba MD (01/14 1310)      No acute cardiopulmonary disease  Workstation performed: [id number]                    Procedures  ECG 12 Lead Documentation  Date/Time: 1/14/2019 11:44 AM  Performed by: Zena Duke  Authorized by: RUDY TOBIN     Indications / Diagnosis:  Chest pain  ECG reviewed by me, the ED Provider: yes    Patient location:  ED  Previous ECG:     Previous ECG:  Compared to current    Comparison ECG info:  April 2018    Similarity:  No change  Interpretation:     Interpretation: abnormal    Rate:     ECG rate:  73    ECG rate assessment: normal    Rhythm:     Rhythm: paced    Pacing:     Capture:  Complete    Type of pacing:  AV  Ectopy:     Ectopy: none    QRS:     QRS intervals:   Wide           Phone Contacts  ED Phone Contact    ED Course         HEART Risk Score      Most Recent Value   History  2 Filed at: 01/14/2019 1239   ECG  0 Filed at: 01/14/2019 1239   Age  2 Filed at: 01/14/2019 1239   Risk Factors  1 Filed at: 01/14/2019 1239   Troponin  0 Filed at: 01/14/2019 1239   Heart Score Risk Calculator   History  2 Filed at: 01/14/2019 1239   ECG  0 Filed at: 01/14/2019 1239   Age  2 Filed at: 01/14/2019 1239   Risk Factors  1 Filed at: 01/14/2019 1239   Troponin  0 Filed at: 01/14/2019 1239   HEART Score  5 Filed at: 01/14/2019 1239   HEART Score  5 Filed at: 01/14/2019 1239                            MDM  Number of Diagnoses or Management Options  Intermittent chest pain: new and requires workup  Pain in left arm: new and requires workup     Amount and/or Complexity of Data Reviewed  Clinical lab tests: ordered and reviewed  Tests in the radiology section of CPT®: ordered and reviewed  Tests in the medicine section of CPT®: ordered and reviewed  Decide to obtain previous medical records or to obtain history from someone other than the patient: yes  Discuss the patient with other providers: yes  Independent visualization of images, tracings, or specimens: yes    Patient Progress  Patient progress: stable    CritCare Time    Disposition  Final diagnoses:   Intermittent chest pain   Pain in left arm     Time reflects when diagnosis was documented in both MDM as applicable and the Disposition within this note     Time User Action Codes Description Comment    1/14/2019 12:51 PM Rosibel Tapia Add [R07 9] Intermittent chest pain     1/14/2019 12:51 PM Rosibel Tapia Add [M79 602] Pain in left arm       ED Disposition     ED Disposition Condition Comment    Admit  Case was discussed with Dr Obinna Quezada and the patient's admission status was agreed to be Admission Status: observation status to the service of Dr Obinna Quezada   Follow-up Information    None         Patient's Medications   Discharge Prescriptions    No medications on file     No discharge procedures on file      ED Provider  Electronically Signed by           Lexis Todd DO  01/14/19 4809

## 2019-01-15 NOTE — ED NOTES
PT agitated and stated "Im supposed to be getting my blood work checked every 3 hours and that was supposed to be the reason I was staying tonight  If they are not doing that im leaving " Informed patient that its possible they want to go another route to observe possible changes and will inform RN "  RN aware and informed        Jenny Huynh  01/14/19 2004

## 2019-01-15 NOTE — DISCHARGE SUMMARY
Discharge Summary - [facility]    Patient Information: Ambrosio Warner 81 y o  male MRN: [id number]  Unit/Bed#: [facility] Encounter: [id number]    Discharging Physician / Practitioner: Alina Bravo DO  PCP: Fransisco Nolasco DO  Admission Date: 1/14/2019  Discharge Date: 01/14/19    Disposition:     Home     Reason for Admission: Chest Pain - Left Against Medical Advice    Discharge Diagnoses:     Principal Problem:    Chest pain  Active Problems:    Subdural hematoma [facility])    Essential hypertension    Atrial fibrillation ([facility] [address] )  Resolved Problems:    * No resolved hospital problems  *      Hospital Course:     Ambrosio Warner is a 81 y o  male patient who originally presented to the hospital on 1/14/2019 due to chest pain  The patient was being admitted on an observation basis for evaluation of the chest pain given his cardiac risk factors  The patient was going to be a bed hold most likely in the emergency department due to capacity in the hospital and, patient decided to leave against medical advice prior to me being able to see him  The patient was seen by the advanced practitioner with plans to have troponins trended, monitoring on telemetry, additional EKG, and possible stress test   However, overall the patient's description of chest pain did not appear to be cardiac in nature and seemed somewhat atypical as it was pleuritic  Please see the admission history for full details of patient's presentation

## 2019-01-15 NOTE — ED NOTES
Pt  Requesting to leave AMA, will make   [facility] aware  Risks of leaving explained and understood by patient and family member  Pt  Still wanting to leave       Shayy Burnett RN  01/14/19 2014

## 2019-02-05 ENCOUNTER — REMOTE DEVICE CLINIC VISIT (OUTPATIENT)
Dept: CARDIOLOGY CLINIC | Facility: CLINIC | Age: 84
End: 2019-02-05
Payer: MEDICARE

## 2019-02-05 DIAGNOSIS — Z95.0 CARDIAC PACEMAKER: ICD-10-CM

## 2019-02-05 DIAGNOSIS — I48.0 PAROXYSMAL ATRIAL FIBRILLATION (HCC): ICD-10-CM

## 2019-02-05 DIAGNOSIS — I49.5 SICK SINUS SYNDROME (HCC): Primary | ICD-10-CM

## 2019-02-05 PROCEDURE — 93294 REM INTERROG EVL PM/LDLS PM: CPT | Performed by: INTERNAL MEDICINE

## 2019-02-05 PROCEDURE — 93296 REM INTERROG EVL PM/IDS: CPT | Performed by: INTERNAL MEDICINE

## 2019-02-05 NOTE — PROGRESS NOTES
MDT-DUAL CHAMBER PPM  CARELINK TRANSMISSION: BATTERY VOLTAGE ADEQUATE  AP - 99%  - 99 9% (>40%/ MVP ON - LRL 70 BPM)  ALL AVAILABLE LEAD PARAMETERS WITHIN NORMAL LIMITS  NO SIGNIFICANT HIGH RATE EPISODES   PACEMAKER FUNCTIONING APPROPRIATELY    EB

## 2019-03-13 ENCOUNTER — DOCUMENTATION (OUTPATIENT)
Dept: NEUROSURGERY | Facility: CLINIC | Age: 84
End: 2019-03-13

## 2019-03-13 NOTE — PROGRESS NOTES
Daughter Magaly Bolaños called states dad fell he said he was fine - he is stubborn per daughter - egg on back of head - now states he has headache - placed  daughter on hold per Jus VILLA pt to go to ER for evaluation - daughter advised

## 2019-03-28 DIAGNOSIS — S06.5X9A SUBDURAL HEMATOMA (HCC): ICD-10-CM

## 2019-03-28 RX ORDER — ENALAPRIL MALEATE 5 MG/1
TABLET ORAL
Qty: 30 TABLET | Refills: 0 | OUTPATIENT
Start: 2019-03-28

## 2019-03-29 ENCOUNTER — TELEPHONE (OUTPATIENT)
Dept: NEUROSURGERY | Facility: CLINIC | Age: 84
End: 2019-03-29

## 2019-03-29 NOTE — TELEPHONE ENCOUNTER
Patient's daughter, Bee Verdin, called reporting how the patient fell on 3/13/19 on ice and hit his head  He fell about one week after 3/13/19 while he fell asleep on the barstool and fell and hit his head  She also reports how he was splitting wood about two days and a piece of wood hit his head  She reports how he has double vision  Inquired if he has a headache, dizziness, or confusion  Philomena said she does not know since he does not tell her anything  Discussed with Latisha Knowles PA-C and Dr Carlene Alvarado  Both Latisha Knowles PA-C and Dr Carlene Alvarado recommended for the patient to present to the emergency department immediately  Philomena said the patient does not want to go to the emergency department  Explained the importance of it  Philomena said she will try to get the patient to the emergency department today  She was appreciative

## 2019-04-13 ENCOUNTER — TRANSCRIBE ORDERS (OUTPATIENT)
Dept: ADMINISTRATIVE | Facility: HOSPITAL | Age: 84
End: 2019-04-13

## 2019-04-13 ENCOUNTER — APPOINTMENT (OUTPATIENT)
Dept: LAB | Facility: MEDICAL CENTER | Age: 84
End: 2019-04-13
Payer: MEDICARE

## 2019-04-13 DIAGNOSIS — N42.9 DISEASE OF PROSTATE: ICD-10-CM

## 2019-04-13 DIAGNOSIS — R73.01 IMPAIRED FASTING GLUCOSE: Primary | ICD-10-CM

## 2019-04-13 DIAGNOSIS — I10 ESSENTIAL HYPERTENSION, MALIGNANT: ICD-10-CM

## 2019-04-13 DIAGNOSIS — E78.5 HYPERLIPIDEMIA, UNSPECIFIED HYPERLIPIDEMIA TYPE: ICD-10-CM

## 2019-04-13 DIAGNOSIS — I48.91 ATRIAL FIBRILLATION, UNSPECIFIED TYPE (HCC): ICD-10-CM

## 2019-04-13 DIAGNOSIS — R73.01 IMPAIRED FASTING GLUCOSE: ICD-10-CM

## 2019-04-13 LAB
ALBUMIN SERPL BCP-MCNC: 3.8 G/DL (ref 3.5–5)
ALP SERPL-CCNC: 93 U/L (ref 46–116)
ALT SERPL W P-5'-P-CCNC: 28 U/L (ref 12–78)
ANION GAP SERPL CALCULATED.3IONS-SCNC: 3 MMOL/L (ref 4–13)
AST SERPL W P-5'-P-CCNC: 29 U/L (ref 5–45)
BASOPHILS # BLD AUTO: 0.02 THOUSANDS/ΜL (ref 0–0.1)
BASOPHILS NFR BLD AUTO: 1 % (ref 0–1)
BILIRUB SERPL-MCNC: 0.65 MG/DL (ref 0.2–1)
BUN SERPL-MCNC: 21 MG/DL (ref 5–25)
CALCIUM SERPL-MCNC: 8.3 MG/DL (ref 8.3–10.1)
CHLORIDE SERPL-SCNC: 106 MMOL/L (ref 100–108)
CHOLEST SERPL-MCNC: 189 MG/DL (ref 50–200)
CO2 SERPL-SCNC: 28 MMOL/L (ref 21–32)
CREAT SERPL-MCNC: 1.1 MG/DL (ref 0.6–1.3)
EOSINOPHIL # BLD AUTO: 0.28 THOUSAND/ΜL (ref 0–0.61)
EOSINOPHIL NFR BLD AUTO: 7 % (ref 0–6)
ERYTHROCYTE [DISTWIDTH] IN BLOOD BY AUTOMATED COUNT: 18.6 % (ref 11.6–15.1)
EST. AVERAGE GLUCOSE BLD GHB EST-MCNC: 108 MG/DL
GFR SERPL CREATININE-BSD FRML MDRD: 62 ML/MIN/1.73SQ M
GLUCOSE P FAST SERPL-MCNC: 104 MG/DL (ref 65–99)
HBA1C MFR BLD: 5.4 % (ref 4.2–6.3)
HCT VFR BLD AUTO: 36.8 % (ref 36.5–49.3)
HDLC SERPL-MCNC: 100 MG/DL (ref 40–60)
HGB BLD-MCNC: 11.1 G/DL (ref 12–17)
IMM GRANULOCYTES # BLD AUTO: 0.02 THOUSAND/UL (ref 0–0.2)
IMM GRANULOCYTES NFR BLD AUTO: 1 % (ref 0–2)
LDLC SERPL CALC-MCNC: 73 MG/DL (ref 0–100)
LYMPHOCYTES # BLD AUTO: 0.94 THOUSANDS/ΜL (ref 0.6–4.47)
LYMPHOCYTES NFR BLD AUTO: 23 % (ref 14–44)
MCH RBC QN AUTO: 20.4 PG (ref 26.8–34.3)
MCHC RBC AUTO-ENTMCNC: 30.2 G/DL (ref 31.4–37.4)
MCV RBC AUTO: 68 FL (ref 82–98)
MONOCYTES # BLD AUTO: 0.54 THOUSAND/ΜL (ref 0.17–1.22)
MONOCYTES NFR BLD AUTO: 13 % (ref 4–12)
NEUTROPHILS # BLD AUTO: 2.32 THOUSANDS/ΜL (ref 1.85–7.62)
NEUTS SEG NFR BLD AUTO: 55 % (ref 43–75)
NONHDLC SERPL-MCNC: 89 MG/DL
NRBC BLD AUTO-RTO: 0 /100 WBCS
PLATELET # BLD AUTO: 187 THOUSANDS/UL (ref 149–390)
PMV BLD AUTO: 9.6 FL (ref 8.9–12.7)
POTASSIUM SERPL-SCNC: 4.3 MMOL/L (ref 3.5–5.3)
PROT SERPL-MCNC: 7.1 G/DL (ref 6.4–8.2)
PSA SERPL-MCNC: 0.7 NG/ML (ref 0–4)
RBC # BLD AUTO: 5.43 MILLION/UL (ref 3.88–5.62)
SODIUM SERPL-SCNC: 137 MMOL/L (ref 136–145)
TRIGL SERPL-MCNC: 82 MG/DL
WBC # BLD AUTO: 4.12 THOUSAND/UL (ref 4.31–10.16)

## 2019-04-13 PROCEDURE — 80053 COMPREHEN METABOLIC PANEL: CPT

## 2019-04-13 PROCEDURE — 36415 COLL VENOUS BLD VENIPUNCTURE: CPT

## 2019-04-13 PROCEDURE — 84153 ASSAY OF PSA TOTAL: CPT

## 2019-04-13 PROCEDURE — 85025 COMPLETE CBC W/AUTO DIFF WBC: CPT

## 2019-04-13 PROCEDURE — 80061 LIPID PANEL: CPT

## 2019-04-13 PROCEDURE — 83036 HEMOGLOBIN GLYCOSYLATED A1C: CPT

## 2019-07-26 DIAGNOSIS — S06.5X9A SDH (SUBDURAL HEMATOMA) (HCC): ICD-10-CM

## 2019-07-26 RX ORDER — AMLODIPINE BESYLATE 5 MG/1
5 TABLET ORAL DAILY
Qty: 90 TABLET | Refills: 3 | Status: SHIPPED | OUTPATIENT
Start: 2019-07-26 | End: 2020-11-10 | Stop reason: SDUPTHER

## 2019-08-13 ENCOUNTER — IN-CLINIC DEVICE VISIT (OUTPATIENT)
Dept: CARDIOLOGY CLINIC | Facility: CLINIC | Age: 84
End: 2019-08-13
Payer: MEDICARE

## 2019-08-13 DIAGNOSIS — I48.0 PAROXYSMAL ATRIAL FIBRILLATION (HCC): ICD-10-CM

## 2019-08-13 DIAGNOSIS — Z95.0 CARDIAC PACEMAKER: ICD-10-CM

## 2019-08-13 DIAGNOSIS — I49.5 SICK SINUS SYNDROME (HCC): Primary | ICD-10-CM

## 2019-08-13 PROCEDURE — 93280 PM DEVICE PROGR EVAL DUAL: CPT | Performed by: INTERNAL MEDICINE

## 2019-08-13 NOTE — PROGRESS NOTES
Results for orders placed or performed in visit on 08/13/19   Cardiac EP device report    Narrative    MDT-DUAL CHAMBER PPM  DEVICE INTERROGATED IN THE [place] OFFICE: BATTERY VOLTAGE ADEQUATE (10 2 YRS)  AP 92 6%  99 7% (>40%/MVP ON)  ALL LEAD PARAMETERS WITHIN NORMAL LIMITS  ALL OTHER TESTING WITHIN NORMAL LIMITS  NO SIGNIFICANT HIGH RATE EPISODES  NO PROGRAMMING CHANGES MADE TO DEVICE PARAMETERS  ATTEMPT TO EXTEND AVD TO DECREASE V PACE % BUT UNSUCCESSFUL @ MAXIMUM AVD /DDD MODE  PACEMAKER FUNCTIONING APPROPRIATELY      EB

## 2019-09-06 ENCOUNTER — LAB (OUTPATIENT)
Dept: LAB | Facility: MEDICAL CENTER | Age: 84
End: 2019-09-06
Payer: MEDICARE

## 2019-09-06 ENCOUNTER — TRANSCRIBE ORDERS (OUTPATIENT)
Dept: ADMINISTRATIVE | Facility: HOSPITAL | Age: 84
End: 2019-09-06

## 2019-09-06 DIAGNOSIS — D50.9 IRON DEFICIENCY ANEMIA, UNSPECIFIED IRON DEFICIENCY ANEMIA TYPE: Primary | ICD-10-CM

## 2019-09-06 DIAGNOSIS — D50.9 IRON DEFICIENCY ANEMIA, UNSPECIFIED IRON DEFICIENCY ANEMIA TYPE: ICD-10-CM

## 2019-09-06 LAB
BASOPHILS # BLD AUTO: 0.03 THOUSANDS/ΜL (ref 0–0.1)
BASOPHILS NFR BLD AUTO: 1 % (ref 0–1)
EOSINOPHIL # BLD AUTO: 0.15 THOUSAND/ΜL (ref 0–0.61)
EOSINOPHIL NFR BLD AUTO: 3 % (ref 0–6)
ERYTHROCYTE [DISTWIDTH] IN BLOOD BY AUTOMATED COUNT: 18.2 % (ref 11.6–15.1)
HCT VFR BLD AUTO: 35.5 % (ref 36.5–49.3)
HGB BLD-MCNC: 10.8 G/DL (ref 12–17)
IMM GRANULOCYTES # BLD AUTO: 0.03 THOUSAND/UL (ref 0–0.2)
IMM GRANULOCYTES NFR BLD AUTO: 1 % (ref 0–2)
IRON SERPL-MCNC: 93 UG/DL (ref 65–175)
LYMPHOCYTES # BLD AUTO: 1.03 THOUSANDS/ΜL (ref 0.6–4.47)
LYMPHOCYTES NFR BLD AUTO: 20 % (ref 14–44)
MCH RBC QN AUTO: 20.3 PG (ref 26.8–34.3)
MCHC RBC AUTO-ENTMCNC: 30.4 G/DL (ref 31.4–37.4)
MCV RBC AUTO: 67 FL (ref 82–98)
MONOCYTES # BLD AUTO: 0.55 THOUSAND/ΜL (ref 0.17–1.22)
MONOCYTES NFR BLD AUTO: 11 % (ref 4–12)
NEUTROPHILS # BLD AUTO: 3.31 THOUSANDS/ΜL (ref 1.85–7.62)
NEUTS SEG NFR BLD AUTO: 64 % (ref 43–75)
NRBC BLD AUTO-RTO: 0 /100 WBCS
PLATELET # BLD AUTO: 201 THOUSANDS/UL (ref 149–390)
PMV BLD AUTO: 9.5 FL (ref 8.9–12.7)
RBC # BLD AUTO: 5.32 MILLION/UL (ref 3.88–5.62)
WBC # BLD AUTO: 5.1 THOUSAND/UL (ref 4.31–10.16)

## 2019-09-06 PROCEDURE — 85025 COMPLETE CBC W/AUTO DIFF WBC: CPT | Performed by: FAMILY MEDICINE

## 2019-09-06 PROCEDURE — 83540 ASSAY OF IRON: CPT | Performed by: FAMILY MEDICINE

## 2019-09-06 PROCEDURE — 36415 COLL VENOUS BLD VENIPUNCTURE: CPT | Performed by: FAMILY MEDICINE

## 2019-09-06 PROCEDURE — 81404 MOPATH PROCEDURE LEVEL 5: CPT

## 2019-09-19 LAB — HBB GENE MUT ANL BLD/T: NORMAL

## 2019-11-14 ENCOUNTER — REMOTE DEVICE CLINIC VISIT (OUTPATIENT)
Dept: CARDIOLOGY CLINIC | Facility: CLINIC | Age: 84
End: 2019-11-14
Payer: MEDICARE

## 2019-11-14 DIAGNOSIS — Z95.0 CARDIAC PACEMAKER IN SITU: Primary | ICD-10-CM

## 2019-11-14 PROCEDURE — 93296 REM INTERROG EVL PM/IDS: CPT | Performed by: INTERNAL MEDICINE

## 2019-11-14 PROCEDURE — 93294 REM INTERROG EVL PM/LDLS PM: CPT | Performed by: INTERNAL MEDICINE

## 2019-11-14 NOTE — PROGRESS NOTES
Results for orders placed or performed in visit on 11/14/19   Cardiac EP device report    Narrative    MDT-DUAL CHAMBER PPM  CARELINK TRANSMISSION: BATTERY VOLTAGE ADEQUATE (9 7 YRS)  AP-97%, >99% (>40% MVP ON @ 70PPM)  ALL AVAILABLE LEAD PARAMETERS WITHIN NORMAL LIMITS  NO SIGNIFICANT HIGH RATE EPISODES  NORMAL DEVICE FUNCTION   GV

## 2019-12-05 ENCOUNTER — OFFICE VISIT (OUTPATIENT)
Dept: CARDIOLOGY CLINIC | Facility: CLINIC | Age: 84
End: 2019-12-05
Payer: MEDICARE

## 2019-12-05 VITALS
BODY MASS INDEX: 28.32 KG/M2 | HEART RATE: 72 BPM | HEIGHT: 65 IN | OXYGEN SATURATION: 95 % | WEIGHT: 170 LBS | SYSTOLIC BLOOD PRESSURE: 140 MMHG | DIASTOLIC BLOOD PRESSURE: 92 MMHG

## 2019-12-05 DIAGNOSIS — I48.0 PAROXYSMAL ATRIAL FIBRILLATION (HCC): Primary | ICD-10-CM

## 2019-12-05 DIAGNOSIS — I10 ESSENTIAL HYPERTENSION: ICD-10-CM

## 2019-12-05 DIAGNOSIS — Z95.0 PRESENCE OF PERMANENT CARDIAC PACEMAKER: ICD-10-CM

## 2019-12-05 DIAGNOSIS — Z01.818 PRE-OP EVALUATION: ICD-10-CM

## 2019-12-05 DIAGNOSIS — I49.5 SICK SINUS SYNDROME (HCC): ICD-10-CM

## 2019-12-05 DIAGNOSIS — S06.5X9A SUBDURAL HEMATOMA (HCC): ICD-10-CM

## 2019-12-05 PROCEDURE — 99215 OFFICE O/P EST HI 40 MIN: CPT | Performed by: INTERNAL MEDICINE

## 2019-12-05 PROCEDURE — 93000 ELECTROCARDIOGRAM COMPLETE: CPT | Performed by: INTERNAL MEDICINE

## 2019-12-05 NOTE — PROGRESS NOTES
Cardiology Follow Up    Saul Rapp  1935  [id number]  [facility]  [facility]  [address]  [phone]  [phone]    5  Paroxysmal atrial fibrillation (HCC)  POCT ECG   2  Essential hypertension     3  Subdural hematoma ([address] )     4  Pre-op evaluation     5  Presence of permanent cardiac pacemaker     6  Sick sinus syndrome [facility])         HPI:  Saul Rapp is a 81 y o  male who presents to the office today for 1 year follow-up  Patient has a history of paroxysmal atrial fibrillation, hypertension, subdural hematoma, and on amiodarone therapy  Patient reports his wife recently passed away in November 2019  He is doing well otherwise with no cardiac symptoms  He denies chest pain, LE edema, palpitations, shortness of breath, pre syncope, or syncope  He states he will be undergoing a Hernia repair surgery in the near future and will need a cardiac clearance            Previous History:   His wife of 62 years has dementia and a stroke  He visits her everyday in [place]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   He is grateful for life and enjoys doing the same      Patient well known to me from hospital stay  Had syncope, fall, subdural bleed  Has sick sinus syndrome, and underwent PPM     The patient is not complaining of angina like chest pain or chest pressure  There is no worsening orthopnea, paroxysmal nocturnal dyspnea  There is no leg swelling     There is no history of presyncope or syncope  There is no history of transient  Lightheadedness after PPM  There is no exertional intolerance        There is history of snoring at night  There is history of morning fatigability  There is occasional history of daytime sleepiness            /92 (BP Location: Left arm, Patient Position: Sitting, Cuff Size: Standard)   Pulse 72   Ht 5' 5" (1 651 m)   Wt 77 1 kg (170 lb)   SpO2 95%   BMI 28 29 kg/m² Patient Active Problem List   Diagnosis    Subdural hematoma (HCC)    Pneumocephalus    Closed nondisplaced fracture of distal phalanx of right thumb    Essential hypertension    Atrial fibrillation (HCC)    Temporal bone fracture (HCC)    On amiodarone therapy    Chest pain    Pre-op evaluation    Sick sinus syndrome (HCC)    Presence of permanent cardiac pacemaker     Past Medical History:   Diagnosis Date    A-fib ([facility] [address] )     Hernia, abdominal     Hypertension     Subdural hematoma (HCC)      Social History     Socioeconomic History    Marital status: /Civil Union     Spouse name: Not on file    Number of children: Not on file    Years of education: Not on file    Highest education level: Not on file   Occupational History    Not on file   Social Needs    Financial resource strain: Not on file    Food insecurity:     Worry: Not on file     Inability: Not on file    Transportation needs:     Medical: Not on file     Non-medical: Not on file   Tobacco Use    Smoking status: Never Smoker    Smokeless tobacco: Never Used   Substance and Sexual Activity    Alcohol use: No    Drug use: No    Sexual activity: Not on file   Lifestyle    Physical activity:     Days per week: Not on file     Minutes per session: Not on file    Stress: Not on file   Relationships    Social connections:     Talks on phone: Not on file     Gets together: Not on file     Attends Oriental orthodox service: Not on file     Active member of club or organization: Not on file     Attends meetings of clubs or organizations: Not on file     Relationship status: Not on file    Intimate partner violence:     Fear of current or ex partner: Not on file     Emotionally abused: Not on file     Physically abused: Not on file     Forced sexual activity: Not on file   Other Topics Concern    Not on file   Social History Narrative    Not on file      Family History   Problem Relation Age of Onset    Cancer Mother exp age 80    Heart disease Father     Heart disease Brother     Hypertension Brother      Past Surgical History:   Procedure Laterality Date    A-V CARDIAC PACEMAKER INSERTION      APPENDECTOMY      age 33    CARDIAC PACEMAKER PLACEMENT      CARPAL TUNNEL RELEASE      COLONOSCOPY       [address] CATARACT EXTRACAP,INSERT LENS Right 4/3/2017    Procedure: EXTRACTION EXTRACAPSULAR CATARACT PHACO INTRAOCULAR LENS (IOL); Surgeon: Bethany Batista MD;  [facility] MA[facility];  Service: Ophthalmology    TONSILLECTOMY      age 12       Current Outpatient Medications:     amLODIPine (NORVASC) 5 mg tablet, Take 1 tablet (5 mg total) by mouth daily, Disp: 90 tablet, Rfl: 3    enalapril (VASOTEC) 5 mg tablet, TAKE 1 TABLET BY MOUTH EVERY DAY, Disp: 30 tablet, Rfl: 0    metoprolol succinate (TOPROL-XL) 25 mg 24 hr tablet, TAKE 1 TABLET BY MOUTH EVERY DAY, Disp: 30 tablet, Rfl: 5  No Known Allergies    Labs:  Lab Results   Component Value Date    K 4 3 04/13/2019     04/13/2019    CO2 28 04/13/2019    CO2 29 04/14/2018    BUN 21 04/13/2019    CREATININE 1 10 04/13/2019    GLUCOSE 136 04/14/2018    CALCIUM 8 3 04/13/2019     Lab Results   Component Value Date    CKTOTAL 304 04/15/2018    CKMB 3 5 04/15/2018    CKMBINDEX 1 2 04/15/2018    TROPONINI <0 02 01/14/2019     Lab Results   Component Value Date    WBC 5 10 09/06/2019    HGB 10 8 (L) 09/06/2019    HCT 35 5 (L) 09/06/2019    MCV 67 (L) 09/06/2019     09/06/2019     Lab Results   Component Value Date    TRIG 82 04/13/2019     (H) 04/13/2019     Imaging:     Device report November 2019  Result Narrative     MDT-DUAL CHAMBER PPM  CARELINK TRANSMISSION: BATTERY VOLTAGE ADEQUATE (9 7 YRS)  AP-97%, >99% (>40% MVP ON @ 70PPM)  ALL AVAILABLE LEAD PARAMETERS WITHIN NORMAL LIMITS  NO SIGNIFICANT HIGH RATE EPISODES  NORMAL DEVICE FUNCTION   GV                 Complete echo April 2018  SUMMARY     LEFT VENTRICLE:  Systolic function was normal  Ejection fraction was estimated to be 55 %  There were no regional wall motion abnormalities  Wall thickness was mildly to moderately increased  The changes were consistent with concentric remodeling (increased wall thickness with normal wall mass)      MITRAL VALVE:  There was mild annular calcification  There was mild regurgitation      AORTIC VALVE:  The valve was trileaflet  Leaflets exhibited sclerosis  There was mild regurgitation      TRICUSPID VALVE:  There was mild to moderate regurgitation  Estimated peak PA pressure was 48 mmHg  The findings suggest mild to moderate pulmonary hypertension      IVC, HEPATIC VEINS:  Respirophasic changes were blunted (less than 50% variation)  Review of Systems:  Review of Systems   All other systems reviewed and are negative  As described in my history of present illness        Physical Exam:  Physical Exam   Constitutional: He is oriented to person, place, and time  He appears well-developed and well-nourished  No distress  Not in any distress at the current time   HENT:   Head: Normocephalic and atraumatic  Right Ear: External ear normal    Left Ear: External ear normal    Nose: Nose normal    Mouth/Throat: Uvula is midline and mucous membranes are normal    Posterior pharynx is crowded   Eyes: Pupils are equal, round, and reactive to light  Conjunctivae, EOM and lids are normal  No scleral icterus  No pallor  No cyanosis  No icterus   Neck: Trachea normal and normal range of motion  Neck supple  No JVD present  Carotid bruit is not present  No thyromegaly present  No jugular lymphadenopathy   Cardiovascular: Normal rate, regular rhythm, S1 normal, S2 normal, normal heart sounds, intact distal pulses and normal pulses  PMI is not displaced  Exam reveals no gallop, no S3, no S4 and no friction rub  No murmur heard  Pulmonary/Chest: Effort normal  No accessory muscle usage  No respiratory distress   He has decreased breath sounds in the right lower field and the left lower field  He has no wheezes  He has no rhonchi  He has no rales  He exhibits no tenderness  Abdominal: Soft  Normal appearance and bowel sounds are normal  He exhibits no distension and no mass  There is no splenomegaly or hepatomegaly  There is no tenderness  Patient has lower abdominal hernia   Musculoskeletal: Normal range of motion  He exhibits no edema, tenderness or deformity  Lymphadenopathy:     He has no cervical adenopathy  Neurological: He is alert and oriented to person, place, and time  Facial symmetry is retained  Extraocular movements are retained  Head neck tongue and palate movement are retained and symmetric   Skin: Skin is intact  No abrasion, no lesion and no rash noted  No erythema  Nails show no clubbing  Psychiatric: He has a normal mood and affect  His speech is normal and behavior is normal  Thought content normal    Vitals reviewed  Discussion/Summary:    1  Syncope, sick sinus syndrome, chronotropic incompetence   Falls  Post PPM  CHB, 100% paced  No further episodes         2  Subdural hematoma  Off anticoagulation  Same size as last visit as per neurosurgery note   Does not lift as much, rolls heavier objects ,  for 60 years  Avoid valsalva maneuvers  No symptoms from same            3   PAF  Has subdural hematoma - secondary to fall due to syncope - recent worsening   No anticoagulation  Currently in rhythm     We had a detailed discussion about atrial fibrillation        1 - natural history of disease   since it increases with age , definitive therapy is indicated         2 - sleep apnea   patient is recommended to follow up with Pulmonary and Sleep Medicine - for BETH        3 - thyroid function   hyperthyroidism is a common precipitator of atrial fibrillation   Check TSH        4 -Aggressive management of  hypertension            5 - anticoagulation   patient's chads Vasc score is -3  hypertension   age   But has head bleed and cannot be on anticoagulation till cleared by neuro-surgery        6 - rate control medication   beta-blocker            7 - rhythm control medication    Currently he is off all antiarrhythmics  The possible choices are  class 1 C agent - flecainide and propafenone   patient will need a stress study to rule out any underlying ischemia before these can be started   flecainide will necessitate use of an additional beta-blocker -   propafenone has intrinsic beta-blocker activity       class 3 agent - Sotalol and dofetilide   both will need hospital admission to monitor first 5 doses over the initial 2 and half days     sotalol has intrinsic beta-blocker properties   dofetilide may need an additional beta-blocker along with it for rate control  Keep potassium between 4 and 4 5   keep magnesium between 2 and 2 5   follow QTC   Follow creatinine       amiodarone   around this is very effective antiarrhythmic but has significantly long term toxicity   Hence certain basic studies are needed at baseline and thereafter at yearly intervals or   -hypo or hyperthyroidism , Check TSH    -can precipitate significant interstitial lung disease and hence DLCO at baseline and thereafter yearly   -long-term use causes cumulative toxicity in the liver- check  CMP   -corneal deposits advice and hence is ophthalmology evaluation      On amiodarone as most effective medication to control PAF, since cannot be on anticoagulation           8 - ablation   this is the most effective method to achieve and maintain sinus rhythm      paroxysmal atrial fibrillation - 70-80% chance in the 1st year  and falls to 50% by 5 years   persistent atrial fibrillation - 50-60% chance in the 1st year and falls to 30% or less by 5 years      we use a combination of cryo and radiofrequency ablation      there is a 2-5% chance of serious complication which include - bleeding around access site, heart attack , stroke , bleeding around the heart requiring drainage , perforation requiring open heart surgery , phrenic nerve paralysis causing diaphragmatic paralysis, atrial esophageal fistula   we do deployed multiple methods to prevent such complication :  - heparin anticoagulation with ACT between 350 and 400s to prevent stroke   - coronary angiography if we are going to ablate close to any major blood vessel   -access to the pericardial drain if pericardial effusion were to happen   - pressure sensing catheters to reduce excessive pressure and chances of perforation   - esophageal temperature monitoring to reduce chances of injury               4  Preoperative evaluation for hernia repair surgery  Cardiac  Echo from 2018  LVEF retained  No significant abnormality or aortic or mitral valve  Patient is dependent on pacemaker  History of paroxysmal AFib, currently in sinus rhythm and all AV paced  No history of VT     Extra cardiac  No history of stroke  Does have a history of subdural hematoma from a fall  Creatinine is 1 1  No history of diabetes mellitus    Activity level  Greater than 4 Mets    Procedure  Hernia surgery  Hydrocele surgery    Combine risk assessment  Taking everything together patient is at low-to-moderate risk of cardiovascular complication  He is dependent on the device, can program V00 for the surgery  Continue with metoprolol succinate          5   Hypertension  He is on metoprolol succinate, enalapril and amlodipine  Blood pressure is 140/92  Need close follow-up with primary physician          Summary of recommendation:  Titration of antihypertensive for primary  Preoperative risk assessment done      Scribe Attestation    I,:   Scarlett Macdonald am acting as a scribe while in the presence of the attending physician :        I,:   Chago Palacios MD personally performed the services described in this documentation    as scribed in my presence :

## 2019-12-05 NOTE — LETTER
December 6, 2019     Juan [place] [id number]    Patient: Paticia Severance   YOB: 1935   Date of Visit: 12/5/2019       Dear Dr Delmis Cohen:    Thank you for referring Luz Davidson to me for evaluation  Below are my notes for this consultation  If you have questions, please do not hesitate to call me  I look forward to following your patient along with you  Sincerely,        Susana Shaw MD        CC: Ketan Wei MD  12/6/2019 10:02 PM  Sign at close encounter                                             Cardiology Follow Up    Paticia Severance  1935  [id number]  [address]  [address]  [phone]    9  Paroxysmal atrial fibrillation (HCC)  POCT ECG   2  Essential hypertension     3  Subdural hematoma ([facility] [address] )     4  Pre-op evaluation     5  Presence of permanent cardiac pacemaker     6  Sick sinus syndrome [facility])         HPI:  Paticia Severance is a 81 y o  male who presents to the office today for 1 year follow-up  Patient has a history of paroxysmal atrial fibrillation, hypertension, subdural hematoma, and on amiodarone therapy  Patient reports his wife recently passed away in November 2019  He is doing well otherwise with no cardiac symptoms  He denies chest pain, LE edema, palpitations, shortness of breath, pre syncope, or syncope  He states he will be undergoing a Hernia repair surgery in the near future and will need a cardiac clearance            Previous History:   His wife of 62 years has dementia and a stroke  He visits her everyday in [place]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   He is grateful for life and enjoys doing the same      Patient well known to me from hospital stay  Had syncope, fall, subdural bleed  Has sick sinus syndrome, and underwent PPM     The patient is not complaining of angina like chest pain or chest pressure  There is no worsening orthopnea, paroxysmal nocturnal dyspnea  There is no leg swelling     There is no history of presyncope or syncope  There is no history of transient  Lightheadedness after PPM  There is no exertional intolerance        There is history of snoring at night  There is history of morning fatigability  There is occasional history of daytime sleepiness            /92 (BP Location: Left arm, Patient Position: Sitting, Cuff Size: Standard)   Pulse 72   Ht 5' 5" (1 651 m)   Wt 77 1 kg (170 lb)   SpO2 95%   BMI 28 29 kg/m²        Patient Active Problem List   Diagnosis    Subdural hematoma (HCC)    Pneumocephalus    Closed nondisplaced fracture of distal phalanx of right thumb    Essential hypertension    Atrial fibrillation (HCC)    Temporal bone fracture (HCC)    On amiodarone therapy    Chest pain    Pre-op evaluation    Sick sinus syndrome (HCC)    Presence of permanent cardiac pacemaker     Past Medical History:   Diagnosis Date    [facility]-fib ([facility] )     Hernia, abdominal     Hypertension     Subdural hematoma (HCC)      Social History     Socioeconomic History    Marital status: /Civil Union     Spouse name: Not on file    Number of children: Not on file    Years of education: Not on file    Highest education level: Not on file   Occupational History    Not on file   Social Needs    Financial resource strain: Not on file    Food insecurity:     Worry: Not on file     Inability: Not on file    Transportation needs:     Medical: Not on file     Non-medical: Not on file   Tobacco Use    Smoking status: Never Smoker    Smokeless tobacco: Never Used   Substance and Sexual Activity    Alcohol use: No    Drug use: No    Sexual activity: Not on file   Lifestyle    Physical activity:     Days per week: Not on file     Minutes per session: Not on file    Stress: Not on file   Relationships    Social connections:     Talks on phone: Not on file     Gets together: Not on file Attends Mandaeism service: Not on file     Active member of club or organization: Not on file     Attends meetings of clubs or organizations: Not on file     Relationship status: Not on file    Intimate partner violence:     Fear of current or ex partner: Not on file     Emotionally abused: Not on file     Physically abused: Not on file     Forced sexual activity: Not on file   Other Topics Concern    Not on file   Social History Narrative    Not on file      Family History   Problem Relation Age of Onset    Cancer Mother         exp age 80    Heart disease Father     Heart disease Brother     Hypertension Brother      Past Surgical History:   Procedure Laterality Date    A-V CARDIAC PACEMAKER INSERTION      APPENDECTOMY      age 33   [facility]    Procedure: EXTRACTION EXTRACAPSULAR CATARACT PHACO INTRAOCULAR LENS (IOL);   Surgeon: George Arce MD;  Location[facility] MAIN OR;  Service: Ophthalmology    TONSILLECTOMY      age 12       Current Outpatient Medications:     amLODIPine (NORVASC) 5 mg tablet, Take 1 tablet (5 mg total) by mouth daily, Disp: 90 tablet, Rfl: 3    enalapril (VASOTEC) 5 mg tablet, TAKE 1 TABLET BY MOUTH EVERY DAY, Disp: 30 tablet, Rfl: 0    metoprolol succinate (TOPROL-XL) 25 mg 24 hr tablet, TAKE 1 TABLET BY MOUTH EVERY DAY, Disp: 30 tablet, Rfl: 5  No Known Allergies    Labs:  Lab Results   Component Value Date    K 4 3 04/13/2019     04/13/2019    CO2 28 04/13/2019    CO2 29 04/14/2018    BUN 21 04/13/2019    CREATININE 1 10 04/13/2019    GLUCOSE 136 04/14/2018    CALCIUM 8 3 04/13/2019     Lab Results   Component Value Date    CKTOTAL 304 04/15/2018    CKMB 3 5 04/15/2018    CKMBINDEX 1 2 04/15/2018    TROPONINI <0 02 01/14/2019     Lab Results   Component Value Date    WBC 5 10 09/06/2019    HGB 10 8 (L) 09/06/2019    HCT 35 5 (L) 09/06/2019    MCV 67 (L) 09/06/2019     09/06/2019     Lab Results   Component Value Date    TRIG 82 04/13/2019     (H) 04/13/2019     Imaging:     Device report November 2019  Result Narrative     MDT-DUAL CHAMBER PPM  CARELINK TRANSMISSION: BATTERY VOLTAGE ADEQUATE (9 7 YRS)  AP-97%, >99% (>40% MVP ON @ 70PPM)  ALL AVAILABLE LEAD PARAMETERS WITHIN NORMAL LIMITS  NO SIGNIFICANT HIGH RATE EPISODES  NORMAL DEVICE FUNCTION  GV                 Complete echo April 2018  SUMMARY     LEFT VENTRICLE:  Systolic function was normal  Ejection fraction was estimated to be 55 %  There were no regional wall motion abnormalities  Wall thickness was mildly to moderately increased  The changes were consistent with concentric remodeling (increased wall thickness with normal wall mass)      MITRAL VALVE:  There was mild annular calcification  There was mild regurgitation      AORTIC VALVE:  The valve was trileaflet  Leaflets exhibited sclerosis  There was mild regurgitation      TRICUSPID VALVE:  There was mild to moderate regurgitation  Estimated peak PA pressure was 48 mmHg  The findings suggest mild to moderate pulmonary hypertension      IVC, HEPATIC VEINS:  Respirophasic changes were blunted (less than 50% variation)  Review of Systems:  Review of Systems   All other systems reviewed and are negative  As described in my history of present illness        Physical Exam:  Physical Exam   Constitutional: He is oriented to person, place, and time  He appears well-developed and well-nourished  No distress  Not in any distress at the current time   HENT:   Head: Normocephalic and atraumatic  Right Ear: External ear normal    Left Ear: External ear normal    Nose: Nose normal    Mouth/Throat: Uvula is midline and mucous membranes are normal    Posterior pharynx is crowded   Eyes: Pupils are equal, round, and reactive to light  Conjunctivae, EOM and lids are normal  No scleral icterus     No pallor  No cyanosis  No icterus Neck: Trachea normal and normal range of motion  Neck supple  No JVD present  Carotid bruit is not present  No thyromegaly present  No jugular lymphadenopathy   Cardiovascular: Normal rate, regular rhythm, S1 normal, S2 normal, normal heart sounds, intact distal pulses and normal pulses  PMI is not displaced  Exam reveals no gallop, no S3, no S4 and no friction rub  No murmur heard  Pulmonary/Chest: Effort normal  No accessory muscle usage  No respiratory distress  He has decreased breath sounds in the right lower field and the left lower field  He has no wheezes  He has no rhonchi  He has no rales  He exhibits no tenderness  Abdominal: Soft  Normal appearance and bowel sounds are normal  He exhibits no distension and no mass  There is no splenomegaly or hepatomegaly  There is no tenderness  Patient has lower abdominal hernia   Musculoskeletal: Normal range of motion  He exhibits no edema, tenderness or deformity  Lymphadenopathy:     He has no cervical adenopathy  Neurological: He is alert and oriented to person, place, and time  Facial symmetry is retained  Extraocular movements are retained  Head neck tongue and palate movement are retained and symmetric   Skin: Skin is intact  No abrasion, no lesion and no rash noted  No erythema  Nails show no clubbing  Psychiatric: He has a normal mood and affect  His speech is normal and behavior is normal  Thought content normal    Vitals reviewed  Discussion/Summary:    1  Syncope, sick sinus syndrome, chronotropic incompetence   Falls  Post PPM  CHB, 100% paced  No further episodes         2  Subdural hematoma  Off anticoagulation  Same size as last visit as per neurosurgery note   Does not lift as much, rolls heavier objects ,  for 60 years  Avoid valsalva maneuvers  No symptoms from same            3   PAF  Has subdural hematoma - secondary to fall due to syncope - recent worsening   No anticoagulation  Currently in rhythm     We had a detailed discussion about atrial fibrillation        1 - natural history of disease   since it increases with age , definitive therapy is indicated         2 - sleep apnea   patient is recommended to follow up with Pulmonary and Sleep Medicine - for BETH        3 - thyroid function   hyperthyroidism is a common precipitator of atrial fibrillation   Check TSH        4 -Aggressive management of  hypertension            5 - anticoagulation   patient's chads Vasc score is -3  hypertension   age   But has head bleed and cannot be on anticoagulation till cleared by neuro-surgery        6 - rate control medication   beta-blocker            7 - rhythm control medication    Currently he is off all antiarrhythmics  The possible choices are  class 1 C agent - flecainide and propafenone   patient will need a stress study to rule out any underlying ischemia before these can be started   flecainide will necessitate use of an additional beta-blocker -   propafenone has intrinsic beta-blocker activity       class 3 agent - Sotalol and dofetilide   both will need hospital admission to monitor first 5 doses over the initial 2 and half days     sotalol has intrinsic beta-blocker properties   dofetilide may need an additional beta-blocker along with it for rate control  Keep potassium between 4 and 4 5   keep magnesium between 2 and 2 5   follow QTC   Follow creatinine       amiodarone   around this is very effective antiarrhythmic but has significantly long term toxicity   Hence certain basic studies are needed at baseline and thereafter at yearly intervals or   -hypo or hyperthyroidism , Check TSH    -can precipitate significant interstitial lung disease and hence DLCO at baseline and thereafter yearly   -long-term use causes cumulative toxicity in the liver- check  CMP   -corneal deposits advice and hence is ophthalmology evaluation      On amiodarone as most effective medication to control PAF, since cannot be on anticoagulation           8 - ablation   this is the most effective method to achieve and maintain sinus rhythm      paroxysmal atrial fibrillation - 70-80% chance in the 1st year  and falls to 50% by 5 years   persistent atrial fibrillation - 50-60% chance in the 1st year and falls to 30% or less by 5 years      we use a combination of cryo and radiofrequency ablation      there is a 2-5% chance of serious complication which include - bleeding around access site, heart attack , stroke , bleeding around the heart requiring drainage , perforation requiring open heart surgery , phrenic nerve paralysis causing diaphragmatic paralysis, atrial esophageal fistula   we do deployed multiple methods to prevent such complication :  - heparin anticoagulation with ACT between 350 and 400s to prevent stroke   - coronary angiography if we are going to ablate close to any major blood vessel   -access to the pericardial drain if pericardial effusion were to happen   - pressure sensing catheters to reduce excessive pressure and chances of perforation   - esophageal temperature monitoring to reduce chances of injury               4  Preoperative evaluation for hernia repair surgery  Cardiac  Echo from 2018  LVEF retained  No significant abnormality or aortic or mitral valve  Patient is dependent on pacemaker  History of paroxysmal AFib, currently in sinus rhythm and all AV paced  No history of VT     Extra cardiac  No history of stroke  Does have a history of subdural hematoma from a fall  Creatinine is 1 1  No history of diabetes mellitus    Activity level  Greater than 4 Mets    Procedure  Hernia surgery  Hydrocele surgery    Combine risk assessment  Taking everything together patient is at low-to-moderate risk of cardiovascular complication  He is dependent on the device, can program V00 for the surgery  Continue with metoprolol succinate          5   Hypertension  He is on metoprolol succinate, enalapril and amlodipine  Blood pressure is 140/92  Need close follow-up with primary physician          Summary of recommendation:  Titration of antihypertensive for primary  Preoperative risk assessment done      Scribe Attestation    I,:   Wilbert Palumbo am acting as a scribe while in the presence of the attending physician :        I,:   Stephen Barakat MD personally performed the services described in this documentation    as scribed in my presence :

## 2019-12-06 PROBLEM — Z95.0 PRESENCE OF PERMANENT CARDIAC PACEMAKER: Status: ACTIVE | Noted: 2019-12-06

## 2019-12-06 PROBLEM — I49.5 SICK SINUS SYNDROME (HCC): Status: ACTIVE | Noted: 2019-12-06

## 2020-02-13 ENCOUNTER — REMOTE DEVICE CLINIC VISIT (OUTPATIENT)
Dept: CARDIOLOGY CLINIC | Facility: CLINIC | Age: 85
End: 2020-02-13
Payer: MEDICARE

## 2020-02-13 DIAGNOSIS — Z95.0 CARDIAC PACEMAKER IN SITU: Primary | ICD-10-CM

## 2020-02-13 PROCEDURE — 93294 REM INTERROG EVL PM/LDLS PM: CPT | Performed by: INTERNAL MEDICINE

## 2020-02-13 PROCEDURE — 93296 REM INTERROG EVL PM/IDS: CPT | Performed by: INTERNAL MEDICINE

## 2020-02-14 NOTE — PROGRESS NOTES
Results for orders placed or performed in visit on 02/13/20   Cardiac EP device report    Narrative    MDT-DUAL CHAMBER PPM/ ACTIVE SYSTEM IS MRI CONDITIONAL  CARELINK TRANSMISSION: BATTERY VOLTAGE ADEQUATE (9 5 YRS)  AP-94%, -98% (>40% MVP ON @ 70PPM)  ALL AVAILABLE LEAD PARAMETERS WITHIN NORMAL LIMITS  NO SIGNIFICANT HIGH RATE EPISODES  NORMAL DEVICE FUNCTION   GV

## 2020-05-06 ENCOUNTER — APPOINTMENT (OUTPATIENT)
Dept: LAB | Facility: MEDICAL CENTER | Age: 85
End: 2020-05-06
Payer: MEDICARE

## 2020-05-06 ENCOUNTER — TRANSCRIBE ORDERS (OUTPATIENT)
Dept: ADMINISTRATIVE | Facility: HOSPITAL | Age: 85
End: 2020-05-06

## 2020-05-06 DIAGNOSIS — N42.9 DISEASE OF PROSTATE: ICD-10-CM

## 2020-05-06 DIAGNOSIS — E78.5 HYPERLIPIDEMIA, UNSPECIFIED HYPERLIPIDEMIA TYPE: ICD-10-CM

## 2020-05-06 DIAGNOSIS — I10 ESSENTIAL HYPERTENSION, BENIGN: ICD-10-CM

## 2020-05-06 DIAGNOSIS — R73.01 IMPAIRED FASTING GLUCOSE: Primary | ICD-10-CM

## 2020-05-06 DIAGNOSIS — D50.9 IRON DEFICIENCY ANEMIA, UNSPECIFIED IRON DEFICIENCY ANEMIA TYPE: ICD-10-CM

## 2020-05-06 LAB
ALBUMIN SERPL BCP-MCNC: 3.9 G/DL (ref 3.5–5)
ALP SERPL-CCNC: 83 U/L (ref 46–116)
ALT SERPL W P-5'-P-CCNC: 33 U/L (ref 12–78)
ANION GAP SERPL CALCULATED.3IONS-SCNC: 6 MMOL/L (ref 4–13)
AST SERPL W P-5'-P-CCNC: 27 U/L (ref 5–45)
BASOPHILS # BLD AUTO: 0.04 THOUSANDS/ΜL (ref 0–0.1)
BASOPHILS NFR BLD AUTO: 1 % (ref 0–1)
BILIRUB SERPL-MCNC: 0.48 MG/DL (ref 0.2–1)
BUN SERPL-MCNC: 27 MG/DL (ref 5–25)
CALCIUM SERPL-MCNC: 8.8 MG/DL (ref 8.3–10.1)
CHLORIDE SERPL-SCNC: 108 MMOL/L (ref 100–108)
CHOLEST SERPL-MCNC: 205 MG/DL (ref 50–200)
CO2 SERPL-SCNC: 27 MMOL/L (ref 21–32)
CREAT SERPL-MCNC: 1.26 MG/DL (ref 0.6–1.3)
EOSINOPHIL # BLD AUTO: 0.19 THOUSAND/ΜL (ref 0–0.61)
EOSINOPHIL NFR BLD AUTO: 4 % (ref 0–6)
ERYTHROCYTE [DISTWIDTH] IN BLOOD BY AUTOMATED COUNT: 18.9 % (ref 11.6–15.1)
EST. AVERAGE GLUCOSE BLD GHB EST-MCNC: 114 MG/DL
GFR SERPL CREATININE-BSD FRML MDRD: 52 ML/MIN/1.73SQ M
GLUCOSE P FAST SERPL-MCNC: 100 MG/DL (ref 65–99)
HBA1C MFR BLD: 5.6 %
HCT VFR BLD AUTO: 37.9 % (ref 36.5–49.3)
HDLC SERPL-MCNC: 109 MG/DL
HGB BLD-MCNC: 11.6 G/DL (ref 12–17)
IMM GRANULOCYTES # BLD AUTO: 0.02 THOUSAND/UL (ref 0–0.2)
IMM GRANULOCYTES NFR BLD AUTO: 0 % (ref 0–2)
IRON SERPL-MCNC: 89 UG/DL (ref 65–175)
LDLC SERPL CALC-MCNC: 81 MG/DL (ref 0–100)
LYMPHOCYTES # BLD AUTO: 1.3 THOUSANDS/ΜL (ref 0.6–4.47)
LYMPHOCYTES NFR BLD AUTO: 29 % (ref 14–44)
MCH RBC QN AUTO: 20 PG (ref 26.8–34.3)
MCHC RBC AUTO-ENTMCNC: 30.6 G/DL (ref 31.4–37.4)
MCV RBC AUTO: 65 FL (ref 82–98)
MONOCYTES # BLD AUTO: 0.7 THOUSAND/ΜL (ref 0.17–1.22)
MONOCYTES NFR BLD AUTO: 15 % (ref 4–12)
NEUTROPHILS # BLD AUTO: 2.3 THOUSANDS/ΜL (ref 1.85–7.62)
NEUTS SEG NFR BLD AUTO: 51 % (ref 43–75)
NONHDLC SERPL-MCNC: 96 MG/DL
NRBC BLD AUTO-RTO: 0 /100 WBCS
PLATELET # BLD AUTO: 176 THOUSANDS/UL (ref 149–390)
PMV BLD AUTO: 9.2 FL (ref 8.9–12.7)
POTASSIUM SERPL-SCNC: 4.6 MMOL/L (ref 3.5–5.3)
PROT SERPL-MCNC: 7.3 G/DL (ref 6.4–8.2)
PSA SERPL-MCNC: 0.5 NG/ML (ref 0–4)
RBC # BLD AUTO: 5.8 MILLION/UL (ref 3.88–5.62)
SODIUM SERPL-SCNC: 141 MMOL/L (ref 136–145)
TRIGL SERPL-MCNC: 77 MG/DL
WBC # BLD AUTO: 4.55 THOUSAND/UL (ref 4.31–10.16)

## 2020-05-06 PROCEDURE — 83036 HEMOGLOBIN GLYCOSYLATED A1C: CPT | Performed by: FAMILY MEDICINE

## 2020-05-06 PROCEDURE — 80061 LIPID PANEL: CPT | Performed by: FAMILY MEDICINE

## 2020-05-06 PROCEDURE — 84153 ASSAY OF PSA TOTAL: CPT | Performed by: FAMILY MEDICINE

## 2020-05-06 PROCEDURE — 80053 COMPREHEN METABOLIC PANEL: CPT | Performed by: FAMILY MEDICINE

## 2020-05-06 PROCEDURE — 36415 COLL VENOUS BLD VENIPUNCTURE: CPT | Performed by: FAMILY MEDICINE

## 2020-05-06 PROCEDURE — 85025 COMPLETE CBC W/AUTO DIFF WBC: CPT | Performed by: FAMILY MEDICINE

## 2020-05-06 PROCEDURE — 83540 ASSAY OF IRON: CPT | Performed by: FAMILY MEDICINE

## 2020-05-14 ENCOUNTER — REMOTE DEVICE CLINIC VISIT (OUTPATIENT)
Dept: CARDIOLOGY CLINIC | Facility: CLINIC | Age: 85
End: 2020-05-14
Payer: MEDICARE

## 2020-05-14 DIAGNOSIS — Z95.0 PRESENCE OF PERMANENT CARDIAC PACEMAKER: Primary | ICD-10-CM

## 2020-05-14 PROCEDURE — 93296 REM INTERROG EVL PM/IDS: CPT | Performed by: INTERNAL MEDICINE

## 2020-05-14 PROCEDURE — 93294 REM INTERROG EVL PM/LDLS PM: CPT | Performed by: INTERNAL MEDICINE

## 2020-06-19 ENCOUNTER — TELEPHONE (OUTPATIENT)
Dept: CARDIOLOGY CLINIC | Facility: CLINIC | Age: 85
End: 2020-06-19

## 2020-06-19 DIAGNOSIS — I48.0 PAROXYSMAL ATRIAL FIBRILLATION (HCC): Primary | ICD-10-CM

## 2020-06-19 RX ORDER — AMIODARONE HYDROCHLORIDE 200 MG/1
200 TABLET ORAL DAILY
Qty: 30 TABLET | Refills: 3 | Status: SHIPPED | OUTPATIENT
Start: 2020-06-19

## 2020-08-27 ENCOUNTER — APPOINTMENT (OUTPATIENT)
Dept: RADIOLOGY | Facility: MEDICAL CENTER | Age: 85
End: 2020-08-27
Payer: MEDICARE

## 2020-08-27 ENCOUNTER — APPOINTMENT (OUTPATIENT)
Dept: LAB | Facility: MEDICAL CENTER | Age: 85
End: 2020-08-27
Payer: MEDICARE

## 2020-08-27 ENCOUNTER — TRANSCRIBE ORDERS (OUTPATIENT)
Dept: ADMINISTRATIVE | Facility: HOSPITAL | Age: 85
End: 2020-08-27

## 2020-08-27 DIAGNOSIS — M16.0 OSTEOARTHRITIS OF BOTH HIPS, UNSPECIFIED OSTEOARTHRITIS TYPE: ICD-10-CM

## 2020-08-27 DIAGNOSIS — R73.01 IMPAIRED FASTING GLUCOSE: ICD-10-CM

## 2020-08-27 DIAGNOSIS — R68.2 DRY MOUTH: ICD-10-CM

## 2020-08-27 DIAGNOSIS — R68.2 DRY MOUTH: Primary | ICD-10-CM

## 2020-08-27 LAB
ANION GAP SERPL CALCULATED.3IONS-SCNC: 4 MMOL/L (ref 4–13)
BUN SERPL-MCNC: 20 MG/DL (ref 5–25)
CALCIUM SERPL-MCNC: 8.8 MG/DL (ref 8.3–10.1)
CHLORIDE SERPL-SCNC: 108 MMOL/L (ref 100–108)
CO2 SERPL-SCNC: 28 MMOL/L (ref 21–32)
CREAT SERPL-MCNC: 1.07 MG/DL (ref 0.6–1.3)
EST. AVERAGE GLUCOSE BLD GHB EST-MCNC: 111 MG/DL
GFR SERPL CREATININE-BSD FRML MDRD: 64 ML/MIN/1.73SQ M
GLUCOSE P FAST SERPL-MCNC: 106 MG/DL (ref 65–99)
HBA1C MFR BLD: 5.5 %
POTASSIUM SERPL-SCNC: 4.6 MMOL/L (ref 3.5–5.3)
SODIUM SERPL-SCNC: 140 MMOL/L (ref 136–145)
TSH SERPL DL<=0.05 MIU/L-ACNC: 2.44 UIU/ML (ref 0.36–3.74)

## 2020-08-27 PROCEDURE — 83036 HEMOGLOBIN GLYCOSYLATED A1C: CPT | Performed by: FAMILY MEDICINE

## 2020-08-27 PROCEDURE — 36415 COLL VENOUS BLD VENIPUNCTURE: CPT | Performed by: FAMILY MEDICINE

## 2020-08-27 PROCEDURE — 86235 NUCLEAR ANTIGEN ANTIBODY: CPT

## 2020-08-27 PROCEDURE — 80048 BASIC METABOLIC PNL TOTAL CA: CPT | Performed by: FAMILY MEDICINE

## 2020-08-27 PROCEDURE — 73521 X-RAY EXAM HIPS BI 2 VIEWS: CPT

## 2020-08-27 PROCEDURE — 84443 ASSAY THYROID STIM HORMONE: CPT | Performed by: FAMILY MEDICINE

## 2020-08-28 LAB
ENA SS-A AB SER-ACNC: <0.2 AI (ref 0–0.9)
ENA SS-B AB SER-ACNC: <0.2 AI (ref 0–0.9)

## 2020-09-21 ENCOUNTER — IN-CLINIC DEVICE VISIT (OUTPATIENT)
Dept: CARDIOLOGY CLINIC | Facility: CLINIC | Age: 85
End: 2020-09-21
Payer: MEDICARE

## 2020-09-21 DIAGNOSIS — Z95.0 CARDIAC PACEMAKER IN SITU: Primary | ICD-10-CM

## 2020-09-21 PROCEDURE — 93280 PM DEVICE PROGR EVAL DUAL: CPT | Performed by: INTERNAL MEDICINE

## 2020-09-21 NOTE — PROGRESS NOTES
Results for orders placed or performed in visit on 09/21/20   Cardiac EP device report    Narrative    MDT-DUAL CHAMBER PPM/ ACTIVE SYSTEM IS MRI CONDITIONAL  DEVICE INTERROGATED IN THE [facility] OFFICE: BATTERY VOLTAGE ADEQUATE (8 7 YRS)  AP 96 8%  98 1% (>40%/AAIR-DDDR 70)  ALL LEAD PARAMETERS WITHIN NORMAL LIMITS  ALL OTHER TESTING WITHIN NORMAL LIMITS  NO SIGNIFICANT HIGH RATE EPISODES  NO PROGRAMMING CHANGES MADE TO DEVICE PARAMETERS  PACEMAKER FUNCTIONING APPROPRIATELY        EB

## 2020-11-10 DIAGNOSIS — S06.5X9A SDH (SUBDURAL HEMATOMA) (HCC): ICD-10-CM

## 2020-11-10 RX ORDER — AMLODIPINE BESYLATE 5 MG/1
5 TABLET ORAL DAILY
Qty: 90 TABLET | Refills: 3 | Status: SHIPPED | OUTPATIENT
Start: 2020-11-10 | End: 2021-11-10

## 2021-01-05 ENCOUNTER — REMOTE DEVICE CLINIC VISIT (OUTPATIENT)
Dept: CARDIOLOGY CLINIC | Facility: CLINIC | Age: 86
End: 2021-01-05
Payer: MEDICARE

## 2021-01-05 DIAGNOSIS — Z95.0 PRESENCE OF PERMANENT CARDIAC PACEMAKER: Primary | ICD-10-CM

## 2021-01-05 PROCEDURE — 93294 REM INTERROG EVL PM/LDLS PM: CPT | Performed by: INTERNAL MEDICINE

## 2021-01-05 PROCEDURE — 93296 REM INTERROG EVL PM/IDS: CPT | Performed by: INTERNAL MEDICINE

## 2021-01-05 NOTE — PROGRESS NOTES
Results for orders placed or performed in visit on 01/05/21   Cardiac EP device report    Narrative    MDT-DUAL CHAMBER PPM/ ACTIVE SYSTEM IS MRI CONDITIONAL  CARELINK TRANSMISSION:  BATTERY VOLTAGE ADEQUATE (8 5 YR)  AP 98%  99%  ALL LEAD PARAMETERS WITHIN NORMAL LIMITS  NO SIGNIFICANT HIGH RATE EPISODES   NORMAL DEVICE FUNCTION  --LEONARD

## 2021-01-14 ENCOUNTER — TRANSCRIBE ORDERS (OUTPATIENT)
Dept: ADMINISTRATIVE | Facility: HOSPITAL | Age: 86
End: 2021-01-14

## 2021-01-14 ENCOUNTER — LAB (OUTPATIENT)
Dept: LAB | Facility: MEDICAL CENTER | Age: 86
End: 2021-01-14
Payer: MEDICARE

## 2021-01-14 DIAGNOSIS — D50.9 IRON DEFICIENCY ANEMIA, UNSPECIFIED IRON DEFICIENCY ANEMIA TYPE: ICD-10-CM

## 2021-01-14 DIAGNOSIS — D50.9 IRON DEFICIENCY ANEMIA, UNSPECIFIED IRON DEFICIENCY ANEMIA TYPE: Primary | ICD-10-CM

## 2021-01-14 LAB
BASOPHILS # BLD AUTO: 0.04 THOUSANDS/ΜL (ref 0–0.1)
BASOPHILS NFR BLD AUTO: 1 % (ref 0–1)
EOSINOPHIL # BLD AUTO: 0.12 THOUSAND/ΜL (ref 0–0.61)
EOSINOPHIL NFR BLD AUTO: 2 % (ref 0–6)
ERYTHROCYTE [DISTWIDTH] IN BLOOD BY AUTOMATED COUNT: 18.2 % (ref 11.6–15.1)
HCT VFR BLD AUTO: 37.5 % (ref 36.5–49.3)
HGB BLD-MCNC: 11.3 G/DL (ref 12–17)
IMM GRANULOCYTES # BLD AUTO: 0.04 THOUSAND/UL (ref 0–0.2)
IMM GRANULOCYTES NFR BLD AUTO: 1 % (ref 0–2)
IRON SERPL-MCNC: 81 UG/DL (ref 65–175)
LYMPHOCYTES # BLD AUTO: 1.08 THOUSANDS/ΜL (ref 0.6–4.47)
LYMPHOCYTES NFR BLD AUTO: 22 % (ref 14–44)
MCH RBC QN AUTO: 20 PG (ref 26.8–34.3)
MCHC RBC AUTO-ENTMCNC: 30.1 G/DL (ref 31.4–37.4)
MCV RBC AUTO: 66 FL (ref 82–98)
MONOCYTES # BLD AUTO: 0.55 THOUSAND/ΜL (ref 0.17–1.22)
MONOCYTES NFR BLD AUTO: 11 % (ref 4–12)
NEUTROPHILS # BLD AUTO: 3.08 THOUSANDS/ΜL (ref 1.85–7.62)
NEUTS SEG NFR BLD AUTO: 63 % (ref 43–75)
NRBC BLD AUTO-RTO: 0 /100 WBCS
PLATELET # BLD AUTO: 183 THOUSANDS/UL (ref 149–390)
PMV BLD AUTO: 9.6 FL (ref 8.9–12.7)
RBC # BLD AUTO: 5.66 MILLION/UL (ref 3.88–5.62)
WBC # BLD AUTO: 4.91 THOUSAND/UL (ref 4.31–10.16)

## 2021-01-14 PROCEDURE — 83540 ASSAY OF IRON: CPT

## 2021-01-14 PROCEDURE — 85025 COMPLETE CBC W/AUTO DIFF WBC: CPT

## 2021-01-14 PROCEDURE — 36415 COLL VENOUS BLD VENIPUNCTURE: CPT

## 2021-04-06 ENCOUNTER — REMOTE DEVICE CLINIC VISIT (OUTPATIENT)
Dept: CARDIOLOGY CLINIC | Facility: CLINIC | Age: 86
End: 2021-04-06
Payer: MEDICARE

## 2021-04-06 DIAGNOSIS — Z95.0 PRESENCE OF PERMANENT CARDIAC PACEMAKER: Primary | ICD-10-CM

## 2021-04-06 PROCEDURE — 93294 REM INTERROG EVL PM/LDLS PM: CPT | Performed by: INTERNAL MEDICINE

## 2021-04-06 PROCEDURE — 93296 REM INTERROG EVL PM/IDS: CPT | Performed by: INTERNAL MEDICINE

## 2021-04-06 NOTE — PROGRESS NOTES
MDT-DUAL CHAMBER PPM/ ACTIVE SYSTEM IS MRI CONDITIONAL   CARELINK TRANSMISSION:  BATTERY VOLTAGE ADEQUATE (8 2 YR)    AP 98 2%  99 6% (>40%/DDDR 70 PPM, 200-250MS, MVP ON)   ALL AVAILABLE LEAD PARAMETERS WITHIN NORMAL LIMITS   NO HIGH RATE EPISODES   NORMAL DEVICE FUNCTION   RG

## 2021-06-01 ENCOUNTER — TELEPHONE (OUTPATIENT)
Dept: CARDIOLOGY CLINIC | Facility: CLINIC | Age: 86
End: 2021-06-01

## 2021-06-01 NOTE — TELEPHONE ENCOUNTER
P/C from pt-says he is still active and has been able to work around the house without difficulty  Of late, pt has been waking up in the AM, develops pain in the left side of the chest that last until about Noon  He denies any shortness of breath, numbness, tingling, palpitations  Reviewed with Dr Avtar Melvin; please have pt set up with a general cardiologist, preferably Dr Berenice Patel, at the [facility] office  I called the pt to advise him that someone from here would be calling him to schedule an OV with general cardiology

## 2021-07-06 ENCOUNTER — REMOTE DEVICE CLINIC VISIT (OUTPATIENT)
Dept: CARDIOLOGY CLINIC | Facility: CLINIC | Age: 86
End: 2021-07-06
Payer: MEDICARE

## 2021-07-06 DIAGNOSIS — Z95.0 PRESENCE OF CARDIAC PACEMAKER: Primary | ICD-10-CM

## 2021-07-06 PROCEDURE — 93296 REM INTERROG EVL PM/IDS: CPT | Performed by: INTERNAL MEDICINE

## 2021-07-06 PROCEDURE — 93294 REM INTERROG EVL PM/LDLS PM: CPT | Performed by: INTERNAL MEDICINE

## 2021-07-06 NOTE — PROGRESS NOTES
Results for orders placed or performed in visit on 07/06/21   Cardiac EP device report    Narrative    MDT-DUAL CHAMBER PPM/ ACTIVE SYSTEM IS MRI CONDITIONAL  CARELINK TRANSMISSION: BATTERY VOLTAGE ADEQUATE (8 YRS)  AP: 99 8%  : 98 2% (> 40 %~MVP-ON/70)  ALL AVAILABLE LEAD PARAMETERS WITHIN NORMAL LIMITS  NO SIGNIFICANT HIGH RATE EPISODES  PACEMAKER FUNCTIONING APPROPRIATELY    [address] [place] [address]

## 2021-09-16 ENCOUNTER — APPOINTMENT (OUTPATIENT)
Dept: LAB | Facility: MEDICAL CENTER | Age: 86
End: 2021-09-16
Payer: MEDICARE

## 2021-10-09 ENCOUNTER — TELEPHONE (OUTPATIENT)
Dept: OTHER | Facility: OTHER | Age: 86
End: 2021-10-09

## 2021-11-10 DIAGNOSIS — S06.5X9A SDH (SUBDURAL HEMATOMA) (HCC): ICD-10-CM

## 2021-11-10 RX ORDER — AMLODIPINE BESYLATE 5 MG/1
TABLET ORAL
Qty: 90 TABLET | Refills: 3 | Status: SHIPPED | OUTPATIENT
Start: 2021-11-10 | End: 2022-07-07 | Stop reason: ALTCHOICE

## 2021-11-12 ENCOUNTER — IN-CLINIC DEVICE VISIT (OUTPATIENT)
Dept: CARDIOLOGY CLINIC | Facility: CLINIC | Age: 86
End: 2021-11-12
Payer: MEDICARE

## 2021-11-12 DIAGNOSIS — Z95.0 CARDIAC PACEMAKER IN SITU: Primary | ICD-10-CM

## 2021-11-12 PROCEDURE — 93280 PM DEVICE PROGR EVAL DUAL: CPT | Performed by: INTERNAL MEDICINE

## 2022-02-16 ENCOUNTER — REMOTE DEVICE CLINIC VISIT (OUTPATIENT)
Dept: CARDIOLOGY CLINIC | Facility: CLINIC | Age: 87
End: 2022-02-16
Payer: MEDICARE

## 2022-02-16 DIAGNOSIS — Z95.0 PRESENCE OF CARDIAC PACEMAKER: Primary | ICD-10-CM

## 2022-02-16 PROCEDURE — 93294 REM INTERROG EVL PM/LDLS PM: CPT | Performed by: INTERNAL MEDICINE

## 2022-02-16 PROCEDURE — 93296 REM INTERROG EVL PM/IDS: CPT | Performed by: INTERNAL MEDICINE

## 2022-02-16 NOTE — PROGRESS NOTES
Results for orders placed or performed in visit on 02/16/22   Cardiac EP device report    Narrative    MDT-DUAL CHAMBER PPM/ ACTIVE SYSTEM IS MRI CONDITIONAL  CARELINK TRANSMISSION: BATTERY VOLTAGE ADEQUATE (7 3 YRS)  AP: 97 25  : 99 9% (>40%~MVP-ON/70)  ALL AVAILABLE LEAD PARAMETERS WITHIN NORMAL LIMITS  1 TREATED AT/AF EPISODE W/ rATP (0:1~0%)  PT DOES NOT TAKE AC DUE TO SUBDURAL BLEED  PT TAKES AMIODARONE, METOPROLOL SUCC  EF: 55% (ECHO 4/15/18)  PACEMAKER FUNCTIONING APPROPRIATELY    [address] [place] [address]

## 2022-04-20 ENCOUNTER — APPOINTMENT (EMERGENCY)
Dept: RADIOLOGY | Facility: HOSPITAL | Age: 87
DRG: 155 | End: 2022-04-20
Payer: MEDICARE

## 2022-04-20 ENCOUNTER — APPOINTMENT (EMERGENCY)
Dept: CT IMAGING | Facility: HOSPITAL | Age: 87
DRG: 155 | End: 2022-04-20
Payer: MEDICARE

## 2022-04-20 ENCOUNTER — HOSPITAL ENCOUNTER (INPATIENT)
Facility: HOSPITAL | Age: 87
LOS: 2 days | Discharge: HOME | DRG: 155 | End: 2022-04-22
Attending: EMERGENCY MEDICINE | Admitting: SURGERY
Payer: MEDICARE

## 2022-04-20 ENCOUNTER — APPOINTMENT (INPATIENT)
Dept: MRI IMAGING | Facility: HOSPITAL | Age: 87
DRG: 155 | End: 2022-04-20
Payer: MEDICARE

## 2022-04-20 ENCOUNTER — APPOINTMENT (INPATIENT)
Dept: NON INVASIVE DIAGNOSTICS | Facility: HOSPITAL | Age: 87
DRG: 155 | End: 2022-04-20
Payer: MEDICARE

## 2022-04-20 DIAGNOSIS — R20.2 PARESTHESIA OF HAND, BILATERAL: ICD-10-CM

## 2022-04-20 DIAGNOSIS — R04.0 EPISTAXIS DUE TO TRAUMA: ICD-10-CM

## 2022-04-20 DIAGNOSIS — R55 RECURRENT SYNCOPE: ICD-10-CM

## 2022-04-20 DIAGNOSIS — S06.5X9A SUBDURAL HEMATOMA (HCC): ICD-10-CM

## 2022-04-20 DIAGNOSIS — S02.2XXA NASAL FRACTURE: Primary | ICD-10-CM

## 2022-04-20 LAB
2HR DELTA HS TROPONIN: 2 NG/L
ABO GROUP BLD: NORMAL
ABO GROUP BLD: NORMAL
ALBUMIN SERPL BCP-MCNC: 3.5 G/DL (ref 3.5–5)
ALP SERPL-CCNC: 119 U/L (ref 46–116)
ALT SERPL W P-5'-P-CCNC: 22 U/L (ref 12–78)
ANION GAP SERPL CALCULATED.3IONS-SCNC: 9 MMOL/L (ref 4–13)
AORTIC ROOT: 4.2 CM
APICAL FOUR CHAMBER EJECTION FRACTION: 53 %
APTT PPP: 31 SECONDS (ref 23–37)
ASCENDING AORTA: 3.8 CM (ref 1.97–2.95)
AST SERPL W P-5'-P-CCNC: 18 U/L (ref 5–45)
ATRIAL RATE: 227 BPM
AV LVOT PEAK GRADIENT: 1 MMHG
AV PEAK GRADIENT: 10 MMHG
AV REGURGITATION PRESSURE HALF TIME: 379 MS
BASOPHILS # BLD AUTO: 0.03 THOUSANDS/ΜL (ref 0–0.1)
BASOPHILS NFR BLD AUTO: 1 % (ref 0–1)
BILIRUB SERPL-MCNC: 0.42 MG/DL (ref 0.2–1)
BILIRUB UR QL STRIP: NEGATIVE
BLD GP AB SCN SERPL QL: NEGATIVE
BUN SERPL-MCNC: 27 MG/DL (ref 5–25)
CALCIUM SERPL-MCNC: 8.5 MG/DL (ref 8.3–10.1)
CARDIAC TROPONIN I PNL SERPL HS: 7 NG/L
CARDIAC TROPONIN I PNL SERPL HS: 9 NG/L
CHLORIDE SERPL-SCNC: 108 MMOL/L (ref 100–108)
CLARITY UR: CLEAR
CO2 SERPL-SCNC: 24 MMOL/L (ref 21–32)
COLOR UR: YELLOW
CREAT SERPL-MCNC: 1.19 MG/DL (ref 0.6–1.3)
DOP CALC RVOT PEAK VEL: 0.61 M/S
E WAVE DECELERATION TIME: 214 MS
EOSINOPHIL # BLD AUTO: 0.06 THOUSAND/ΜL (ref 0–0.61)
EOSINOPHIL NFR BLD AUTO: 1 % (ref 0–6)
ERYTHROCYTE [DISTWIDTH] IN BLOOD BY AUTOMATED COUNT: 18.6 % (ref 11.6–15.1)
FRACTIONAL SHORTENING: 32 % (ref 28–44)
GFR SERPL CREATININE-BSD FRML MDRD: 55 ML/MIN/1.73SQ M
GLUCOSE SERPL-MCNC: 129 MG/DL (ref 65–140)
GLUCOSE UR STRIP-MCNC: NEGATIVE MG/DL
HCT VFR BLD AUTO: 35.7 % (ref 36.5–49.3)
HGB BLD-MCNC: 11.2 G/DL (ref 12–17)
HGB UR QL STRIP.AUTO: NEGATIVE
IMM GRANULOCYTES # BLD AUTO: 0.05 THOUSAND/UL (ref 0–0.2)
IMM GRANULOCYTES NFR BLD AUTO: 1 % (ref 0–2)
INR PPP: 0.99 (ref 0.84–1.19)
INTERVENTRICULAR SEPTUM IN DIASTOLE (PARASTERNAL SHORT AXIS VIEW): 1 CM
INTERVENTRICULAR SEPTUM: 1 CM (ref 0.52–0.98)
KETONES UR STRIP-MCNC: NEGATIVE MG/DL
LACTATE SERPL-SCNC: 0.9 MMOL/L (ref 0.5–2)
LEFT ATRIUM AREA SYSTOLE SINGLE PLANE A4C: 21.4 CM2
LEFT ATRIUM SIZE: 5 CM
LEFT INTERNAL DIMENSION IN SYSTOLE: 3.2 CM (ref 2.65–4.01)
LEFT VENTRICULAR INTERNAL DIMENSION IN DIASTOLE: 4.7 CM (ref 4.34–6.46)
LEFT VENTRICULAR POSTERIOR WALL IN END DIASTOLE: 0.9 CM (ref 0.51–0.96)
LEFT VENTRICULAR STROKE VOLUME: 61 ML
LEUKOCYTE ESTERASE UR QL STRIP: NEGATIVE
LVSV (TEICH): 61 ML
LYMPHOCYTES # BLD AUTO: 0.65 THOUSANDS/ΜL (ref 0.6–4.47)
LYMPHOCYTES NFR BLD AUTO: 12 % (ref 14–44)
MAGNESIUM SERPL-MCNC: 2.4 MG/DL (ref 1.6–2.6)
MCH RBC QN AUTO: 20.2 PG (ref 26.8–34.3)
MCHC RBC AUTO-ENTMCNC: 31.4 G/DL (ref 31.4–37.4)
MCV RBC AUTO: 64 FL (ref 82–98)
MONOCYTES # BLD AUTO: 0.43 THOUSAND/ΜL (ref 0.17–1.22)
MONOCYTES NFR BLD AUTO: 8 % (ref 4–12)
MV E'TISSUE VEL-SEP: 9 CM/S
MV PEAK A VEL: 0.31 M/S
MV PEAK E VEL: 134 CM/S
MV STENOSIS PRESSURE HALF TIME: 62 MS
MV VALVE AREA P 1/2 METHOD: 3.55 CM2
NEUTROPHILS # BLD AUTO: 4.03 THOUSANDS/ΜL (ref 1.85–7.62)
NEUTS SEG NFR BLD AUTO: 77 % (ref 43–75)
NITRITE UR QL STRIP: NEGATIVE
NRBC BLD AUTO-RTO: 0 /100 WBCS
NT-PROBNP SERPL-MCNC: 818 PG/ML
P AXIS: -75 DEGREES
PH UR STRIP.AUTO: 6 [PH]
PLATELET # BLD AUTO: 209 THOUSANDS/UL (ref 149–390)
PMV BLD AUTO: 8.6 FL (ref 8.9–12.7)
POTASSIUM SERPL-SCNC: 4.6 MMOL/L (ref 3.5–5.3)
PROT SERPL-MCNC: 6.9 G/DL (ref 6.4–8.2)
PROT UR STRIP-MCNC: NEGATIVE MG/DL
PROTHROMBIN TIME: 13.1 SECONDS (ref 11.6–14.5)
PV PEAK GRADIENT: 4 MMHG
QRS AXIS: -61 DEGREES
QRSD INTERVAL: 176 MS
QT INTERVAL: 472 MS
QTC INTERVAL: 509 MS
RA PRESSURE ESTIMATED: 5 MMHG
RBC # BLD AUTO: 5.55 MILLION/UL (ref 3.88–5.62)
RH BLD: POSITIVE
RH BLD: POSITIVE
RIGHT ATRIUM AREA SYSTOLE A4C: 18.9 CM2
RIGHT VENTRICLE ID DIMENSION: 4.8 CM
RV PSP: 43 MMHG
SL CV AV DECELERATION TIME RETROGRADE: 1308 MS
SL CV AV PEAK GRADIENT RETROGRADE: 72 MMHG
SL CV LV EF: 60
SL CV PED ECHO LEFT VENTRICLE DIASTOLIC VOLUME (MOD BIPLANE) 2D: 103 ML
SL CV PED ECHO LEFT VENTRICLE SYSTOLIC VOLUME (MOD BIPLANE) 2D: 42 ML
SL CV RVOT MAX GRADIENT: 2 MMHG
SODIUM SERPL-SCNC: 141 MMOL/L (ref 136–145)
SP GR UR STRIP.AUTO: 1.02 (ref 1–1.03)
SPECIMEN EXPIRATION DATE: NORMAL
T WAVE AXIS: 106 DEGREES
TR MAX PG: 38 MMHG
TR PEAK VELOCITY: 3.1 M/S
TRICUSPID VALVE PEAK REGURGITATION VELOCITY: 3.07 M/S
UROBILINOGEN UR QL STRIP.AUTO: 0.2 E.U./DL
VENTRICULAR RATE: 70 BPM
WBC # BLD AUTO: 5.25 THOUSAND/UL (ref 4.31–10.16)
Z-SCORE OF ASCENDING AORTA: 5.41
Z-SCORE OF INTERVENTRICULAR SEPTUM IN END DIASTOLE: 2.17
Z-SCORE OF LEFT VENTRICULAR DIMENSION IN END DIASTOLE: -1.18
Z-SCORE OF LEFT VENTRICULAR DIMENSION IN END SYSTOLE: -0.15
Z-SCORE OF LEFT VENTRICULAR POSTERIOR WALL IN END DIASTOLE: 1.42

## 2022-04-20 PROCEDURE — 83880 ASSAY OF NATRIURETIC PEPTIDE: CPT | Performed by: EMERGENCY MEDICINE

## 2022-04-20 PROCEDURE — 90471 IMMUNIZATION ADMIN: CPT

## 2022-04-20 PROCEDURE — G1004 CDSM NDSC: HCPCS

## 2022-04-20 PROCEDURE — 71045 X-RAY EXAM CHEST 1 VIEW: CPT

## 2022-04-20 PROCEDURE — 73110 X-RAY EXAM OF WRIST: CPT

## 2022-04-20 PROCEDURE — 93005 ELECTROCARDIOGRAM TRACING: CPT

## 2022-04-20 PROCEDURE — NC001 PR NO CHARGE: Performed by: SURGERY

## 2022-04-20 PROCEDURE — 85025 COMPLETE CBC W/AUTO DIFF WBC: CPT | Performed by: EMERGENCY MEDICINE

## 2022-04-20 PROCEDURE — 81404 MOPATH PROCEDURE LEVEL 5: CPT | Performed by: INTERNAL MEDICINE

## 2022-04-20 PROCEDURE — 85730 THROMBOPLASTIN TIME PARTIAL: CPT | Performed by: EMERGENCY MEDICINE

## 2022-04-20 PROCEDURE — 90715 TDAP VACCINE 7 YRS/> IM: CPT | Performed by: PHYSICIAN ASSISTANT

## 2022-04-20 PROCEDURE — 93010 ELECTROCARDIOGRAM REPORT: CPT | Performed by: INTERNAL MEDICINE

## 2022-04-20 PROCEDURE — 72125 CT NECK SPINE W/O DYE: CPT

## 2022-04-20 PROCEDURE — 70486 CT MAXILLOFACIAL W/O DYE: CPT

## 2022-04-20 PROCEDURE — 81003 URINALYSIS AUTO W/O SCOPE: CPT | Performed by: EMERGENCY MEDICINE

## 2022-04-20 PROCEDURE — 86900 BLOOD TYPING SEROLOGIC ABO: CPT | Performed by: EMERGENCY MEDICINE

## 2022-04-20 PROCEDURE — 70450 CT HEAD/BRAIN W/O DYE: CPT

## 2022-04-20 PROCEDURE — 86850 RBC ANTIBODY SCREEN: CPT | Performed by: EMERGENCY MEDICINE

## 2022-04-20 PROCEDURE — 85610 PROTHROMBIN TIME: CPT | Performed by: EMERGENCY MEDICINE

## 2022-04-20 PROCEDURE — 84484 ASSAY OF TROPONIN QUANT: CPT | Performed by: EMERGENCY MEDICINE

## 2022-04-20 PROCEDURE — NC001 PR NO CHARGE: Performed by: NEUROLOGICAL SURGERY

## 2022-04-20 PROCEDURE — 73030 X-RAY EXAM OF SHOULDER: CPT

## 2022-04-20 PROCEDURE — 99223 1ST HOSP IP/OBS HIGH 75: CPT | Performed by: SURGERY

## 2022-04-20 PROCEDURE — 99285 EMERGENCY DEPT VISIT HI MDM: CPT

## 2022-04-20 PROCEDURE — 99223 1ST HOSP IP/OBS HIGH 75: CPT | Performed by: NEUROLOGICAL SURGERY

## 2022-04-20 PROCEDURE — 93306 TTE W/DOPPLER COMPLETE: CPT | Performed by: INTERNAL MEDICINE

## 2022-04-20 PROCEDURE — 80053 COMPREHEN METABOLIC PANEL: CPT | Performed by: EMERGENCY MEDICINE

## 2022-04-20 PROCEDURE — 73090 X-RAY EXAM OF FOREARM: CPT

## 2022-04-20 PROCEDURE — 83735 ASSAY OF MAGNESIUM: CPT | Performed by: EMERGENCY MEDICINE

## 2022-04-20 PROCEDURE — 83605 ASSAY OF LACTIC ACID: CPT | Performed by: EMERGENCY MEDICINE

## 2022-04-20 PROCEDURE — 99285 EMERGENCY DEPT VISIT HI MDM: CPT | Performed by: EMERGENCY MEDICINE

## 2022-04-20 PROCEDURE — 72141 MRI NECK SPINE W/O DYE: CPT

## 2022-04-20 PROCEDURE — 93306 TTE W/DOPPLER COMPLETE: CPT

## 2022-04-20 PROCEDURE — 99222 1ST HOSP IP/OBS MODERATE 55: CPT | Performed by: INTERNAL MEDICINE

## 2022-04-20 PROCEDURE — 97760 ORTHOTIC MGMT&TRAING 1ST ENC: CPT

## 2022-04-20 PROCEDURE — 36415 COLL VENOUS BLD VENIPUNCTURE: CPT | Performed by: EMERGENCY MEDICINE

## 2022-04-20 PROCEDURE — 86901 BLOOD TYPING SEROLOGIC RH(D): CPT | Performed by: EMERGENCY MEDICINE

## 2022-04-20 RX ORDER — GABAPENTIN 100 MG/1
100 CAPSULE ORAL 3 TIMES DAILY
Status: DISCONTINUED | OUTPATIENT
Start: 2022-04-20 | End: 2022-04-22 | Stop reason: HOSPADM

## 2022-04-20 RX ORDER — AMIODARONE HYDROCHLORIDE 200 MG/1
200 TABLET ORAL DAILY
Status: DISCONTINUED | OUTPATIENT
Start: 2022-04-20 | End: 2022-04-22 | Stop reason: HOSPADM

## 2022-04-20 RX ORDER — ACETAMINOPHEN 325 MG/1
975 TABLET ORAL EVERY 8 HOURS
Status: DISCONTINUED | OUTPATIENT
Start: 2022-04-20 | End: 2022-04-22 | Stop reason: HOSPADM

## 2022-04-20 RX ORDER — OXYCODONE HYDROCHLORIDE 5 MG/1
5 TABLET ORAL EVERY 4 HOURS PRN
Status: DISCONTINUED | OUTPATIENT
Start: 2022-04-20 | End: 2022-04-22 | Stop reason: HOSPADM

## 2022-04-20 RX ORDER — ONDANSETRON 2 MG/ML
4 INJECTION INTRAMUSCULAR; INTRAVENOUS EVERY 6 HOURS PRN
Status: DISCONTINUED | OUTPATIENT
Start: 2022-04-20 | End: 2022-04-22 | Stop reason: HOSPADM

## 2022-04-20 RX ORDER — OXYCODONE HYDROCHLORIDE 5 MG/1
2.5 TABLET ORAL EVERY 4 HOURS PRN
Status: DISCONTINUED | OUTPATIENT
Start: 2022-04-20 | End: 2022-04-22 | Stop reason: HOSPADM

## 2022-04-20 RX ORDER — METOPROLOL SUCCINATE 25 MG/1
25 TABLET, EXTENDED RELEASE ORAL DAILY
Status: DISCONTINUED | OUTPATIENT
Start: 2022-04-20 | End: 2022-04-21

## 2022-04-20 RX ADMIN — TETANUS TOXOID, REDUCED DIPHTHERIA TOXOID AND ACELLULAR PERTUSSIS VACCINE, ADSORBED 0.5 ML: 5; 2.5; 8; 8; 2.5 SUSPENSION INTRAMUSCULAR at 15:35

## 2022-04-20 RX ADMIN — ACETAMINOPHEN 975 MG: 325 TABLET ORAL at 20:35

## 2022-04-20 RX ADMIN — ACETAMINOPHEN 975 MG: 325 TABLET ORAL at 15:31

## 2022-04-20 RX ADMIN — GABAPENTIN 100 MG: 100 CAPSULE ORAL at 20:36

## 2022-04-20 RX ADMIN — GABAPENTIN 100 MG: 100 CAPSULE ORAL at 17:46

## 2022-04-20 NOTE — ASSESSMENT & PLAN NOTE
· Hold home metoprolol and amlodipine  · Will trial volume resuscitation for MAP>85  · Can consider phenylephrine if needed

## 2022-04-20 NOTE — ED PROVIDER NOTES
History  Chief Complaint   Patient presents with    Fall     pt BIBEMS from home w co syncopal episode and fall  -blood thinners, +head strike  states he has had increased dizziness in the last two weeks  states he ate something that caused diarrhea about two weeks ago and this is when dizziness started  This is an 31-year-old male commit to ED for evaluation of syncopal episode this morning and fall forward onto his face, sustaining a nasal injury with epistaxis prior to arrival   He did lose consciousness  He states this happened when he changes position about 4 times over the past week  He denies palpitations  No chest pain or shortness of breath  He denies neck pain but complains of paresthesias to bilateral hands  History provided by:  Patient   used: No    Fall      Prior to Admission Medications   Prescriptions Last Dose Informant Patient Reported? Taking? amLODIPine (NORVASC) 5 mg tablet   No No   Sig: TAKE 1 TABLET BY MOUTH EVERY DAY   amiodarone 200 mg tablet   No No   Sig: Take 1 tablet (200 mg total) by mouth daily   metoprolol succinate (TOPROL-XL) 25 mg 24 hr tablet  Self No No   Sig: TAKE 1 TABLET BY MOUTH EVERY DAY      Facility-Administered Medications: None       Past Medical History:   Diagnosis Date    A-fib ([address] )     Hernia, abdominal     Hypertension     Subdural hematoma (HCC)        Past Surgical History:   Procedure Laterality Date    A-V CARDIAC PACEMAKER INSERTION      APPENDECTOMY      age 33    CARDIAC PACEMAKER PLACEMENT      CARPAL TUNNEL RELEASE      COLONOSCOPY      INGUINAL HERNIA REPAIR Left     ME XCAPSL CTRC RMVL INSJ IO LENS PROSTH W/O ECP Right 4/3/2017    Procedure: EXTRACTION EXTRACAPSULAR CATARACT PHACO INTRAOCULAR LENS (IOL);   Surgeon: Sanket Salomon MD;  [facility] MA[facility] OR;  Service: Ophthalmology    TONSILLECTOMY      age 12       Family History   Problem Relation Age of Onset    Cancer Mother         exp age 80    Heart disease Father     Heart disease Brother     Hypertension Brother      I have reviewed and agree with the history as documented  E-Cigarette/Vaping    E-Cigarette Use Never User      E-Cigarette/Vaping Substances     Social History     Tobacco Use    Smoking status: Never Smoker    Smokeless tobacco: Never Used   Vaping Use    Vaping Use: Never used   Substance Use Topics    Alcohol use: No    Drug use: Never       Review of Systems   Neurological: Positive for numbness  All other systems reviewed and are negative  Physical Exam  Physical Exam  Vitals and nursing note reviewed  Constitutional:       General: He is not in acute distress  Appearance: Normal appearance  He is normal weight  HENT:      Head: Normocephalic and atraumatic  Right Ear: External ear normal       Left Ear: External ear normal       Nose: Nose normal  No congestion or rhinorrhea  Comments: Nasal deformity, abrasion to right lateral nasal bridge  Dried blood to b/l anterior nares, no septal hematoma present  Mouth/Throat:      Mouth: Mucous membranes are moist       Pharynx: Oropharynx is clear  Eyes:      General: No scleral icterus  Right eye: No discharge  Left eye: No discharge  Extraocular Movements: Extraocular movements intact  Conjunctiva/sclera: Conjunctivae normal       Pupils: Pupils are equal, round, and reactive to light  Cardiovascular:      Rate and Rhythm: Normal rate and regular rhythm  Pulses: Normal pulses  Heart sounds: Normal heart sounds  Pulmonary:      Effort: Pulmonary effort is normal  No respiratory distress  Breath sounds: Normal breath sounds  Abdominal:      General: Abdomen is flat  Palpations: Abdomen is soft  Tenderness: There is no abdominal tenderness  Musculoskeletal:         General: No swelling  Normal range of motion  Cervical back: Normal range of motion and neck supple  No tenderness     Skin: General: Skin is warm and dry  Capillary Refill: Capillary refill takes less than 2 seconds  Neurological:      General: No focal deficit present  Mental Status: He is alert and oriented to person, place, and time  Mental status is at baseline  Comments: Bilateral reduced sensation to both hands  Intact strength to b/l upper and lower extremities     Psychiatric:         Mood and Affect: Mood normal          Behavior: Behavior normal          Vital Signs  ED Triage Vitals [04/20/22 0853]   Temperature Pulse Respirations Blood Pressure SpO2   97 9 °F (36 6 °C) 73 18 143/76 95 %      Temp Source Heart Rate Source Patient Position - Orthostatic VS BP Location FiO2 (%)   Oral Monitor Lying Right arm --      Pain Score       No Pain           Vitals:    04/20/22 1600 04/20/22 1620 04/20/22 1700 04/20/22 1800   BP: 142/72 153/71 (!) 143/112 139/67   Pulse: 69      Patient Position - Orthostatic VS:             Visual Acuity  Visual Acuity      Most Recent Value   L Pupil Size (mm) 3   R Pupil Size (mm) 3   L Pupil Shape Round   R Pupil Shape Round          ED Medications  Medications   amiodarone tablet 200 mg (has no administration in time range)   metoprolol succinate (TOPROL-XL) 24 hr tablet 25 mg (25 mg Oral Not Given 4/20/22 1523)   ondansetron (ZOFRAN) injection 4 mg (has no administration in time range)   acetaminophen (TYLENOL) tablet 975 mg (975 mg Oral Given 4/20/22 1531)   gabapentin (NEURONTIN) capsule 100 mg (100 mg Oral Given 4/20/22 1746)   oxyCODONE (ROXICODONE) IR tablet 2 5 mg (has no administration in time range)   oxyCODONE (ROXICODONE) IR tablet 5 mg (has no administration in time range)   tetanus-diphtheria-acellular pertussis (BOOSTRIX) IM injection 0 5 mL (0 5 mL Intramuscular Given 4/20/22 1535)       Diagnostic Studies  Results Reviewed     Procedure Component Value Units Date/Time    HS Troponin I 2hr [[id number]]  (Normal) Collected: 04/20/22 1143    Lab Status: Final result Specimen: Blood from Arm, Left Updated: 04/20/22 1231     hs TnI 2hr 9 ng/L      Delta 2hr hsTnI 2 ng/L     HS Troponin I 4hr [[id number]]     Lab Status: No result Specimen: Blood     Magnesium [[id number]]  (Normal) Collected: 04/20/22 0947    Lab Status: Final result Specimen: Blood from Arm, Left Updated: 04/20/22 1103     Magnesium 2 4 mg/dL     NT-BNP PRO [[id number]]  (Abnormal) Collected: 04/20/22 0947    Lab Status: Final result Specimen: Blood from Arm, Left Updated: 04/20/22 1103     NT-proBNP 818 pg/mL     Comprehensive metabolic panel [[id number]]  (Abnormal) Collected: 04/20/22 0947    Lab Status: Final result Specimen: Blood from Arm, Left Updated: 04/20/22 1056     Sodium 141 mmol/L      Potassium 4 6 mmol/L      Chloride 108 mmol/L      CO2 24 mmol/L      ANION GAP 9 mmol/L      BUN 27 mg/dL      Creatinine 1 19 mg/dL      Glucose 129 mg/dL      Calcium 8 5 mg/dL      AST 18 U/L      ALT 22 U/L      Alkaline Phosphatase 119 U/L      Total Protein 6 9 g/dL      Albumin 3 5 g/dL      Total Bilirubin 0 42 mg/dL      eGFR 55 ml/min/1 73sq m     Narrative:      [place] guidelines for Chronic Kidney Disease (CKD):     Stage 1 with normal or high GFR (GFR > 90 mL/min/1 73 square meters)    Stage 2 Mild CKD (GFR = 60-89 mL/min/1 73 square meters)    Stage 3A Moderate CKD (GFR = 45-59 mL/min/1 73 square meters)    Stage 3B Moderate CKD (GFR = 30-44 mL/min/1 73 square meters)    Stage 4 Severe CKD (GFR = 15-29 mL/min/1 73 square meters)    Stage 5 End Stage CKD (GFR <15 mL/min/1 73 square meters)  Note: GFR calculation is accurate only with a steady state creatinine    UA (URINE) with reflex to Scope [[id number]] Collected: 04/20/22 1020    Lab Status: Final result Specimen: Urine, Clean Catch Updated: 04/20/22 1029     Color, UA Yellow     Clarity, UA Clear     Specific Gravity, UA 1 025     pH, UA 6 0     Leukocytes, UA Negative     Nitrite, UA Negative     Protein, UA Negative mg/dl      Glucose, UA Negative mg/dl      Ketones, UA Negative mg/dl      Urobilinogen, UA 0 2 E U /dl      Bilirubin, UA Negative     Blood, UA Negative    HS Troponin 0hr (reflex protocol) [[id number]]  (Normal) Collected: 04/20/22 0946    Lab Status: Final result Specimen: Blood from Arm, Left Updated: 04/20/22 1021     hs TnI 0hr 7 ng/L     Lactic acid [[id number]]  (Normal) Collected: 04/20/22 0946    Lab Status: Final result Specimen: Blood from Arm, Left Updated: 04/20/22 1019     LACTIC ACID 0 9 mmol/L     Narrative:      Result may be elevated if tourniquet was used during collection  Protime-INR [[id number]]  (Normal) Collected: 04/20/22 0947    Lab Status: Final result Specimen: Blood from Arm, Left Updated: 04/20/22 1008     Protime 13 1 seconds      INR 0 99    APTT [[id number]]  (Normal) Collected: 04/20/22 0947    Lab Status: Final result Specimen: Blood from Arm, Left Updated: 04/20/22 1008     PTT 31 seconds     CBC and differential [[id number]]  (Abnormal) Collected: 04/20/22 0946    Lab Status: Final result Specimen: Blood from Arm, Left Updated: 04/20/22 0957     WBC 5 25 Thousand/uL      RBC 5 55 Million/uL      Hemoglobin 11 2 g/dL      Hematocrit 35 7 %      MCV 64 fL      MCH 20 2 pg      MCHC 31 4 g/dL      RDW 18 6 %      MPV 8 6 fL      Platelets 826 Thousands/uL      nRBC 0 /100 WBCs      Neutrophils Relative 77 %      Immat GRANS % 1 %      Lymphocytes Relative 12 %      Monocytes Relative 8 %      Eosinophils Relative 1 %      Basophils Relative 1 %      Neutrophils Absolute 4 03 Thousands/µL      Immature Grans Absolute 0 05 Thousand/uL      Lymphocytes Absolute 0 65 Thousands/µL      Monocytes Absolute 0 43 Thousand/µL      Eosinophils Absolute 0 06 Thousand/µL      Basophils Absolute 0 03 Thousands/µL                  MRI cervical spine wo contrast   Final Result by Effie Montenegro DO (04/20 1436)      Image quality is severely degraded by patient motion artifact    Determining cord signal abnormality is particularly limited secondary to motion artifact  Questionable mild prevertebral soft tissue swelling  There is spinal cord compression at the C5-6 level, and near spinal cord compression at the C6-7 level secondary to spondylotic degenerative disease  Bilateral foraminal narrowing is also noted at these levels  Moderate central and bilateral foraminal stenosis at C4-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  Consider repeat imaging with sedation to further delineate cord signal       The study was marked in EPIC for immediate notification  Workstation performed: [id number]         XR forearm 2 vw right   Final Result by Francisco Bradford MD (04/20 1336)      No acute osseous abnormality  Workstation performed: [id number]         XR shoulder 2+ views RIGHT   Final Result by Francisco Bradford MD (04/20 1325)   Degenerative changes      No acute osseous abnormality  Workstation performed: [id number]         XR wrist 3+ views RIGHT   Final Result by Francisco Bradford MD (04/20 1324)   Degenerative changes      No acute osseous abnormality  Workstation performed: [id number]         XR chest 1 view portable   Final Result by Anika Cardozo MD (04/20 1140)      No acute cardiopulmonary disease  Workstation performed: [id number]         CT head without contrast   Final Result by Segundo Zepeda MD (04/20 1046)      No acute intracranial abnormality  Mildly depressed nasal bone fracture  Workstation performed: [id number]         CT facial bones without contrast   Final Result by Segundo Zepeda MD (04/20 1046)      Mildly depressed nasal bone fracture  Workstation performed: [id number]         CT cervical spine without contrast   Final Result by Segundo Zepeda MD (04/20 1048)      No cervical spine fracture or traumatic malalignment  Workstation performed: [id number]                    Procedures  Procedures         ED Course  ED Course as of 04/20/22 1827   Wed Apr 20, 2022   1131 Pt has persistent b/l paresthesias to his arms  No fractures on CT c spine  He does have a depressed nasal bone fracture  No ICH on head CT  Recurrent syncope - no clear explanation,   D/w trauma, Dr Pérez Boles, will admit                                             MDM  Number of Diagnoses or Management Options  Epistaxis due to trauma: new and requires workup  Nasal fracture: new and requires workup  Paresthesia of hand, bilateral: new and requires workup  Recurrent syncope: new and requires workup  Diagnosis management comments: 56-year-old male with fall from standing, recurrent syncope, sustained nasal fracture, has bilateral paresthesias upper extremities  Admit to Trauma Service for further management, stat MRI to rule out spinal cord compression  Amount and/or Complexity of Data Reviewed  Clinical lab tests: ordered and reviewed  Tests in the radiology section of CPT®: ordered and reviewed    Risk of Complications, Morbidity, and/or Mortality  Presenting problems: high  Diagnostic procedures: high  Management options: high    Patient Progress  Patient progress: stable      Disposition  Final diagnoses:   Nasal fracture   Epistaxis due to trauma   Paresthesia of hand, bilateral   Recurrent syncope     Time reflects when diagnosis was documented in both MDM as applicable and the Disposition within this note     Time User Action Codes Description Comment    4/20/2022 11:38 AM Veronique Irizarry Add [S02  2XXA] Nasal fracture     4/20/2022 11:38 AM Latoya Zepeda Add [R04 0] Epistaxis     4/20/2022 11:38 AM Veronique Irizarry Remove [R04 0] Epistaxis     4/20/2022 11:38 AM Veronique Bledsoe Add [R04 0] Epistaxis due to trauma     4/20/2022 11:38 AM Veronique Bledsoe Add [R20 2] Paresthesia of hand, bilateral     4/20/2022 11:39 AM Veronique Irizarry Add [R55] Recurrent syncope       ED Disposition     ED Disposition Condition Date/Time Comment    Admit Stable Wed Apr 20, 2022 11:37 AM Case was discussed with Dr Robert Tolliver and the patient's admission status was agreed to be Admission Status: inpatient status to the service of Dr Robert Tolliver   Follow-up Information    None         Current Discharge Medication List      CONTINUE these medications which have NOT CHANGED    Details   amiodarone 200 mg tablet Take 1 tablet (200 mg total) by mouth daily  Qty: 30 tablet, Refills: 3    Associated Diagnoses: Paroxysmal atrial fibrillation (HCC)      amLODIPine (NORVASC) 5 mg tablet TAKE 1 TABLET BY MOUTH EVERY DAY  Qty: 90 tablet, Refills: 3    Associated Diagnoses: SDH (subdural hematoma) ([facility])      metoprolol succinate (TOPROL-XL) 25 mg 24 hr tablet TAKE 1 TABLET BY MOUTH EVERY DAY  Qty: 30 tablet, Refills: 5    Associated Diagnoses: Atrial fibrillation, unspecified type ([facility] )             No discharge procedures on file      PDMP Review       Value Time User    PDMP Reviewed  Yes 4/20/2022 12:19 PM Erinn Licona PA-C          ED Provider  Electronically Signed by           Mary Guevara DO  04/20/22 5169

## 2022-04-20 NOTE — CONSULTS
Oral and Maxillofacial Surgery Consult    Pt seen 04/20/22 7:32 PM    Assessment  81 y o  male who presents to ED s/p facial trauma sustaining fracture of b/l nasal bones  On exam, nares patent w/ swelling over nasal dorsum noted, no deviation or great defeciet  CT facial bones reveals mildly depressed nasal bones  Plan:  - No OMS intervention at this time, will monitor as outpatient and re-evaluate need for surgery  - Abx: As per primary  - Analgesia: As per primary   - Steroid taper: As per primary  - Reg diet  - Encourage good oral hygiene  - DVT ppx: As per primary   - HOB  - Yankaur suction at bedside  - Ice to face: 20min on, 20min off for 24-48 hours post trauma, then use heat  - Sinus precautions: no nose blowing, no heavy lifting, avoid pressure to the area, head of bed elevated, decongestants as needed   - Follow up at outpatient [facility] clinic -- Patient can call to schedule an appointment for 1 week after discharge or 2 weeks from today    - Location: [address] [place] [address]  [phone])    D/w OMFS attg on call    Consults     HPI: 81 y o  male w/ PMH afib, HTN, SDH  Pt presents to ED s/p syncopal episode involving facial trauma  Pt reports multiple episodes of syncope over the last week and he is now reporting positional dizziness  Denies further LOC 2/2 incident, changes to occlusion (edentulous w/out dentures), cervical spine tenderness  Reports congestion however unchanged from pre-trauma      PMH:   Past Medical History:   Diagnosis Date    A-fib ([facility] )     Hernia, abdominal     Hypertension     Subdural hematoma (HCC)         Allergies:   No Known Allergies    Meds:     Current Facility-Administered Medications:     acetaminophen (TYLENOL) tablet 975 mg, 975 mg, Oral, Q8H, Bari Ba PA-C, 975 mg at 04/20/22 1531    amiodarone tablet 200 mg, 200 mg, Oral, Daily, Bari Ba PA-C    gabapentin (NEURONTIN) capsule 100 mg, 100 mg, Oral, TID, Bari Ba PA-C, 100 mg at 04/20/22 1746    metoprolol succinate (TOPROL-XL) 24 hr tablet 25 mg, 25 mg, Oral, Daily, Bari Ba PA-C    ondansetron (ZOFRAN) injection 4 mg, 4 mg, Intravenous, Q6H PRN, Khris Perdomo PA-C    oxyCODONE (ROXICODONE) IR tablet 2 5 mg, 2 5 mg, Oral, Q4H PRN, Bari Ba PA-C    oxyCODONE (ROXICODONE) IR tablet 5 mg, 5 mg, Oral, Q4H PRN, Khris Perdomo PA-C    PSH:   Past Surgical History:   Procedure Laterality Date    A-V CARDIAC PACEMAKER INSERTION      APPENDECTOMY      age 33    CARDIAC PACEMAKER PLACEMENT      CARPAL TUNNEL RELEASE      COLONOSCOPY      INGUINAL HERNIA REPAIR Left     CT XCAPSL CTRC RMVL INSJ IO LENS PROSTH W/O ECP Right 4/3/2017    Procedure: EXTRACTION EXTRACAPSULAR CATARACT PHACO INTRAOCULAR LENS (IOL); Surgeon: Benny Griggs MD;  Location: [facility] MAIN OR;  Service: Ophthalmology    TONSILLECTOMY      age 12      Family History   Problem Relation Age of Onset    Cancer Mother         exp age 80    Heart disease Father     Heart disease Brother     Hypertension Brother         Review of Systems   Constitutional: Negative for chills and fever  HENT: Positive for facial swelling and nosebleeds  Negative for dental problem and trouble swallowing  Eyes: Negative for photophobia and visual disturbance  Respiratory: Negative for shortness of breath  Cardiovascular: Negative for chest pain  Gastrointestinal: Negative for nausea and vomiting  Neurological: Positive for dizziness, syncope, weakness and numbness  All other systems reviewed and are negative  Temp:  [97 8 °F (36 6 °C)-97 9 °F (36 6 °C)] 97 8 °F (36 6 °C)  HR:  [69-73] 69  Resp:  [17-24] 18  BP: (139-153)/() 149/66  SpO2:  [95 %-97 %] 97 %     No intake or output data in the 24 hours ending 04/20/22 1932     Physical Exam:  Gen: AAOx3  NAD  CVS: RRR  Resp: Unlabored on RA  Neuro: CNV and VII intact  HEENT:   Head: mild bilateral facial swelling/ecchymosis consistent with injury   No bony step-off palpated  Mild tenderness to palpation  No facial laceration/abrasion  Eye: EOM intact  PEERL  No subconjunctival hemorrhage  bilateral periorbital ecchymosis/edema  No exophthalmos, enophthalmos, chemosis  Visual acuity grossly intact  Nose:  No nasal dorsum deviation nor mobility  No septal hematoma  Scant dried blood in nares  Intraoral: JAYLA ~40 mm  Edentulous  No segmental mobility  No gingival laceration nor ecchymosis in vestibule/FOM  FOM soft, non-elevated, non-tender  OP clear  Lab Results: I have personally reviewed pertinent lab results  Imaging: I have personally reviewed pertinent reports  EKG, Pathology, and Other Studies: I have personally reviewed pertinent reports  Counseling / Coordination of Care  Total floor / unit time spent today 20 minutes  Greater than 50% of total time was spent with the patient and / or family counseling and / or coordination of care  A description of the counseling / coordination of care

## 2022-04-20 NOTE — PROGRESS NOTES
Neurosurgery brief update note    I reviewed the MRI c-spine wo contrast, which shows chronic degenerative changes at C4-6 with central and foraminal stenoses  Again, his neurologic exam has been improving in bilateral arms and hands since the fall this morning, and his current proximal strength in bilateral arms (deltoids and biceps, particularly) is near full-strength, limited by diffuse hyperalgesia  He denies recent history of gait ataxia or loss of fine motor coordination in his hands/fingers  I believe that he p/w central cord syndrome in the setting of this chronic degenerative disease and is now improving gradually  Because his exam is improving and there is currently no clinical evidence of myelopathy (no gait ataxia or loss of fine motor coordination or long tract signs on my exam), there is no urgent neurosurgical intervention required  After syncope/cardiac work-up, I would like to follow up with him and schedule spinal surgery in an elective manner (likely posterior C4-6 decompression and fusion)  I still recommend continuing conservative therapy with MAP goal > 85 for several days in the setting of acute, yet improving, central cord syndrome      Saundra Rocha MD  Neurosurgeon

## 2022-04-20 NOTE — ED NOTES
Pt transported to [facility] love Gonsalves RN  04/20/22 0224

## 2022-04-20 NOTE — CONSULTS
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  -neurologically he appears to be improving appears to be near his full strength according to evaluation  Conservative therapy advised maintaining in arterial pressure goal of 85 or greater  3 -history of atrial fibrillation in the past with pacemaker placement -patient is being followed by Cardiology  4 -closed fracture of nasal bone -pain management,    5 -chronic renal insufficiency stage IIIA  -continue to monitor renal status  6 -benign prostatic hypertrophy -patient had been taking Flomax as an outpatient patient has increased nocturia gets up approximately 4-5 times at nighttime prior to taking medication  7 -history of previous subdural    8 -anemia with microcytic indices -had been labeled as iron deficient suspect patient has underlying beta thalassemia minor based on his low MCVs and the fact that his iron levels had been normal     Recommendations    1 -gene testing for thalassemia    2 -patient currently being monitored in the intensive care unit    3  -orthostatic monitoring           History of Present Illness   Physician Requesting Consult: Fidelia Lauren, DO  Reason for Consult / Principal Problem:  Fall  Hx and PE limited by:  No limitations  Additional history obtained from:  Patient's son and daughters      HPI: Tico Mckeon is a 81y o  year old male who presents with a history of developing diarrhea a couple of weeks ago after eating a pie  He relates that his diarrhea was worse than when he preped for his colonoscopy  After that episode the patient began to have episodes of lightheadedness and dizziness when he changed positions  Today on the date of admission he had a recurrent episode on the way to the bathroom  When he awakened he had fallen on the floor hitting his face and his hands were underneath his chest   He did notice some numbness and tingling of both hands he had a previous history of carpal tunnel syndrome  Patient had discomfort in his hands as well as nasal pain  He noticed some weakness and pain on movement of his arms  Patient denied any chest pain or shortness of breath there was no evidence of any loss of bowel or bladder function  He was alert upon awakening  Patient's other comorbidities include a history of atrial fibrillation with a previous fall that resulted in subdural hematoma at which time anticoagulation was discontinued  Patient has a pacemaker, history of hypertension, BPH, osteoarthritis, history of carpal tunnel he had a carpal tunnel release on the left and steroid injections on the right  Patient has aged successfully he had been fully independent prior to current event  He lost his wife approximately 2 years ago, has a very supportive family  Patient's blood work was relevant for anemia with evidence of microcytic indices suspect underlying thalassemia  Patient's CMP was relevant for evidence of chronic renal insufficiency stage IIIA with a GFR of 55 in the past CS run GFR is in the 60s patient's creatinine was 1  19  MRI of his neck was relevant for spinal cord compression at the C5-C6 level and near spinal cord compression at the C6-C7 level secondary to spondylotic degenerative disease  There was evidence of moderate central and bilateral foraminal stenosis at C4-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    CT scan of the face revealed evidence of mildly depressed nasal bone fracture  Patient has had his vaccinations the only vaccine he does not take is the flu and he has not had the shingles vaccine  He has had his COVID-19 vaccines plus the booster  Review of Systems   Constitutional: Negative  HENT: Negative  Eyes: Negative  Respiratory: Negative  Cardiovascular: Negative  Gastrointestinal:        Patient with history of hemorrhoids   Endocrine: Negative  Genitourinary: Positive for frequency  Patient with increased nocturia gets up approximately 5 times at nighttime to urinate  Musculoskeletal: Positive for arthralgias  Skin: Negative  Neurological: Negative  Hematological: Negative  Psychiatric/Behavioral: Negative  Memory/Cognitive screening:  Patient has no cognitive issues  Mobility:  Patient was ambulating without any difficulty prior to accident  Falls:  Patient was having falls for approximately few weeks he felt lightheaded  Assistive Devices:  Does not utilize any assistive devices  Fraility:  No evidence frailty  Nutrition/weight loss/grocery shopping/meal preparation:  No weight loss  Vision impairment:  No evidence of visual impairment  Hearing impairment:  No evidence of any hearing impairment  Incontinence:  No history of urinary incontinence  Delirium:  No history of delirium  Polypharmacy:  Patient also takes Flomax 0 4 mg at bedtime  No current facility-administered medications on file prior to encounter  Current Outpatient Medications on File Prior to Encounter   Medication Sig Dispense Refill    amiodarone 200 mg tablet Take 1 tablet (200 mg total) by mouth daily 30 tablet 3    amLODIPine (NORVASC) 5 mg tablet TAKE 1 TABLET BY MOUTH EVERY DAY 90 tablet 3    metoprolol succinate (TOPROL-XL) 25 mg 24 hr tablet TAKE 1 TABLET BY MOUTH EVERY DAY 30 tablet 5     Patients primary residence: In his own home Lives with:  Lives by himself wife  2 years ago  Patient lives with his dog    iADL's:  Patient enjoys is independent activities of daily living  ADL's:  Patient enjoys all his basic activities of daily living    Historical Information   Past medical history:   Past Medical History:   Diagnosis Date    A-fib ([address] )     Hernia, abdominal     Hypertension     Subdural hematoma (HCC)      Past surgical history:   Past Surgical History:   Procedure Laterality Date    A-V CARDIAC PACEMAKER INSERTION      APPENDECTOMY      age 33    CARDIAC PACEMAKER PLACEMENT      CARPAL TUNNEL RELEASE      COLONOSCOPY      INGUINAL HERNIA REPAIR Left     CO XCAPSL CTRC RMVL INSJ IO LENS PROSTH W/O ECP Right 4/3/2017    Procedure: EXTRACTION EXTRACAPSULAR CATARACT PHACO INTRAOCULAR LENS (IOL); Surgeon: Sanket Salomon MD;  Location: [facility] MAIN OR;  Service: Ophthalmology    TONSILLECTOMY      age 12     Social history:  Patient is  has 2 daughters who live nearby and a son    Social History     Socioeconomic History    Marital status: /Civil Union     Spouse name: Not on file    Number of children: Not on file    Years of education: Not on file    Highest education level: Not on file   Occupational History    Not on file   Tobacco Use    Smoking status: Never Smoker    Smokeless tobacco: Never Used   Vaping Use    Vaping Use: Never used   Substance and Sexual Activity    Alcohol use: No    Drug use: Never    Sexual activity: Not on file   Other Topics Concern    Not on file   Social History Narrative    Not on file     Social Determinants of Health     Financial Resource Strain: Not on file   Food Insecurity: Not on file   Transportation Needs: Not on file   Physical Activity: Not on file   Stress: Not on file   Social Connections: Not on file   Intimate Partner Violence: Not on file   Housing Stability: Not on file     Family history:   Family History   Problem Relation Age of Onset    Cancer Mother         exp age 80    Heart disease Father     Heart disease Brother     Hypertension Brother        Meds/Allergies   All current active meds have been reviewed  No Known Allergies    Objective   Vitals:    04/20/22 0853   BP: 143/76   Pulse: 73   Resp: 18   Temp: 97 9 °F (36 6 °C)   SpO2: 95%     No intake or output data in the 24 hours ending 04/20/22 1502  Invasive Devices  Report    Peripheral Intravenous Line            Peripheral IV 04/20/22 Left Antecubital <1 day                Physical Exam  Vitals and nursing note reviewed  Constitutional:       Appearance: He is obese  HENT:      Head: Normocephalic and atraumatic  Right Ear: Ear canal and external ear normal       Left Ear: Ear canal and external ear normal       Nose: Nose normal       Mouth/Throat:      Mouth: Mucous membranes are dry  Eyes:      Conjunctiva/sclera: Conjunctivae normal       Pupils: Pupils are equal, round, and reactive to light  Neck:      Comments: Patient with a cervical brace  Cardiovascular:      Rate and Rhythm: Normal rate and regular rhythm  Pulses: Normal pulses  Heart sounds: Normal heart sounds  Pulmonary:      Effort: Pulmonary effort is normal       Breath sounds: Normal breath sounds  Abdominal:      General: Bowel sounds are normal  There is distension  Palpations: Abdomen is soft  Musculoskeletal:         General: Normal range of motion  Skin:     General: Skin is warm  Neurological:      Mental Status: He is alert and oriented to person, place, and time  Mental status is at baseline  Psychiatric:         Mood and Affect: Mood normal          Behavior: Behavior normal          Thought Content: Thought content normal          Judgment: Judgment normal          Lab Results:   I have personally reviewed pertinent lab and imaging results  VTE Prophylaxis:  Patient with sequential compression devices      Code Status: Level 1 - Full Code  Advance Directive and Living Will:      Power of :    POLST:      Family and Social Support:  Has excellent support from family    No data recorded

## 2022-04-20 NOTE — ASSESSMENT & PLAN NOTE
· Patient has pacemaker in place, interrogated this admission without malfunction  · Holding home metoprolol  · Will continue home amiodarone  · 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

## 2022-04-20 NOTE — NURSING NOTE
Device programmed remotely for MRI per Itz Petersen  Patient continuously monitored by Radiology RN  Patient tolerated well  Device reprogrammed to prior settings per MedItz Holder  During MRI patient's SpO2 dropped to 87% on RA  Patient placed on 2 L nasal cannula and recovered to 93%  Patient kept on 2 L for the remainder of the scan  VS remained stable throughout  No complaints per patient  SpO2 93% without O2 post scan  Vinay MAKENNA Clarke made aware

## 2022-04-20 NOTE — PROGRESS NOTES
[facility]  Transfer/Accept Note - Vashti Flores 1935, 81 y o  male MRN: [id number]  Unit/Bed#: ED 29 Encounter: [id number]  Primary Care Provider: Melania Gu DO   Date and time admitted to hospital: 4/20/2022  8:49 AM    Closed fracture of nasal bone  Assessment & Plan  · No anticipated OMFS intervention  · Pain control    Recurrent syncope  Assessment & Plan  · Awaiting ECHO  · Check orthostatics prior to ambulation  · Close electrolyte replacement  · PT/OT    Presence of permanent cardiac pacemaker  Assessment & Plan  · Follow on telemetry  · Pending ECHO    Atrial fibrillation ([address] )  Assessment & Plan  · Patient has pacemaker in place, interrogated this admission without malfunction  · Holding home metoprolol  · Will continue home amiodarone  · 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    Essential hypertension  Assessment & Plan  · Hold home metoprolol and amlodipine  · Will trial volume resuscitation for MAP>85  · Can consider phenylephrine if needed    * Paresthesias  Assessment & Plan  · MRI cervical spine degraded by motion  · Continue cervical collar  · MAP>85  · Appreciate neurosurgery recommendations      ----------------------------------------------------------------------------------------  HPI/24hr events:  Patient is an 60-year-old male presenting 4/20 after a syncopal event and fall on the face  He has a past medical history of atrial fibrillation not on anticoagulation secondary to history of subdural, permanent pacemaker, hypertension, and frequent falls  His traumatic injuries were notable for a nasal bone fracture and bilateral upper extremity paresthesias and weakness  His MRI of the cervical spine was degraded by motion artifact and neurosurgery recommending maintaining a map greater than 85 for 5 days  He will be transferred to the critical care unit for close hemodynamic monitoring      Patient appropriate for transfer out of the ICU today?: No  Disposition: Admit to Critical Care   Code Status: Level 1 - Full Code  ---------------------------------------------------------------------------------------  SUBJECTIVE  "I can't wait to get out of here"    Review of Systems   Constitutional: Positive for activity change (fall)  Negative for appetite change, fatigue and fever  HENT: Negative for trouble swallowing  Respiratory: Negative for shortness of breath  Cardiovascular: Negative for chest pain  Gastrointestinal: Positive for diarrhea  Negative for abdominal pain  Genitourinary: Negative for difficulty urinating  Musculoskeletal: Positive for arthralgias, gait problem, joint swelling and myalgias  Neurological: Positive for syncope and weakness  Negative for headaches        ---------------------------------------------------------------------------------------  OBJECTIVE    Vitals   Vitals:    22 0853 22 0901   BP: 143/76    BP Location: Right arm    Pulse: 73    Resp: 18    Temp: 97 9 °F (36 6 °C)    TempSrc: Oral    SpO2: 95%    Weight: 91 3 kg (201 lb 4 5 oz) 77 1 kg (169 lb 15 6 oz)     Temp (24hrs), Av 9 °F (36 6 °C), Min:97 9 °F (36 6 °C), Max:97 9 °F (36 6 °C)  Current: Temperature: 97 9 °F (36 6 °C)          Respiratory:  SpO2: SpO2: 95 %, SpO2 Activity: SpO2 Activity: At Rest, SpO2 Device: O2 Device: None (Room air)       Invasive/non-invasive ventilation settings   Respiratory  Report   Lab Data (Last 4 hours)    None         O2/Vent Data (Last 4 hours)    None                Physical Exam  Constitutional:       Appearance: He is not ill-appearing  Interventions: Cervical collar in place  HENT:      Head: Normocephalic  Comments: Bilateral periorbital ecchymosis     Nose:      Comments: Dried blood in bilateral nares     Mouth/Throat:      Mouth: Mucous membranes are dry  Eyes:      Pupils: Pupils are equal, round, and reactive to light     Cardiovascular:      Rate and Rhythm: Normal rate and regular rhythm  Pulses: Normal pulses  Pulmonary:      Effort: Pulmonary effort is normal  No respiratory distress  Breath sounds: Normal breath sounds  Abdominal:      General: There is distension  Palpations: Abdomen is soft  Tenderness: There is no abdominal tenderness  Musculoskeletal:         General: Swelling (right hand) and tenderness (right arrieta) present  No signs of injury  Right lower leg: No edema  Left lower leg: No edema  Skin:     General: Skin is warm and dry  Neurological:      Mental Status: He is alert  GCS: GCS eye subscore is 4  GCS verbal subscore is 5  GCS motor subscore is 6  Comments: Bilateral lower extremities 4/5 strength  Right upper extremity 4/5 extension/flexion of elbow, right  strength equal to left  strength, left extension/flexion 5/5             Laboratory and Diagnostics:  Results from last 7 days   Lab Units 04/20/22  0946   WBC Thousand/uL 5 25   HEMOGLOBIN g/dL 11 2*   HEMATOCRIT % 35 7*   PLATELETS Thousands/uL 209   NEUTROS PCT % 77*   MONOS PCT % 8     Results from last 7 days   Lab Units 04/20/22  0947   SODIUM mmol/L 141   POTASSIUM mmol/L 4 6   CHLORIDE mmol/L 108   CO2 mmol/L 24   ANION GAP mmol/L 9   BUN mg/dL 27*   CREATININE mg/dL 1 19   CALCIUM mg/dL 8 5   GLUCOSE RANDOM mg/dL 129   ALT U/L 22   AST U/L 18   ALK PHOS U/L 119*   ALBUMIN g/dL 3 5   TOTAL BILIRUBIN mg/dL 0 42     Results from last 7 days   Lab Units 04/20/22  0947   MAGNESIUM mg/dL 2 4      Results from last 7 days   Lab Units 04/20/22  0947   INR  0 99   PTT seconds 31          Results from last 7 days   Lab Units 04/20/22  0946   LACTIC ACID mmol/L 0 9     ABG:    VBG:          Micro        EKG:  Atrial flutter  Imaging: I have personally reviewed pertinent reports  and I have personally reviewed pertinent films in PACS    Intake and Output  I/O     None          Height and Weights         Body mass index is 28 29 kg/m²    Weight (last 2 days)     Date/Time Weight    04/20/22 0901 77 1 (169 97)    04/20/22 0853 91 3 (201 28)            Nutrition       Diet Orders   (From admission, onward)             Start     Ordered    04/20/22 1225  Diet NPO; Sips with meds  Diet effective now        References:    Nutrtion Support Algorithm Enteral vs  Parenteral   Question Answer Comment   Diet Type NPO    NPO Except: Sips with meds    RD to adjust diet per protocol? Yes        04/20/22 1224                  Active Medications  Scheduled Meds:  Current Facility-Administered Medications   Medication Dose Route Frequency Provider Last Rate    acetaminophen  975 mg Oral Q8H Bari Ba PA-C      amiodarone  200 mg Oral Daily Bari Ba PA-C      gabapentin  100 mg Oral TID Gomez Riddles, BASIL      metoprolol succinate  25 mg Oral Daily Bari Ba PA-C      ondansetron  4 mg Intravenous Q6H PRN Gomez Riddles, PA-STEVE      oxyCODONE  2 5 mg Oral Q4H PRN Gomez Riddles, PA-STEVE      oxyCODONE  5 mg Oral Q4H PRN Gomez Riddles, PA-C      tetanus-diphtheria-acellular pertussis  0 5 mL Intramuscular Once Gomez Riddles, PA-STEVE       Continuous Infusions:     PRN Meds:   ondansetron, 4 mg, Q6H PRN  oxyCODONE, 2 5 mg, Q4H PRN  oxyCODONE, 5 mg, Q4H PRN        Invasive Devices Review  Invasive Devices  Report    Peripheral Intravenous Line            Peripheral IV 04/20/22 Left Antecubital <1 day                Rationale for remaining devices:  IV access  ---------------------------------------------------------------------------------------  Advance Directive and Living Will:      Power of :    POLST:    ---------------------------------------------------------------------------------------  Care Time Delivered:   No Critical Care time spent       ARPIT Shaikh      Portions of the record may have been created with voice recognition software    Occasional wrong word or "sound a like" substitutions may have occurred due to the inherent limitations of voice recognition software    Read the chart carefully and recognize, using context, where substitutions have occurred

## 2022-04-20 NOTE — ASSESSMENT & PLAN NOTE
- Patient with multiple syncopal episodes over the last week (as many as 4)  He describes the episodes beginning after significant amount of diarrhea last week  He notes that since that time, he has been getting some dizziness when standing up  - Suspected syncope may be related to dehydration and orthostatic hypotension   - Cardiac pacemaker interrogated by ED staff with no reported significant events  - Monitor on telemetry for 24 hours  - Obtain echocardiogram for further evaluation of cardiac function   - Encourage adequate oral intake and initiate IV fluid hydration over the next 24 hours  - Monitor volume status  - Obtain orthostatic vital signs   - Patient only to be up with assistance  - Monitor labs including CBC and electrolytes  No evidence of new or significant acute anemia, electrolyte abnormality, or renal impairment on initial lab assessment

## 2022-04-20 NOTE — ASSESSMENT & PLAN NOTE
- Chronic history of hypertension   - Patient currently normotensive at the time of his initial trauma evaluation  Suspect possible orthostatic hypotension as cause of recurrent syncope  - Will hold antihypertensive medications on admission   - Obtain orthostatic vital signs   - Monitor blood pressure with goal MAP >85 per Neurosurgery

## 2022-04-20 NOTE — ASSESSMENT & PLAN NOTE
· MRI cervical spine degraded by motion  · Continue cervical collar  · MAP>85  · Appreciate neurosurgery recommendations

## 2022-04-20 NOTE — ASSESSMENT & PLAN NOTE
- Paresthesias in the distal bilateral upper extremities, right greater than left, present on presentation  - CT scan of the cervical spine from 04/20/2022 reviewed with evidence of acute traumatic injury   - Suspect central cord syndrome with possible cervical spinal cord injury in setting of syncope and collapse/fall with obvious facial trauma and possible hyperextension injury   - Obtain MRI of the cervical spine for further evaluation  Patient does have an MRI compatible pacemaker; confirmed with cardiology clinic  - Await Neurosurgery evaluation and recommendations  - Closely monitor neurologic exam with neurologic checks every hour

## 2022-04-20 NOTE — CONSULTS
[address] 1935, 81 y o  male MRN: [id number]  Unit/Bed#: ED 29 Encounter: [id number]  Primary Care Provider: Tiff Templeton DO   Date and time admitted to hospital: 4/20/2022  8:49 AM    Inpatient consult to Neurosurgery  Consult performed by: ARPIT Solomon  Consult ordered by: Ibrahima Jansen PA-C          Closed fracture of nasal bone  Assessment & Plan  Nasal bone fx s/p fall  OMFS consulted  Atrial fibrillation ([address] )  Assessment & Plan  No AC/AP secondary to previous SDH  * Paresthesias  Assessment & Plan  Likely central cord syndrome  · Presents with bilateral hand/arm paresthesias, R > L s/p syncopal event and fall forwards onto face on 4/20/2022  · Initially couldn't move and then was able to use legs to crawl  · Since complaining of "pins and needles" to bilateral UE and weakness, worse on right  · On exam, weakness to bilateral UE, right biceps 3/5, left biceps 4/5, distal strength 3/5 bilaterally  Dysesthetic pain to RUE  Imaging:  · CT cervical spine wo, 4/20/2022: No cervical spine fracture or traumatic malalignment  Plan:  · Continue to monitor neuro exam closely  · Ongoing workup for syncope  · MRI cervical spine pending  Patient has PPM that is MRI compatible  · Suspect central cord syndrome  · Maintain MAP > 85 mmHg x 5 days  · Continue aspen vista brace at all times  Noris brace for showers  · No PT/OT until MRI completed  · DVT ppx: SCDs  Neurosurgery will continue to monitor closely  Please call with any questions or concerns  History of Present Illness     HPI: Mike Fishman is a 81 y o  male with PMH including atrial fibrillation, PPM, hypertension, prior traumatic subdural hemorrhage, who presents for evaluation after a syncopal event with subsequent fall forwards onto face at home  He states he remembers passing out and then "formed into a ball" on the floor   He woke up and felt he could not move  He was eventually able to move his legs and propel himself to the cameras so that his children could see that he had fallen  He states he noticed immediately that his arms felt weak and tingly  Currently, he continues to endorse bilateral upper extremity weakness and numbness  He is left hand dominant  He states symptoms are worse on the right arm and his right arm feels "sensitive " He denies any leg weakness or bowel or bladder issues  He states he was able to ambulate with EMS after falling with both arms over their shoulders  He lives at home alone and has recently been suffering from syncopal events of unknown etiology  Review of Systems   Constitutional: Negative  Negative for activity change, appetite change and fatigue  HENT: Positive for facial swelling and sinus pain  Negative for ear pain, hearing loss, nosebleeds, postnasal drip, tinnitus, trouble swallowing and voice change  Eyes: Negative for pain and visual disturbance  Respiratory: Negative for chest tightness and shortness of breath  Cardiovascular: Negative for chest pain, palpitations and leg swelling  Gastrointestinal: Negative for abdominal pain, diarrhea, nausea and vomiting  Musculoskeletal: Negative for back pain, neck pain and neck stiffness  Skin: Negative for color change and pallor  Neurological: Positive for syncope, weakness and numbness  Negative for dizziness, tremors, seizures, facial asymmetry, speech difficulty, light-headedness and headaches  Psychiatric/Behavioral: Negative for agitation, behavioral problems and confusion         Historical Information   Past Medical History:   Diagnosis Date    A-fib ([facility] [address] )     Hernia, abdominal     Hypertension     Subdural hematoma (HCC)      Past Surgical History:   Procedure Laterality Date    A-V CARDIAC PACEMAKER INSERTION      APPENDECTOMY      age 33    CARDIAC PACEMAKER PLACEMENT      CARPAL TUNNEL RELEASE      COLONOSCOPY      INGUINAL HERNIA REPAIR Left     MO XCAPSL CTRC RMVL INSJ IO LENS PROSTH W/O ECP Right 4/3/2017    Procedure: EXTRACTION EXTRACAPSULAR CATARACT PHACO INTRAOCULAR LENS (IOL); Surgeon: Kaden Henao MD;  Location: [facility] MAIN OR;  Service: Ophthalmology    TONSILLECTOMY      age 12     Social History     Substance and Sexual Activity   Alcohol Use No     Social History     Substance and Sexual Activity   Drug Use Never     Social History     Tobacco Use   Smoking Status Never Smoker   Smokeless Tobacco Never Used     Family History   Problem Relation Age of Onset    Cancer Mother         exp age 80    Heart disease Father     Heart disease Brother     Hypertension Brother        Meds/Allergies   all current active meds have been reviewed and current meds:   Current Facility-Administered Medications   Medication Dose Route Frequency    acetaminophen (TYLENOL) tablet 975 mg  975 mg Oral Q8H    amiodarone tablet 200 mg  200 mg Oral Daily    gabapentin (NEURONTIN) capsule 100 mg  100 mg Oral TID    metoprolol succinate (TOPROL-XL) 24 hr tablet 25 mg  25 mg Oral Daily    ondansetron (ZOFRAN) injection 4 mg  4 mg Intravenous Q6H PRN    oxyCODONE (ROXICODONE) IR tablet 2 5 mg  2 5 mg Oral Q4H PRN    oxyCODONE (ROXICODONE) IR tablet 5 mg  5 mg Oral Q4H PRN    tetanus-diphtheria-acellular pertussis (BOOSTRIX) IM injection 0 5 mL  0 5 mL Intramuscular Once     No Known Allergies    Objective   I/O     None          Physical Exam  Constitutional:       General: He is not in acute distress  Appearance: He is well-developed  He is not diaphoretic  HENT:      Head:      Comments: Bilateral periorbital ecchymosis, nasal swelling  Eyes:      General:         Right eye: No discharge  Left eye: No discharge  Extraocular Movements: EOM normal       Conjunctiva/sclera: Conjunctivae normal       Pupils: Pupils are equal, round, and reactive to light     Pulmonary:      Effort: Pulmonary effort is normal  No respiratory distress  Abdominal:      General: Bowel sounds are normal  There is no distension  Palpations: Abdomen is soft  Tenderness: There is no abdominal tenderness  Musculoskeletal:         General: Normal range of motion  Cervical back: Normal range of motion and neck supple  Skin:     General: Skin is warm and dry  Neurological:      Mental Status: He is alert and oriented to person, place, and time  Cranial Nerves: No cranial nerve deficit  Sensory: Sensory deficit present  Motor: Weakness present  Coordination: Finger-Nose-Finger Test normal       Deep Tendon Reflexes: Reflexes normal       Reflex Scores:       Tricep reflexes are 1+ on the right side and 1+ on the left side  Bicep reflexes are 1+ on the right side and 1+ on the left side  Brachioradialis reflexes are 1+ on the right side and 1+ on the left side  Patellar reflexes are 1+ on the right side and 1+ on the left side  Achilles reflexes are 1+ on the right side and 1+ on the left side  Psychiatric:         Speech: Speech normal          Behavior: Behavior normal          Thought Content: Thought content normal          Judgment: Judgment normal        Neurologic Exam     Mental Status   Oriented to person, place, and time  Oriented to person  Oriented to place  Oriented to time  Oriented to year, month and date  Registration: recalls 3 of 3 objects  Attention: normal  Concentration: normal    Speech: speech is normal   Level of consciousness: alert  Knowledge: good and consistent with education  Able to name object  Cranial Nerves     CN III, IV, VI   Pupils are equal, round, and reactive to light  Extraocular motions are normal    Right pupil: Size: 3 mm  Shape: regular  Reactivity: brisk  Consensual response: intact  Accommodation: intact  Left pupil: Size: 3 mm  Shape: regular  Reactivity: brisk  Consensual response: intact  Accommodation: intact     Nystagmus: none Diplopia: none  Conjugate gaze: present    CN V   Right facial sensation deficit: none  Left facial sensation deficit: none    CN VII   Facial expression full, symmetric  CN VIII   Hearing: intact    CN IX, X   Palate: symmetric    CN XI   Right sternocleidomastoid strength: normal  Left sternocleidomastoid strength: normal  Right trapezius strength: normal  Left trapezius strength: normal    CN XII   Tongue: not atrophic  Fasciculations: absent  Tongue deviation: none    Motor Exam   Muscle bulk: normal  Overall muscle tone: normal  Right arm pronator drift: absent  Left arm pronator drift: absent    Strength   Right deltoid: 5/5  Left deltoid: 5/5  Right biceps: 3/5  Left biceps: 4/5  Right triceps: 4/5  Left triceps: 4/5  Right wrist flexion: 3/5  Left wrist flexion: 3/5  Right wrist extension: 3/5  Left wrist extension: 3/5  Right interossei: 3/5  Left interossei: 3/5  Right quadriceps: 5/5  Left quadriceps: 5/5  Right hamstrin/5  Left hamstrin/5  Right anterior tibial: 5/5  Left anterior tibial: 5/5  Right posterior tibial: 5/5  Left posterior tibial: 5/5  Right peroneal: 5/5  Left peroneal: 5/5  Right gastroc: 5/5  Left gastroc: 5/5    Sensory Exam   Right arm light touch: decreased from fingers  Left arm light touch: decreased from fingers  Proprioception normal      Gait, Coordination, and Reflexes     Coordination   Finger to nose coordination: normal    Tremor   Resting tremor: absent  Intention tremor: absent  Action tremor: absent    Reflexes   Right brachioradialis: 1+  Left brachioradialis: 1+  Right biceps: 1+  Left biceps: 1+  Right triceps: 1+  Left triceps: 1+  Right patellar: 1+  Left patellar: 1+  Right achilles: 1+  Left achilles: 1+  Right : 1+  Left : 1+  Right Mullins: absent  Left Mullins: absent  Right ankle clonus: absent  Left ankle clonus: absent      Vitals:Blood pressure 143/76, pulse 73, temperature 97 9 °F (36 6 °C), temperature source Oral, resp   rate 18, weight 77 1 kg (169 lb 15 6 oz), SpO2 95 %  ,Body mass index is 28 29 kg/m²  Lab Results:   Results from last 7 days   Lab Units 04/20/22  0946   WBC Thousand/uL 5 25   HEMOGLOBIN g/dL 11 2*   HEMATOCRIT % 35 7*   PLATELETS Thousands/uL 209   NEUTROS PCT % 77*   MONOS PCT % 8     Results from last 7 days   Lab Units 04/20/22  0947   POTASSIUM mmol/L 4 6   CHLORIDE mmol/L 108   CO2 mmol/L 24   BUN mg/dL 27*   CREATININE mg/dL 1 19   CALCIUM mg/dL 8 5   ALK PHOS U/L 119*   ALT U/L 22   AST U/L 18     Results from last 7 days   Lab Units 04/20/22  0947   MAGNESIUM mg/dL 2 4         Results from last 7 days   Lab Units 04/20/22  0947   INR  0 99   PTT seconds 31     No results found for: TROPONINT  ABG:No results found for: PHART, GKI3BRW, PO2ART, HBC7OQG, N9NHSHBS, BEART, SOURCE    Imaging Studies: I have personally reviewed pertinent reports  and I have personally reviewed pertinent films in PACS    XR chest 1 view portable    Result Date: 4/20/2022  Impression: No acute cardiopulmonary disease  Workstation performed: [id number]     CT head without contrast    Result Date: 4/20/2022  Impression: No acute intracranial abnormality  Mildly depressed nasal bone fracture  Workstation performed: [id number]     CT facial bones without contrast    Result Date: 4/20/2022  Impression: Mildly depressed nasal bone fracture  Workstation performed: [id number]     CT cervical spine without contrast    Result Date: 4/20/2022  Impression: No cervical spine fracture or traumatic malalignment  Workstation performed: [id number]       EKG, Pathology, and Other Studies: I have personally reviewed pertinent reports  and I have personally reviewed pertinent films in PACS    VTE Prophylaxis: Sequential compression device (Venodyne)     Code Status: Level 1 - Full Code  Advance Directive and Living Will:      Power of :    POLST:      Counseling / Coordination of Care  I spent 20 minutes with the patient

## 2022-04-20 NOTE — ASSESSMENT & PLAN NOTE
Likely central cord syndrome  · Presents with bilateral hand/arm paresthesias, R > L s/p syncopal event and fall forwards onto face on 4/20/2022  · Initially couldn't move and then was able to use legs to crawl  · Since complaining of "pins and needles" to bilateral UE and weakness, worse on right  · On exam, weakness to bilateral UE, right biceps 3/5, left biceps 4/5, distal strength 3/5 bilaterally  Dysesthetic pain to RUE  Imaging:  · CT cervical spine wo, 4/20/2022: No cervical spine fracture or traumatic malalignment  Plan:  · Continue to monitor neuro exam closely  · Ongoing workup for syncope  · MRI cervical spine pending  Patient has PPM that is MRI compatible  · Suspect central cord syndrome  · Maintain MAP > 85 mmHg x 5 days  · Continue aspen vista brace at all times  Noris brace for showers  · No PT/OT until MRI completed  · DVT ppx: SCDs  Neurosurgery will continue to monitor closely  Please call with any questions or concerns

## 2022-04-20 NOTE — PHYSICAL THERAPY NOTE
PHYSICAL THERAPY ORTHOTIC FITTING NOTE          Patient Name: Bria Miguel  BRTPF'D Date: 4/20/2022       Orthotic Fitting:   Time in: 2962  Time out: 1746  Total Time: 12 min    Orthotic Ordered: [place] Manteno Cervical Brace  Orthotic Ordered by: Jose A Sotelo PA (contacted via TT prior to fitting)      Objective: RN Jose Garg present for and assisted w/ brace fitting/donning as needed  Pt educated on spinal precautions, [place] Manteno Cervial brace fit, alignment, and wearing schedule w/ pt agreeable to brace  Brace fit and adjusted while pt supine w/ assistance provided by RN throughout  Pt requires total assistance for donning brace at this time, as well as for adjustment of brace as needed  Pt verbalized understanding of donning/doffing brace, when to wear brace  Educational handout provided to pt's family  Pt confirmed no further questions/concerns at this time        Heidi Alfred, PT, DPT  04/20/22

## 2022-04-20 NOTE — H&P
[facility]  H&P- Marylene Perna 1935, 81 y o  male MRN: [id number]  Unit/Bed#: ED 29 Encounter: [id number]  Primary Care Provider: Lottie Lemus DO   Date and time admitted to hospital: 4/20/2022  8:49 AM    Recurrent syncope  Assessment & Plan  - Patient with multiple syncopal episodes over the last week (as many as 4)  He describes the episodes beginning after significant amount of diarrhea last week  He notes that since that time, he has been getting some dizziness when standing up  - Suspected syncope may be related to dehydration and orthostatic hypotension   - Cardiac pacemaker interrogated by ED staff with no reported significant events  - Monitor on telemetry for 24 hours  - Obtain echocardiogram for further evaluation of cardiac function   - Encourage adequate oral intake and initiate IV fluid hydration over the next 24 hours  - Monitor volume status  - Obtain orthostatic vital signs   - Patient only to be up with assistance  - Monitor labs including CBC and electrolytes  No evidence of new or significant acute anemia, electrolyte abnormality, or renal impairment on initial lab assessment  * Paresthesias  Assessment & Plan  - Paresthesias in the distal bilateral upper extremities, right greater than left, present on presentation  - CT scan of the cervical spine from 04/20/2022 reviewed with evidence of acute traumatic injury   - Suspect central cord syndrome with possible cervical spinal cord injury in setting of syncope and collapse/fall with obvious facial trauma and possible hyperextension injury   - Obtain MRI of the cervical spine for further evaluation  Patient does have an MRI compatible pacemaker; confirmed with cardiology clinic  - Await Neurosurgery evaluation and recommendations  - Closely monitor neurologic exam with neurologic checks every hour      Closed fracture of nasal bone  Assessment & Plan  - Acute depressed nasal bone fracture, present on presentation   - Await OMS evaluation and recommendations  - Ice and analgesia as needed  - Maintain sinus precautions  Atrial fibrillation (HCC)  Assessment & Plan  - Chronic history of atrial fibrillation without evidence of rapid ventricular response at this time  - Continue home medication regimen with amiodarone, but will hold beta-blocker at this time secondary to concern for spinal cord injury in setting of bilateral upper extremity paresthesias and Neurosurgery recommendations for MAP >85  - Will resume beta-blocker therapy when able  - Closely monitor heart rate  - Pacemaker interrogated without significant event noted as 04/20/2022  - Monitoring on telemetry for least 24 hours in setting of syncope  Essential hypertension  Assessment & Plan  - Chronic history of hypertension   - Patient currently normotensive at the time of his initial trauma evaluation  Suspect possible orthostatic hypotension as cause of recurrent syncope  - Will hold antihypertensive medications on admission   - Obtain orthostatic vital signs   - Monitor blood pressure with goal MAP >85 per Neurosurgery  Presence of permanent cardiac pacemaker  Assessment & Plan  - Presence of permanent cardiac pacemaker   - Confirmed to be MR compatible with cardiology clinic  Disposition:  Admit to step-down level 2 for frequent neurologic checks  Trauma Alert: Evaluation; trauma team notified at 11:29 AM via text   Model of Arrival: Ambulance    Trauma Team: Attending Dr Gali Conner and Morena Armstrong PA-C  Consultants:     Neurosurgery: routine consult; Epic consult order placed;      Oral Maxillofacial: routine consult; Epic consult order placed; OMS notified via tiger connect at 12:18 PM   Neurosurgery notified via tiger connect at 12:17 PM     History of Present Illness     Chief Complaint:  I passed out    Mechanism:Fall     HPI:    Pura Young is a 81 y o  male who presents following an episode of syncope and collapse  The patient states that he has had multiple episodes of syncope over the last week  The initial episode occurred after large volume diarrhea last week  Since that time, he has noticed that he gets dizzy or lightheaded when standing up  He usually is able to study himself and go about his business, but he has had multiple episodes where he subsequently falls once attempting to walk or get up from the toilet  Today, he had a recurrent episode on the way to the bathroom  Upon awakening on the floor, he was unable to move his hands normally and he had numbness and tingling in his hands and wrists as well as nasal pain  He has continued to have pins and needles sensation in his bilateral hands and wrists with some continued weakness, but notes this is improving  The right upper extremity symptoms are worse than the left upper extremity symptoms  He also notes some pain in his right shoulder and right forearm near the elbow  He denies any chest pain, shortness of breath, difficulty breathing, loss of bowel or bladder function, and/or any symptoms in his lower extremities  Prior to his 1st episode a week ago, he has not had issues with syncope in the last few years  Review of Systems   Constitutional: Negative  Negative for activity change, appetite change, chills, fatigue and fever  HENT: Positive for nosebleeds (Some nasal bleeding following fall this morning, stopped on its own )  Negative for ear pain, facial swelling and voice change  Pain and swelling in the nose following fall today  Eyes: Negative  Negative for photophobia, redness and visual disturbance  Respiratory: Negative  Negative for cough, chest tightness, shortness of breath and wheezing  Cardiovascular: Negative  Negative for chest pain, palpitations and leg swelling  Gastrointestinal: Negative  Negative for abdominal distention, abdominal pain, nausea and vomiting  Endocrine: Negative  Genitourinary: Negative  Negative for dysuria, flank pain, frequency and hematuria  Musculoskeletal: Negative  Negative for arthralgias, back pain, myalgias and neck pain  Skin: Negative  Negative for color change, pallor, rash and wound  Allergic/Immunologic: Negative  Neurological: Positive for dizziness, syncope, weakness (Bilateral hands and wrists) and numbness (Pins and needles sensation in the bilateral hands and wrists)  Negative for light-headedness and headaches  Hematological: Negative  Psychiatric/Behavioral: Negative  Negative for agitation, confusion, self-injury and sleep disturbance  The patient is not nervous/anxious  12-point, complete review of systems was reviewed and negative except as stated above  Historical Information     Past Medical History:   Diagnosis Date    A-fib ([address] )     Hernia, abdominal     Hypertension     Subdural hematoma (HCC)      Past Surgical History:   Procedure Laterality Date    A-V CARDIAC PACEMAKER INSERTION      APPENDECTOMY      age 33    CARDIAC PACEMAKER PLACEMENT      CARPAL TUNNEL RELEASE      COLONOSCOPY      INGUINAL HERNIA REPAIR Left     ND XCAPSL CTRC RMVL INSJ IO LENS PROSTH W/O ECP Right 4/3/2017    Procedure: EXTRACTION EXTRACAPSULAR CATARACT PHACO INTRAOCULAR LENS (IOL); Surgeon: Lora Hamilton MD;  [facility] MAIN OR;  Service: Ophthalmology    TONSILLECTOMY      age 12        Social History     Tobacco Use    Smoking status: Never Smoker    Smokeless tobacco: Never Used   Vaping Use    Vaping Use: Never used   Substance Use Topics    Alcohol use: No    Drug use: Never     Immunization History   Administered Date(s) Administered    COVID-19 MODERNA VACC 0 5 ML IM 01/15/2021, 02/15/2021, 10/29/2021    Pneumococcal Conjugate 13-Valent 02/12/2020    Tdap 04/14/2018     Last Tetanus: Unknown  Family History: Non-contributory    1   Before the illness or injury that brought you to the Emergency, did you need someone to help you on a regular basis? 0=No   2  Since the illness or injury that brought you to the Emergency, have you needed more help than usual to take care of yourself? 1=Yes   3  Have you been hospitalized for one or more nights during the past 6 months (excluding a stay in the Emergency Department)? 0=No   4  In general, do you see well? 0=Yes   5  In general, do you have serious problems with your memory? 0=No   6  Do you take more than three different medications everyday? 1=Yes   TOTAL   2     Did you order a geriatric consult if the score was 2 or greater?: yes     Meds/Allergies   all current active meds have been reviewed, current meds:   Current Facility-Administered Medications   Medication Dose Route Frequency    acetaminophen (TYLENOL) tablet 975 mg  975 mg Oral Q8H    amiodarone tablet 200 mg  200 mg Oral Daily    gabapentin (NEURONTIN) capsule 100 mg  100 mg Oral TID    metoprolol succinate (TOPROL-XL) 24 hr tablet 25 mg  25 mg Oral Daily    ondansetron (ZOFRAN) injection 4 mg  4 mg Intravenous Q6H PRN    oxyCODONE (ROXICODONE) IR tablet 2 5 mg  2 5 mg Oral Q4H PRN    oxyCODONE (ROXICODONE) IR tablet 5 mg  5 mg Oral Q4H PRN    and PTA meds:   Prior to Admission Medications   Prescriptions Last Dose Informant Patient Reported? Taking?    amLODIPine (NORVASC) 5 mg tablet   No No   Sig: TAKE 1 TABLET BY MOUTH EVERY DAY   amiodarone 200 mg tablet   No No   Sig: Take 1 tablet (200 mg total) by mouth daily   metoprolol succinate (TOPROL-XL) 25 mg 24 hr tablet  Self No No   Sig: TAKE 1 TABLET BY MOUTH EVERY DAY      Facility-Administered Medications: None      No Known Allergies    Objective   Initial Vitals:   Temperature: 97 9 °F (36 6 °C) (04/20/22 0853)  Pulse: 73 (04/20/22 0853)  Respirations: 18 (04/20/22 0853)  Blood Pressure: 143/76 (04/20/22 0853)    Primary Survey:   Airway:        Status: patent;        Pre-hospital Interventions: none        Hospital Interventions: none  Breathing:        Pre-hospital Interventions: none       Effort: normal       Right breath sounds: normal       Left breath sounds: normal  Circulation:        Rhythm: regular       Rate: regular   Right Pulses Left Pulses    R radial: 2+  R femoral: 2+  R pedal: 2+  R carotid: 2+  R popliteal: 2+ L radial: 2+  L femoral: 2+  L pedal: 2+  L carotid: 2+  L popliteal: 2+   Disability:        GCS: Eye: 4; Verbal: 5 Motor: 6 Total: 15       Right Pupil: 2 mm;  round;  reactive         Left Pupil:  2 mm;  round;  reactive      R Motor Strength L Motor Strength    R : 5/5  R dorsiflex: 5/5  R plantarflex: 5/5 L : 5/5  L dorsiflex: 5/5  L plantarflex: 5/5        Sensory:  No sensory deficit  Exposure:       Completed: Yes      Secondary Survey:  Physical Exam  Vitals and nursing note reviewed  Exam conducted with a chaperone present  Constitutional:       General: He is awake  He is not in acute distress  Appearance: Normal appearance  He is normal weight  He is not ill-appearing, toxic-appearing or diaphoretic  Interventions: He is not intubated  Cervical collar in place  HENT:      Head: Normocephalic  No abrasion, contusion or laceration  Jaw: There is normal jaw occlusion  Right Ear: Hearing and external ear normal  No swelling or tenderness  Left Ear: Hearing and external ear normal  No swelling or tenderness  Nose: Signs of injury (Diffuse nasal swelling, tenderness, and superficial abrasion over the right nasal bridge/nare ) and nasal tenderness (Mild tenderness throughout the nose) present  No nasal deformity, septal deviation or laceration  Mouth/Throat:      Mouth: Mucous membranes are moist       Pharynx: Oropharynx is clear  Eyes:      General: Lids are normal  Vision grossly intact  Extraocular Movements: Extraocular movements intact  Conjunctiva/sclera: Conjunctivae normal       Pupils: Pupils are equal, round, and reactive to light  Comments: Pupils were 2 mm, equal, round and reactive bilaterally  Neck:      Comments: Cervical collar in place  Cardiovascular:      Rate and Rhythm: Normal rate and regular rhythm  Pulses:           Carotid pulses are 2+ on the right side and 2+ on the left side  Radial pulses are 2+ on the right side and 2+ on the left side  Femoral pulses are 2+ on the right side and 2+ on the left side  Dorsalis pedis pulses are 2+ on the right side and 2+ on the left side  Heart sounds: Normal heart sounds  No murmur heard  No friction rub  No gallop  Pulmonary:      Effort: Pulmonary effort is normal  No tachypnea, bradypnea, accessory muscle usage, prolonged expiration, respiratory distress or retractions  He is not intubated  Breath sounds: Normal breath sounds and air entry  No stridor or decreased air movement  No decreased breath sounds, wheezing, rhonchi or rales  Chest:      Chest wall: No lacerations, deformity, swelling, tenderness or crepitus  Abdominal:      General: Abdomen is flat  Bowel sounds are normal  There is no distension  Palpations: Abdomen is soft  Tenderness: There is no abdominal tenderness  There is no guarding or rebound  Musculoskeletal:         General: No swelling or deformity  Normal range of motion  Right shoulder: No swelling, deformity or tenderness  Normal range of motion (Mild pain in the right shoulder with range of motion, but full range of motion was intact)  Right forearm: Tenderness (Mild tenderness over the proximal right forearm near the radial head) present  No swelling, deformity or lacerations  Cervical back: Neck supple  No swelling, deformity, lacerations or tenderness  Muscular tenderness (There is mild tenderness along the right lateral posterior neck/paraspinal musculature without midline tenderness ) present  No spinous process tenderness        Thoracic back: No swelling, deformity, signs of trauma, lacerations or tenderness  Lumbar back: No swelling, deformity, signs of trauma, lacerations or tenderness  Right lower leg: No edema  Left lower leg: No edema  Comments: No midline cervical, thoracic or lumbar spine tenderness, step-offs or deformities  No paraspinal muscular tenderness in the back  Patient did have some tenderness over the right cervical spine paraspinal musculature    The patient did have normal full range of motion throughout the right upper extremity, but did have some pain in the right shoulder as well as tenderness over the proximal right forearm near the radial head  Normal range of motion in the left upper and bilateral lower extremities without pain, tenderness or deformity  Skin:     General: Skin is warm and dry  Capillary Refill: Capillary refill takes less than 2 seconds  Coloration: Skin is not jaundiced or pale  Findings: Abrasion (Nasal abrasion as noted) present  No erythema, laceration, lesion or rash  Neurological:      Mental Status: He is alert and oriented to person, place, and time  Mental status is at baseline  GCS: GCS eye subscore is 4  GCS verbal subscore is 5  GCS motor subscore is 6  Sensory: Sensation is intact  No sensory deficit (Patient with intact sensation, but continues to describe subjective altered sensation in the bilateral hands with pins and needles sensation  )  Motor: Weakness (Weakness in the bilateral hands and fingers with flexion and extension as well as  strength and decreased fine motor skills ) present  Psychiatric:         Mood and Affect: Mood normal          Behavior: Behavior is cooperative           Invasive Devices  Report    Peripheral Intravenous Line            Peripheral IV 01/14/19 Left Antecubital 1192 days    Peripheral IV 04/20/22 Left Antecubital <1 day              Lab Results:   Results: I have personally reviewed all pertinent laboratory/tests results, BMP/CMP:   Lab Results   Component Value Date    SODIUM 141 04/20/2022    K 4 6 04/20/2022     04/20/2022    CO2 24 04/20/2022    BUN 27 (H) 04/20/2022    CREATININE 1 19 04/20/2022    CALCIUM 8 5 04/20/2022    AST 18 04/20/2022    ALT 22 04/20/2022    ALKPHOS 119 (H) 04/20/2022    EGFR 55 04/20/2022   , CBC:   Lab Results   Component Value Date    WBC 5 25 04/20/2022    HGB 11 2 (L) 04/20/2022    HCT 35 7 (L) 04/20/2022    MCV 64 (L) 04/20/2022     04/20/2022    MCH 20 2 (L) 04/20/2022    MCHC 31 4 04/20/2022    RDW 18 6 (H) 04/20/2022    MPV 8 6 (L) 04/20/2022    NRBC 0 04/20/2022    and Coagulation:   Lab Results   Component Value Date    INR 0 99 04/20/2022       Imaging Results: I have personally reviewed pertinent reports  and I have personally reviewed pertinent films in PACS  Chest Xray(s): negative for acute findings   FAST exam(s): N/A   CT Scan(s): positive for acute findings: Mild depressed nasal bone fracture   Additional Xray(s): pending are x-rays of the right upper extremity as well as MRI of the cervical spine     Other Studies:  Await echocardiogram as part of syncope workup  Code Status: Level 1 - Full Code  Advance Directive and Living Will:      Power of :    POLST:    I have spent 50 minutes with Patient and family today in which greater than 50% of this time was spent in counseling/coordination of care regarding Diagnostic results, Prognosis, Intructions for management, Patient and family education and Impressions

## 2022-04-20 NOTE — ASSESSMENT & PLAN NOTE
- Chronic history of atrial fibrillation without evidence of rapid ventricular response at this time  - Continue home medication regimen with amiodarone, but will hold beta-blocker at this time secondary to concern for spinal cord injury in setting of bilateral upper extremity paresthesias and Neurosurgery recommendations for MAP >85  - Will resume beta-blocker therapy when able  - Closely monitor heart rate  - Pacemaker interrogated without significant event noted as 04/20/2022  - Monitoring on telemetry for least 24 hours in setting of syncope

## 2022-04-20 NOTE — ASSESSMENT & PLAN NOTE
- Acute depressed nasal bone fracture, present on presentation   - Await OMS evaluation and recommendations  - Ice and analgesia as needed  - Maintain sinus precautions

## 2022-04-21 ENCOUNTER — TELEPHONE (OUTPATIENT)
Dept: NEUROSURGERY | Facility: CLINIC | Age: 87
End: 2022-04-21

## 2022-04-21 LAB
ANION GAP SERPL CALCULATED.3IONS-SCNC: 8 MMOL/L (ref 4–13)
BUN SERPL-MCNC: 21 MG/DL (ref 5–25)
CALCIUM SERPL-MCNC: 8.1 MG/DL (ref 8.3–10.1)
CHLORIDE SERPL-SCNC: 108 MMOL/L (ref 100–108)
CO2 SERPL-SCNC: 25 MMOL/L (ref 21–32)
CREAT SERPL-MCNC: 1.1 MG/DL (ref 0.6–1.3)
ERYTHROCYTE [DISTWIDTH] IN BLOOD BY AUTOMATED COUNT: 18.4 % (ref 11.6–15.1)
GFR SERPL CREATININE-BSD FRML MDRD: 60 ML/MIN/1.73SQ M
GLUCOSE SERPL-MCNC: 112 MG/DL (ref 65–140)
HCT VFR BLD AUTO: 35.4 % (ref 36.5–49.3)
HGB BLD-MCNC: 10.9 G/DL (ref 12–17)
MCH RBC QN AUTO: 19.9 PG (ref 26.8–34.3)
MCHC RBC AUTO-ENTMCNC: 30.8 G/DL (ref 31.4–37.4)
MCV RBC AUTO: 65 FL (ref 82–98)
PLATELET # BLD AUTO: 203 THOUSANDS/UL (ref 149–390)
PMV BLD AUTO: 8.6 FL (ref 8.9–12.7)
POTASSIUM SERPL-SCNC: 4.3 MMOL/L (ref 3.5–5.3)
RBC # BLD AUTO: 5.49 MILLION/UL (ref 3.88–5.62)
SODIUM SERPL-SCNC: 141 MMOL/L (ref 136–145)
TSH SERPL DL<=0.05 MIU/L-ACNC: 3.45 UIU/ML (ref 0.45–4.5)
VIT B12 SERPL-MCNC: 435 PG/ML (ref 100–900)
WBC # BLD AUTO: 5.44 THOUSAND/UL (ref 4.31–10.16)

## 2022-04-21 PROCEDURE — 85027 COMPLETE CBC AUTOMATED: CPT

## 2022-04-21 PROCEDURE — 99232 SBSQ HOSP IP/OBS MODERATE 35: CPT | Performed by: NEUROLOGICAL SURGERY

## 2022-04-21 PROCEDURE — 99232 SBSQ HOSP IP/OBS MODERATE 35: CPT | Performed by: INTERNAL MEDICINE

## 2022-04-21 PROCEDURE — 80048 BASIC METABOLIC PNL TOTAL CA: CPT

## 2022-04-21 PROCEDURE — 84443 ASSAY THYROID STIM HORMONE: CPT

## 2022-04-21 PROCEDURE — 97163 PT EVAL HIGH COMPLEX 45 MIN: CPT

## 2022-04-21 PROCEDURE — 82607 VITAMIN B-12: CPT

## 2022-04-21 PROCEDURE — 97116 GAIT TRAINING THERAPY: CPT

## 2022-04-21 PROCEDURE — 97535 SELF CARE MNGMENT TRAINING: CPT

## 2022-04-21 PROCEDURE — 99233 SBSQ HOSP IP/OBS HIGH 50: CPT | Performed by: SURGERY

## 2022-04-21 PROCEDURE — 97167 OT EVAL HIGH COMPLEX 60 MIN: CPT

## 2022-04-21 PROCEDURE — NC001 PR NO CHARGE: Performed by: SURGERY

## 2022-04-21 RX ADMIN — GABAPENTIN 100 MG: 100 CAPSULE ORAL at 08:43

## 2022-04-21 RX ADMIN — GABAPENTIN 100 MG: 100 CAPSULE ORAL at 15:45

## 2022-04-21 RX ADMIN — OXYCODONE HYDROCHLORIDE 5 MG: 5 TABLET ORAL at 18:05

## 2022-04-21 RX ADMIN — ACETAMINOPHEN 975 MG: 325 TABLET ORAL at 11:45

## 2022-04-21 RX ADMIN — ACETAMINOPHEN 975 MG: 325 TABLET ORAL at 20:24

## 2022-04-21 RX ADMIN — METOPROLOL SUCCINATE 25 MG: 25 TABLET, EXTENDED RELEASE ORAL at 08:43

## 2022-04-21 RX ADMIN — ENOXAPARIN SODIUM 30 MG: 30 INJECTION SUBCUTANEOUS at 20:24

## 2022-04-21 RX ADMIN — ACETAMINOPHEN 975 MG: 325 TABLET ORAL at 04:13

## 2022-04-21 RX ADMIN — ENOXAPARIN SODIUM 30 MG: 30 INJECTION SUBCUTANEOUS at 11:00

## 2022-04-21 RX ADMIN — OXYCODONE HYDROCHLORIDE 2.5 MG: 5 TABLET ORAL at 06:38

## 2022-04-21 RX ADMIN — AMIODARONE HYDROCHLORIDE 200 MG: 200 TABLET ORAL at 08:42

## 2022-04-21 RX ADMIN — GABAPENTIN 100 MG: 100 CAPSULE ORAL at 20:24

## 2022-04-21 NOTE — PROGRESS NOTES
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  · Continue cervical collar  · MAP > 85 x 5days  · Neurochecks  · Appreciate neurosurgery recommendations  · Will likely need elective surgical intervention after discharge    Recurrent syncope  Assessment & Plan  · Pt with multiple syncopal events over the last week, most notably when standing up  · Pt notes diarrhea last week, pt appears euvolemic less likely dehydration  · Likely related to orthostatic hypotension  · Currently normotensive with MAP goal > 85, antihypertensives on hold  · Echo - LVEF 00%, systolic function normal, no WMA  · Troponin negative  · Check orthostatics prior to ambulation  · Close electrolyte replacement  · Monitor volume status  · CBC, BMP in AM  · Check B12 level with paresthesia, syncope  · PT/OT eval  · OOB with assistance    Atrial fibrillation (HCC)  Assessment & Plan  · Patient has pacemaker in place, interrogated this admission without malfunction  · Holding home metoprolol with MAP goal >85  · Will continue home amiodarone  · 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  · SCD's for prophylaxis    Closed fracture of nasal bone  Assessment & Plan  · No anticipated OMFS intervention at this time, follow up as out patient  · Pain control  · Acetaminophen 975mg q8h ATC  · Oxycodone as needed    Presence of permanent cardiac pacemaker  Assessment & Plan  · Follow on telemetry  · Echo completes, see above    Essential hypertension  Assessment & Plan  · Hold home metoprolol and amlodipine  · Will trial volume resuscitation for MAP>85  · Can consider phenylephrine if needed    ----------------------------------------------------------------------------------------  HPI/24hr events: No acute event overnight, pain in hands relieved with tylenol  MAP maintain over 85  Patient appropriate for transfer out of the ICU today?: No  Disposition: Continue Stepdown Level 1 level of care   Code Status: Level 1 - Full Code  ---------------------------------------------------------------------------------------  SUBJECTIVE  Pt notes pain in bilateral hands at rest, 9/10 aching, burning when touch, notes some numbness and tingling, pain does not radiate, pain medications and rest alleviate pain, palpation and use of hands makes it worse  Pt notes some congestion  Pt denies chest pain, ABD pain, nose/facial pain or pain anywhere else, denies shortness of breath or difficulty breathing, N/V, lightheadedness  Review of Systems   Constitutional: Positive for activity change and appetite change  HENT: Positive for congestion  Eyes: Negative  Negative for visual disturbance  Respiratory: Negative  Negative for cough, chest tightness and shortness of breath  Cardiovascular: Negative  Negative for chest pain, palpitations and leg swelling  Gastrointestinal: Negative  Negative for abdominal pain, nausea and vomiting  Endocrine: Negative  Genitourinary: Negative  Musculoskeletal: Negative  Skin: Negative  Allergic/Immunologic: Negative  Neurological: Positive for syncope, weakness and numbness  Negative for dizziness, light-headedness and headaches  Hematological: Negative  Psychiatric/Behavioral: Negative        Review of systems was reviewed and negative unless stated above in HPI/24-hour events ---------------------------------------------------------------------------------------  OBJECTIVE    Vitals   Vitals:    22 2300 22 0000 22 0003 22 0100   BP: 131/69 143/65  118/64   BP Location:       Pulse: 70 69  69   Resp: (!) 35 15  16   Temp:   99 °F (37 2 °C)    TempSrc:   Axillary    SpO2: 97% 93%  92%   Weight:       Height:         Temp (24hrs), Av 1 °F (36 7 °C), Min:97 8 °F (36 6 °C), Max:99 °F (37 2 °C)  Current: Temperature: 99 °F (37 2 °C)          Respiratory:  SpO2: SpO2: 92 %, SpO2 Device: O2 Device: None (Room air)       Invasive/non-invasive ventilation settings   Respiratory  Report   Lab Data (Last 4 hours)    None         O2/Vent Data (Last 4 hours)    None                Physical Exam  Vitals and nursing note reviewed  Constitutional:       General: He is awake  HENT:      Head:      Comments: Bilateral periorbital edema  Bilateral periorbital echymosis  Scab on nose  Dried blood in nares     Nose: Congestion present  Mouth/Throat:      Mouth: Mucous membranes are moist       Pharynx: Oropharynx is clear  Eyes:      Pupils: Pupils are equal, round, and reactive to light  Neck:      Comments: Cervical collar  Cardiovascular:      Rate and Rhythm: Normal rate  Pulses:           Radial pulses are 2+ on the right side and 2+ on the left side  Dorsalis pedis pulses are 2+ on the right side and 2+ on the left side  Heart sounds: Normal heart sounds  Comments: Afib, vpaced  Pulmonary:      Effort: Pulmonary effort is normal       Breath sounds: Normal breath sounds and air entry  Abdominal:      General: Bowel sounds are normal  There is no distension  Palpations: Abdomen is soft  Tenderness: There is no abdominal tenderness  There is no guarding  Genitourinary:     Comments: Catheter  Musculoskeletal:      Right hand: Swelling and tenderness present  Decreased range of motion  Decreased strength        Left hand: Swelling and tenderness present  Decreased range of motion  Decreased strength  Right lower leg: No edema  Left lower leg: No edema  Comments: Bilateral hand numbness, tingling and pain to touch, with flexion/extension  Right hand strength 4/5  Left hand strength 4/5  Able to shrug shoulders, lift arms past 90 degrees  Able to feel light touch in bilateral hands   Skin:     General: Skin is warm and dry  Capillary Refill: Capillary refill takes less than 2 seconds  Neurological:      General: No focal deficit present  Mental Status: He is alert  GCS: GCS eye subscore is 4  GCS verbal subscore is 5  GCS motor subscore is 6  Motor: Weakness present  Psychiatric:         Mood and Affect: Mood normal          Behavior: Behavior normal  Behavior is cooperative  Laboratory and Diagnostics:  Results from last 7 days   Lab Units 04/20/22  0946   WBC Thousand/uL 5 25   HEMOGLOBIN g/dL 11 2*   HEMATOCRIT % 35 7*   PLATELETS Thousands/uL 209   NEUTROS PCT % 77*   MONOS PCT % 8     Results from last 7 days   Lab Units 04/20/22  0947   SODIUM mmol/L 141   POTASSIUM mmol/L 4 6   CHLORIDE mmol/L 108   CO2 mmol/L 24   ANION GAP mmol/L 9   BUN mg/dL 27*   CREATININE mg/dL 1 19   CALCIUM mg/dL 8 5   GLUCOSE RANDOM mg/dL 129   ALT U/L 22   AST U/L 18   ALK PHOS U/L 119*   ALBUMIN g/dL 3 5   TOTAL BILIRUBIN mg/dL 0 42     Results from last 7 days   Lab Units 04/20/22  0947   MAGNESIUM mg/dL 2 4      Results from last 7 days   Lab Units 04/20/22  0947   INR  0 99   PTT seconds 31          Results from last 7 days   Lab Units 04/20/22  0946   LACTIC ACID mmol/L 0 9     ABG:    VBG:          Micro        EKG: Atrial flutter with ventricular pacing  Imaging: I have personally reviewed pertinent reports  and I have personally reviewed pertinent films in PACS   CT head w/o contrast - No acute intracranial abnormalities   Mildly depressed nasal bone  CT facial bones w/o contrast - Mildly depressed nasal bone fracture  CT cervical spine w/o - No cervical spine fracture or traumatic malalignment  CXR - No acute cardiopulmonary disease  Xray RIGHT shoulder, wrist, forearm - No acute osseous abnormality  MRI cervical spine w/o contrast - Image quality severely degraded by patient motion artifact  Determining cord signal abnormality is particularly limited secondary to motion artifact  Questionable mild prevertebral soft tissue swelling  There is spinal cord compression at the C5-6 level, and near spinal cord compression at the C6-7 level secondary to spondylotic degenerative disease  Bilateral foraminal narrowing is also noted at these levels  Moderate central and bilateral foraminal stenosis at C4-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  Intake and Output  I/O     None          Height and Weights   Height: 5' 5" (165 1 cm)  IBW (Ideal Body Weight): 61 5 kg  Body mass index is 27 73 kg/m²  Weight (last 2 days)     Date/Time Weight    04/20/22 1559 75 6 (166 67)    04/20/22 1435 76 7 (169)    04/20/22 0901 77 1 (169 97)    04/20/22 0853 91 3 (201 28)            Nutrition       Diet Orders   (From admission, onward)             Start     Ordered    04/20/22 1654  Diet Regular; Regular House  Diet effective now        References:    Nutrtion Support Algorithm Enteral vs  Parenteral   Question Answer Comment   Diet Type Regular    Regular Regular House    RD to adjust diet per protocol?  Yes        04/20/22 1653                  Active Medications  Scheduled Meds:  Current Facility-Administered Medications   Medication Dose Route Frequency Provider Last Rate    acetaminophen  975 mg Oral Q8H Bari Ba PA-C      amiodarone  200 mg Oral Daily Bari Ba PA-C      gabapentin  100 mg Oral TID Bernadette Osullivan PA-C      metoprolol succinate  25 mg Oral Daily Bari Ba PA-C      ondansetron  4 mg Intravenous Q6H PRN Bernadette Osullivan PA-C      oxyCODONE  2 5 mg Oral Q4H PRN Bernadette Osullivan PA-C     [facility] oxyCODONE  5 mg Oral Q4H PRN Monica Hutson PA-C       Continuous Infusions:     PRN Meds:   ondansetron, 4 mg, Q6H PRN  oxyCODONE, 2 5 mg, Q4H PRN  oxyCODONE, 5 mg, Q4H PRN        Invasive Devices Review  Invasive Devices  Report    Peripheral Intravenous Line            Peripheral IV 04/20/22 Left Antecubital <1 day          Drain            External Urinary Catheter <1 day                Rationale for remaining devices:   Peripheral IV - Medications  Barraza catheter - Accurate I&O  ---------------------------------------------------------------------------------------  Advance Directive and Living Will: No  Power of : Philomena Dave (daughter)  POLST: No  ---------------------------------------------------------------------------------------  Care Time Delivered:   No Critical Care time spent       ARPIT Sebastian      Portions of the record may have been created with voice recognition software  Occasional wrong word or "sound a like" substitutions may have occurred due to the inherent limitations of voice recognition software    Read the chart carefully and recognize, using context, where substitutions have occurred

## 2022-04-21 NOTE — ASSESSMENT & PLAN NOTE
· No anticipated OMFS intervention at this time, follow up as out patient  · Pain control  · Acetaminophen 975mg q8h ATC  · Oxycodone as needed

## 2022-04-21 NOTE — PLAN OF CARE
Problem: PHYSICAL THERAPY ADULT  Goal: Performs mobility at highest level of function for planned discharge setting  See evaluation for individualized goals  Description: Treatment/Interventions: Functional transfer training,LE strengthening/ROM,Therapeutic exercise,Endurance training,Patient/family training,Equipment eval/education,Bed mobility,Gait training  Equipment Recommended: Amari Scripture       See flowsheet documentation for full assessment, interventions and recommendations  Note: Prognosis: Fair  Problem List: Decreased strength,Decreased endurance,Impaired balance,Decreased mobility,Decreased safety awareness,Impaired sensation,Orthopedic restrictions,Pain  Assessment: Pt is a 81 y o  male seen for PT evaluation s/p admit to [address] [place] [address] on 4/20/2022 w/ Paresthesias  Order placed for PT  Comorbidities affecting pt's physical performance at time of assessment include: HTN  Personal factors affecting pt at time of IE include: steps to enter environment, limited home support, advanced age, past experience, inability to perform IADLs, inability to perform ADLs, inability to ambulate household distances, limited insight into impairments and recent fall(s)  Prior to admission, pt was was independent w/ all functional mobility w/ out AD, ambulated household distances and lived alone  Upon evaluation: Pt requires min A for sit to stand, mod A for ambulation without AD, and min A for ambulation with RW  (Please find full objective findings from PT assessment regarding body systems outlined above)  Impairments and limitations also listed above, especially due to  weakness, decreased ROM, impaired balance, decreased endurance, gait deviations, pain, decreased activity tolerance, decreased safety awareness, fall risk and orthopedic restrictions    Pt's clinical presentation is currently unstable/unpredictable seen in pt's presentation of decreased safety awareness, fall risk, new cervical collar, and limited insight into deficits  Pt to benefit from continued skilled PT tx while in hospital and upon DC to address deficits as defined above and maximize level of functional mobility  From PT/mobility standpoint, recommendation at time of d/c would be inpatient rehab pending progress  Recommend progression of ambulation and initiation of HEP as appropriate  PT Discharge Recommendation: Post acute rehabilitation services          See flowsheet documentation for full assessment

## 2022-04-21 NOTE — PROGRESS NOTES
[facility]  Transfer Note - Sachin Juárez 1935, 81 y o  male MRN: [id number]  Unit/Bed#: ICU 04 Encounter: [id number]  Primary Care Provider: Aki Mancera DO   Date and time admitted to hospital: 4/20/2022  8:49 AM    * Paresthesias  Assessment & Plan  · Waxing/waning  · Continue cervical collar  · Continue available pain control  · Frequent neurologic exams  · Maintain MAP>85  · Appreciate neurosurgery recommendations    Recurrent syncope  Assessment & Plan  - Patient with multiple syncopal episodes over the last week (as many as 4)  He describes the episodes beginning after significant amount of diarrhea last week  He notes that since that time, he has been getting some dizziness when standing up  - Suspected syncope may be related to dehydration and orthostatic hypotension   - Cardiac pacemaker interrogated by ED staff with no reported significant events  - Monitor on telemetry for 24 hours  - ECHO within normal limits  - Encourage adequate oral intake  - Monitor volume status  - Obtain orthostatic vital signs   - Patient only to be up with assistance  - Monitor labs including CBC and electrolytes  No evidence of new or significant acute anemia, electrolyte abnormality, or renal impairment on initial lab assessment  Atrial fibrillation (HCC)  Assessment & Plan  - Chronic history of atrial fibrillation without evidence of rapid ventricular response at this time  - Continue home medication regimen with amiodarone, but will hold beta-blocker at this time secondary to concern for spinal cord injury in setting of bilateral upper extremity paresthesias and Neurosurgery recommendations for MAP >85  - Will resume beta-blocker therapy MAP requirements relaxed  - Closely monitor heart rate  - Pacemaker interrogated without significant event noted as 04/20/2022  - Monitoring on telemetry for least 24 hours in setting of syncope        Closed fracture of nasal bone  Assessment & Plan  - Acute depressed nasal bone fracture, present on presentation   - No imeediate need for surgical intervention, outpatient follow-up with OMFS  - Ice and analgesia as needed  - Maintain sinus precautions  Presence of permanent cardiac pacemaker  Assessment & Plan  - Presence of permanent cardiac pacemaker   - Confirmed to be MR compatible with cardiology clinic  Essential hypertension  Assessment & Plan  - Chronic history of hypertension   - Patient currently normotensive at the time of his initial trauma evaluation  Suspect possible orthostatic hypotension as cause of recurrent syncope  - Will hold antihypertensive medications on admission   - Obtain orthostatic vital signs   - Monitor blood pressure with goal MAP >85 per Neurosurgery  Code Status: Level 1 - Full Code  POA:    POLST:      Reason for ICU admission:   Cervical cord compression    Active problems:   Principal Problem:    Paresthesias  Active Problems:    Recurrent syncope    Atrial fibrillation (HCC)    Closed fracture of nasal bone    Essential hypertension    Presence of permanent cardiac pacemaker  Resolved Problems:    * No resolved hospital problems  *      Consultants:   Neurosurgery, gerontology    History of Present Illness:   Patient 67-year-old male presented on 04/20 for syncopal event and fall on face  He has a past medical history of atrial fibrillation on anticoagulation secondary to history of subdural, permanent pacemaker, hypertension, and frequent falls  His injuries were notable for nasal bone fracture and bilateral upper extremity paresthesias  His pacemaker was interrogated in the emergency room and found to be functioning correctly  He was seen by Neurosurgery who recommended maintaining mean arterial pressure greater than 85 and transferred to the critical care unit for close monitoring      Summary of clinical course:   His cervical MRI did not demonstrate any acute changes in the cord signal however demonstrated significant degenerative changes with possible cord compression  His home antihypertensives were held and he is able to maintain a mean arterial pressure greater than 85 without clinical assistant  He has had improving strength in his bilateral upper extremities  Neurosurgery at this point is planning to reassess the patient the outpatient world for possible decompressive surgery  She is seen by OMFS planned outpatient follow-up and no surgical intervention at this time  At this point he is felt to be stable for transition to medical-surgical floor  Recent or scheduled procedures:   4/20 ECHO- ejection fraction 60%, abnormal septal motion consistent with right ventricular pacing, right ventricle mildly dilated, left atrium mildly dilated, mild aortic regurgitation, mild aortic stenosis, mild mitral regurgitation, moderate tricuspid regurgitation with elevated right systolic pressures  0/77 MRI Cervical spine- questionable mild prevertebral soft tissue swelling, spinal cord compression at C5-C6 and near spinal cord compression at C6-C7 secondary to spondylitic degenerative disease HC, bilateral foraminal narrowing is also noted at these levels, moderate central and bilateral foraminal stenosis at C4-C5  4/20 Right forearm xray- no acute osseous abnormality  4/20 Right shoulder xray- degenerative changes, no acute osseous abnormality  4/20 Right wrist xray- degenerative changes, no acute osseous abnormality  4/20 CXR- no acute cardiopulmonary disease  4/20 CT Facial bones- mildly depressed nasal bone fracture  4/20 CT Cervical spine- no cervical spine fracture or traumatic malalignment  4/20 pacemaker interrogation, showing no dysfunction    Outstanding/pending diagnostics:   Outpatient OMFS, outpatient neurosurgery- requesting cardiac clearance prior, PT/OT    Cultures:   None       Mobilization Plan:    With assistance    Nutrition Plan:   Regular diet    Invasive Devices Review  Invasive Devices  Report    Peripheral Intravenous Line            Peripheral IV 04/20/22 Left Antecubital 1 day    Peripheral IV 04/21/22 Left Hand <1 day          Drain            External Urinary Catheter <1 day                Rationale for remaining devices: IV access, discontinue catheter    VTE Pharmacologic Prophylaxis: Enoxaparin (Lovenox)  VTE Mechanical Prophylaxis: sequential compression device    Discharge Plan:   Patient should be ready for discharge to home/rehab after PT/OT, MAP push    Initial Physical Therapy Recommendations: Pending  Initial Occupational Therapy Recommendations: Pending   Initial /Plan: Pending    Home medications that are not reordered and reason why:   Metoprolol/amlodipine- maintain MAP>85    Spoke with Alden Felty, PA-C regarding transfer  Please contact critical care via [employer] with any questions or concerns  Portions of the record may have been created with voice recognition software  Occasional wrong word or "sound a like" substitutions may have occurred due to the inherent limitations of voice recognition software  Read the chart carefully and recognize, using context, where substitutions have occurred

## 2022-04-21 NOTE — ASSESSMENT & PLAN NOTE
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

## 2022-04-21 NOTE — ASSESSMENT & PLAN NOTE
· Waxing/waning  · Continue cervical collar  · Continue available pain control  · Frequent neurologic exams  · Maintain MAP>85  · Appreciate neurosurgery recommendations

## 2022-04-21 NOTE — OCCUPATIONAL THERAPY NOTE
Occupational Therapy Evaluation     Patient Name: Eddie Gaona  BGISB'P Date: 4/21/2022  Problem List  Principal Problem:    Paresthesias  Active Problems:    Essential hypertension    Atrial fibrillation (HCC)    Presence of permanent cardiac pacemaker    Recurrent syncope    Closed fracture of nasal bone    Past Medical History  Past Medical History:   Diagnosis Date    A-fib ([facility] )     Hernia, abdominal     Hypertension     Subdural hematoma ([facility] )      Past Surgical History  Past Surgical History:   Procedure Laterality Date    A-V CARDIAC PACEMAKER INSERTION      APPENDECTOMY      age 33    CARDIAC PACEMAKER PLACEMENT      CARPAL TUNNEL RELEASE      COLONOSCOPY      INGUINAL HERNIA REPAIR Left     GA XCAPSL CTRC RMVL INSJ IO LENS PROSTH W/O ECP Right 4/3/2017    Procedure: EXTRACTION EXTRACAPSULAR CATARACT PHACO INTRAOCULAR LENS (IOL); Surgeon: Melanie Olmos MD;  Location: [facility] MAIN OR;  Service: Ophthalmology    TONSILLECTOMY      age 12        04/21/22 1029   OT Last Visit   OT Visit Date 04/21/22  (Thursday)   Note Type   Note type Evaluation   Restrictions/Precautions   Weight Bearing Precautions Per Order No  (C spine and sinus precautions)   Braces or Orthoses C/S Collar; Other (Comment)  (Henriette [place] collar)   Other Precautions Chair Alarm; Bed Alarm;Multiple lines;Telemetry;Spinal precautions; Fall Risk;Pain;Hard of hearing   Pain Assessment   Pain Assessment Tool FLACC   Pain Location/Orientation Orientation: Right;Location: Arm;Location: Neck  (shoulder)   Effect of Pain on Daily Activities limits activity tolerance and I w/ ADLs   Patient's Stated Pain Goal No pain   Hospital Pain Intervention(s) Repositioned; Ambulation/increased activity; Emotional support   Pain Rating: FLACC (Rest) - Face 0   Pain Rating: FLACC (Rest) - Legs 0   Pain Rating: FLACC (Rest) - Activity 0   Pain Rating: FLACC (Rest) - Cry 0   Pain Rating: FLACC (Rest) - Consolability 0   Score: FLACC (Rest) 0   Pain Rating: FLACC (Activity) - Face 1   Pain Rating: FLACC (Activity) - Legs 1   Pain Rating: FLACC (Activity) - Activity 1   Pain Rating: FLACC (Activity) - Cry 1   Pain Rating: FLACC (Activity) - Consolability 1   Score: FLACC (Activity) 5   Home Living   Type of Home House; Other (Comment)  (2 SH w/ 1st floor set- up)   Home Layout Two level;Performs ADLs on one level; Able to live on main level with bedroom/bathroom   Bathroom Shower/Tub Tub/shower unit   Bathroom Toilet Standard   Bathroom Equipment   (not using DME; may have shower chair from late wife)   [address] [facility]; Other (Comment)  (not using AD PTA, has walkers from his wife)   Additional Comments Pt lives alone in [address] [facility] w/ 1st floor set- up  Daughter, Reymundo howard during eval assisted w/ detailed history  Cameras throughout house so family can monitor pt  Prior Function   Level of [place] Independent with ADLs and functional mobility   Lives With Alone   Receives Help From Family; Other (Comment)  (supportive local family)   ADL Assistance Independent   IADLs Independent  (+ drive, goes to dog park daily)   Falls in the last 6 months 5 to 10  (4 week of admit)   Vocational Retired   Comments Pt reports I and active lifestyle PTA w/ out use of AD or DME  + drive and enjoys splitting wood outside, going to dog park   Lifestyle   Autonomy Pt reports I w/ ADL PTA w/ out use of AD or DME   Reciprocal Relationships Supportive local family but pt lives alone  Service to Others 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 [employer] after serving 2 years in [place] [address] reports enjoying his dog, working outside and around the [address] [address] "I want to get out of here and go home"   ADL   Where Assessed Edge of bed  (vs OOB in chair)   [address]  [facility]86; Increased time to complete; Other (Comment); Verbal cueing  (due to [employer], weak grasp)   Grooming Assistance 5  Supervision/Setup  (seated OOB In chair)   Grooming Deficit Setup;Supervision/safety; Increased time to complete;Verbal cueing   UB Bathing Assistance Unable to assess   LB Bathing Assistance Unable to assess   UB Dressing Assistance 4  Minimal Assistance   UB Dressing Deficit Setup;Verbal cueing;Supervision/safety; Increased time to complete;Pull around back; Fasteners   LB Dressing Assistance 2  Maximal Assistance   LB Dressing Deficit Don/doff R sock; Don/doff L sock; Fasteners; Thread RLE into pants; Thread LLE into pants;Setup; Requires assistive device for steadying;Steadying; Increased time to complete;Supervision/safety;Verbal cueing   Toileting Assistance  Unable to assess  (RN removed condom catheter during eval)   Toileting Deficit Bedside commode; Other (Comment)  (recommend use of commode over toilet)   Additional Comments Educated pt on tech to complete LBD while seated w/ LE crossed over opposite  May benefit from [facility]   Bed Mobility   Rolling L 4  Minimal assistance   Additional items Assist x 1; Increased time required; Bedrails;Verbal cues   Supine to Sit 4  Minimal assistance   Additional items Assist x 1; Increased time required;Verbal cues;LE management; Bedrails   Sit to Supine Unable to assess   Additional Comments Pt seated OOB in chair post eval w/ needs met, call bell in reach and chair alarm activated  Daughter present   Transfers   Sit to Stand 4  Minimal assistance  (min A <> (close) S)   Additional items Assist x 1;Bedrails;Armrests; Impulsive;Verbal cues   Stand to Sit 4  Minimal assistance   Additional items Assist x 1;Bedrails;Armrests; Verbal cues; Impulsive   Additional Comments Pt performed sit <> stand 2 X during eval w/ min A  Pt benefits from cues for pacing and hand placement  Functional Mobility   Functional Mobility 4  Minimal assistance   Additional Comments min HHA few feet from EOB to chair   Pt reaching for UE support Additional items Hand hold assistance   Balance   Static Sitting Fair +   Static Standing Fair -   Ambulatory Poor +   Activity Tolerance   Activity Tolerance Patient limited by fatigue;Patient limited by pain   Medical Staff Made Aware care coordination w/ PT, Jose Acosta and spoke to [facility]   Nurse Made Aware per RNClifton appropriate to see pt   RUE Assessment   RUE Assessment X   RUE Strength   RUE Overall Strength Deficits;Due to pain; Other (Comment)  (grasp strength grossly 3+/5)   R Shoulder Flexion 2+/5   R Shoulder Extension 3/5   LUE Assessment   LUE Assessment X  (limited AROM AG shoulder >90*; grasp grossly 3+/5)   LUE Strength   LUE Overall Strength Deficits  (able to participate in ADLs)   Hand Function   Gross Motor Coordination Impaired  (unable to isolate digit movement; weak grasp)   Fine Motor Coordination Impaired  (difficulty grasping utensils, managing tv channels)   Sensation   Light Touch No apparent deficits  (B UE to light touch)   Sharp/Dull Not tested   Additional Comments Pt reports numbness tingling R UE >L   Cognition   Overall Cognitive Status   (will continue to assess; able to participate in conversation)   Arousal/Participation Alert; Cooperative   Attention Attends with cues to redirect  (+ hard of hearing)   Orientation Level Oriented to person;Oriented to place;Oriented to situation   Memory Decreased recall of recent events; Other (Comment)  (details, timeline; fall history)   Following Commands Follows multistep commands with increased time or repetition   Comments Identified pt by full name and birthdate  Alert and generally oriented  Able to participate in conversation and follow directions w/ + time  Demonstrated limited insight into deficits  Recommend ongoing eval of functional cognitive skills to assist in DC planning   Assessment   Limitation Decreased ADL status; Decreased UE strength;Decreased UE ROM; Decreased cognition;Decreased endurance;Decreased self-care trans;Decreased high-level ADLs; Decreased fine motor control   Goals   Patient Goals Pt stated that he would like to return home   Plan   Treatment Interventions ADL retraining;Functional transfer training;UE strengthening/ROM; Endurance training;Patient/family training;Equipment evaluation/education; Compensatory technique education;Continued evaluation; Fine motor coordination activities; Energy conservation; Activityengagement   Goal Expiration Date 05/01/22   OT Frequency 3-5x/wk   Additional Treatment Session   Start Time 1029   End Time 1019   Treatment Assessment Pt seen for skilled OT tx session day 1 following eval from 0534-5417 focusing on challenging activity and functional mobility using RW  Pt agreeable to participate in session  Pt required S w/ cues for hand placement, pacing to complete sit to stand from recliner chair  Pt engaged in functional mobility w/ min A (CG/ steadying) using RW w/ in room  Pt benefits from cues for walker mgmt and body position  Educated pt on use of built up utensils using tubi  to max I w/ feeding and grooming  Provided w/ tubi   Continue to recommend post acute rehab when medically stable for discharge from acute care  Will continue to follow   Additional Treatment Day 1  (Thursday)   Recommendation   OT Discharge Recommendation Post acute rehabilitation services   Equipment Recommended Bedside commode; Shower/Tub chair with back ($)   AM-PAC Daily Activity Inpatient   Lower Body Dressing 3   Bathing 3   Toileting 3   Upper Body Dressing 3   Grooming 3   Eating 3   Daily Activity Raw Score 18   Daily Activity Standardized Score (Calc for Raw Score >=11) 38 66   AM-PAC Applied Cognition Inpatient   Following a Speech/Presentation 3   Understanding Ordinary Conversation 4   Taking Medications 3   Remembering Where Things Are Placed or Put Away 4   Remembering List of 4-5 Errands 3   Taking Care of Complicated Tasks 3   Applied Cognition Raw Score 20   Applied Cognition Standardized Score 41 76   Barthel Index   Feeding 5   Bathing 0   Grooming Score 0   Dressing Score 5   Bladder Score 5   Bowels Score 10   Toilet Use Score 5   Transfers (Bed/Chair) Score 10   Mobility (Level Surface) Score 0   Stairs Score 0   Barthel Index Score 40   Modified Silvio Scale   Modified [place] Scale 4   Pt is an 82yo male [facility]22/ as trauma following a fall from home  Diagnosed w/ recurrent syncope, closed fracture of nasal bone, C4-C6 central and foraminal stenoses  Per neurosurgery recommend [employer] collar, MAP> 85 for 5-7 days for central cord syndrome  OMFS recommends sinus precautions, ice  Significant PMH impacting his occupational performance includes a-fib, HTN, hx subdural hematoma, pacemaker, carpal tunnel release, R thumb fracture  Pt w/ active OT orders and activity orders  Personal factors impacting performance includes advanced age, lives alone, increased pain, difficulty managing stairs  Pt reports living alone ini 2 SH w/ 1st floor set- up PTA  Pt reports I w/ ADL/ IADL including driving w/ out use of AD or DME  Upon eval, pt alert and generally oriented  Limited recall details, timeline events  Able to participate in conversation and communicate wants / needs w/ + time due to hard of hearing  Pt required S to complete grooming seated w/ + time, min A for UBD, and max A for LBD  Pt required min A to complete bed mobility, sit <> stand and short distance functional mobility  Pt reports L hand dominance and demonstrated L UE coordination > R  Pt presents w/ increased pain, decreased UE ROM / strength / coordination, decreased standing tolerance, decreased standing balance, generalized weakness / deconditioning, limited insight into deficits, activity restrictions impacting his I w/ dressing, bathing, oral hygiene, functional mobility, functional transfers, activity engagement, clothing mgmt, pet care, food prep / clean up, and community mobility   Pt would benefit from OT while in acute care to address deficits and post acute rehab when medically stable for discharge from acute care  Recommend Fitness to Drive assessment prior to driving when cleared by MD  Will continue to follow pt in acute care  The patient's raw score on the AM-PAC Daily Activity inpatient short form is 18, standardized score is 38 66, less than 39 4  Patients at this level are likely to benefit from discharge to post-acute rehabilitation services  Please refer to the recommendation of the Occupational Therapist for safe discharge planning       Pt goals to be met by 5/1/22:  -Pt will demonstrate good attention and participation in continued evaluation to assess functional cognitive skills and health literacy to assist in [address] [place] [address]    -Pt will consistently follow multi step directions during ADLs w/ good recall to max I to return home alone    -Pt will complete functional transfers to bed, chair, and toilet using AD, DME as needed w/ mod I to max I w/ ADLs and return home    -Pt will consistently engage in functional mobility household distances w/ S using AD to max I and improve engagement    -Pt will demonstrate improved functional standing tolerance for at least 10 minutes w/ fair + balance to participate in grooming to max I to return home take dog to park    -Pt will complete LBD w/ S using LHAE as needed to max I    -Pt will complete bed mobility supine <> sit w/ S to max I w/ ADLs    -Pt will demonstrated improved UE strength and coordination to at least 4-/5 to max I w/ feeding and grooming    Oletta Libman, OTR/GENESIS

## 2022-04-21 NOTE — PROGRESS NOTES
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  -patient improving with strength and no longer has paresthesias   -Conservative therapy advised maintaining in arterial pressure goal of 85 or greater      3  History of atrial fibrillation in the past with pacemaker placement  -patient is being followed by Cardiology  -patient on amiodarone and metoprolol     4  Closed fracture of nasal bone  -OMS evaluated patient and advised pain control and will monitor as outpatient     5  Chronic renal insufficiency stage II  -continue to monitor renal status  eGFR of 60 today      6  Benign prostatic hypertrophy  -patient had been taking Flomax as an outpatient  -patient has nocturia and gets up approximately 4-5 times at nighttime prior to taking medication      7  History of previous subdural     8  Anemia with microcytic indices  -had been labeled as iron deficient suspect patient has underlying beta thalassemia minor based on his low MCVs and the fact that his iron levels had been normal   -gene test for beta-thalassemia pending    Subjective:   Patient evaluated at bedside today with his daughter  Patient states the "pins and needles" feeling is gone today  Patient does mention pain in his right thumb; XR of R wrist revealed no acute fracture or dislocation  Patient states he received Tylenol for the thumb pain and it improved  Patient states he has not had a BM in 3 days but does not feel constipated or have any abdominal pain  Review of Systems   Constitutional: Negative for chills and fever     HENT: Negative for ear pain, sore throat and trouble swallowing  Eyes: Negative for pain and visual disturbance  Respiratory: Negative for cough and shortness of breath  Cardiovascular: Negative for chest pain and palpitations  Gastrointestinal: Positive for constipation (Patient has not had a BM in 3 days)  Negative for abdominal pain and vomiting  Genitourinary: Positive for frequency (patient has BPH)  Negative for dysuria and hematuria  Musculoskeletal: Negative for arthralgias and back pain  Right thumb pain, improved with Tylenol     Skin: Negative for color change and rash  Neurological: Negative for seizures, syncope, numbness (Patient states this has improved) and headaches  Psychiatric/Behavioral: Negative for agitation and confusion  All other systems reviewed and are negative  Objective:     Vitals: Blood pressure 144/74, pulse 69, temperature 97 7 °F (36 5 °C), temperature source Oral, resp  rate 22, height 5' 5" (1 651 m), weight 77 kg (169 lb 12 1 oz), SpO2 93 %  ,Body mass index is 28 25 kg/m²  Intake/Output Summary (Last 24 hours) at 4/21/2022 0911  Last data filed at 4/21/2022 0557  Gross per 24 hour   Intake --   Output 250 ml   Net -250 ml       Current Medications: Reviewed    Physical Exam:   Physical Exam  Vitals and nursing note reviewed  Constitutional:       General: He is not in acute distress  Appearance: He is well-developed  Interventions: Cervical collar in place  HENT:      Head: Normocephalic and atraumatic  Right Ear: External ear normal       Left Ear: External ear normal       Nose:      Comments: Scab from fall    Eyes:      Conjunctiva/sclera: Conjunctivae normal    Cardiovascular:      Rate and Rhythm: Normal rate and regular rhythm  Heart sounds: No murmur heard  Pulmonary:      Effort: Pulmonary effort is normal  No respiratory distress  Breath sounds: Normal breath sounds     Abdominal:      General: Bowel sounds are normal       Palpations: Abdomen is soft  Tenderness: There is no abdominal tenderness  Musculoskeletal:      Cervical back: Neck supple  Comments: SCDs in place of the LE bilaterally   Skin:     General: Skin is warm and dry  Findings: Bruising present  Comments: Bruising of the face secondary to fall   Neurological:      Mental Status: He is alert and oriented to person, place, and time  Mental status is at baseline  Psychiatric:         Mood and Affect: Mood normal          Behavior: Behavior normal           Invasive Devices  Report    Peripheral Intravenous Line            Peripheral IV 04/20/22 Left Antecubital <1 day    Peripheral IV 04/21/22 Left Hand <1 day          Drain            External Urinary Catheter <1 day                Lab, Imaging and other studies: I have personally reviewed pertinent reports  Note transcribed by MICK Rosenbaum   Patient evaluated and note reviewed by Dr Vaibhav Dobson MD

## 2022-04-21 NOTE — PLAN OF CARE
Problem: OCCUPATIONAL THERAPY ADULT  Goal: Performs self-care activities at highest level of function for planned discharge setting  See evaluation for individualized goals  Description: Treatment Interventions: ADL retraining,Functional transfer training,UE strengthening/ROM,Endurance training,Patient/family training,Equipment evaluation/education,Compensatory technique education,Continued evaluation,Fine motor coordination activities,Energy conservation,Activityengagement  Equipment Recommended: Bedside commode,Shower/Tub chair with back ($)       See flowsheet documentation for full assessment, interventions and recommendations     Note: Limitation: Decreased ADL status,Decreased UE strength,Decreased UE ROM,Decreased cognition,Decreased endurance,Decreased self-care trans,Decreased high-level ADLs,Decreased fine motor control           OT Discharge Recommendation: Post acute rehabilitation services

## 2022-04-21 NOTE — ASSESSMENT & PLAN NOTE
· Patient has pacemaker in place, interrogated this admission without malfunction  · Holding home metoprolol with MAP goal >85  · Will continue home amiodarone  · 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  · SCD's for prophylaxis

## 2022-04-21 NOTE — ASSESSMENT & PLAN NOTE
- Presence of permanent cardiac pacemaker   - Confirmed to be MR compatible with cardiology clinic  Yes

## 2022-04-21 NOTE — ASSESSMENT & PLAN NOTE
- Chronic history of atrial fibrillation without evidence of rapid ventricular response at this time  - Continue home medication regimen with amiodarone, but will hold beta-blocker at this time secondary to concern for spinal cord injury in setting of bilateral upper extremity paresthesias and Neurosurgery recommendations for MAP >85  - Will resume beta-blocker therapy MAP requirements relaxed  - Closely monitor heart rate  - Pacemaker interrogated without significant event noted as 04/20/2022  - Monitoring on telemetry for least 24 hours in setting of syncope

## 2022-04-21 NOTE — ASSESSMENT & PLAN NOTE
- Acute depressed nasal bone fracture, present on presentation   - No imeediate need for surgical intervention, outpatient follow-up with OMFS  - Ice and analgesia as needed  - Maintain sinus precautions

## 2022-04-21 NOTE — ASSESSMENT & PLAN NOTE
· Pt with multiple syncopal events over the last week, most notably when standing up  · Pt notes diarrhea last week, pt appears euvolemic less likely dehydration  · Likely related to orthostatic hypotension  · Currently normotensive with MAP goal > 85, antihypertensives on hold  · Echo - LVEF 95%, systolic function normal, no WMA  · Troponin negative  · Check orthostatics prior to ambulation  · Close electrolyte replacement  · Monitor volume status  · CBC, BMP in AM  · Check B12 level with paresthesia, syncope  · PT/OT eval  · OOB with assistance

## 2022-04-21 NOTE — ASSESSMENT & PLAN NOTE
- Patient with multiple syncopal episodes over the last week (as many as 4)  He describes the episodes beginning after significant amount of diarrhea last week  He notes that since that time, he has been getting some dizziness when standing up  - Suspected syncope may be related to dehydration and orthostatic hypotension   - Cardiac pacemaker interrogated by ED staff with no reported significant events  - Monitor on telemetry for 24 hours  - ECHO within normal limits  - Encourage adequate oral intake  - Monitor volume status  - Obtain orthostatic vital signs   - Patient only to be up with assistance  - Monitor labs including CBC and electrolytes  No evidence of new or significant acute anemia, electrolyte abnormality, or renal impairment on initial lab assessment

## 2022-04-21 NOTE — PROGRESS NOTES
Neurosurgery progress note    Overnight events  No acute events overnight  Neurologic exam continues to improve since presentation in the ED  He reports, "I don't have the pins and needles in my hands anymore"      Vitals:    04/21/22 0600 04/21/22 0614 04/21/22 0700 04/21/22 0744   BP: (!) 140/108  153/81    Pulse: 69  69    Resp: (!) 23  22    Temp:    97 7 °F (36 5 °C)   TempSrc:    Oral   SpO2: 95%  93%    Weight:  77 kg (169 lb 12 1 oz)     Height:           Lab Results   Component Value Date/Time    SODIUM 141 04/21/2022 04:57 AM    K 4 3 04/21/2022 04:57 AM    WBC 5 44 04/21/2022 04:57 AM    HGB 10 9 (L) 04/21/2022 04:57 AM    HCT 35 4 (L) 04/21/2022 04:57 AM     04/21/2022 04:57 AM    PTT 31 04/20/2022 09:47 AM    INR 0 99 04/20/2022 09:47 AM         Intake/Output Summary (Last 24 hours) at 4/21/2022 0823  Last data filed at 4/21/2022 0557  Gross per 24 hour   Intake --   Output 250 ml   Net -250 ml         Current Facility-Administered Medications:     acetaminophen (TYLENOL) tablet 975 mg, 975 mg, Oral, Q8H, Bari Ba PA-C, 975 mg at 04/21/22 0413    amiodarone tablet 200 mg, 200 mg, Oral, Daily, Bari Ba PA-C    gabapentin (NEURONTIN) capsule 100 mg, 100 mg, Oral, TID, Erinn Licona PA-C, 100 mg at 04/20/22 2036    metoprolol succinate (TOPROL-XL) 24 hr tablet 25 mg, 25 mg, Oral, Daily, Bari Ba PA-C    ondansetron (ZOFRAN) injection 4 mg, 4 mg, Intravenous, Q6H PRN, Erinn Licona PA-C    oxyCODONE (ROXICODONE) IR tablet 2 5 mg, 2 5 mg, Oral, Q4H PRN, Erinn Licona PA-C, 2 5 mg at 04/21/22 0563    oxyCODONE (ROXICODONE) IR tablet 5 mg, 5 mg, Oral, Q4H PRN, Erinn Licona PA-C     Neurologic exam  Alert, oriented X 3  PERRL, EOMI  Face symmetric  5/5 in all extremities except bilateral hand intrinsics 4+/5, still limited by hyperalgesia but significantly improved since yesterday (patient agrees)  Sensation intact throughout    Assessment  Patient is an 71-year-old male with h/o atrial fibrillation not on [facility] [place] (h/o SDH in 2018 which was managed conservatively), cardiac pacemaker, hypertension, who presents after multiple syncopal events this past one week, most recently morning of 4/20 causing a fall forward from standing onto his face at home (+LOC)  Initially he could not move any extremities at home then his legs returned to baseline and weakness his arms gradually returned to near full strength (clinically consistent with central cord syndrome)  This morning, he continues to improve significantly in his BUE motor exam and is now 5/5 throughout except bilateral hand intrinsics 4+/5, which is significantly improved from yesterday  Denies significant neck pain today  Plan  I discussed with him again the plan for outpatient follow up to further discuss indication and timing of surgery (likely posterior C4-6 decompression and fusion) as well as the potential risks and complications  For now, we recommend medical optimization (he will need cardiology follow-up and ultimately clearance for surgery) and PT/OT  MAP goal > 85 for 5-7 days per guidelines, as he presented acutely with central cord syndrome s/p trauma  Devonte AARON    Neurosurgeon

## 2022-04-21 NOTE — PHYSICAL THERAPY NOTE
PHYSICAL THERAPY EVALUATION  NAME: Yajaira Thomas  AGE:   81 y o  MRN:  [id number]  ADMIT DX: Nasal fracture [S02  2XXA]  Paresthesia of hand, bilateral [R20 2]  Recurrent syncope [R55]  Epistaxis due to trauma [R04 0]    PMH:   Past Medical History:   Diagnosis Date    A-fib ([facility] [address] )     Hernia, abdominal     Hypertension     Subdural hematoma (HCC)      LENGTH OF STAY: 1       04/21/22 1030   PT Last Visit   PT Visit Date 04/21/22   Note Type   Note type Evaluation   Pain Assessment   Pain Assessment Tool FLACC   Pain Location/Orientation Orientation: Right;Location: [facility] Pain Intervention(s) Repositioned; Ambulation/increased activity   Pain Rating: FLACC (Rest) - Face 0   Pain Rating: FLACC (Rest) - Legs 0   Pain Rating: FLACC (Rest) - Activity 0   Pain Rating: FLACC (Rest) - Cry 0   Pain Rating: FLACC (Rest) - Consolability 0   Score: FLACC (Rest) 0   Pain Rating: FLACC (Activity) - Face 1   Pain Rating: FLACC (Activity) - Legs 1   Pain Rating: FLACC (Activity) - Activity 1   Pain Rating: FLACC (Activity) - Cry 1   Pain Rating: FLACC (Activity) - Consolability 1   Score: FLACC (Activity) 5   Restrictions/Precautions   Weight Bearing Precautions Per Order No   Braces or Orthoses C/S Collar  ([place] collar)   Other Precautions Chair Alarm; Bed Alarm;Multiple lines;Telemetry; Fall Risk;Spinal precautions;Hard of hearing  (nasal precautions)   Home Living   Type of [address] [place] [address] Two level; Able to live on main level with bedroom/bathroom  (pt stays on first floor)   Bathroom Shower/Tub Tub/shower unit   [address] [place] [address],[address]   Additional Comments Ambulates independently without AD at baseline  Family reports having cameras in home to monitor     Prior Function   Level of [place] Independent with ADLs and functional mobility   Lives With Alone   Receives Help From Family  (daughter is local)   ADL Assistance Independent   IADLs Independent  ((+) )   Falls in the last 6 months 5 to [employer] [place] Retired   Comments goes to dog park daily; chops wood    General   Family/Caregiver Present Yes   Cognition   Arousal/Participation Cooperative   Attention Attends with cues to redirect   Orientation Level Oriented to person;Oriented to place;Oriented to situation   Memory Decreased recall of recent events; Other (Comment)   Following Commands Follows multistep commands with increased time or repetition   Comments Pt identified by name and   Subjective   Subjective Agrees to PT evaluation  Pleasant and cooperative throughout session  RLE Assessment   RLE Assessment X   Strength RLE   RLE Overall Strength 4+/5  (grossly)   LLE Assessment   LLE Assessment X   Strength LLE   LLE Overall Strength 4+/5  (grossly)   Coordination   Sensation X  ("some tingling in UE")   Light Touch   RLE Light Touch Grossly intact   LLE Light Touch Grossly intact   Bed Mobility   Rolling L 4  Minimal assistance   Additional items Assist x 1; Increased time required;Verbal cues   Supine to Sit 4  Minimal assistance   Additional items Assist x 1; Increased time required;Verbal cues;LE management   Sit to Supine Unable to assess  (left OOB in chair post session )   Transfers   Sit to Stand 4  Minimal assistance   Additional items Assist x 1; Increased time required;Verbal cues;Armrests   Stand to Sit 4  Minimal assistance   Additional items Assist x 1; Armrests; Increased time required;Verbal cues   Ambulation/Elevation   Gait pattern Improper Weight shift; Forward Flexion;Decreased foot clearance; Short stride; Excessively slow; Wide TIN   Gait Assistance 3  Moderate assist   Additional items Assist x 1;Verbal cues   Assistive Device None  (HHA)   Distance 4` bed to chair   Balance   Static Sitting Fair +   Static Standing Fair -   Ambulatory Poor +  (with RW)   Endurance Deficit   Endurance Deficit Yes   Endurance Deficit Description limited ambulation distance, fatigue   Activity Tolerance   Activity Tolerance Patient limited by fatigue   Nurse Made Aware Per RN, pt appropriate to evaluate   Assessment   Prognosis Fair   Problem List Decreased strength;Decreased endurance; Impaired balance;Decreased mobility; Decreased safety awareness; Impaired sensation;Orthopedic restrictions;Pain   Goals   Patient Goals to go home   STG Expiration Date 05/01/22   Short Term Goal #1 Pt will be able to: (1) perform bed mobility with supervision to promote OOB activity (2) perform sit to stand with supervision to decrease burden of care (3) ambulate at least 200` with supervision and least restrictive AD to increase activity tolerance (4) increase standing balance by 1 grade to decrease risk of falls   PT Treatment Day 1   Plan   Treatment/Interventions Functional transfer training;LE strengthening/ROM; Therapeutic exercise; Endurance training;Patient/family training;Equipment eval/education; Bed mobility;Gait training   PT Frequency 4-6x/wk   Recommendation   PT Discharge Recommendation Post acute rehabilitation services   Equipment Recommended [facility] Recommended Wheeled walker   AM-PAC Basic Mobility Inpatient   Turning in Bed Without Bedrails 3   Lying on Back to Sitting on Edge of Flat Bed 2   Moving Bed to Chair 2   Standing Up From Chair 3   Walk in Room 3   Climb 3-5 Stairs 2   Basic Mobility Inpatient Raw Score 15   Basic Mobility Standardized Score 36 97   Highest Level Of Mobility   -HL Goal 4: Move to chair/commode   -[facility] Highest Level of Mobility 7: Walk 25 feet or more   -HLM Goal Achieved Yes   Additional Treatment Session   Start Time 1011   End Time 1030   Treatment Assessment Pt agrees to PT treatment following evaluation  Education provided on use and precautions of RW  Able to ambulate an additional ~30` x2 with min A and use of RW  Will continue to benefit from ongoing skilled PT to maximize his functional mobility and increase his level of independence      Equipment Use RW   Additional Treatment Day 1   End of Consult   Patient Position at End of Consult Bedside chair; All needs within reach   The patient's AM-[facility] Basic Mobility Inpatient Short Form Raw Score is 15, Standardized Score is 36 97   A standardized score less than 40 78 or Raw Score of 16 suggests the patient may benefit from discharge to post-acute rehabilitation services, which DOES coincide with CURRENT above PT recommendations  However please refer to therapist recommendation for discharge planning given other factors that may influence destination  Adapted from [employer] [facility] -[facility] 6-Clicks Basic Mobility and Daily Activity Scores With Discharge Destination  Physical Therapy, 2021;101:1-9  DOI: 10 [id number]    Pt was seen for a co-eval with OT due to potential need for significant physical assist, poor pain control, impaired mental status, limiting behaviors, and poor adherence to precautions  Assessment: Pt is a 81 y o  male seen for PT evaluation s/p admit to [address] [place] [address] on 4/20/2022 w/ Paresthesias  Order placed for PT  Comorbidities affecting pt's physical performance at time of assessment include: HTN  Personal factors affecting pt at time of IE include: steps to enter environment, limited home support, advanced age, past experience, inability to perform IADLs, inability to perform ADLs, inability to ambulate household distances, limited insight into impairments and recent fall(s)  Prior to admission, pt was was independent w/ all functional mobility w/ out AD, ambulated household distances and lived alone  Upon evaluation: Pt requires min A for sit to stand, mod A for ambulation without AD, and min A for ambulation with RW  (Please find full objective findings from PT assessment regarding body systems outlined above)   Impairments and limitations also listed above, especially due to  weakness, decreased ROM, impaired balance, decreased endurance, gait deviations, pain, decreased activity tolerance, decreased safety awareness, fall risk and orthopedic restrictions  Pt's clinical presentation is currently unstable/unpredictable seen in pt's presentation of decreased safety awareness, fall risk, new cervical collar, and limited insight into deficits  Pt to benefit from continued skilled PT tx while in hospital and upon DC to address deficits as defined above and maximize level of functional mobility  From PT/mobility standpoint, recommendation at time of d/c would be inpatient rehab pending progress  Recommend progression of ambulation and initiation of HEP as appropriate        Bishop Hou, PT,DPT

## 2022-04-21 NOTE — TELEPHONE ENCOUNTER
4/25/22:   DISCHARGED HOME  PHONED KARLY [phone] - PER DOLLY  CONFIRMED OV     4/22/22: INPATIENT    4/21/22: INPATIENT    Josefina Limon 135 FOLLOW UP  W/ ORLIN     5/6/22 / 10:30 / Luly Innocent: [address]; [place], [place]  Can we please schedule this gentleman as snpx with Gabriele Farrar in 2 weeks? No imaging needed  Thanks!

## 2022-04-22 VITALS
BODY MASS INDEX: 28.28 KG/M2 | OXYGEN SATURATION: 96 % | SYSTOLIC BLOOD PRESSURE: 143 MMHG | HEIGHT: 65 IN | RESPIRATION RATE: 18 BRPM | TEMPERATURE: 97.7 F | DIASTOLIC BLOOD PRESSURE: 83 MMHG | HEART RATE: 70 BPM | WEIGHT: 169.75 LBS

## 2022-04-22 LAB
DME PARACHUTE DELIVERY DATE ACTUAL: NORMAL
DME PARACHUTE DELIVERY DATE REQUESTED: NORMAL
DME PARACHUTE ITEM DESCRIPTION: NORMAL
DME PARACHUTE ORDER STATUS: NORMAL
DME PARACHUTE SUPPLIER NAME: NORMAL
DME PARACHUTE SUPPLIER PHONE: NORMAL

## 2022-04-22 PROCEDURE — 97530 THERAPEUTIC ACTIVITIES: CPT

## 2022-04-22 PROCEDURE — 97535 SELF CARE MNGMENT TRAINING: CPT

## 2022-04-22 PROCEDURE — 97116 GAIT TRAINING THERAPY: CPT

## 2022-04-22 PROCEDURE — NC001 PR NO CHARGE: Performed by: SURGERY

## 2022-04-22 PROCEDURE — 99238 HOSP IP/OBS DSCHRG MGMT 30/<: CPT | Performed by: SURGERY

## 2022-04-22 RX ORDER — OXYCODONE HYDROCHLORIDE 5 MG/1
2.5 TABLET ORAL EVERY 4 HOURS PRN
Qty: 15 TABLET | Refills: 0 | Status: SHIPPED | OUTPATIENT
Start: 2022-04-22 | End: 2022-05-02

## 2022-04-22 RX ORDER — GABAPENTIN 100 MG/1
100 CAPSULE ORAL 3 TIMES DAILY
Qty: 25 CAPSULE | Refills: 0 | Status: SHIPPED | OUTPATIENT
Start: 2022-04-22 | End: 2022-05-24

## 2022-04-22 RX ADMIN — GABAPENTIN 100 MG: 100 CAPSULE ORAL at 09:52

## 2022-04-22 RX ADMIN — ACETAMINOPHEN 975 MG: 325 TABLET ORAL at 12:03

## 2022-04-22 RX ADMIN — ACETAMINOPHEN 975 MG: 325 TABLET ORAL at 04:23

## 2022-04-22 RX ADMIN — ENOXAPARIN SODIUM 30 MG: 30 INJECTION SUBCUTANEOUS at 09:52

## 2022-04-22 RX ADMIN — AMIODARONE HYDROCHLORIDE 200 MG: 200 TABLET ORAL at 09:52

## 2022-04-22 NOTE — PLAN OF CARE
Problem: PHYSICAL THERAPY ADULT  Goal: Performs mobility at highest level of function for planned discharge setting  See evaluation for individualized goals  Description: Treatment/Interventions: Functional transfer training,LE strengthening/ROM,Therapeutic exercise,Endurance training,Patient/family training,Equipment eval/education,Bed mobility,Gait training  Equipment Recommended: Santiago Ramírez       See flowsheet documentation for full assessment, interventions and recommendations  Note: Prognosis: Fair  Problem List: Impaired balance,Decreased mobility,Decreased safety awareness,Impaired sensation,Orthopedic restrictions,Pain,Decreased cognition,Impaired judgement,Decreased strength  Assessment: Pt seen for PT treatment session today w/ pt agreeable to participate  In comparison to previous session, pt with improvements in ambulatory endurance, ambulatory balance, and overall tolerance to functional mobility  Pt demonstrated ability to perform multiple STS transfer trials and ambulation trials w/o AD and w/o evidence of LOB  Pt educated on Cervical collar brace wearing schedule and safety precautions (decreased visual field while wearing brace) to be aware of when mobilizing  Pt required re-directing to tasks and demonstrated limited safety awareness and decreased insight into current deficits throughout mobility, thus required frequent, repeated cues for safety when mobilizing  Pt will continue to benefit from skilled PT intervention to increase pt's independence w/ functional mobility and to promote continued progress towards set goals  Continue to recommend DC: OPPT (vs HHPT pending transportation) w/ increased support and supervision due to pt's limited safety awareness and decreased insight into current deficits  PT Discharge Recommendation: Home with outpatient rehabilitation (vs HHPT w/ increased support/supervision)          See flowsheet documentation for full assessment

## 2022-04-22 NOTE — CASE MANAGEMENT
Case Management Progress Note    Patient name Sebastian Padilla  Location W /W -01 MRN [id number]  : 1935 Date 2022       LOS (days): 2  Geometric Mean LOS (GMLOS) (days): 2 60  Days to GMLOS:0 6        OBJECTIVE:        Current admission status: Inpatient  Preferred Pharmacy:   [facility]pharm[facility][id number]- [facility] [address] [facility] [address]  Phone: [phone] Fax: [phone]    Primary Care Provider: Lee Alejo DO    Primary Insurance: MEDICARE  Secondary Insurance: [employer]    PROGRESS NOTE:    CM followed-up with daughter and pt bedside  Daughter now wishes for pt to go to OP PT/OT for services

## 2022-04-22 NOTE — ASSESSMENT & PLAN NOTE
- Acute depressed nasal bone fracture, present on presentation   - Appreciate [facility]MS evaluation and recommendations  No immediate need for surgical intervention   - Ice and analgesia as needed  - Maintain sinus precautions   - Outpatient follow-up with [facility]MS for re-evaluation

## 2022-04-22 NOTE — QUICK NOTE
Patient's daughter accompanies patient currently at bedside today  Updated her on the current plan to discharge to home with outpatient PT and OT  List of locations for outpatient PT and OT provided  Also at this time was discussed the outpatient follow-up plan in regards to antihypertensives and outpatient follow-up with PCP  Instructed patient to follow-up with PCP next Wednesday  Also instructed patient and patient's daughter to hold metoprolol until Tuesday  Then they may resume metoprolol  Will discharge home on unloaded [place] as well as amiodarone  Patient's daughter and patient expressed verbal understanding of this outpatient plan  Reviewed all other outpatient discharge planning process including follow-up importance with Neurosurgery as well as PCP  Again they verbalized understanding

## 2022-04-22 NOTE — ASSESSMENT & PLAN NOTE
- Patient with multiple syncopal episodes over the last week (as many as 4)  He describes the episodes beginning after significant amount of diarrhea last week  He notes that since that time, he has been getting some dizziness when standing up  - Suspected syncope may be related to dehydration and orthostatic hypotension   - Cardiac pacemaker interrogated by ED staff with no reported significant events  - Echocardiogram from 4/20/2022 reviewed and was within normal limits   - Encourage adequate oral intake  - Monitor volume status  - Patient only to be up with assistance at this time  - Will need outpatient follow-up with PCP

## 2022-04-22 NOTE — OCCUPATIONAL THERAPY NOTE
OT TREATMENT    The patient's raw score on the AM-PAC Daily Activity inpatient short form is 19, standardized score is 40 22, greater than 39 4  Patients at this level are likely to benefit from DC to home  Please refer to the recommendation of the Occupational Therapist for safe DC planning  04/22/22 0900   OT Last Visit   OT Visit Date 04/22/22   Note Type   Note Type Treatment   Restrictions/Precautions   Weight Bearing Precautions Per Order Yes   Braces or Orthoses C/S Collar   Other Precautions Chair Alarm; Bed Alarm;Multiple lines;Telemetry; Fall Risk;Pain   General   Response to Previous Treatment Patient with no complaints from previous session   Family/Caregiver Present yes, daughter Dieter Guerrero present for session   Lifestyle   Autonomy Pt reports I w/ ADL PTA w/ out use of AD or DME   Reciprocal Relationships Supportive local family but pt lives alone  Service to Others Pt reports recentyl retired from [employer] and worked for 20+ years at [employer] after serving 2 years in [employer] Pt reports enjoying his dog, working outside and around the house   Pain Assessment   Pain Assessment Tool 0-10   Pain Score 5   Pain Location/Orientation Orientation: Bilateral  (hands)   Effect of Pain on Daily Activities limits funcitonal performanec for ADLS   Patient's Stated Pain Goal No pain   Hospital Pain Intervention(s) Ambulation/increased activity; Emotional support   ADL   Eating Assistance 5  Supervision/Setup   Eating Deficit Setup;Verbal cueing;Supervision/safety; Increased time to complete; Adaptive utensils (Comment)   Eating Comments Pt provided with adaptive utensil tubing this session  Pt reporting "I was waiting for you, this is much easier"  Pt with some difficulty with manipulation and  strength to cut eggs, but able to complete with supervision with + time  Grooming Assistance 5  Supervision/Setup   Grooming Deficit Setup;Verbal cueing; Increased time to complete;Wash/dry hands   Grooming Comments Standing at sink in bathroom, + time to compress soap, + time to grasp paper towel  LB Dressing Assistance 4  Minimal Assistance   LB Dressing Deficit Setup; Increased time to complete; Don/doff R sock; Don/doff L sock; Supervision/safety;Verbal cueing   LB Dressing Comments + time due to decreased  strength  VC for technique with Tailor Sit method  Functional Standing Tolerance   Time 2 minutes   Activity grooming    Comments standing at sink in bathroom; CGA for balance and safety   Bed Mobility   Supine to Sit Unable to assess   Sit to Supine Unable to assess   Additional Comments Pt received sitting EOB upon arrival and OOB to recliner at end of session; bed mobility not assessed   Transfers   Sit to Stand 5  Supervision   Additional items Increased time required;Verbal cues   Stand to Sit 5  Supervision   Additional items Increased time required;Verbal cues   Additional Comments Improving to supervision this session with VC for safe hand placement during functional transfers    Functional Mobility   Functional Mobility 5  Supervision  (CGA for balance and safety)   Additional Comments Short household distance to bathroom and back with no device with CGA  Education provided for pacing during mobility and use of full body turning to examine peripheral vision  Additional items   (no AD)   Coordination   Gross Motor WFL   Fine Motor improved with use of adaptive utensils; difficulty grasping paper towels   Cognition   Overall Cognitive Status Impaired   Arousal/Participation Alert; Cooperative   Attention Attends with cues to redirect   Orientation Level Oriented to person;Oriented to place;Oriented to time   Memory Decreased recall of precautions;Decreased recall of recent events   Following Commands Follows one step commands without difficulty   Comments Pt pleasant and agreeable to OT session  Pt very appreciative for adaptive utensils for feeding this session   Pt with limited insight into deficits and required VC for safety throughout session  Pt unaware of spinal precautions and provided with education  Pt also educated on dressing with spinal precautions, demonstrated good understanding  Pt educated on driving cessation while in C/S collar, pt confused as to why he cannot drive or chop wood when he goes home  Daughter Dieter Guerrero present and reporting will be around more upon DC to keep an eye on father  Continued cognitive assessment recommended  Additional Activities   Additional Activities Comments Pt provided education on therapy ball to increase strength in bilateral hands  Pt verbalized understanding of exercises  Activity Tolerance   Activity Tolerance Patient tolerated treatment well   Medical Staff Made Aware yes, RN ok to see pt and spoke to PT Luna    Assessment   Assessment Patient participated in Skilled OT session this date with interventions consisting of ADL re training with the use of correct body mechnaics, Energy Conservation techniques, Work simplification skills , safety awareness and fall prevention techniques, maintaining spinal precautions, therapeutic exercise to: increase functional use of BUEs, increase BUE muscle strength , increase standing tolerance time with unilateral UE support to complete sink level ADLs, elicit righting and equilibrium reactions for improved postural control and alignment during transitional movements, increase dynamic sit/ stand balance during functional activity , increase postural control, increase trunk control and increase OOB/ sitting tolerance   Patient agreeable to OT treatment session, upon arrival patient was found seated at edge of bed  In comparison to previous session, patient with improvements in participation in ADLs this session  With use of adaptive utensil tubing, pt improving to supervision for eating with + time  Pt able to perform LB dressing this session with min A and VC for technique   Pt able to ambulate short household distance to bathroom with no AD with CGA for safety  Demonstrated limited safety awareness during mobility, required VC for pacing  Pt provided with education regarding spinal precautions and activity restrictions, poor insight into limitations and concern for safety upon DC  Patient requiring frequent re direction, verbal cues for safety, verbal cues for correct technique, cognitive assistance to anticipate next step and ocassional safety reminders  Patient continues to be functioning below baseline level, occupational performance remains limited secondary to factors listed above and increased risk for falls and injury  From OT standpoint, recommendation at time of d/c would be Home with home health rehabilitation and increased social support for IADLs  Patient to benefit from continued Occupational Therapy treatment while in the hospital to address deficits as defined above and maximize level of functional independence with ADLs and functional mobility  Plan   Treatment Interventions ADL retraining;Functional transfer training;UE strengthening/ROM; Cognitive reorientation;Patient/family training;Equipment evaluation/education; Compensatory technique education;Continued evaluation; Energy conservation; Activityengagement   Goal Expiration Date 05/01/22   OT Treatment Day 0   OT Frequency 3-5x/wk   Recommendation   OT Discharge Recommendation Home with home health rehabilitation   Equipment Recommended Bedside commode   Commode Type Standard   OT - OK to Discharge Yes  (Once medically cleared)   Additional Comments  At end of session, pt OOB to recliner with all needs met and call bell within reach    AM-PAC Daily Activity Inpatient   Lower Body Dressing 3   Bathing 3   Toileting 3   Upper Body Dressing 4   Grooming 3   Eating 3   Daily Activity Raw Score 19   Daily Activity Standardized Score (Calc for Raw Score >=11) 40 22   AM-PAC Applied Cognition Inpatient   Following a Speech/Presentation 3 Understanding Ordinary Conversation 4   Taking Medications 3   Remembering Where Things Are Placed or Put Away 4   Remembering List of 4-5 Errands 3   Taking Care of Complicated Tasks 3   Applied Cognition Raw Score 20   Applied Cognition Standardized Score 41 76     Tanya Abdul, OT

## 2022-04-22 NOTE — PLAN OF CARE
Problem: MOBILITY - ADULT  Goal: Maintain or return to baseline ADL function  Description: INTERVENTIONS:  -  Assess patient's ability to carry out ADLs; assess patient's baseline for ADL function and identify physical deficits which impact ability to perform ADLs (bathing, care of mouth/teeth, toileting, grooming, dressing, etc )  - Assess/evaluate cause of self-care deficits   - Assess range of motion  - Assess patient's mobility; develop plan if impaired  - Assess patient's need for assistive devices and provide as appropriate  - Encourage maximum independence but intervene and supervise when necessary  - Involve family in performance of ADLs  - Assess for home care needs following discharge   - Consider OT consult to assist with ADL evaluation and planning for discharge  - Provide patient education as appropriate  Outcome: Progressing  Goal: Maintains/Returns to pre admission functional level  Description: INTERVENTIONS:  - Perform BMAT or MOVE assessment daily    - Set and communicate daily mobility goal to care team and patient/family/caregiver  - Collaborate with rehabilitation services on mobility goals if consulted  - Perform Range of Motion  times a day  - Reposition patient every  hours  - Dangle patient  times a day  - Stand patient  times a day  - Ambulate patient  times a day  - Out of bed to chair  times a day   - Out of bed for meals  times a day  - Out of bed for toileting  - Record patient progress and toleration of activity level   Outcome: Progressing     Problem: Nutrition/Hydration-ADULT  Goal: Nutrient/Hydration intake appropriate for improving, restoring or maintaining nutritional needs  Description: Monitor and assess patient's nutrition/hydration status for malnutrition  Collaborate with interdisciplinary team and initiate plan and interventions as ordered  Monitor patient's weight and dietary intake as ordered or per policy   Utilize nutrition screening tool and intervene as necessary  Determine patient's food preferences and provide high-protein, high-caloric foods as appropriate       INTERVENTIONS:  - Monitor oral intake, urinary output, labs, and treatment plans  - Assess nutrition and hydration status and recommend course of action  - Evaluate amount of meals eaten  - Assist patient with eating if necessary   - Allow adequate time for meals  - Recommend/ encourage appropriate diets, oral nutritional supplements, and vitamin/mineral supplements  - Order, calculate, and assess calorie counts as needed  - Recommend, monitor, and adjust tube feedings and TPN/PPN based on assessed needs  - Assess need for intravenous fluids  - Provide specific nutrition/hydration education as appropriate  - Include patient/family/caregiver in decisions related to nutrition  Outcome: Progressing     Problem: Potential for Falls  Goal: Patient will remain free of falls  Description: INTERVENTIONS:  - Educate patient/family on patient safety including physical limitations  - Instruct patient to call for assistance with activity   - Consult OT/PT to assist with strengthening/mobility   - Keep Call bell within reach  - Keep bed low and locked with side rails adjusted as appropriate  - Keep care items and personal belongings within reach  - Initiate and maintain comfort rounds  - Make Fall Risk Sign visible to staff  - Offer Toileting every hours, in advance of need  - Initiate/Maintain alarm  - Obtain necessary fall risk management equipment:   - Apply yellow socks and bracelet for high fall risk patients  - Consider moving patient to room near nurses station  Outcome: Progressing

## 2022-04-22 NOTE — PROGRESS NOTES
[facility]  Progress Note - Richa Factor 1935, 81 y o  male MRN: [id number]  Unit/Bed#: W -01 Encounter: [id number]  Primary Care Provider: Maximino Woodruff DO   Date and time admitted to hospital: 4/20/2022  8:49 AM    Recurrent syncope  Assessment & Plan  - Patient with multiple syncopal episodes over the last week (as many as 4)  He describes the episodes beginning after significant amount of diarrhea last week  He notes that since that time, he has been getting some dizziness when standing up  - Suspected syncope may be related to dehydration and orthostatic hypotension   - Cardiac pacemaker interrogated by ED staff with no reported significant events  - Echocardiogram from 4/20/2022 reviewed and was within normal limits   - Encourage adequate oral intake  - Monitor volume status  - Patient only to be up with assistance at this time  - Will need outpatient follow-up with PCP  * Paresthesias  Assessment & Plan  - Bilateral upper extremity paresthesias, present on presentation in setting of suspected central cord syndrome following fall   - No evidence of acute traumatic cervical spine injury on CT scan of the cervical spine from 04/20/2022 or from MRI of the cervical spinal 04/20/2022  - Appreciate Neurosurgery evaluation and recommendations  Non operative management at this time  Anticipate re-evaluation and likely need for non urgent elective cervical spine surgery as an outpatient per Neurosurgery recommendations  - Continue to monitor neurologic exam   Patient symptoms have overall improved since presentation   - Per Neurosurgery recommendations, goal is to maintain MAP>85  Patient has not required vasopressors over the last 24 hours  - PT and OT evaluation and treatment as indicated  - Analgesia as needed  - Outpatient follow-up with Neurosurgery for re-evaluation      Closed fracture of nasal bone  Assessment & Plan  - Acute depressed nasal bone fracture, present on presentation   - Appreciate OMS evaluation and recommendations  No immediate need for surgical intervention   - Ice and analgesia as needed  - Maintain sinus precautions   - Outpatient follow-up with OMS for re-evaluation  Atrial fibrillation (HCC)  Assessment & Plan  - Chronic history of atrial fibrillation without evidence of rapid ventricular response at this time  - Continue home medication regimen with amiodarone, but will hold beta-blocker at this time secondary to concern for spinal cord injury in setting of bilateral upper extremity paresthesias and Neurosurgery recommendations for MAP >85  - Will resume beta-blocker therapy when appropriate  - Closely monitor heart rate  - Pacemaker interrogated without significant event noted as 04/20/2022  Essential hypertension  Assessment & Plan  - Chronic history of hypertension   - Patient currently normotensive at the time of his initial trauma evaluation  Suspect possible orthostatic hypotension as cause of recurrent syncope  - Monitor blood pressure with goal MAP >85 per Neurosurgery  - Resume home medication regimen when appropriate  - Outpatient follow-up with PCP  Presence of permanent cardiac pacemaker  Assessment & Plan  - Presence of permanent cardiac pacemaker   - Confirmed to be MR compatible with cardiology clinic  Disposition:  Continue current level of care  Will discuss timing of discharge with Neurosurgery  Case Management following for disposition planning  SUBJECTIVE:  Chief Complaint:  I am doing better      Subjective:  Patient states he is feeling better each day  He continues to notice improvement in the pain and function of his hands with no more pins or needles feelings  He is tolerating his diet without nausea vomiting  He offers no complaints otherwise      OBJECTIVE:   Vitals:   Temp:  [97 2 °F (36 2 °C)-97 7 °F (36 5 °C)] 97 7 °F (36 5 °C)  HR:  [68-70] 70  Resp:  [13-22] 18  BP: (125-169)/(65-86) 143/83    Intake/Output:  I/O       04/20 0701  04/21 0700 04/21 0701 04/22 0700 04/22 0701 04/23 0700    Urine (mL/kg/hr) 250 325 (0 2) 150 (4 7)    Total Output 250 325 150    Net -250 -325 -150                Nutrition: Diet Regular; Regular House  GI Proph/Bowel Reg: N/A  VTE Prophylaxis:Sequential compression device (Venodyne)  and Enoxaparin (Lovenox)     Physical Exam:   GENERAL APPEARANCE: Patient in no acute distress  HEENT: NC, improved nasal swelling with minimal tenderness; EOMs intact; Mucous membranes moist  NECK / BACK:  Cervical collar in place without any neck tenderness  CV: Regular rate and rhythm; no murmur/gallops/rubs appreciated  CHEST / LUNGS: Clear to auscultation; no wheezes/rales/rhonci  ABD: NABS; soft; non-distended; non-tender  :  Voiding spontaneously  EXT: +2 pulses bilaterally upper & lower extremities; no edema  NEURO: GCS 15; neurovascularly intact  Patient with improved strength and motor function in the bilateral hands and fingers, but still struggles with some coordination and fine motor skills; still with tenderness to light touch, but sensation is intact  SKIN: Warm, dry and well perfused; no rash; no jaundice  Invasive Devices  Report    Peripheral Intravenous Line            Peripheral IV 04/20/22 Left Antecubital 1 day                      Lab Results: Results: I have personally reviewed all pertinent laboratory/tests results, BMP/CMP: No results found for: SODIUM, K, CL, CO2, ANIONGAP, BUN, CREATININE, GLUCOSE, CALCIUM, AST, ALT, ALKPHOS, PROT, BILITOT, EGFR, CBC: No results found for: WBC, HGB, HCT, MCV, PLT, ADJUSTEDWBC, MCH, MCHC, RDW, MPV, NRBC and Coagulation: No results found for: PT, INR, APTT  Imaging/EKG Studies: I have personally reviewed pertinent reports     and I have personally reviewed pertinent films in PACS   Other Studies: N/A    Krystal Winchester PA-C  4/22/2022 07:27 AM

## 2022-04-22 NOTE — CASE MANAGEMENT
Case Management Assessment & Discharge Planning Note    Patient name Nocatee Nurse  Location W /W -04 MRN [id number]  : 1935 Date 2022       Current Admission Date: 2022  Current Admission Diagnosis:Paresthesias   Patient Active Problem List    Diagnosis Date Noted    Recurrent syncope 2022    Paresthesias 2022    Closed fracture of nasal bone 2022    Sick sinus syndrome ([address] ) 2019    Presence of permanent cardiac pacemaker 2019    Pre-op evaluation 2019    Chest pain 2019    On amiodarone therapy 2018    Temporal bone fracture ([facility] ) 2018    Subdural hematoma ([facility] ) 04/15/2018    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    Closed nondisplaced fracture of distal phalanx of right thumb 04/15/2018    Atrial fibrillation ([facility] ) 04/15/2018    Essential hypertension 2017      LOS (days): 2  Geometric Mean LOS (GMLOS) (days): 2 60  Days to GMLOS:0 5     OBJECTIVE:    Risk of Unplanned Readmission Score: 10         Current admission status: Inpatient       Preferred Pharmacy:   [id number] [place] [address]  Phone: [phone] Fax: [phone]    Primary Care Provider: Toro Dan DO    Primary Insurance: MEDICARE  Secondary Insurance: AARP    ASSESSMENT:  Asael Ham Proxies    There are no active Health Care Proxies on file  DISCHARGE DETAILS:    Discharge planning discussed with[de-identified] Pt and daughter  Freedom of Choice: Yes  Comments - Freedom of Choice: Pt and daughter now wish for the pt to go to OP PT/OT at this time    CM contacted family/caregiver?: Yes  Were Treatment Team discharge recommendations reviewed with patient/caregiver?: Yes  Did patient/caregiver verbalize understanding of patient care needs?: Yes  Were patient/caregiver advised of the risks associated with not following Treatment Team discharge recommendations?: Yes    Contacts  Patient Contacts: Philomena  Relationship to Patient[de-identified] Family  Contact Method: In Person  Reason/Outcome: Referral    [facility]         Is the patient interested in [place] [address] at discharge?: No         Other Referral/Resources/Interventions Provided:  Interventions: DME,Other (Specify) (OP PT/OT)         Treatment Team Recommendation: Other (Home with OP PT/OT)  Discharge Destination Plan[de-identified] Home (with OP PT/OT)  Transport at Discharge : Family                                                         CM placed a referral via [place] for bedside commode

## 2022-04-22 NOTE — PHYSICAL THERAPY NOTE
PHYSICAL THERAPY TREATMENT NOTE          Patient Name: Karen CARLISLE Date: 22 1103   PT Last Visit   PT Visit Date 22   Note Type   Note Type Treatment   Pain Assessment   Pain Assessment Tool 0-10   Pain Score 3   Pain Location/Orientation Orientation: Bilateral  (hands R>L)   Hospital Pain Intervention(s) Repositioned; Ambulation/increased activity; Emotional support   Restrictions/Precautions   Braces or Orthoses C/S Collar  Monitoring Division at all times)   Other Precautions Impulsive;Cognitive; Chair Alarm; Bed Alarm; Fall Risk;Pain   General   Chart Reviewed Yes   Additional Pertinent History Pt impulsive w/ functional mobility, decreased safety awareness, and limited insight into current deficits  Pt required frequent re-directing throughout for safety  Pt educated throughout on spinal precautions and cervical collar brace w/ pt demonstrating limited carryover of cues for safety and decreased recall of precautions  Pt's daughter reports pt will have increased support upon DC from family  Pt reports 2 JACKELYN vs 4 JACKELYN w/ railing available and 1st floor set-up   Response to Previous Treatment Patient with no complaints from previous session  Family/Caregiver Present Yes  (pt's daughter)   Cognition   Overall Cognitive Status Impaired  (impulsive, decreased safety awareness, limited insight)   Arousal/Participation Alert; Cooperative   Attention Attends with cues to redirect   Orientation Level Oriented to person   Memory Decreased recall of precautions;Decreased recall of recent events   Following Commands Follows one step commands with increased time or repetition   Comments Pt ID via name and ; pt agreeable to PT tx     Bed Mobility   Supine to Sit Unable to assess   Additional items   (pt OOB in recliner chair upon arrival)   Sit to Supine Unable to assess   Additional items   (pt OOB in recliner chair at end of session) Transfers   Sit to Stand 5  Supervision   Additional items Assist x 1; Armrests   Stand to Sit 5  Supervision   Additional items Assist x 1; Armrests   Additional Comments Pt able to maintain static sitting balance at toilet and at bathroom sink for approx 1-2 min w/ close supervision  Pt performed multiple STS transfer trials throughout w/ supervision level of assistance   Ambulation/Elevation   Gait pattern Improper Weight shift; Wide TIN; Decreased foot clearance; Inconsistent ria   Gait Assistance 5  Supervision  (close supervision)   Additional items Assist x 1;Verbal cues   Assistive Device None;SPC   Distance 120'x2+200'  (30' w/ [facility])   Stair Management Assistance 5  Supervision   Additional items Assist x 1;Verbal cues   Stair Management Technique One rail L;Step to pattern   Number of Stairs 2   Ambulation/Elevation Additional Comments Pt agreeable to [facility] trial w/ pt ambulating 30' w/ SPC; however, pt observed to be carrying/swining cane w/ AD discontinued for remainder of ambulation trial(s)  Pt ambulated 120'x2+200' w/ close supervision  Pt demonstrated ability to negotiation multiple turns in hallway  Pt educated on stair technique precautions due to decreased visual field while wearing Cervical brace  Pt demonstrated ability to negotiate 2 steps w/ unilateral railing and supervision  Pt w/ decreased safety awareness and attempted to ascend step backward   Balance   Static Sitting Fair +   Dynamic Sitting Fair   Static Standing Fair   Dynamic Standing [address] & 5 -   Activity Tolerance   Activity Tolerance Patient tolerated treatment well   Medical Staff Made Aware Spoke to FELICIA Elias, Trauma team, SOULEYMANE Espinoza   Nurse Made Aware MAKENNA Bhatia   Assessment   Prognosis Fair   Problem List Impaired balance;Decreased mobility; Decreased safety awareness; Impaired sensation;Orthopedic restrictions;Pain;Decreased cognition; Impaired judgement;Decreased strength   Assessment Pt seen for PT treatment session today w/ pt agreeable to participate  In comparison to previous session, pt with improvements in ambulatory endurance, ambulatory balance, and overall tolerance to functional mobility  Pt demonstrated ability to perform multiple STS transfer trials and ambulation trials w/o AD and w/o evidence of LOB  Pt educated on Cervical collar brace wearing schedule and safety precautions (decreased visual field while wearing brace) to be aware of when mobilizing  Pt required re-directing to tasks and demonstrated limited safety awareness and decreased insight into current deficits throughout mobility, thus required frequent, repeated cues for safety when mobilizing  Pt will continue to benefit from skilled PT intervention to increase pt's independence w/ functional mobility and to promote continued progress towards set goals  Continue to recommend DC: OPPT (vs HHPT pending transportation) w/ increased support and supervision due to pt's limited safety awareness and decreased insight into current deficits  Goals   Patient Goals to go home   STG Expiration Date 05/02/22   Short Term Goal #1 Pt will be able to: (1) perform bed mobility with Mod I to promote OOB activity (2) perform sit to stand with Mod I to decrease burden of care (3) ambulate at least 350` with supervision and least restrictive AD to increase activity tolerance (4) increase standing balance by 1 grade to decrease risk of falls (5) negotiate 2 steps w/ unilateral railing and mod I to facilitate return to previous living environment (6) recall 3/3 spinal precautions and Cervical brace wearing schedule   PT Treatment Day 2   Plan   Treatment/Interventions Functional transfer training;LE strengthening/ROM; Elevations; Therapeutic exercise;Patient/family training;Equipment eval/education; Bed mobility;Gait training   Progress Progressing toward goals   PT Frequency 4-6x/wk   Recommendation   PT Discharge Recommendation Home with outpatient rehabilitation  (vs HHPT w/ increased support/supervision)   Additional Comments DC rec: from a PT/mobility standpoint recommend OPPT vs HHPT (pending transportation) w/ increased family support/supervision due to pt's decreased safety awareness and limited insight into current deficits   AM-PAC Basic Mobility Inpatient   Turning in Bed Without Bedrails 3   Lying on Back to Sitting on Edge of Flat Bed 3   Moving Bed to Chair 3   Standing Up From Chair 3   Walk in Room 3   Climb 3-5 Stairs 3   Basic Mobility Inpatient Raw Score 18   Basic Mobility Standardized Score 41 05   Highest Level Of Mobility   JH-HLM Goal 6: Walk 10 steps or more   JH-HLM Highest Level of Mobility 7: Walk 25 feet or more   JH-HLM Goal Achieved Yes   Education   Education Provided Mobility training  (spinal precautions, Cervical brace education)   Patient Demonstrates acceptance/verbal understanding;Reinforcement needed   End of Consult   Patient Position at End of Consult Bedside chair;Bed/Chair alarm activated; All needs within reach  (pt's daughter remaining in room)       The patient's AM-PAC Basic Mobility Inpatient Short Form Raw Score is 18  A Raw score of greater than 16 suggests the patient may benefit from discharge to home  Please also refer to the recommendation of the Physical Therapist for safe discharge planning      DC rec: OPPT (vs HHPT pending transportation) w/ increased support/supervision      Nikos Gonzalez, PT, DPT  04/22/22

## 2022-04-22 NOTE — ASSESSMENT & PLAN NOTE
- Chronic history of atrial fibrillation without evidence of rapid ventricular response at this time  - Continue home medication regimen with amiodarone, but will hold beta-blocker at this time secondary to concern for spinal cord injury in setting of bilateral upper extremity paresthesias and Neurosurgery recommendations for MAP >85  - Will resume beta-blocker therapy when appropriate  - Closely monitor heart rate  - Pacemaker interrogated without significant event noted as 04/20/2022

## 2022-04-22 NOTE — ASSESSMENT & PLAN NOTE
- Bilateral upper extremity paresthesias, present on presentation in setting of suspected central cord syndrome following fall   - No evidence of acute traumatic cervical spine injury on CT scan of the cervical spine from 04/20/2022 or from MRI of the cervical spinal 04/20/2022  - Appreciate Neurosurgery evaluation and recommendations  Non operative management at this time  Anticipate re-evaluation and likely need for non urgent elective cervical spine surgery as an outpatient per Neurosurgery recommendations  - Continue to monitor neurologic exam   Patient symptoms have overall improved since presentation   - Per Neurosurgery recommendations, goal is to maintain MAP>85  Patient has not required vasopressors over the last 24 hours  - PT and OT evaluation and treatment as indicated  - Analgesia as needed  - Outpatient follow-up with Neurosurgery for re-evaluation

## 2022-04-22 NOTE — DISCHARGE SUMMARY
Discharge Summary - Saul Rapp 81 y o  male MRN: [id number]    Unit/Bed#: W -01 Encounter: [id number]    Admission Date:   Admission Orders (From admission, onward)     Ordered        04/20/22 1459  Inpatient Admission  Once                        Admitting Diagnosis: Nasal fracture [S02  2XXA]  Paresthesia of hand, bilateral [R20 2]  Recurrent syncope [R55]  Epistaxis due to trauma [R04 0]    HPI: Per Jesus Mckeon, "Sual Rapp is a 81 y o  male who presents following an episode of syncope and collapse  The patient states that he has had multiple episodes of syncope over the last week  The initial episode occurred after large volume diarrhea last week  Since that time, he has noticed that he gets dizzy or lightheaded when standing up  He usually is able to study himself and go about his business, but he has had multiple episodes where he subsequently falls once attempting to walk or get up from the toilet  Today, he had a recurrent episode on the way to the bathroom  Upon awakening on the floor, he was unable to move his hands normally and he had numbness and tingling in his hands and wrists as well as nasal pain  He has continued to have pins and needles sensation in his bilateral hands and wrists with some continued weakness, but notes this is improving  The right upper extremity symptoms are worse than the left upper extremity symptoms  He also notes some pain in his right shoulder and right forearm near the elbow  He denies any chest pain, shortness of breath, difficulty breathing, loss of bowel or bladder function, and/or any symptoms in his lower extremities  Prior to his 1st episode a week ago, he has not had issues with syncope in the last few years "    Procedures Performed: No orders of the defined types were placed in this encounter  Summary of Hospital Course:  Patient is an 80-year-old male comes in status post fall    Patient was evaluated by the trauma team and noted to have possible central cord syndrome  He was sent to the ICU with a stat MRI  Neurosurgery was consulted recommended conservative management with a cervical collar  Will continue collar and close interval outpatient follow-up with Neurosurgery at which time there discussed the possible decompressive surgery of the cervical spine  He was also noted to have a nasal bone fracture which required no operative intervention with OMFS  He was able to clear PT and OT for outpatient  He was then subsequently discharged home  Planning process discussed with patient's daughter at bedside at time of discharge  She was instructed to hold the metoprolol at discharge until next Tuesday on 04/26/2022  She would also follow-up with PCP next week to discuss resuming of those home medications as well as other comorbidities  The patient would have close interval outpatient follow-up with Neurosurgery and OMFS  Patient discharged in stable condition  Significant Findings, Care, Treatment and Services Provided:   XR chest 1 view portable    Result Date: 4/20/2022  Impression: No acute cardiopulmonary disease  Workstation performed: [id number]     XR shoulder 2+ views RIGHT    Result Date: 4/20/2022  Impression: Degenerative changes No acute osseous abnormality  Workstation performed: [id number]     XR forearm 2 vw right    Result Date: 4/20/2022  Impression: No acute osseous abnormality  Workstation performed: [id number]     XR wrist 3+ views RIGHT    Result Date: 4/20/2022  Impression: Degenerative changes No acute osseous abnormality  Workstation performed: [id number]     CT head without contrast    Result Date: 4/20/2022  Impression: No acute intracranial abnormality  Mildly depressed nasal bone fracture  Workstation performed: [id number]     CT facial bones without contrast    Result Date: 4/20/2022  Impression: Mildly depressed nasal bone fracture   Workstation performed: [id number]     CT cervical spine without contrast    Result Date: 4/20/2022  Impression: No cervical spine fracture or traumatic malalignment  Workstation performed: [id number]     MRI cervical spine wo contrast    Result Date: 4/20/2022  Impression: Image quality is severely degraded by patient motion artifact  Determining cord signal abnormality is particularly limited secondary to motion artifact  Questionable mild prevertebral soft tissue swelling  There is spinal cord compression at the C5-6 level, and near spinal cord compression at the C6-7 level secondary to spondylotic degenerative disease  Bilateral foraminal narrowing is also noted at these levels  Moderate central and bilateral foraminal stenosis at C4-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  Consider repeat imaging with sedation to further delineate cord signal  The study was marked in EPIC for immediate notification  Workstation performed: [id number]       Complications:  No complications    Discharge Diagnosis:   Paresthesias  Nasal bone fracture    Medical Problems             Resolved Problems  Date Reviewed: 4/22/2022    None                Condition at Discharge: good         Discharge instructions/Information to patient and family:   See after visit summary for information provided to patient and family  Provisions for Follow-Up Care:  See after visit summary for information related to follow-up care and any pertinent home health orders  PCP: Scarlet Guajardo DO    Disposition: Home    Planned Readmission: No      Discharge Statement   I spent 22 minutes discharging the patient  This time was spent on the day of discharge  I had direct contact with the patient on the day of discharge  Additional documentation is required if more than 30 minutes were spent on discharge  Discharge Medications:  See after visit summary for reconciled discharge medications provided to patient and family

## 2022-04-22 NOTE — DISCHARGE INSTRUCTIONS
Neurosurgery discharge instructions following spinal cord injury:      VISTA collar at all times except for showering change to bonnie (peach) collar   No bending, twisting or heavy lifting  No pushing or pulling over 10lbs  No strenuous activities  NO DRIVING  **Please notify MD immediately if you have increased neck or arm pain  New numbness and/or weakness in your arm  Difficulty swallowing or breathing especially while lying down  Numbness or weakness in arms or legs  Increased difficulty walking **        Gabapentin (By mouth)   Gabapentin (bernadette-a-PEN-tin)  Treats seizures and pain caused by shingles  Brand Name(s): FusePaq Fanatrex, Neurontin   There may be other brand names for this medicine  When This Medicine Should Not Be Used: This medicine is not right for everyone  Do not use it if you had an allergic reaction to gabapentin  How to Use This Medicine:   Capsule, Liquid, Tablet  · Take your medicine as directed  Your dose may need to be changed several times to find what works best for you  If you have epilepsy, do not allow more than 12 hours to pass between doses  · Capsule: Swallow the capsule whole with plenty of water  Do not open, crush, or chew it  · Gralise® tablet: Swallow the tablet whole   Do not crush, break, or chew it  · Neurontin® tablet: If you break a tablet into 2 pieces, use the second half as your next dose  Do not use the half-tablet if the whole tablet has been cut or broken after 28 days  · Oral liquid: Measure the oral liquid medicine with a marked measuring spoon, oral syringe, or medicine cup  · This medicine should come with a Medication Guide  Ask your pharmacist for a copy if you do not have one  · Missed dose: Take a dose as soon as you remember  If it is almost time for your next dose, wait until then and take a regular dose  Do not take extra medicine to make up for a missed dose    · Store the medicine in a closed container at room temperature, away from heat, moisture, and direct light  Store the Neurontin® oral liquid in the refrigerator  Do not freeze  Drugs and Foods to Avoid:   Ask your doctor or pharmacist before using any other medicine, including over-the-counter medicines, vitamins, and herbal products  · Some medicines can affect how gabapentin works  Tell your doctor if you also using hydrocodone or morphine  · If you take an antacid, wait at least 2 hours before you take gabapentin  · Do not drink alcohol while you are using this medicine  · Tell your doctor if you use anything else that makes you sleepy  Some examples are allergy medicine, narcotic pain medicine, and alcohol  Tell your doctor if you are also using lorazepam, oxycodone, or zolpidem  Warnings While Using This Medicine:   · Tell your doctor if you are pregnant or breastfeeding, or if you have kidney problems (including patients receiving dialysis) or lung problems  Tell your doctor if you have a history of depression or mental health problems  · This medicine may cause the following problems:  ? Drug reaction with eosinophilia and systemic symptoms (DRESS) or multiorgan hypersensitivity, which may damage the liver, kidney, blood, heart, or muscles  ? Changes in mood or behavior, including suicidal thoughts or behavior  ? Respiratory depression (serious breathing problem that can be life-threatening), when used with narcotic pain medicines  · Do not stop using this medicine suddenly  Your doctor will need to slowly decrease your dose before you stop it completely  · This medicine may make you dizzy or drowsy  Do not drive or do anything else that could be dangerous until you know how this medicine affects you  · Tell any doctor or dentist who treats you that you are using this medicine  This medicine may affect certain medical test results  · Your doctor will check your progress and the effects of this medicine at regular visits  Keep all appointments    · Keep all medicine out of the reach of children  Never share your medicine with anyone  Possible Side Effects While Using This Medicine:   Call your doctor right away if you notice any of these side effects:  · Allergic reaction: Itching or hives, swelling in your face or hands, swelling or tingling in your mouth or throat, chest tightness, trouble breathing  · Behavior problems, aggression, restlessness, trouble concentrating, moodiness (especially in children)  · Blistering, peeling, red skin rash  · Blue lips, fingernails, or skin, chest pain, fast heartbeat, trouble breathing  · Change in how much or how often you urinate, bloody or cloudy urine  · Dark urine or pale stools, nausea, vomiting, loss of appetite, stomach pain, yellow skin or eyes  · Fever, chills, cough, sore throat, body aches  · Problems with coordination, shakiness, unsteadiness, unusual eye movement  · Rapid weight gain, swelling in your hands, ankles, or feet  · Rash, swollen or tender glands in the neck, armpit, or groin  · Unusual moods or behaviors, thoughts of hurting yourself, feeling depressed  If you notice these less serious side effects, talk with your doctor:   · Dizziness, drowsiness, sleepiness, tiredness  If you notice other side effects that you think are caused by this medicine, tell your doctor  Call your doctor for medical advice about side effects  You may report side effects to FDA at 3-677-FDA-1944    © Copyright [employer] 2022 Information is for End User's use only and may not be sold, redistributed or otherwise used for commercial purposes  The above information is an  only  It is not intended as medical advice for individual conditions or treatments  Talk to your doctor, nurse or pharmacist before following any medical regimen to see if it is safe and effective for you  Oxycodone, Rapid Release (By mouth)   Oxycodone Hydrochloride (yb-r-PNB-done vickie-droe-KLOR-jorge)  Treats moderate to severe pain   This medicine is a narcotic pain reliever  Brand Name(s): Oxaydo, Oxy IR, Roxicodone   There may be other brand names for this medicine  When This Medicine Should Not Be Used: This medicine is not right for everyone  Do not use it if you had an allergic reaction to oxycodone, codeine, hydrocodone, dihydrocodeine, or morphine, or if you have severe lung or breathing problems, or stomach or bowel blockage (including paralytic ileus)  How to Use This Medicine:   Capsule, Liquid, Tablet  · Take your medicine as directed  Your dose may need to be changed several times to find what works best for you  An overdose can be dangerous  Follow directions carefully so you do not get too much medicine at one time  Your doctor may also give naloxone to treat an overdose  · Oral liquid: Measure the oral liquid medicine with a marked measuring spoon, oral syringe, or medicine cup  · Oxaydo® tablet: Swallow it whole with enough water to swallow it completely  Do not break, crush, chew, or dissolve it  Do not wet the tablet before you put it in your mouth  · While taking the Roxybond tablet, part of it may pass into your stool  This is normal and nothing to worry about  · This medicine should come with a Medication Guide  Ask your pharmacist for a copy if you do not have one  · Missed dose: Take a dose as soon as you remember  If it is almost time for your next dose, wait until then and take a regular dose  Do not take extra medicine to make up for a missed dose  If you miss a dose of Roxybond or Roxicodone®, skip the missed dose and go back to your regular dosing schedule  · Store the medicine in a closed container at room temperature, away from heat, moisture, and direct light  Store the medicine in a secure place to prevent others from getting it  Drop off any unused narcotic medicine at a drug take-back location right away  If you do not have a drug take-back location near you, flush any unused narcotic medicine down the toilet   Check your local drug store and clinics for take-back locations  You can also check the [employer] web site for locations  Here is the link to the FDA safe disposal of medicines website: www fda gov/drugs/resourcesforyou/consumers/buyingusingmedicinesafely/ensuringsafeuseofmedicine/safedisposalofmedicines/dqj412920 htm  Drugs and Foods to Avoid:   Ask your doctor or pharmacist before using any other medicine, including over-the-counter medicines, vitamins, and herbal products  · Do not use this medicine if you are using or have used an MAO inhibitor within the past 14 days  · Some medicines can affect how oxycodone works  Tell your doctor if you are using any of the following:  ? Amiodarone, carbamazepine, cyclobenzaprine, erythromycin, ketoconazole, metaxalone, mirtazapine, phenytoin, quinidine, rifampin, ritonavir, tramadol, trazodone  ? Benzodiazepine medicine  ? Diuretic (water pill)  ? Medicine to treat depression or mental health problems (including SNRIs, SSRIs, TCAs)  ? Medicine to treat migraine headaches  ? Phenothiazine medicine  · Tell your doctor if you use anything else that makes you sleepy  Some examples are allergy medicine, narcotic pain medicine, and alcohol  Tell your doctor if you are also using buprenorphine, butorphanol, nalbuphine, pentazocine, or a muscle relaxer  · Do not drink alcohol while you are using this medicine  Warnings While Using This Medicine:   · Tell your doctor if you are pregnant or breastfeeding, or if you have kidney disease, liver disease, heart disease, low blood pressure, lung disease or breathing problems (including asthma, COPD, sleep apnea), scoliosis, an enlarged prostate, trouble urinating or swallowing, thyroid problems, Clay disease, gallbladder or pancreas problems, or digestion problems  Tell your doctor if you have a history of head injury, brain tumor, mental health problems, seizures, or alcohol or drug addiction    · This medicine may cause the following problems:  ? High risk of overdose, which can lead to death  ? Respiratory depression (serious breathing problem that can be life-threatening)  ? Sleep-related breathing problems (including sleep apnea, sleep-related hypoxemia)  ? Adrenal gland problems  ? Low blood pressure  ? Serotonin syndrome, when used with certain medicines  ? Seizures  · This medicine may make you dizzy, drowsy, or lightheaded  Do not drive or do anything else that could be dangerous until you know how this medicine affects you  Sit or lie down if you feel dizzy  Stand up carefully  · This medicine can be habit-forming  Do not use more than your prescribed dose  Call your doctor if you think your medicine is not working  · Do not stop using this medicine suddenly  Your doctor will need to slowly decrease your dose before you stop it completely  · This medicine may cause constipation, especially with long-term use  Ask your doctor if you should use a laxative to prevent and treat constipation  Drink plenty of liquids to help avoid constipation  · This medicine could cause infertility  Talk with your doctor before using this medicine if you plan to have children  · Keep all medicine out of the reach of children  Never share your medicine with anyone    Possible Side Effects While Using This Medicine:   Call your doctor right away if you notice any of these side effects:  · Allergic reaction: Itching or hives, swelling in your face or hands, swelling or tingling in your mouth or throat, chest tightness, trouble breathing  · Anxiety, restlessness, fast heartbeat, fever, sweating, muscle spasms, twitching, nausea, vomiting, diarrhea, seeing or hearing things that are not there  · Blue lips, fingernails, or skin, trouble breathing  · Changes in skin color, dark freckles, cold feeling, tiredness, weight loss  · Extreme dizziness or weakness, shallow breathing, slow heartbeat, sweating, cold or clammy skin, seizures  · Lightheadedness, dizziness, fainting  If you notice these less serious side effects, talk with your doctor:   · Constipation, stomach pain  · Sleepiness  If you notice other side effects that you think are caused by this medicine, tell your doctor  Call your doctor for medical advice about side effects  You may report side effects to FDA at 2-392-FDA-1442    © Copyright [employer] 2022 Information is for End User's use only and may not be sold, redistributed or otherwise used for commercial purposes  The above information is an  only  It is not intended as medical advice for individual conditions or treatments  Talk to your doctor, nurse or pharmacist before following any medical regimen to see if it is safe and effective for you

## 2022-04-22 NOTE — PLAN OF CARE
Problem: MOBILITY - ADULT  Goal: Maintain or return to baseline ADL function  Description: INTERVENTIONS:  -  Assess patient's ability to carry out ADLs; assess patient's baseline for ADL function and identify physical deficits which impact ability to perform ADLs (bathing, care of mouth/teeth, toileting, grooming, dressing, etc )  - Assess/evaluate cause of self-care deficits   - Assess range of motion  - Assess patient's mobility; develop plan if impaired  - Assess patient's need for assistive devices and provide as appropriate  - Encourage maximum independence but intervene and supervise when necessary  - Involve family in performance of ADLs  - Assess for home care needs following discharge   - Consider OT consult to assist with ADL evaluation and planning for discharge  - Provide patient education as appropriate  Outcome: Adequate for Discharge  Goal: Maintains/Returns to pre admission functional level  Description: INTERVENTIONS:  - Perform BMAT or MOVE assessment daily    - Set and communicate daily mobility goal to care team and patient/family/caregiver  - Collaborate with rehabilitation services on mobility goals if consulted  - Perform Range of Motion  times a day  - Reposition patient every  hours  - Dangle patient  times a day  - Stand patient  times a day  - Ambulate patient  times a day  - Out of bed to chair  times a day   - Out of bed for meals times a day  - Out of bed for toileting  - Record patient progress and toleration of activity level   Outcome: Adequate for Discharge     Problem: Nutrition/Hydration-ADULT  Goal: Nutrient/Hydration intake appropriate for improving, restoring or maintaining nutritional needs  Description: Monitor and assess patient's nutrition/hydration status for malnutrition  Collaborate with interdisciplinary team and initiate plan and interventions as ordered  Monitor patient's weight and dietary intake as ordered or per policy   Utilize nutrition screening tool and intervene as necessary  Determine patient's food preferences and provide high-protein, high-caloric foods as appropriate       INTERVENTIONS:  - Monitor oral intake, urinary output, labs, and treatment plans  - Assess nutrition and hydration status and recommend course of action  - Evaluate amount of meals eaten  - Assist patient with eating if necessary   - Allow adequate time for meals  - Recommend/ encourage appropriate diets, oral nutritional supplements, and vitamin/mineral supplements  - Order, calculate, and assess calorie counts as needed  - Recommend, monitor, and adjust tube feedings and TPN/PPN based on assessed needs  - Assess need for intravenous fluids  - Provide specific nutrition/hydration education as appropriate  - Include patient/family/caregiver in decisions related to nutrition  Outcome: Adequate for Discharge     Problem: Potential for Falls  Goal: Patient will remain free of falls  Description: INTERVENTIONS:  - Educate patient/family on patient safety including physical limitations  - Instruct patient to call for assistance with activity   - Consult OT/PT to assist with strengthening/mobility   - Keep Call bell within reach  - Keep bed low and locked with side rails adjusted as appropriate  - Keep care items and personal belongings within reach  - Initiate and maintain comfort rounds  - Make Fall Risk Sign visible to staff  - Offer Toileting every  Hours, in advance of need  - Initiate/Maintain alarm  - Obtain necessary fall risk management equipment:   - Apply yellow socks and bracelet for high fall risk patients  - Consider moving patient to room near nurses station  Outcome: Adequate for Discharge

## 2022-04-22 NOTE — CASE MANAGEMENT
Case Management Assessment & Discharge Planning Note    Patient name Pura Young  Location W /W -04 MRN [id number]  : 1935 Date 2022       Current Admission Date: 2022  Current Admission Diagnosis:Paresthesias   Patient Active Problem List    Diagnosis Date Noted    Recurrent syncope 2022    Paresthesias 2022    Closed fracture of nasal bone 2022    Sick sinus syndrome ([address] ) 2019    Presence of permanent cardiac pacemaker 2019    Pre-op evaluation 2019    Chest pain 2019    On amiodarone therapy 2018    Temporal bone fracture ([facility] ) 2018    Subdural hematoma ([facility] ) 04/15/2018    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    Closed nondisplaced fracture of distal phalanx of right thumb 04/15/2018    Atrial fibrillation ([facility] ) 04/15/2018    Essential hypertension 2017      LOS (days): 2  Geometric Mean LOS (GMLOS) (days): 2 60  Days to GMLOS:0 7     OBJECTIVE:    Risk of Unplanned Readmission Score: 10         Current admission status: Inpatient       Preferred Pharmacy:   [id number] [place], [place] - [address]  [address]  [place] [address]  Phone: [phone] Fax: [phone]    Primary Care Provider: Pearle Severs, DO    Primary Insurance: MEDICARE  Secondary Insurance: AARP    ASSESSMENT:  [address] Proxies    There are no active Health Care Proxies on file         Advance Directives  Does patient have a [address]?: Yes  Does patient have Advance Directives?: Yes  Advance Directives: Living will  Primary Contact: Frederick Dave              Patient Information  Admitted from[de-identified] Home  Mental Status: Alert  During Assessment patient was accompanied by: Daughter  Assessment information provided by[de-identified] Patient,Daughter  Primary Caregiver: Self  Support Systems: [employer] of Residence: [address] [facility]  Type of Current Residence:  story home  Upon entering residence, is there a bedroom on the main floor (no further steps)?: Yes  Upon entering residence, is there a bathroom on the main floor (no further steps)?: Yes  In the last 12 months, was there a time when you were not able to pay the mortgage or rent on time?: No  In the last 12 months, was there a time when you did not have a steady place to sleep or slept in a shelter (including now)?: No  Living Arrangements: Lives Alone    Activities of Daily Living Prior to Admission  Functional Status: Independent  Completes ADLs independently?: Yes  Ambulates independently?: Yes  Does patient use assisted devices?: Yes  Assisted Devices (DME) used: Brenita Laity  Does patient currently own DME?: Yes  What DME does the patient currently own?: Torsten Fillers [employer]  Does patient have a history of Outpatient Therapy (PT/OT)?: Yes  Does the patient have a history of Short-Term Rehab?: No  Does patient have a history of C?: No  Does patient currently have [facility]?: No         Patient Information Continued  Income Source: Pension/custodial  Does patient have prescription coverage?: Yes  Within the past 12 months, you worried that your food would run out before you got the money to buy more : Never true  Within the past 12 months, the food you bought just didnt last and you didnt have money to get more : Never true  Does patient have a history of substance abuse?: No  Does patient have a history of Mental Health Diagnosis?: No         Means of Transportation  Means of Transport to Appts[de-identified] Drives Self  In the past 12 months, has lack of transportation kept you from medical appointments or from getting medications?: No  In the past 12 months, has lack of transportation kept you from meetings, work, or from getting things needed for daily living?: No        DISCHARGE DETAILS:    Discharge planning discussed with[de-identified] Pt and daughter  Freedom of Choice: Yes  Comments - Freedom of Choice: Pt and daughter both do not want the pt to go to a rehab facility at this time  Daughter says she lives 2 miles away and does not work, she will stay with the pt  She says her sister lives 5 miles away and can help, and their brother lives 7 miles away and will assist when needed  Philomena and pt wish for the pt to go home with [place] [address] services and do not have a preference for an agency, a blanket referral with follow-up of choices is what they would prefer  CM contacted family/caregiver?: Yes  Were Treatment Team discharge recommendations reviewed with patient/caregiver?: Yes  Did patient/caregiver verbalize understanding of patient care needs?: Yes  Were patient/caregiver advised of the risks associated with not following Treatment Team discharge recommendations?: Yes    Contacts  Patient Contacts: Philomena  Relationship to Patient[de-identified] Family  Contact Method: In Person  Reason/Outcome: Continuity of [address]         Is the patient interested in [place] [address] at discharge?: Yes  [address] requested[de-identified] Physical [facility] Name[de-identified] Other  [address] [place] [address] Provider[de-identified] PCP  Home Health Services Needed[de-identified] Strengthening/Theraputic Exercises to Improve Function,Gait/ADL Training,Evaluate Functional Status and Safety  Homebound Criteria Met[de-identified] Uses an Assist Device (i e  cane, walker, etc),Requires the Assistance of Another Person for Safe Ambulation or to Leave the Home  Supporting Clincal Findings[de-identified] Limited Endurance,Fatigues Easliy in Short Distances         Other Referral/Resources/Interventions Provided:  Interventions: [facility]  Referral Comments: CM placed a blanket referral via [facility] And will follow-up with choices           Treatment Team Recommendation: Short Term Rehab  Discharge Destination Plan[de-identified] Home with [place] at Discharge : Family

## 2022-04-22 NOTE — PLAN OF CARE
Problem: OCCUPATIONAL THERAPY ADULT  Goal: Performs self-care activities at highest level of function for planned discharge setting  See evaluation for individualized goals  Description: Treatment Interventions: ADL retraining,Functional transfer training,UE strengthening/ROM,Endurance training,Patient/family training,Equipment evaluation/education,Compensatory technique education,Continued evaluation,Fine motor coordination activities,Energy conservation,Activityengagement  Equipment Recommended: Bedside commode,Shower/Tub chair with back ($)       See flowsheet documentation for full assessment, interventions and recommendations  Note: Limitation: Decreased ADL status,Decreased UE strength,Decreased UE ROM,Decreased cognition,Decreased endurance,Decreased self-care trans,Decreased high-level ADLs,Decreased fine motor control     Assessment: Patient participated in Skilled OT session this date with interventions consisting of ADL re training with the use of correct body mechnaics, Energy Conservation techniques, Work simplification skills , safety awareness and fall prevention techniques, maintaining spinal precautions, therapeutic exercise to: increase functional use of BUEs, increase BUE muscle strength , increase standing tolerance time with unilateral UE support to complete sink level ADLs, elicit righting and equilibrium reactions for improved postural control and alignment during transitional movements, increase dynamic sit/ stand balance during functional activity , increase postural control, increase trunk control and increase OOB/ sitting tolerance   Patient agreeable to OT treatment session, upon arrival patient was found seated at edge of bed  In comparison to previous session, patient with improvements in participation in ADLs this session  With use of adaptive utensil tubing, pt improving to supervision for eating with + time   Pt able to perform LB dressing this session with min A and VC for technique  Pt able to ambulate short household distance to bathroom with no AD with CGA for safety  Demonstrated limited safety awareness during mobility, required VC for pacing  Pt provided with education regarding spinal precautions and activity restrictions, poor insight into limitations and concern for safety upon DC  Patient requiring frequent re direction, verbal cues for safety, verbal cues for correct technique, cognitive assistance to anticipate next step and ocassional safety reminders  Patient continues to be functioning below baseline level, occupational performance remains limited secondary to factors listed above and increased risk for falls and injury  From OT standpoint, recommendation at time of d/c would be Home with home health rehabilitation and increased social support for IADLs  Patient to benefit from continued Occupational Therapy treatment while in the hospital to address deficits as defined above and maximize level of functional independence with ADLs and functional mobility        OT Discharge Recommendation: Home with home health rehabilitation  OT - OK to Discharge: Yes (Once medically cleared)     Saritha Medrano, OT

## 2022-04-22 NOTE — ASSESSMENT & PLAN NOTE
- Chronic history of hypertension   - Patient currently normotensive at the time of his initial trauma evaluation  Suspect possible orthostatic hypotension as cause of recurrent syncope  - Monitor blood pressure with goal MAP >85 per Neurosurgery  - Resume home medication regimen when appropriate  - Outpatient follow-up with PCP

## 2022-04-22 NOTE — OCCUPATIONAL THERAPY NOTE
OT TREATMENT    The patient's raw score on the AM-PAC Daily Activity inpatient short form is 19, standardized score is 40 22, greater than 39 4  Patients at this level are likely to benefit from DC to home with outpatient occupational therapy  Please refer to the recommendation of the Occupational Therapist for safe DC planning  04/22/22 0133   OT Last Visit   OT Visit Date 04/22/22   Note Type   Note Type Treatment   Restrictions/Precautions   Weight Bearing Precautions Per Order Yes   Braces or Orthoses C/S Collar  ([place] at all times )   Other Precautions Cognitive; Chair Alarm; Bed Alarm; Fall Risk;Spinal precautions; Impulsive   General   Response to Previous Treatment Patient with no complaints from previous session   Family/Caregiver Present yes, daughter Shanice Bradford present for session   Lifestyle   Autonomy Pt reports I w/ ADL PTA w/ out use of AD or DME   Reciprocal Relationships Supportive local family but pt lives alone  Service to Others 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 [employer] after serving 2 years in [employer] Pt reports enjoying his dog, working outside and around the house   Pain Assessment   Pain Assessment Tool 0-10   Pain Score No Pain   Cognition   Overall Cognitive Status Impaired   Arousal/Participation Alert; Cooperative   Attention Attends with cues to redirect   Orientation Level Oriented to person;Oriented to place   Memory Decreased recall of precautions;Decreased recall of recent events;Decreased short term memory   Following Commands Follows one step commands with increased time or repetition   Comments Pt pleasant and agreeable to OT session  Domenico Lay to go home  Able to verbalize correct orientation of shower collar  Still high concern for safety upon DC  Spoke to carolina Bradford at end of session and reporting family will be able to provide 24/7 assist upon DC   Will ensure father follow precautions and activity restrictions  Philomena also reporting neighbors aware of pt restrictions and will keep an additional eye out as well  Additional Activities   Additional Activities Comments Shower collar education provided this session to pt and daughter Laura Khan  Pt reporting "I need someone to help me, I can't do it because of my hands"  Pt educated on use of [place] vs shower collar and able to distinguish between the two  Pt educated on correct orientation and technique for doffing/donning collar and verbalized understanding  Pt daughter educated through demonstration and then performed donning/doffing herself on pt  All questions answered regarding technique and daughter Laura Khan reporting feeling confident with collar management  Activity Tolerance   Activity Tolerance Patient tolerated treatment well   Medical Staff Made Aware yes, CM updated on outcomes    Plan   Treatment Interventions ADL retraining;Cognitive reorientation;Patient/family training;Equipment evaluation/education   Goal Expiration Date 05/01/22   OT Treatment Day 2   OT Frequency 3-5x/wk   Recommendation   OT Discharge Recommendation Home with outpatient rehabilitation   Equipment Recommended Bedside commode   Commode Type Standard   OT - OK to Discharge Yes  (Once medically cleared)   Additional Comments  At end of session, pt in recliner with all needs met and call bell within reach   Daughter present    AM-PAC Daily Activity Inpatient   Lower Body Dressing 3   Bathing 3   Toileting 3   Upper Body Dressing 3   Grooming 3   Eating 3   Daily Activity Raw Score 18   Daily Activity Standardized Score (Calc for Raw Score >=11) 38 66   AM-PAC Applied Cognition Inpatient   Following a Speech/Presentation 3   Understanding Ordinary Conversation 4   Taking Medications 3   Remembering Where Things Are Placed or Put Away 4   Remembering List of 4-5 Errands 3   Taking Care of Complicated Tasks 3   Applied Cognition Raw Score 20   Applied Cognition Standardized Score 41 76 Rella Hodgkins, OT

## 2022-04-29 LAB — HBB GENE MUT ANL BLD/T: NORMAL

## 2022-05-04 ENCOUNTER — OFFICE VISIT (OUTPATIENT)
Dept: NEUROSURGERY | Facility: CLINIC | Age: 87
End: 2022-05-04
Payer: MEDICARE

## 2022-05-04 VITALS
HEART RATE: 88 BPM | RESPIRATION RATE: 16 BRPM | DIASTOLIC BLOOD PRESSURE: 60 MMHG | TEMPERATURE: 97.6 F | WEIGHT: 169 LBS | HEIGHT: 65 IN | BODY MASS INDEX: 28.16 KG/M2 | SYSTOLIC BLOOD PRESSURE: 122 MMHG

## 2022-05-04 DIAGNOSIS — M48.02 CERVICAL STENOSIS OF SPINE: ICD-10-CM

## 2022-05-04 DIAGNOSIS — R29.898 DECREASED RANGE OF MOTION OF NECK: Primary | ICD-10-CM

## 2022-05-04 PROCEDURE — 99213 OFFICE O/P EST LOW 20 MIN: CPT | Performed by: NEUROLOGICAL SURGERY

## 2022-05-04 RX ORDER — OXYCODONE HYDROCHLORIDE 5 MG/1
5 CAPSULE ORAL EVERY 4 HOURS PRN
COMMUNITY
End: 2022-05-24 | Stop reason: ALTCHOICE

## 2022-05-04 NOTE — PROGRESS NOTES
Neurosurgery Office Note  Zackary Jeong 81 y o  male MRN: [id number]      Assessment/Plan        Problem List Items Addressed This Visit        Nervous and Auditory    Cervical stenosis      Other Visit Diagnoses     Decreased range of motion of neck    -  Primary    Relevant Orders    Ambulatory Referral to Physical Therapy            Discussion:  Patient is an 78-year-old male with h/o atrial fibrillation not on [facility] (h/o SDH in 2018 which was managed conservatively and resolved over time, last [facility] on 4/20 negative), cardiac pacemaker, hypertension, who was hospitalized on 4/20/22 after multiple syncopal events in one week causing a fall forward from standing onto his face at home (+LOC)  Initially he could not move any extremities at home then his legs returned to baseline and weakness in his arms gradually returned to near full strength (clinically consistent with central cord syndrome)  MAP was augmented in ICU, and he was discharged in improved condition on 4/22  No more syncopal episodes since  Today, he reports no pain and has no complaints other than requesting to remove the rigid collar (I initially advised to maintain collar because of neck pain and focal tenderness to palpation of spine s/p fall on 4/20)  The initial neck/arm pain, hyperalgesia, bilateral hand weakness and swelling during hospitalization have all resolved  Denies gait imbalance, bowel/bladder changes, more recent trauma/fall  Taking Neurontin (recently started due to insurance issues)  No longer taking/requiring Oxycodone given no pain  Not taking AC  Non-smoker  Neurologic exam today is intact including bilateral hand intrinsics, no hyperalgesia, sensation intact throughout, no gait ataxia, bilateral Mullinss (same as my prior exams, likely chronic)  MRI inpatient on 4/20/22 showed chronic degenerative changes at C4-6 with central and foraminal stenoses   CT c-spine showed no acute fracture, only diffuse degenerative bony changes  His recent symptoms are resolved, and he has returned to his neurologic baseline (intact), with no clinical evidence of worsening myelopathy  While he was hospitalized, we had a lengthy discussion about possible surgical intervention (posterior C4-6 decompression and fusion)  I explained again that since he improved to his neurologically intact baseline, there is no urgency to treat the findings on the MRI  I cleared him from the rigid collar today, which he was ecstatic about, and will refer to PT for neck muscle strengthening and range of motion exercises  Continue Neurontin for now  Follow up in 3 months to reassess for any recurrent symptoms  His and his daughters questions and concerns were all addressed thoroughly during this visit  CHIEF COMPLAINT    Chief Complaint   Patient presents with    Follow-up     hospital f/u        HISTORY    History of Present Illness     81y o  year old male     HPI    See Discussion    REVIEW OF SYSTEMS    Review of Systems   Cardiovascular:        Hx of HTN      Gastrointestinal: Positive for constipation (due to meds)  Musculoskeletal: Positive for neck pain (b/l arms-fingers-wrist pain &* swelling  that has improved since )  WAS SEEN IN THE ED ON 4/20/22 Paresthesias   Neurological: Positive for weakness and numbness (b/l arms-fingers-wrist pain &* swelling  that has improved since )  All other systems reviewed and are negative  Meds/Allergies     Current Outpatient Medications   Medication Sig Dispense Refill    amiodarone 200 mg tablet Take 1 tablet (200 mg total) by mouth daily 30 tablet 3    amLODIPine (NORVASC) 5 mg tablet TAKE 1 TABLET BY MOUTH EVERY DAY 90 tablet 3    gabapentin (NEURONTIN) 100 mg capsule Take 1 capsule (100 mg total) by mouth 3 (three) times a day 25 capsule 0     No current facility-administered medications for this visit         No Known Allergies    PAST HISTORY    Past Medical History:   Diagnosis Date    A-fib [facility])     Hernia, abdominal     Hypertension     Subdural hematoma (HCC)        Past Surgical History:   Procedure Laterality Date    A-V CARDIAC PACEMAKER INSERTION      APPENDECTOMY      age 33    CARDIAC PACEMAKER PLACEMENT      CARPAL TUNNEL RELEASE      COLONOSCOPY      INGUINAL HERNIA REPAIR Left     MO XCAPSL CTRC RMVL INSJ IO LENS PROSTH W/O ECP Right 4/3/2017    Procedure: EXTRACTION EXTRACAPSULAR CATARACT PHACO INTRAOCULAR LENS (IOL); Surgeon: Kaden Henao MD;  [facility] MAIN OR;  Service: Ophthalmology    TONSILLECTOMY      age 12       Social History     Tobacco Use    Smoking status: Never Smoker    Smokeless tobacco: Never Used   Vaping Use    Vaping Use: Never used   Substance Use Topics    Alcohol use: No    Drug use: Never       Family History   Problem Relation Age of Onset    Cancer Mother         exp age 80    Heart disease Father     Heart disease Brother     Hypertension Brother          The following portions of the patient's history were reviewed in this encounter and updated as appropriate: Past medical, surgical, family, and social history, as well as medications, allergies, and review of systems  EXAM    Vitals:Blood pressure 122/60, pulse 88, temperature 97 6 °F (36 4 °C), temperature source Temporal, resp  rate 16, height 5' 5" (1 651 m), weight 76 7 kg (169 lb)  ,Body mass index is 28 12 kg/m²  Physical Exam  Vitals and nursing note reviewed  Constitutional:       Appearance: Normal appearance  He is normal weight  HENT:      Head: Normocephalic and atraumatic  Eyes:      Extraocular Movements: Extraocular movements intact and EOM normal       Pupils: Pupils are equal, round, and reactive to light  Cardiovascular:      Rate and Rhythm: Normal rate and regular rhythm  Pulses: Normal pulses  Heart sounds: Normal heart sounds     Pulmonary:      Effort: Pulmonary effort is normal       Breath sounds: Normal breath sounds  Abdominal:      General: Abdomen is flat  Palpations: Abdomen is soft  Musculoskeletal:         General: Normal range of motion  Cervical back: Normal range of motion  Neurological:      General: No focal deficit present  Mental Status: He is alert and oriented to person, place, and time  Mental status is at baseline  GCS: GCS eye subscore is 4  GCS verbal subscore is 5  GCS motor subscore is 6  Cranial Nerves: Cranial nerves are intact  Sensory: Sensation is intact  Motor: Motor function is intact  Coordination: Coordination is intact  Gait: Gait is intact  Deep Tendon Reflexes: Strength normal and reflexes are normal and symmetric  Psychiatric:         Mood and Affect: Mood normal          Speech: Speech normal          Behavior: Behavior normal          Neurologic Exam     Mental Status   Oriented to person, place, and time  Attention: normal    Speech: speech is normal   Level of consciousness: alert    Cranial Nerves     CN II   Visual fields full to confrontation  Visual acuity: normal  Right visual field deficit: none  Left visual field deficit: none     CN III, IV, VI   Pupils are equal, round, and reactive to light  Extraocular motions are normal    CN III: no CN III palsy  CN VI: no CN VI palsy  Nystagmus: none   Diplopia: none  Ophthalmoparesis: none  Upgaze: normal  Downgaze: normal  Conjugate gaze: present    CN V   Facial sensation intact  Right facial sensation deficit: none  Left facial sensation deficit: none    CN VII   Facial expression full, symmetric     Right facial weakness: none  Left facial weakness: none    CN VIII   CN VIII normal      CN IX, X   CN IX normal    CN X normal    Palate: symmetric    CN XI   CN XI normal    Right sternocleidomastoid strength: normal  Left sternocleidomastoid strength: normal  Right trapezius strength: normal  Left trapezius strength: normal    CN XII   CN XII normal    Tongue deviation: none    Motor Exam   Muscle bulk: normal  Overall muscle tone: normal  Right arm pronator drift: absent  Left arm pronator drift: absent    Strength   Strength 5/5 throughout  Sensory Exam   Light touch normal      Gait, Coordination, and Reflexes     Gait  Gait: normal    Reflexes   Reflexes 2+ except as noted  Bilateral Mullins's, unchanged from prior exams         MEDICAL DECISION MAKING    Imaging Studies:     XR chest 1 view portable    Result Date: 4/20/2022  Narrative: CHEST INDICATION:   syncope  COMPARISON:  None EXAM PERFORMED/VIEWS:  XR CHEST PORTABLE FINDINGS: Poor inspiratory effort with secondary cardiac size accentuation and crowded markings  The lungs are otherwise clear  Right nipple shadow and right cardiac pacer again noted  No pneumothorax or pleural effusion  Left ribs old trauma  Left AC joint previous surgery  Impression: No acute cardiopulmonary disease  Workstation performed: [id number]     XR shoulder 2+ views RIGHT    Result Date: 4/20/2022  Narrative: RIGHT SHOULDER INDICATION:   R shoulder pain  COMPARISON:  None VIEWS:  XR SHOULDER 2+ VW RIGHT Images: 3 FINDINGS: There is no acute fracture or dislocation  Widening of the [facility] joint with adjacent fragment exhibiting chronic appearance Mild degenerative changes involve the acromioclavicular and glenohumeral joints No lytic or blastic osseous lesion  Soft tissues are unremarkable  Right chest wall cardiac pacer with incompletely visualized wires     Impression: Degenerative changes No acute osseous abnormality  Workstation performed: [id number]     XR forearm 2 vw right    Result Date: 4/20/2022  Narrative: RIGHT FOREARM INDICATION:   S/P fall w/ pain  COMPARISON:  None VIEWS:  XR FOREARM 2 VW RIGHT Images: 2 FINDINGS: There is no acute fracture or dislocation  No significant degenerative changes  No lytic or blastic osseous lesion  Soft tissues are unremarkable  Impression: No acute osseous abnormality   Workstation performed: [id number]     XR wrist 3+ views RIGHT    Result Date: 4/20/2022  Narrative: RIGHT WRIST INDICATION:   R wrist pain  COMPARISON:  None VIEWS:  XR WRIST 3+ VW RIGHT Images: 3 FINDINGS: There is no acute fracture or dislocation  Mild degenerative changes throughout the wrist No lytic or blastic osseous lesion  Soft tissues are unremarkable  Impression: Degenerative changes No acute osseous abnormality  Workstation performed: [id number]     CT head without contrast    Result Date: 4/20/2022  Narrative: CT BRAIN - WITHOUT CONTRAST INDICATION:   head injury, syncope  COMPARISON:  8/24/2018 TECHNIQUE:  CT examination of the brain was performed  In addition to axial images, sagittal and coronal 2D reformatted images were created and submitted for interpretation  Radiation dose length product (DLP) for this visit:  863 mGy-cm   This examination, like all CT scans performed in the [facility], was performed utilizing techniques to minimize radiation dose exposure, including the use of iterative reconstruction and automated exposure control  IMAGE QUALITY:  Diagnostic  FINDINGS: PARENCHYMA: Decreased attenuation is noted in periventricular and subcortical white matter demonstrating an appearance that is statistically most likely to represent mild microangiopathic change  Focus of right temporal encephalomalacia is present  No CT signs of acute infarction  No intracranial mass, mass effect or midline shift  No acute parenchymal hemorrhage  VENTRICLES AND EXTRA-AXIAL SPACES:  Normal for the patient's age  VISUALIZED ORBITS AND PARANASAL SINUSES:  Unremarkable  CALVARIUM AND EXTRACRANIAL SOFT TISSUES:  Mildly depressed nasal bone fracture is present  Impression: No acute intracranial abnormality  Mildly depressed nasal bone fracture   Workstation performed: [id number]     CT facial bones without contrast    Result Date: 4/20/2022  Narrative: CT FACIAL BONES WITHOUT INTRAVENOUS CONTRAST INDICATION:   fall head injury, nasal swelling, epistaxis  COMPARISON: None  TECHNIQUE:  Axial CT images were obtained through the facial bones with additional sagittal and coronal reconstructions  Radiation dose length product (DLP) for this visit:  489 mGy-cm   This examination, like all CT scans performed in the [facility], was performed utilizing techniques to minimize radiation dose exposure, including the use of iterative reconstruction and automated exposure control  IMAGE QUALITY:  Diagnostic  FINDINGS: FACIAL BONES:  Mildly depressed nasal bone fracture is present  Normal alignment of the temporomandibular joints  No lytic or blastic lesion  ORBITS:  Orbital globes, optic nerves, and extraocular muscles appear symmetric and normal  There is no evidence of retrobulbar mass, abscess, or hematoma  SINUSES:  Normal  SOFT TISSUES:  Normal      Impression: Mildly depressed nasal bone fracture  Workstation performed: [id number]     CT cervical spine without contrast    Result Date: 4/20/2022  Narrative: CT CERVICAL SPINE - WITHOUT CONTRAST INDICATION:   fall, head injury, bilateral hand paresthesias  COMPARISON:  4/14/2018 TECHNIQUE:  CT examination of the cervical spine was performed without intravenous contrast   Contiguous axial images were obtained  Sagittal and coronal reconstructions were performed  Radiation dose length product (DLP) for this visit:  370 mGy-cm   This examination, like all CT scans performed in the [facility], was performed utilizing techniques to minimize radiation dose exposure, including the use of iterative reconstruction and automated exposure control  IMAGE QUALITY:  Diagnostic  FINDINGS: ALIGNMENT:  Normal alignment of the cervical spine  No subluxation  VERTEBRAL BODIES:  No fracture  DEGENERATIVE CHANGES:  Severe multilevel cervical degenerative changes are noted  No critical central canal stenosis   PREVERTEBRAL AND PARASPINAL SOFT TISSUES:  Unremarkable  THORACIC INLET:  Normal      Impression: No cervical spine fracture or traumatic malalignment  Workstation performed: [id number]     MRI cervical spine wo contrast    Result Date: 4/20/2022  Narrative: MRI CERVICAL SPINE WITHOUT CONTRAST INDICATION: Patient is status post fall with bilateral upper extremity paresthesias and concern for cervical spinal cord injury  COMPARISON:  None  TECHNIQUE:  Sagittal T1, sagittal T2, sagittal inversion recovery, axial T2, axial  2D merge IMAGE QUALITY:  Severely degraded by patient motion artifact  FINDINGS: ALIGNMENT:  There is straightening of the cervical lordosis  Slight retrolisthesis of C5-6 and C6-7  MARROW SIGNAL:  Mild marrow edema C4-C6 vertebral bodies  CERVICAL AND VISUALIZED THORACIC CORD:  Normal signal within the visualized cord  PREVERTEBRAL AND PARASPINAL SOFT TISSUES:  Question mild prevertebral soft tissue swelling  Image quality is severely degraded by patient motion artifact  VISUALIZED POSTERIOR FOSSA:  The visualized posterior fossa demonstrates no abnormal signal  CERVICAL DISC SPACES: C2-C3:  Mild facet hypertrophy  Mild right foraminal narrowing  Central canal patent  C3-C4: Moderate facet degenerative change on the right  No significant central stenosis  Minimal right foraminal narrowing  C4-C5:  Moderate to severe facet hypertrophy with disc osteophyte complex and marginal osteophytes  There is moderate central stenosis  Moderate bilateral foraminal narrowing right greater than left  C5-C6:  Degenerative disc osteophyte complex with marginal osteophytes and facet hypertrophy combined result in severe central stenosis  There is compression of the cord eccentric to the left  Evaluation for cord signal is severely limited by patient motion artifact   C6-C7:  Degenerative disc osteophyte complex with marginal osteophytes and facet hypertrophy results in moderate to severe central stenosis with flattening of the cord and near cord compression  Severe left and moderate right foraminal narrowing  C7-T1:  No significant central or foraminal narrowing  UPPER THORACIC DISC SPACES:  Normal      Impression: Image quality is severely degraded by patient motion artifact  Determining cord signal abnormality is particularly limited secondary to motion artifact  Questionable mild prevertebral soft tissue swelling  There is spinal cord compression at the C5-6 level, and near spinal cord compression at the C6-7 level secondary to spondylotic degenerative disease  Bilateral foraminal narrowing is also noted at these levels  Moderate central and bilateral foraminal stenosis at C4-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  Consider repeat imaging with sedation to further delineate cord signal  The study was marked in EPIC for immediate notification  Workstation performed: [id number]     Echo complete w/ contrast if indicated    Result Date: 4/20/2022  Narrative: Matt Welsh  Left Ventricle: Left ventricular cavity size is normal  Wall thickness is mildly increased  The left ventricular ejection fraction is 60%  Systolic function is normal  Wall motion is normal    IVS: There is abnormal septal motion consistent with right ventricular pacing    Right Ventricle: Right ventricular cavity size is mildly dilated  Systolic function is normal    Left Atrium: The atrium is mildly dilated    Aortic Valve: The aortic valve is trileaflet  The leaflets are mildly thickened  The leaflets are moderately calcified  There is mildly reduced mobility  There is mild regurgitation  There is mild stenosis (DEE DEE 1 6 cm2/MG 5 mmHg/DI 0 5)  The aortic valve velocity is increased due to stenosis but lower than expected due to the presence of decreased flow    Mitral Valve: There is moderate annular calcification  There is mild regurgitation    Tricuspid Valve: There is mild to moderate regurgitation  The right ventricular systolic pressure is mildly elevated   The estimated right ventricular systolic pressure is 01 18 mmHg    Aorta: The aortic root is mildly dilated at 4 2 cm  I have personally reviewed pertinent films in [facility]    Neurosurgeon

## 2022-05-18 ENCOUNTER — REMOTE DEVICE CLINIC VISIT (OUTPATIENT)
Dept: CARDIOLOGY CLINIC | Facility: CLINIC | Age: 87
End: 2022-05-18
Payer: MEDICARE

## 2022-05-18 DIAGNOSIS — Z95.0 PRESENCE OF CARDIAC PACEMAKER: Primary | ICD-10-CM

## 2022-05-18 PROCEDURE — 93296 REM INTERROG EVL PM/IDS: CPT | Performed by: INTERNAL MEDICINE

## 2022-05-18 PROCEDURE — 93294 REM INTERROG EVL PM/LDLS PM: CPT | Performed by: INTERNAL MEDICINE

## 2022-05-18 NOTE — PROGRESS NOTES
Results for orders placed or performed in visit on 05/18/22   Cardiac EP device report    Narrative    MDT-DUAL CHAMBER PPM/ ACTIVE SYSTEM IS MRI CONDITIONAL  CARELINK TRANSMISSION: BATTERY VOLTAGE ADEQUATE (7 1 YRS)  AP: 0 5%  : 99 5% (>40%~MVP-ON/70)  ALL AVAILABLE LEAD PARAMETERS WITHIN NORMAL LIMITS  4 AT/AF EPISODES W/ AF IN PROGRESS (HX OF SAME)  AF BURDEN: 100%  PT DOES NOT TAKE AC DUE TO HX OF SUBDURAL HEMATOMA (2018)  PT TAKES AMIODARONE  EF: 60% (ECHO 4/20/22)  PACEMAKER FUNCTIONING APPROPRIATELY    [address] [place] [address]

## 2022-05-19 ENCOUNTER — TELEPHONE (OUTPATIENT)
Dept: CARDIOLOGY CLINIC | Facility: CLINIC | Age: 87
End: 2022-05-19

## 2022-05-19 NOTE — TELEPHONE ENCOUNTER
----- Message from Herve Rodriguez MD sent at 5/19/2022  6:33 AM EDT -----  Thank you Davon Hudson   Please call this patient   He was last seen over 2 years ago  He is in 100% AFib, is on amiodarone, and is not on a blood thinner   He needs to be re-evaluated          ----- Message -----  From: Neena Bob MD  Sent: 5/18/2022   8:27 AM EDT  To: Herve Rodriguez MD    Normal Device Function  On Amiodarone but 100% afib  Subdural hematoma not on anticoagulatoin

## 2022-05-23 NOTE — TELEPHONE ENCOUNTER
Spoke with patient  He is unaware of any a fib episodes  Transferred to  to schedule appt  Patient has not seen Dr Grupo Haines since 2019  I advised Criss to schedule with PA while Dr Grupo Haines is in office if the doc does not have any openings

## 2022-05-24 ENCOUNTER — OFFICE VISIT (OUTPATIENT)
Dept: CARDIOLOGY CLINIC | Facility: CLINIC | Age: 87
End: 2022-05-24
Payer: MEDICARE

## 2022-05-24 VITALS
DIASTOLIC BLOOD PRESSURE: 72 MMHG | WEIGHT: 160.5 LBS | HEIGHT: 65 IN | BODY MASS INDEX: 26.74 KG/M2 | SYSTOLIC BLOOD PRESSURE: 130 MMHG | HEART RATE: 71 BPM

## 2022-05-24 DIAGNOSIS — Z79.899 LONG TERM CURRENT USE OF AMIODARONE: Primary | ICD-10-CM

## 2022-05-24 DIAGNOSIS — S06.5X9A SUBDURAL HEMATOMA (HCC): ICD-10-CM

## 2022-05-24 DIAGNOSIS — I48.0 PAROXYSMAL ATRIAL FIBRILLATION (HCC): ICD-10-CM

## 2022-05-24 DIAGNOSIS — S02.2XXA NASAL FRACTURE: ICD-10-CM

## 2022-05-24 PROCEDURE — 99215 OFFICE O/P EST HI 40 MIN: CPT | Performed by: PHYSICIAN ASSISTANT

## 2022-05-24 PROCEDURE — 93000 ELECTROCARDIOGRAM COMPLETE: CPT | Performed by: PHYSICIAN ASSISTANT

## 2022-05-24 RX ORDER — GABAPENTIN 100 MG/1
100 CAPSULE ORAL 3 TIMES DAILY
Qty: 1 CAPSULE | Refills: 0 | Status: SHIPPED | COMMUNITY
Start: 2022-05-24

## 2022-05-24 NOTE — PROGRESS NOTES
Electrophysiology Office Note    Edith Faulkner  1935  [id number]  HEART & VASCULAR [facility] LISA Alexander [phone]        Assessment/Plan     Primary diagnosis:   1  Persistent atrial fibrillation   * patient presents to [facility] EP office today to discuss [facility], he is a patient of dr Ted Galan    * today ECG showing AV paced rhythm    * on March 16th he went into a persistent AF episode which is his first on device interrogations  Broke to AV paced rhythm on May 20th  We did not get alert due to his avg HR being in mid 70's, device was programmed needed 6 hours AF + avg HR of 100bpm  I changed this to alert only if 2 hours of Afib  Device is properly plugged in at home    * [id number] is 1 (age, HTN) ; he has history of frequent falls  Most recently in April fell resulting in nasal fracture  Has hx of fall with SDH in 2018, on most recent CT's there is no SDH  Follows with NSX  Discussed with NSX, possibly planning Cspine decompression,will f/u timing  Risk of recurrent SDH even with small trauma  * he is extremely active splitting wood with the wood pieces hitting his head frequently  * we had a long discussion with his daughter today on [facility] management given his falls  For now will not start [facility] since he is back in NSR  Discussed Watchman but patient not sure if he is willing to take [facility] for short time given his active lifestyle  Will discuss his case with Dr Ted Galan, Dr Blade Salgado and Dr Vanna Gomez who implant device  * will have patient f/u in office in 3 months  If watchman recommended will have him f/u with either dr Shellie Higgins or tami in next few weeks  Given the above with his NSx history/possible upcoming surgery and falls may be best to accept CVA risk as bleeding risk seems to be higher at least for now  * continue amiodarone ; BB stopped due to hypotension will not restart     2   Long term amiodarone use    * due to amiodarone use for the long term discussed need to monitor his eyes, liver, lungs and thyroid    * recent TSH and CMP are WNL    * sent for DLCO    * does f/u with eye physician yearly  Secondary diagnosis:   1  Hx SDH 2/2 fall in 2018   2  HTN               Rhythm History:   Atrial fibrillation:     Atrial flutter:     SVT:     VT/VF/PVC:     Device history:   Pacemaker:    Defibrillator:    BIV PPM:    BIV ICD:    ILR:      Cardiac Testing:     ECHO: Results for orders placed during the hospital encounter of 18    Echo complete with contrast if indicated    Narrative  [address]  [facility], [address] [facility]  [phone]    Transthoracic Echocardiogram  2D, M-mode, Doppler, and Color Doppler    Study date:  15-Apr-2018    Patient: Zoie Howell  MR number: [id number]  Account number: [de-identified]  : [id number]  Age: 81 years  Gender: Male  Status: Inpatient  Location: Bedside  Height:  Weight:  BP: 125/ 52 mmHg    Indications: Syncope  Diagnoses: R55  - Syncope and collapse    Sonographer:  Juancho Walker RDCS  Primary Physician:  Jayesh Lamas DO  Referring Physician:  Alexander Frye MD  Group:  [facility]  Cardiology Fellow:  Yvon Delgado MD  Interpreting Physician:  Dave Samuel MD    SUMMARY    LEFT VENTRICLE:  Systolic function was normal  Ejection fraction was estimated to be 55 %  There were no regional wall motion abnormalities  Wall thickness was mildly to moderately increased  The changes were consistent with concentric remodeling (increased wall thickness with normal wall mass)  MITRAL VALVE:  There was mild annular calcification  There was mild regurgitation  AORTIC VALVE:  The valve was trileaflet  Leaflets exhibited sclerosis  There was mild regurgitation  TRICUSPID VALVE:  There was mild to moderate regurgitation  Estimated peak PA pressure was 48 mmHg    The findings suggest mild to moderate pulmonary hypertension  IVC, HEPATIC VEINS:  Respirophasic changes were blunted (less than 50% variation)  HISTORY: PRIOR HISTORY: Atrial fibrillation, Hypertension  PROCEDURE: The procedure was performed at the bedside  This was a routine study  The transthoracic approach was used  The study included complete 2D imaging, M-mode, complete spectral Doppler, and color Doppler  The heart rate was 50 bpm,  at the start of the study  Images were obtained from the parasternal, apical, subcostal, and suprasternal notch acoustic windows  Image quality was adequate  LEFT VENTRICLE: Size was normal  Systolic function was normal  Ejection fraction was estimated to be 55 %  There were no regional wall motion abnormalities  Wall thickness was mildly to moderately increased  The changes were consistent  with concentric remodeling (increased wall thickness with normal wall mass)  DOPPLER: Left ventricular diastolic function parameters were normal     RIGHT VENTRICLE: The size was normal  Systolic function was normal     LEFT ATRIUM: Size was normal     RIGHT ATRIUM: Size was normal     MITRAL VALVE: There was mild annular calcification  Valve structure was normal  There was normal leaflet separation  DOPPLER: The transmitral velocity was within the normal range  There was no evidence for stenosis  There was mild  regurgitation  AORTIC VALVE: The valve was trileaflet  Leaflets exhibited sclerosis  DOPPLER: Transaortic velocity was within the normal range  There was no evidence for stenosis  There was mild regurgitation  TRICUSPID VALVE: The valve structure was normal  There was normal leaflet separation  DOPPLER: The transtricuspid velocity was within the normal range  There was no evidence for stenosis  There was mild to moderate regurgitation  The  regurgitant jet was directed centrally  Estimated peak PA pressure was 48 mmHg  The findings suggest mild to moderate pulmonary hypertension      PULMONIC VALVE: DOPPLER: The transpulmonic velocity was within the normal range  There was no evidence for stenosis  There was trace regurgitation  PERICARDIUM: There was no pericardial effusion  AORTA: The root exhibited normal size  SYSTEMIC VEINS: IVC: The inferior vena cava was normal in size  Respirophasic changes were blunted (less than 50% variation)  SYSTEM MEASUREMENT TABLES    2D  %FS: 27 33 %  Ao Diam: 3 74 cm  EDV(Teich): 95 08 ml  EF(Teich): 53 26 %  ESV(Teich): 44 44 ml  IVSd: 1 31 cm  LA Area: 18 96 cm2  LA Diam: 3 73 cm  LVEDV MOD A4C: 97 77 ml  LVEF MOD A4C: 57 52 %  LVESV MOD A4C: 41 53 ml  LVIDd: 4 55 cm  LVIDs: 3 31 cm  LVLd A4C: 8 11 cm  LVLs A4C: 6 6 cm  LVOT Diam: 2 21 cm  LVPWd: 0 92 cm  RA Area: 15 37 cm2  RVIDd: 3 7 cm  SV MOD A4C: 56 23 ml  SV(Teich): 50 63 ml    CW  AR Dec Mower: 2 11 m/s2  AR Dec Time: 1731 3 ms  AR PHT: 502 08 ms  AR Vmax: 3 66 m/s  AR maxP 49 mmHg  TR Vmax: 3 09 m/s  TR maxP 29 mmHg    MM  TAPSE: 2 42 cm    PW  E': 0 07 m/s  E/E': 14 56  MV A Ilan: 0 69 m/s  MV Dec Mower: 4 62 m/s2  MV DecT: 228 93 ms  MV E Ilan: 1 06 m/s  MV E/A Ratio: 1 53  MV PHT: 66 39 ms  MVA By PHT: 3 31 cm2    [employer] Accredited Echocardiography Laboratory    Prepared and electronically signed by    Sunny Lock MD  Signed 2018 11:02:16        History of Present Illness     HPI/INTERVAL HISTORY: Trey Osborn is a 81 y o  male with history as above who presents to [facility] EP office today for discussion of AC  On device report patient was found to be in 100% AF burden and office appointment recommended  He is not sure if he felt different in the past few weeks as he was hospitalized with a fall resulting in a nasal fracture  In March he was drinking a lot of milk and had shoe fly pie which "cleaned me out good " denies any palpitations, LH, dizziness or SOB  Review of Systems  ROS as noted above, otherwise 12 point review of systems was performed and is negative  Historical Information   Social History     Socioeconomic History    Marital status: /Civil Union     Spouse name: Not on file    Number of children: Not on file    Years of education: Not on file    Highest education level: Not on file   Occupational History    Not on file   Tobacco Use    Smoking status: Never Smoker    Smokeless tobacco: Never Used   Vaping Use    Vaping Use: Never used   Substance and Sexual Activity    Alcohol use: No    Drug use: Never    Sexual activity: Not on file   Other Topics Concern    Not on file   Social History Narrative    Not on file     Social Determinants of Health     Financial Resource Strain: Not on file   Food Insecurity: No Food Insecurity    Worried About Running Out of Food in the Last Year: Never true    Amirah of Food in the Last Year: Never true   Transportation Needs: No Transportation Needs    Lack of Transportation (Medical): No    Lack of Transportation (Non-Medical): No   Physical Activity: Not on file   Stress: Not on file   Social Connections: Not on file   Intimate Partner Violence: Not on file   Housing Stability: Unknown    Unable to Pay for Housing in the Last Year: No    Number of Places Lived in the Last Year: Not on file    Unstable Housing in the Last Year: No     Past Medical History:   Diagnosis Date    A-fib ([address] )     Hernia, abdominal     Hypertension     Subdural hematoma (HCC)      Past Surgical History:   Procedure Laterality Date    A-V CARDIAC PACEMAKER INSERTION      APPENDECTOMY      age 33   [address] [place] [address]      COLONOSCOPY      INGUINAL HERNIA REPAIR Left     MN XCAPSL CTRC RMVL INSJ IO LENS PROSTH W/O ECP Right 4/3/2017    Procedure: EXTRACTION EXTRACAPSULAR CATARACT PHACO INTRAOCULAR LENS (IOL);   Surgeon: Duane Calderon MD;  Location: [facility] MAIN OR;  Service: Ophthalmology    TONSILLECTOMY      age 12     Social History     Substance and Sexual Activity Alcohol Use No     Social History     Substance and Sexual Activity   Drug Use Never     Social History     Tobacco Use   Smoking Status Never Smoker   Smokeless Tobacco Never Used     Family History   Problem Relation Age of Onset    Cancer Mother         exp age 80    Heart disease Father     Heart disease Brother     Hypertension Brother        Meds/Allergies       Current Outpatient Medications:     amiodarone 200 mg tablet, Take 1 tablet (200 mg total) by mouth daily, Disp: 30 tablet, Rfl: 3    amLODIPine (NORVASC) 5 mg tablet, TAKE 1 TABLET BY MOUTH EVERY DAY (Patient taking differently: Take 2 5 mg by mouth in the morning ), Disp: 90 tablet, Rfl: 3    gabapentin (NEURONTIN) 100 mg capsule, Take 1 capsule (100 mg total) by mouth in the morning and 1 capsule (100 mg total) in the evening and 1 capsule (100 mg total) before bedtime  , Disp: 1 capsule, Rfl: 0    No Known Allergies    Objective   Vitals: Blood pressure 130/72, pulse 71, height 5' 5" (1 651 m), weight 72 8 kg (160 lb 8 oz)  Physical Exam  Constitutional:       Appearance: He is well-developed  HENT:      Head: Normocephalic and atraumatic  Eyes:      Pupils: Pupils are equal, round, and reactive to light  Cardiovascular:      Rate and Rhythm: Normal rate and regular rhythm  Pulmonary:      Effort: Pulmonary effort is normal       Breath sounds: Normal breath sounds  Abdominal:      General: Bowel sounds are normal       Palpations: Abdomen is soft  Musculoskeletal:         General: Normal range of motion  Cervical back: Normal range of motion and neck supple  Skin:     General: Skin is warm and dry  Neurological:      Mental Status: He is alert and oriented to person, place, and time             Labs:  Admission on 04/20/2022, Discharged on 04/22/2022   Component Date Value    WBC 04/20/2022 5 25     RBC 04/20/2022 5 55     Hemoglobin 04/20/2022 11 2 (A)    Hematocrit 04/20/2022 35 7 (A)    MCV 04/20/2022 64 (A)    MCH 04/20/2022 20 2 (A)    MCHC 04/20/2022 31 4     RDW 04/20/2022 18 6 (A)    MPV 04/20/2022 8 6 (A)    Platelets 11/50/6544 209     nRBC 04/20/2022 0     Neutrophils Relative 04/20/2022 77 (A)    Immat GRANS % 04/20/2022 1     Lymphocytes Relative 04/20/2022 12 (A)    Monocytes Relative 04/20/2022 8     Eosinophils Relative 04/20/2022 1     Basophils Relative 04/20/2022 1     Neutrophils Absolute 04/20/2022 4 03     Immature Grans Absolute 04/20/2022 0 05     Lymphocytes Absolute 04/20/2022 0 65     Monocytes Absolute 04/20/2022 0 43     Eosinophils Absolute 04/20/2022 0 06     Basophils Absolute 04/20/2022 0 03     Protime 04/20/2022 13 1     INR 04/20/2022 0 99     PTT 04/20/2022 31     Sodium 04/20/2022 141     Potassium 04/20/2022 4 6     Chloride 04/20/2022 108     CO2 04/20/2022 24     ANION GAP 04/20/2022 9     BUN 04/20/2022 27 (A)    Creatinine 04/20/2022 1 19     Glucose 04/20/2022 129     Calcium 04/20/2022 8 5     AST 04/20/2022 18     ALT 04/20/2022 22     Alkaline Phosphatase 04/20/2022 119 (A)    Total Protein 04/20/2022 6 9     Albumin 04/20/2022 3 5     Total Bilirubin 04/20/2022 0 42     eGFR 04/20/2022 55     Magnesium 04/20/2022 2 4     NT-proBNP 04/20/2022 818 (A)    hs TnI 0hr 04/20/2022 7     LACTIC ACID 04/20/2022 0 9     Color, UA 04/20/2022 Yellow     Clarity, UA 04/20/2022 Clear     Specific Gravity, UA 04/20/2022 1 025     pH, UA 04/20/2022 6 0     Leukocytes, UA 04/20/2022 Negative     Nitrite, UA 04/20/2022 Negative     Protein, UA 04/20/2022 Negative     Glucose, UA 04/20/2022 Negative     Ketones, UA 04/20/2022 Negative     Urobilinogen, UA 04/20/2022 0 2     Bilirubin, UA 04/20/2022 Negative     Blood, UA 04/20/2022 Negative     ABO Grouping 04/20/2022 O     Rh Factor 04/20/2022 Positive     Antibody Screen 04/20/2022 Negative     Specimen Expiration Date 04/20/2022 [id number]     ABO Grouping 04/20/2022 Roberto Be Factor 04/20/2022 Positive     Ventricular Rate 04/20/2022 70     Atrial Rate 04/20/2022 227     QRSD Interval 04/20/2022 176     QT Interval 04/20/2022 472     QTC Interval 04/20/2022 509     P Axis 04/20/2022 -75     QRS Axis 04/20/2022 -61     T Wave Axis 04/20/2022 106     hs TnI 2hr 04/20/2022 9     Delta 2hr hsTnI 04/20/2022 2     AV peak gradient 04/20/2022 72     LA size 04/20/2022 5     Triscuspid Valve Regurgi* 04/20/2022 38 0     Tricuspid valve peak reg* 04/20/2022 3 07     LVPWd 04/20/2022 0 90     MV E' Tissue Velocity Se* 04/20/2022 9     TR Peak Ilan 04/20/2022 3 1     IVSd 04/20/2022 0 51     LV DIASTOLIC VOLUME (MOD* 06/97/4546 103     LEFT VENTRICLE SYSTOLIC * 95/06/1402 42     Left ventricular stroke * 04/20/2022 61 00     AV Deceleration Time 04/20/2022 1,308     RVOT PMAX 04/20/2022 2     A4C EF 04/20/2022 53     LVIDd 04/20/2022 4 70     IVS 04/20/2022 1     LVIDS 04/20/2022 3 20     FS 04/20/2022 32     Asc Ao 04/20/2022 3 8     Ao root 04/20/2022 4 20     RVID d 04/20/2022 4 8     AV regurgitation pressur* 04/20/2022 379     AV LVOT peak gradient 04/20/2022 1     MV valve area p 1/2 meth* 04/20/2022 3 55     PV peak gradient antegra* 04/20/2022 4     E wave deceleration time 04/20/2022 214     RVOT peak ilan 04/20/2022 0 61     AV peak gradient 04/20/2022 10     MV Peak E Ilan 04/20/2022 134     MV Peak A Ilan 04/20/2022 0 31     ALDO A4C 04/20/2022 21 4     RAA A4C 04/20/2022 18 9     MV stenosis pressure 1/2* 04/20/2022 62     LVSV, 2D 04/20/2022 61     Ao asc z-score 04/20/2022 5 41     ZLVPWD 04/20/2022 1 42     ZLVIDS 04/20/2022 -0 15     ZLVIDD 04/20/2022 -1 18     ZIVSD 04/20/2022 2 17     LV EF 04/20/2022 60     Est  RA pres 04/20/2022 5 0     Right Ventricular Peak S* 04/20/2022 43 00     ROUTING (BETA-THALASSEMI* 04/20/2022 Comment     WBC 04/21/2022 5 44     RBC 04/21/2022 5 49     Hemoglobin 04/21/2022 10 9 (A)    Hematocrit 04/21/2022 35 4 (A)    MCV 04/21/2022 65 (A)    MCH 04/21/2022 19 9 (A)    MCHC 04/21/2022 30 8 (A)    RDW 04/21/2022 18 4 (A)    Platelets 05/44/6488 203     MPV 04/21/2022 8 6 (A)    Sodium 04/21/2022 141     Potassium 04/21/2022 4 3     Chloride 04/21/2022 108     CO2 04/21/2022 25     ANION GAP 04/21/2022 8     BUN 04/21/2022 21     Creatinine 04/21/2022 1 10     Glucose 04/21/2022 112     Calcium 04/21/2022 8 1 (A)    eGFR 04/21/2022 60     TSH 3RD GENERATON 04/21/2022 3 447     [facility]20[facility]     Supplier Phone Number 04/22/2022 [phone]     Order Status 04/22/2022 Delivery Successful     Delivery Request Date 04/22/2022 04/22/2022     Date Delivered  04/22/2022 04/24/2022     Item Description 04/22/2022 3 in 1 Commode        Imaging: I have personally reviewed pertinent reports  I have spent 60 minutes with Patient and family today in which greater than 50% of this time was spent in counseling/coordination of care regarding Diagnostic results, Prognosis, Risks and benefits of tx options, Intructions for management, Patient and family education, Importance of tx compliance, Risk factor reductions and Impressions

## 2022-05-25 ENCOUNTER — TELEPHONE (OUTPATIENT)
Dept: CARDIOLOGY CLINIC | Facility: CLINIC | Age: 87
End: 2022-05-25

## 2022-06-15 ENCOUNTER — HOSPITAL ENCOUNTER (OUTPATIENT)
Dept: PULMONOLOGY | Facility: HOSPITAL | Age: 87
Discharge: HOME | End: 2022-06-15
Payer: MEDICARE

## 2022-06-15 DIAGNOSIS — Z79.899 LONG TERM CURRENT USE OF AMIODARONE: ICD-10-CM

## 2022-06-15 PROCEDURE — 94729 DIFFUSING CAPACITY: CPT

## 2022-06-15 PROCEDURE — 94760 N-INVAS EAR/PLS OXIMETRY 1: CPT

## 2022-06-15 PROCEDURE — 94010 BREATHING CAPACITY TEST: CPT | Performed by: INTERNAL MEDICINE

## 2022-06-15 PROCEDURE — 94729 DIFFUSING CAPACITY: CPT | Performed by: INTERNAL MEDICINE

## 2022-06-15 PROCEDURE — 94010 BREATHING CAPACITY TEST: CPT

## 2022-07-07 ENCOUNTER — OFFICE VISIT (OUTPATIENT)
Dept: CARDIOLOGY CLINIC | Facility: CLINIC | Age: 87
End: 2022-07-07
Payer: MEDICARE

## 2022-07-07 VITALS
DIASTOLIC BLOOD PRESSURE: 72 MMHG | WEIGHT: 161.5 LBS | BODY MASS INDEX: 26.91 KG/M2 | SYSTOLIC BLOOD PRESSURE: 118 MMHG | HEIGHT: 65 IN | HEART RATE: 79 BPM

## 2022-07-07 DIAGNOSIS — I48.91 ATRIAL FIBRILLATION, UNSPECIFIED TYPE (HCC): Primary | ICD-10-CM

## 2022-07-07 PROCEDURE — 93000 ELECTROCARDIOGRAM COMPLETE: CPT | Performed by: PHYSICIAN ASSISTANT

## 2022-07-07 PROCEDURE — 99215 OFFICE O/P EST HI 40 MIN: CPT | Performed by: PHYSICIAN ASSISTANT

## 2022-07-07 RX ORDER — AMLODIPINE BESYLATE 2.5 MG/1
2.5 TABLET ORAL DAILY
COMMUNITY
Start: 2022-05-19 | End: 2022-08-24 | Stop reason: SDUPTHER

## 2022-07-07 NOTE — PROGRESS NOTES
Electrophysiology Office Note    Ayaz Krystyna  1935  [id number]  HEART & VASCULAR [facility] LISA Alexander [phone]        Assessment/Plan     Primary diagnosis:   1  Persistent atrial fibrillation, asymptomatic    * patient presents to B EP office to discuss Watchman implant  He is a patient of Dr Mac Galdamez    * since last OV has decided he would under go watchman implant  He understands he needs to be on low dose eliquis for 6 months post procedure  Has upcoming [address] [place] [address] appointment on   Will f/u with NSX note post visit to see if he needs spinal decompression  If he does then plan to keep off of [facility] but if he doesn't plan for watchman in august or September  Will call daughter after I review the nsx note  * discussed post watchman need for 45 day MAURA  * in AV paced rhythm today   * no afib since May 20th  Only 1 persistent afib episode thus far from  - May 20th 2022    * BB stopped due to hypotension at last visit     2  Long term amiodarone use    * due to amiodarone use for long term discussed need to monitor his eyes, liver, lungs and thyroid    * recent TSH and CMP WNL    * DLCO from 2022 WNL     Secondary diagnosis:   1  Hx SDH 2/2 fall in    2   HTN               Rhythm History:   Atrial fibrillation:     Atrial flutter:     SVT:     VT/VF/PVC:     Device history:   Pacemaker:    Defibrillator:    BIV PPM:    BIV ICD:    ILR:      Cardiac Testing:     ECHO: Results for orders placed during the hospital encounter of 18    Echo complete with contrast if indicated    Narrative  [facility] [address] [place] [address]  [phone]    Transthoracic Echocardiogram  2D, M-mode, Doppler, and Color Doppler    Study date:  15-Apr-2018    Patient: Evangelista Jonas  MR number: [id number]  Account number: [de-identified]  : [id number]  Age: 81 years  Gender: Male  Status: Mailed to pt Inpatient  Location: Bedside  Height:  Weight:  BP: 125/ 52 mmHg    Indications: Syncope  Diagnoses: R55  - Syncope and collapse    Sonographer:  Terry Guillaume RDCS  Primary Physician:  Deyvi Perrin DO  Referring Physician:  Elena Donnelly MD  Group:  [id number]  Cardiology Fellow:  Randy Galaviz MD  Interpreting Physician:  Venecia Presley MD    SUMMARY    LEFT VENTRICLE:  Systolic function was normal  Ejection fraction was estimated to be 55 %  There were no regional wall motion abnormalities  Wall thickness was mildly to moderately increased  The changes were consistent with concentric remodeling (increased wall thickness with normal wall mass)  MITRAL VALVE:  There was mild annular calcification  There was mild regurgitation  AORTIC VALVE:  The valve was trileaflet  Leaflets exhibited sclerosis  There was mild regurgitation  TRICUSPID VALVE:  There was mild to moderate regurgitation  Estimated peak PA pressure was 48 mmHg  The findings suggest mild to moderate pulmonary hypertension  IVC, HEPATIC VEINS:  Respirophasic changes were blunted (less than 50% variation)  HISTORY: PRIOR HISTORY: Atrial fibrillation, Hypertension  PROCEDURE: The procedure was performed at the bedside  This was a routine study  The transthoracic approach was used  The study included complete 2D imaging, M-mode, complete spectral Doppler, and color Doppler  The heart rate was 50 bpm,  at the start of the study  Images were obtained from the parasternal, apical, subcostal, and suprasternal notch acoustic windows  Image quality was adequate  LEFT VENTRICLE: Size was normal  Systolic function was normal  Ejection fraction was estimated to be 55 %  There were no regional wall motion abnormalities  Wall thickness was mildly to moderately increased  The changes were consistent  with concentric remodeling (increased wall thickness with normal wall mass)   DOPPLER: Left ventricular diastolic function parameters were normal     RIGHT VENTRICLE: The size was normal  Systolic function was normal     LEFT ATRIUM: Size was normal     RIGHT ATRIUM: Size was normal     MITRAL VALVE: There was mild annular calcification  Valve structure was normal  There was normal leaflet separation  DOPPLER: The transmitral velocity was within the normal range  There was no evidence for stenosis  There was mild  regurgitation  AORTIC VALVE: The valve was trileaflet  Leaflets exhibited sclerosis  DOPPLER: Transaortic velocity was within the normal range  There was no evidence for stenosis  There was mild regurgitation  TRICUSPID VALVE: The valve structure was normal  There was normal leaflet separation  DOPPLER: The transtricuspid velocity was within the normal range  There was no evidence for stenosis  There was mild to moderate regurgitation  The  regurgitant jet was directed centrally  Estimated peak PA pressure was 48 mmHg  The findings suggest mild to moderate pulmonary hypertension  PULMONIC VALVE: DOPPLER: The transpulmonic velocity was within the normal range  There was no evidence for stenosis  There was trace regurgitation  PERICARDIUM: There was no pericardial effusion  AORTA: The root exhibited normal size  SYSTEMIC VEINS: IVC: The inferior vena cava was normal in size  Respirophasic changes were blunted (less than 50% variation)      SYSTEM MEASUREMENT TABLES    2D  %FS: 27 33 %  Ao Diam: 3 74 cm  EDV(Teich): 95 08 ml  EF(Teich): 53 26 %  ESV(Teich): 44 44 ml  IVSd: 1 31 cm  LA Area: 18 96 cm2  LA Diam: 3 73 cm  LVEDV MOD A4C: 97 77 ml  LVEF MOD A4C: 57 52 %  LVESV MOD A4C: 41 53 ml  LVIDd: 4 55 cm  LVIDs: 3 31 cm  LVLd A4C: 8 11 cm  LVLs A4C: 6 6 cm  LVOT Diam: 2 21 cm  LVPWd: 0 92 cm  RA Area: 15 37 cm2  RVIDd: 3 7 cm  SV MOD A4C: 56 23 ml  SV(Teich): 50 63 ml    CW  AR Dec Butler: 2 11 m/s2  AR Dec Time: 1731 3 ms  AR PHT: 502 08 ms  AR Vmax: 3 66 m/s  AR maxP 49 mmHg  TR Vmax: 3 09 m/s  TR maxP 29 mmHg    MM  TAPSE: 2 42 cm    PW  E': 0 07 m/s  E/E': 14 56  MV A Ilan: 0 69 m/s  MV Dec Wythe: 4 62 m/s2  MV DecT: 228 93 ms  MV E Ilan: 1 06 m/s  MV E/A Ratio: 1 53  MV PHT: 66 39 ms  MVA By PHT: 3 31 cm2    [address] Accredited Ech[facility]    Prepared and electronically signed by    Eddy Ruffin MD  Signed 2018 11:02:16        History of Present Illness     HPI/INTERVAL HISTORY: Josie Grubbs is a 81 y o  male with history as above who presents to B EP office today for discussion of watchman  Since last OV no new concerns or complaints  Has upcoming appointment with [address] [place] [address] on   Review of Systems  ROS as noted above, otherwise 12 point review of systems was performed and is negative  Historical Information   Social History     Socioeconomic History    Marital status: /Civil Union     Spouse name: Not on file    Number of children: Not on file    Years of education: Not on file    Highest education level: Not on file   Occupational History    Not on file   Tobacco Use    Smoking status: Never Smoker    Smokeless tobacco: Never Used   Vaping Use    Vaping Use: Never used   Substance and Sexual Activity    Alcohol use: No    Drug use: Never    Sexual activity: Not on file   Other Topics Concern    Not on file   Social History Narrative    Not on file     Social Determinants of Health     Financial Resource Strain: Not on file   Food Insecurity: No Food Insecurity    Worried About Running Out of Food in the Last Year: Never true    Amirah of Food in the Last Year: Never true   Transportation Needs: No Transportation Needs    Lack of Transportation (Medical): No    Lack of Transportation (Non-Medical):  No   Physical Activity: Not on file   Stress: Not on file   Social Connections: Not on file   Intimate Partner Violence: Not on file   Housing Stability: Unknown    Unable to Pay for Housing in the Last Year: No    Number of Places Lived in the Last Year: Not on file    Unstable Housing in the Last Year: No     Past Medical History:   Diagnosis Date    A-fib ([address] )     Hernia, abdominal     Hypertension     Subdural hematoma (HCC)      Past Surgical History:   Procedure Laterality Date    A-V CARDIAC PACEMAKER INSERTION      APPENDECTOMY      age 33    CARDIAC PACEMAKER PLACEMENT      CARPAL TUNNEL RELEASE      COLONOSCOPY      INGUINAL HERNIA REPAIR Left     AK XCAPSL CTRC RMVL INSJ IO LENS PROSTH W/O ECP Right 4/3/2017    Procedure: EXTRACTION EXTRACAPSULAR CATARACT PHACO INTRAOCULAR LENS (IOL); Surgeon: Nettie Sheridan MD;  [facility] MA[facility];  Service: Ophthalmology    TONSILLECTOMY      age 12     Social History     Substance and Sexual Activity   Alcohol Use No     Social History     Substance and Sexual Activity   Drug Use Never     Social History     Tobacco Use   Smoking Status Never Smoker   Smokeless Tobacco Never Used     Family History   Problem Relation Age of Onset    Cancer Mother         exp age 80    Heart disease Father     Heart disease Brother     Hypertension Brother        Meds/Allergies       Current Outpatient Medications:     amiodarone 200 mg tablet, Take 1 tablet (200 mg total) by mouth daily, Disp: 30 tablet, Rfl: 3    amLODIPine (NORVASC) 2 5 mg tablet, Take 2 5 mg by mouth daily, Disp: , Rfl:     gabapentin (NEURONTIN) 100 mg capsule, Take 1 capsule (100 mg total) by mouth in the morning and 1 capsule (100 mg total) in the evening and 1 capsule (100 mg total) before bedtime  , Disp: 1 capsule, Rfl: 0    No Known Allergies    Objective   Vitals: Blood pressure 118/72, pulse 79, height 5' 5" (1 651 m), weight 73 3 kg (161 lb 8 oz)  Physical Exam      Labs:  No visits with results within 2 Month(s) from this visit     Latest known visit with results is:   Admission on 04/20/2022, Discharged on 04/22/2022   Component Date Value    WBC 04/20/2022 5 25     RBC 04/20/2022 5 55     Hemoglobin 04/20/2022 11 2 (A)    Hematocrit 04/20/2022 35 7 (A)    MCV 04/20/2022 64 (A)    MCH 04/20/2022 20 2 (A)    MCHC 04/20/2022 31 4     RDW 04/20/2022 18 6 (A)    MPV 04/20/2022 8 6 (A)    Platelets 04/12/8997 209     nRBC 04/20/2022 0     Neutrophils Relative 04/20/2022 77 (A)    Immat GRANS % 04/20/2022 1     Lymphocytes Relative 04/20/2022 12 (A)    Monocytes Relative 04/20/2022 8     Eosinophils Relative 04/20/2022 1     Basophils Relative 04/20/2022 1     Neutrophils Absolute 04/20/2022 4 03     Immature Grans Absolute 04/20/2022 0 05     Lymphocytes Absolute 04/20/2022 0 65     Monocytes Absolute 04/20/2022 0 43     Eosinophils Absolute 04/20/2022 0 06     Basophils Absolute 04/20/2022 0 03     Protime 04/20/2022 13 1     INR 04/20/2022 0 99     PTT 04/20/2022 31     Sodium 04/20/2022 141     Potassium 04/20/2022 4 6     Chloride 04/20/2022 108     CO2 04/20/2022 24     ANION GAP 04/20/2022 9     BUN 04/20/2022 27 (A)    Creatinine 04/20/2022 1 19     Glucose 04/20/2022 129     Calcium 04/20/2022 8 5     AST 04/20/2022 18     ALT 04/20/2022 22     Alkaline Phosphatase 04/20/2022 119 (A)    Total Protein 04/20/2022 6 9     Albumin 04/20/2022 3 5     Total Bilirubin 04/20/2022 0 42     eGFR 04/20/2022 55     Magnesium 04/20/2022 2 4     NT-proBNP 04/20/2022 818 (A)    hs TnI 0hr 04/20/2022 7     LACTIC ACID 04/20/2022 0 9     Color, UA 04/20/2022 Yellow     Clarity, UA 04/20/2022 Clear     Specific Gravity, UA 04/20/2022 1 025     pH, UA 04/20/2022 6 0     Leukocytes, UA 04/20/2022 Negative     Nitrite, UA 04/20/2022 Negative     Protein, UA 04/20/2022 Negative     Glucose, UA 04/20/2022 Negative     Ketones, UA 04/20/2022 Negative     Urobilinogen, UA 04/20/2022 0 2     Bilirubin, UA 04/20/2022 Negative     Occult Blood, UA 04/20/2022 Negative     ABO Grouping 04/20/2022 O     Rh Factor 04/20/2022 Positive     Antibody Screen 04/20/2022 Negative     Specimen Expiration Date 04/20/2022 [id number]     ABO Grouping 04/20/2022 O     Rh Factor 04/20/2022 Positive     Ventricular Rate 04/20/2022 70     Atrial Rate 04/20/2022 227     QRSD Interval 04/20/2022 176     QT Interval 04/20/2022 472     QTC Interval 04/20/2022 509     P Axis 04/20/2022 -75     QRS Axis 04/20/2022 -61     T Wave Axis 04/20/2022 106     hs TnI 2hr 04/20/2022 9     Delta 2hr hsTnI 04/20/2022 2     AV peak gradient 04/20/2022 72     LA size 04/20/2022 5     Triscuspid Valve Regurgi* 04/20/2022 38 0     Tricuspid valve peak reg* 04/20/2022 3 07     LVPWd 04/20/2022 0 90     MV E' Tissue Velocity Se* 04/20/2022 9     TR Peak Ilan 04/20/2022 3 1     IVSd 04/20/2022 0 35     LV DIASTOLIC VOLUME (MOD* 67/06/4165 103     LEFT VENTRICLE SYSTOLIC * 81/96/1407 42     Left ventricular stroke * 04/20/2022 61 00     AV Deceleration Time 04/20/2022 1,308     RVOT PMAX 04/20/2022 2     A4C EF 04/20/2022 53     LVIDd 04/20/2022 4 70     IVS 04/20/2022 1     LVIDS 04/20/2022 3 20     FS 04/20/2022 32     Asc Ao 04/20/2022 3 8     Ao root 04/20/2022 4 20     RVID d 04/20/2022 4 8     AV regurgitation pressur* 04/20/2022 379     AV LVOT peak gradient 04/20/2022 1     MV valve area p 1/2 meth* 04/20/2022 3 55     PV peak gradient antegra* 04/20/2022 4     E wave deceleration time 04/20/2022 214     RVOT peak ilan 04/20/2022 0 61     AV peak gradient 04/20/2022 10     MV Peak E Ilan 04/20/2022 134     MV Peak A Ilan 04/20/2022 0 31     ALDO A4C 04/20/2022 21 4     RAA A4C 04/20/2022 18 9     MV stenosis pressure 1/2* 04/20/2022 62     LVSV, 2D 04/20/2022 61     Ao asc z-score 04/20/2022 5 41     ZLVPWD 04/20/2022 1 42     ZLVIDS 04/20/2022 -0 15     ZLVIDD 04/20/2022 -1 18     ZIVSD 04/20/2022 2 17     LV EF 04/20/2022 60     Est  RA pres 04/20/2022 5 0     Right Ventricular Peak S* 04/20/2022 43 00     ROUTING (BETA-THALASSEMI* 04/20/2022 Comment     WBC 04/21/2022 5 44     RBC 04/21/2022 5 49     Hemoglobin 04/21/2022 10 9 (A)    Hematocrit 04/21/2022 35 4 (A)    MCV 04/21/2022 65 (A)    MCH 04/21/2022 19 9 (A)    MCHC 04/21/2022 30 8 (A)    RDW 04/21/2022 18 4 (A)    Platelets 28/29/6001 203     MPV 04/21/2022 8 6 (A)    Sodium 04/21/2022 141     Potassium 04/21/2022 4 3     Chloride 04/21/2022 108     CO2 04/21/2022 25     ANION GAP 04/21/2022 8     BUN 04/21/2022 21     Creatinine 04/21/2022 1 10     Glucose 04/21/2022 112     Calcium 04/21/2022 8 1 (A)    eGFR 04/21/2022 60     TSH 3RD GENERATON 04/21/2022 3 447     Vitamin B-12 04/21/2022 [facility] Name 04/22/2022 [facility]     Supplier Phone Number 04/22/2022 [phone]     Order Status 04/22/2022 Delivery Successful     Delivery Request Date 04/22/2022 04/22/2022     Date Delivered  04/22/2022 04/24/2022     Item Description 04/22/2022 3 in 1 Commode        Imaging: I have personally reviewed pertinent reports

## 2022-07-26 ENCOUNTER — TELEPHONE (OUTPATIENT)
Dept: NEUROSURGERY | Facility: CLINIC | Age: 87
End: 2022-07-26

## 2022-07-26 NOTE — TELEPHONE ENCOUNTER
Spoke to daughter shannon- the patient has not started Physical therapy  Provided daughter with the number to [facility]

## 2022-08-01 ENCOUNTER — TELEPHONE (OUTPATIENT)
Dept: NEUROSURGERY | Facility: CLINIC | Age: 87
End: 2022-08-01

## 2022-08-01 NOTE — TELEPHONE ENCOUNTER
Faxed script to daughter at [phone] & to [facility] PT  Patient is refusing to do PT and is adamant about seeing Dr Mariana Hagen for schedule follow up

## 2022-08-05 ENCOUNTER — OFFICE VISIT (OUTPATIENT)
Dept: NEUROSURGERY | Facility: CLINIC | Age: 87
End: 2022-08-05
Payer: MEDICARE

## 2022-08-05 VITALS
HEIGHT: 65 IN | TEMPERATURE: 97.8 F | WEIGHT: 164 LBS | HEART RATE: 77 BPM | RESPIRATION RATE: 18 BRPM | SYSTOLIC BLOOD PRESSURE: 122 MMHG | BODY MASS INDEX: 27.32 KG/M2 | DIASTOLIC BLOOD PRESSURE: 84 MMHG

## 2022-08-05 DIAGNOSIS — M48.02 CERVICAL STENOSIS OF SPINE: Primary | ICD-10-CM

## 2022-08-05 PROCEDURE — 99213 OFFICE O/P EST LOW 20 MIN: CPT | Performed by: NEUROLOGICAL SURGERY

## 2022-08-05 NOTE — PROGRESS NOTES
Neurosurgery Office Note  Napoleon Leung 81 y o  male MRN: [id number]      Assessment/Plan        Problem List Items Addressed This Visit    Chronic cervical stenosis             Discussion:  Patient was previously evaluated on 5/4/22  He returns with his daughter  He is an 20-year-old male with h/o atrial fibrillation not on [facility] (h/o SDH in 2018 which was managed conservatively and resolved over time, last [address] on 4/20 negative), cardiac pacemaker, hypertension, who was hospitalized on 4/20/22 after multiple syncopal events in one week causing a fall forward from standing onto his face at home (+LOC)  Initially he could not move any extremities at home then his legs returned to baseline and weakness in his arms gradually returned to near full strength (clinically consistent with central cord syndrome)  MAP was augmented in ICU, and he was discharged in improved condition on 4/22  No more syncopal episodes since  Today, he reports no significant pain and has no new neurologic complaints  Initially reported neck/arm pain, severe diffuse hyperalgesias, bilateral hand weakness and swelling during hospitalization have all resolved  Denies gait imbalance, bowel/bladder changes, more recent trauma/fall  He remains very active, high-functioning, and independent at home  They report that he will soon be undergoing a cardiac procedure, which will require Coumadin for 6 months  Taking Neurontin, which has been helpful  No longer taking/requiring Oxycodone given no pain  Not taking AC  Non-smoker  Neurologic exam today is intact including bilateral hand intrinsics, no hyperalgesia, sensation intact throughout, no gait ataxia, bilateral Mullinss (same as my prior exams, likely chronic)  MRI inpatient on 4/20/22 showed chronic degenerative changes at C4-6 with central and foraminal stenoses  CT c-spine showed no acute fracture, only diffuse degenerative bony changes       His recent symptoms are resolved, and he has returned to his neurologic baseline (intact), with no clinical evidence of worsening myelopathy  On multiple occasions, we had a lengthy discussion about possible surgical intervention (posterior C4-6 decompression and fusion)  I explained again that since he improved to his neurologically intact baseline, there is no urgency to treat the findings on the MRI  I am ok with proceeding with the planned cardiac procedure and Coumadin (which can be reversed if any urgent/emergent neurosurgical intervention indicated)  Continue PT as needed for neck muscle strengthening and range of motion exercises  Continue Neurontin  Follow up as needed, if neurologic symptoms recur or new symptoms develop  I advised fall precautions as well as avoiding strenuous activities, including chiropractic maneuvers  His and his daughters questions and concerns were all addressed thoroughly during this visit  CHIEF COMPLAINT    Chief Complaint   Patient presents with    Follow-up     Decreased range of motion of neck       HISTORY    History of Present Illness     81y o  year old male     HPI    See Discussion    REVIEW OF SYSTEMS    Review of Systems   Cardiovascular:        Hx of HTN     ov cardiology 7/7/22    Gastrointestinal: Positive for constipation (due to meds)  Musculoskeletal: Positive for neck pain (b/l arms-fingers-wrist pain &* swelling  that has improved since )  3 mon f/u Decreased range of motion of neck  PT ? provided patient with phone number-pt is unwilling to do Physical therapy     Neurontin (helpful?)     Neurological: Positive for weakness and numbness (b/l arms-fingers-wrist pain &* swelling  that has improved since )  All other systems reviewed and are negative          Meds/Allergies     Current Outpatient Medications   Medication Sig Dispense Refill    amiodarone 200 mg tablet Take 1 tablet (200 mg total) by mouth daily 30 tablet 3    amLODIPine (NORVASC) 2 5 mg tablet Take 2 5 mg by mouth daily      gabapentin (NEURONTIN) 100 mg capsule Take 1 capsule (100 mg total) by mouth in the morning and 1 capsule (100 mg total) in the evening and 1 capsule (100 mg total) before bedtime  1 capsule 0     No current facility-administered medications for this visit  No Known Allergies    PAST HISTORY    Past Medical History:   Diagnosis Date    A-fib ([address] )     Hernia, abdominal     Hypertension     Subdural hematoma (HCC)        Past Surgical History:   Procedure Laterality Date    A-V CARDIAC PACEMAKER INSERTION      APPENDECTOMY      age 33    CARDIAC PACEMAKER PLACEMENT      CARPAL TUNNEL RELEASE      COLONOSCOPY      INGUINAL HERNIA REPAIR Left     MI XCAPSL CTRC RMVL INSJ IO LENS PROSTH W/O ECP Right 4/3/2017    Procedure: EXTRACTION EXTRACAPSULAR CATARACT PHACO INTRAOCULAR LENS (IOL); Surgeon: Trena Isbell MD;  [facility] MA[facility];  Service: Ophthalmology    TONSILLECTOMY      age 12       Social History     Tobacco Use    Smoking status: Never Smoker    Smokeless tobacco: Never Used   Vaping Use    Vaping Use: Never used   Substance Use Topics    Alcohol use: No    Drug use: Never       Family History   Problem Relation Age of Onset    Cancer Mother         exp age 80    Heart disease Father     Heart disease Brother     Hypertension Brother          The following portions of the patient's history were reviewed in this encounter and updated as appropriate: Past medical, surgical, family, and social history, as well as medications, allergies, and review of systems  EXAM    Vitals:Blood pressure 122/84, pulse 77, temperature 97 8 °F (36 6 °C), temperature source Temporal, resp  rate 18, height 5' 5" (1 651 m), weight 74 4 kg (164 lb)  ,There is no height or weight on file to calculate BMI  Physical Exam  Vitals and nursing note reviewed  Constitutional:       Appearance: Normal appearance  He is normal weight     HENT:      Head: Normocephalic and atraumatic  Eyes:      Extraocular Movements: Extraocular movements intact and EOM normal       Pupils: Pupils are equal, round, and reactive to light  Cardiovascular:      Rate and Rhythm: Normal rate and regular rhythm  Pulses: Normal pulses  Heart sounds: Normal heart sounds  Pulmonary:      Effort: Pulmonary effort is normal       Breath sounds: Normal breath sounds  Abdominal:      General: Abdomen is flat  Palpations: Abdomen is soft  Musculoskeletal:         General: Normal range of motion  Cervical back: Normal range of motion  Neurological:      General: No focal deficit present  Mental Status: He is alert and oriented to person, place, and time  Mental status is at baseline  GCS: GCS eye subscore is 4  GCS verbal subscore is 5  GCS motor subscore is 6  Cranial Nerves: Cranial nerves are intact  Sensory: Sensation is intact  Motor: Motor function is intact  Coordination: Coordination is intact  Gait: Gait is intact  Deep Tendon Reflexes: Strength normal and reflexes are normal and symmetric  Psychiatric:         Mood and Affect: Mood normal          Speech: Speech normal          Behavior: Behavior normal          Neurologic Exam     Mental Status   Oriented to person, place, and time  Attention: normal    Speech: speech is normal   Level of consciousness: alert    Cranial Nerves     CN II   Visual fields full to confrontation  Visual acuity: normal  Right visual field deficit: none  Left visual field deficit: none     CN III, IV, VI   Pupils are equal, round, and reactive to light  Extraocular motions are normal    CN III: no CN III palsy  CN VI: no CN VI palsy  Nystagmus: none   Diplopia: none  Ophthalmoparesis: none  Upgaze: normal  Downgaze: normal  Conjugate gaze: present    CN V   Facial sensation intact     Right facial sensation deficit: none  Left facial sensation deficit: none    CN VII   Facial expression full, symmetric  Right facial weakness: none  Left facial weakness: none    CN VIII   CN VIII normal      CN IX, X   CN IX normal    CN X normal    Palate: symmetric    CN XI   CN XI normal    Right sternocleidomastoid strength: normal  Left sternocleidomastoid strength: normal  Right trapezius strength: normal  Left trapezius strength: normal    CN XII   CN XII normal    Tongue deviation: none    Motor Exam   Muscle bulk: normal  Overall muscle tone: normal  Right arm pronator drift: absent  Left arm pronator drift: absent    Strength   Strength 5/5 throughout  Sensory Exam   Light touch normal      Gait, Coordination, and Reflexes     Gait  Gait: normal    Reflexes   Reflexes 2+ except as noted  Bilateral Mullins's (same as my prior exams, likely chronic)         MEDICAL DECISION MAKING    Imaging Studies:     No results found  I have personally reviewed pertinent films in [facility] L[facility] M D    Neurosurgeon

## 2022-08-08 ENCOUNTER — TELEPHONE (OUTPATIENT)
Dept: CARDIOLOGY CLINIC | Facility: CLINIC | Age: 87
End: 2022-08-08

## 2022-08-12 ENCOUNTER — EVALUATION (OUTPATIENT)
Dept: PHYSICAL THERAPY | Facility: MEDICAL CENTER | Age: 87
End: 2022-08-12
Payer: MEDICARE

## 2022-08-12 DIAGNOSIS — R26.89 OTHER ABNORMALITIES OF GAIT AND MOBILITY: ICD-10-CM

## 2022-08-12 DIAGNOSIS — M62.81 MUSCLE WEAKNESS: Primary | ICD-10-CM

## 2022-08-12 PROCEDURE — 97110 THERAPEUTIC EXERCISES: CPT | Performed by: PHYSICAL THERAPIST

## 2022-08-12 PROCEDURE — 97161 PT EVAL LOW COMPLEX 20 MIN: CPT | Performed by: PHYSICAL THERAPIST

## 2022-08-12 NOTE — LETTER
2022    [address] [address]    Patient: Lori Treviño   YOB: 1935   Date of Visit: 2022     Encounter Diagnosis     ICD-10-CM    1  Muscle weakness  M62 81    2  Other abnormalities of gait and mobility  R26 89        Dear Dr Tobias Tucker: Thank you for your recent referral of Lori Treviño  Please review the attached evaluation summary from Matias's recent visit  Please verify that you agree with the plan of care by signing the attached order  If you have any questions or concerns, please do not hesitate to call  I sincerely appreciate the opportunity to share in the care of one of your patients and hope to have another opportunity to work with you in the near future  Sincerely,    Nate Enriqeuz, PT      Referring Provider:      I certify that I have read the below Plan of Care and certify the need for these services furnished under this plan of treatment while under my care  Magaly Barbour DO  [address] [address]  Via Fax: [phone]          PT Evaluation     Today's date: 2022  Patient name: Lori Treviño  : 1935  MRN: [id number]  Referring provider: Lizy Aj DO  Dx:   Encounter Diagnosis     ICD-10-CM    1  Muscle weakness  M62 81    2  Other abnormalities of gait and mobility  R26 89        Start Time: 715  Stop Time: 805  Total time in clinic (min): 50 minutes    Assessment  Assessment details: Pt is a 81 y o male who presents with decreased LE strength, decreased functional endurance and decreased functional ability as subjectively reported  These impairments limit the patient from participating in prolonged walking in stores, decreased ability to complete stairs and decreased ability to participate in the community  Pt demonstrates decreased endurance as noted with functional tests completed today  Frequent rest breaks were given    Blood O2 levels were maintained 94% and greater throughout session  Pt denied chest pain throughout treatment session  Pt will be following up with cardiologist on 8/24/2022  I believe this patient is a good candidate for and will benefit from skilled physical therapy for LE strengthening exercises, endurance training and gait/balance training in order to improve symptoms and imitations and assist the patient to improved QOL  Thank you for the opportunity to participate in [facility]  Positive Prognostic Indicators: desire to improve    Negative Prognostic Indicators: co-morbidities, chronicity of smyptoms  Impairments: abnormal gait, abnormal muscle tone, abnormal or restricted ROM, abnormal movement, activity intolerance, impaired balance, impaired physical strength, lacks appropriate home exercise program and poor posture     Symptom irritability: lowUnderstanding of Dx/Px/POC: good   Prognosis: good    Goals  STGs: 4 weeks  1) Pt will improve TUG time by 3 seconds in order to demonstrate improved dynamic stability  2) pt will demonstrate ability to complete 6 minutes of walking in order to demonstrate improved functional endurance  3) pt will have improved foto score of 10 points  4) pt will demonstrate ability to complete at least 10 STSs in 30 seconds in order to demonstrate improved ability to complete functional transfers and demonstrate improved LE power and endurance  LTGs: 8 weeks  1) pt will be independent with HEP by D/C  2) pt will be independent with symptom management by D/C  3) pt will subjectively report improved ability to walk in [employer] by 50% by DC        Plan  Patient would benefit from: skilled physical therapy  Planned modality interventions: cryotherapy and thermotherapy: hydrocollator packs  Planned therapy interventions: joint mobilization, manual therapy, neuromuscular re-education, patient education, postural training, stretching, strengthening, therapeutic activities, therapeutic exercise and home exercise program  Frequency: 2x week  Duration in weeks: 12  Plan of Care beginning date: 8/12/2022  Plan of Care expiration date: 11/4/2022  Treatment plan discussed with: patient        Subjective Evaluation    History of Present Illness  Mechanism of injury: Subjective Comments: pt reports that he met with doctor and states that when he walks around a big store he will have increased soreness in his hips  He notes that this has gotten worse over time  Pt denies using a cane or walker  Pt reports that he has had recent syncopal episodes that put him in the hospital   He changed medications which improved symptoms  Denies N&T in the legs  Pt reports that if he does a lot of yard work one day, he will have light pain in the center of the chest   He reports that he will be having a cardiac procedure which he will meet with MD for on the 8/24/2022  Pain: 0/10    Home Set-up: independent, stays on one floor  Pt tries to avoid steps as much as possible  ADLs:  Independent     Work/Hobbies: taking care of dog, burning wood  Previous Treatment: n/a    Goals:  Wants to be able to walk around more without the legs hurting as much            Objective     Palpation     Additional Palpation Details  No TTP    Strength/Myotome Testing     Left Hip   Planes of Motion   Flexion: 4  Abduction: 4+  Adduction: 4+    Right Hip   Planes of Motion   Flexion: 4+  Abduction: 4+  Adduction: 4+    Left Knee   Flexion: 4+  Extension: 4+    Right Knee   Flexion: 4+  Extension: 5    Left Ankle/Foot   Dorsiflexion: 4+  Plantar flexion: 5    Right Ankle/Foot   Dorsiflexion: 4+  Plantar flexion: 5    Ambulation     Ambulation: Stairs   Ascend stairs: independent  Pattern: reciprocal  Railings: two rails  Descend stairs: independent  Pattern: reciprocal  Railings: two rails    Observational Gait   Gait: asymmetric   Decreased walking speed and stride length     Left arm swing: within functional limits  Right arm swing: within functional limits    Additional Observational Gait Details  Toed out, flexed knees, glute max gait     Comments   SpO2: 94% (baseline)  HR: 64bpm (baseline)  Blood pressure 120/73 (baseline)    Functional Assessment        Comments  TU 4 seconds (2022)  30Sec STS: 7 times  6MWT: 600ft in 4 mins 38 seconds             Precautions: HTN, A-fib, SSS, cervical stenosis      Manuals             STM c/s                                                    Neuro Re-Ed             Foam beam             Tandem walking             Side stepping                                                                 Ther Ex             bike             Standing marches x10 ea   alt            HR x10             standing hip abd/ext x10 ea  b/l            Step ups             Lateral step ups                                       Ther Activity                                       Gait Training                                       Modalities

## 2022-08-12 NOTE — PROGRESS NOTES
PT Evaluation     Today's date: 2022  Patient name: Marianela Naqvi  : 1935  MRN: [id number]  Referring provider: Lakisha Mcgregor DO  Dx:   Encounter Diagnosis     ICD-10-CM    1  Muscle weakness  M62 81    2  Other abnormalities of gait and mobility  R26 89        Start Time: 715  Stop Time: 805  Total time in clinic (min): 50 minutes    Assessment  Assessment details: Pt is a 81 y o male who presents with decreased LE strength, decreased functional endurance and decreased functional ability as subjectively reported  These impairments limit the patient from participating in prolonged walking in stores, decreased ability to complete stairs and decreased ability to participate in the community  Pt demonstrates decreased endurance as noted with functional tests completed today  Frequent rest breaks were given  Blood O2 levels were maintained 94% and greater throughout session  Pt denied chest pain throughout treatment session  Pt will be following up with cardiologist on 2022  I believe this patient is a good candidate for and will benefit from skilled physical therapy for LE strengthening exercises, endurance training and gait/balance training in order to improve symptoms and imitations and assist the patient to improved QOL  Thank you for the opportunity to participate in [facility] care      Positive Prognostic Indicators: desire to improve    Negative Prognostic Indicators: co-morbidities, chronicity of smyptoms  Impairments: abnormal gait, abnormal muscle tone, abnormal or restricted ROM, abnormal movement, activity intolerance, impaired balance, impaired physical strength, lacks appropriate home exercise program and poor posture     Symptom irritability: lowUnderstanding of Dx/Px/POC: good   Prognosis: good    Goals  STGs: 4 weeks  1) Pt will improve TUG time by 3 seconds in order to demonstrate improved dynamic stability  2) pt will demonstrate ability to complete 6 minutes of walking in order to demonstrate improved functional endurance  3) pt will have improved foto score of 10 points  4) pt will demonstrate ability to complete at least 10 STSs in 30 seconds in order to demonstrate improved ability to complete functional transfers and demonstrate improved LE power and endurance  LTGs: 8 weeks  1) pt will be independent with HEP by D/C  2) pt will be independent with symptom management by D/C  3) pt will subjectively report improved ability to walk in [employer] by 50% by DC  Plan  Patient would benefit from: skilled physical therapy  Planned modality interventions: cryotherapy and thermotherapy: hydrocollator packs  Planned therapy interventions: joint mobilization, manual therapy, neuromuscular re-education, patient education, postural training, stretching, strengthening, therapeutic activities, therapeutic exercise and home exercise program  Frequency: 2x week  Duration in weeks: 12  Plan of Care beginning date: 8/12/2022  Plan of Care expiration date: 11/4/2022  Treatment plan discussed with: patient        Subjective Evaluation    History of Present Illness  Mechanism of injury: Subjective Comments: pt reports that he met with doctor and states that when he walks around a big store he will have increased soreness in his hips  He notes that this has gotten worse over time  Pt denies using a cane or walker  Pt reports that he has had recent syncopal episodes that put him in the hospital   He changed medications which improved symptoms  Denies N&T in the legs  Pt reports that if he does a lot of yard work one day, he will have light pain in the center of the chest   He reports that he will be having a cardiac procedure which he will meet with MD for on the 8/24/2022  Pain: 0/10    Home Set-up: independent, stays on one floor  Pt tries to avoid steps as much as possible  ADLs:  Independent     Work/Hobbies: taking care of dog, burning wood       Previous Treatment: n/a    Goals:  Wants to be able to walk around more without the legs hurting as much            Objective     Palpation     Additional Palpation Details  No TTP    Strength/Myotome Testing     Left Hip   Planes of Motion   Flexion: 4  Abduction: 4+  Adduction: 4+    Right Hip   Planes of Motion   Flexion: 4+  Abduction: 4+  Adduction: 4+    Left Knee   Flexion: 4+  Extension: 4+    Right Knee   Flexion: 4+  Extension: 5    Left Ankle/Foot   Dorsiflexion: 4+  Plantar flexion: 5    Right Ankle/Foot   Dorsiflexion: 4+  Plantar flexion: 5    Ambulation     Ambulation: Stairs   Ascend stairs: independent  Pattern: reciprocal  Railings: two rails  Descend stairs: independent  Pattern: reciprocal  Railings: two rails    Observational Gait   Gait: asymmetric   Decreased walking speed and stride length  Left arm swing: within functional limits  Right arm swing: within functional limits    Additional Observational Gait Details  Toed out, flexed knees, glute max gait     Comments   SpO2: 94% (baseline)  HR: 64bpm (baseline)  Blood pressure 120/73 (baseline)    Functional Assessment        Comments  TU 4 seconds (2022)  30Sec STS: 7 times  6MWT: 600ft in 4 mins 38 seconds             Precautions: HTN, A-fib, SSS, cervical stenosis      Manuals             STM c/s                                                    Neuro Re-Ed             Foam beam             Tandem walking             Side stepping                                                                 Ther Ex             bike             Standing marches x10 ea   alt            HR x10             standing hip abd/ext x10 ea  b/l            Step ups             Lateral step ups                                       Ther Activity                                       Gait Training                                       Modalities

## 2022-08-17 ENCOUNTER — OFFICE VISIT (OUTPATIENT)
Dept: PHYSICAL THERAPY | Facility: MEDICAL CENTER | Age: 87
End: 2022-08-17
Payer: MEDICARE

## 2022-08-17 ENCOUNTER — REMOTE DEVICE CLINIC VISIT (OUTPATIENT)
Dept: CARDIOLOGY CLINIC | Facility: CLINIC | Age: 87
End: 2022-08-17
Payer: MEDICARE

## 2022-08-17 DIAGNOSIS — Z95.0 CARDIAC PACEMAKER IN SITU: Primary | ICD-10-CM

## 2022-08-17 DIAGNOSIS — R26.89 OTHER ABNORMALITIES OF GAIT AND MOBILITY: ICD-10-CM

## 2022-08-17 DIAGNOSIS — M62.81 MUSCLE WEAKNESS: Primary | ICD-10-CM

## 2022-08-17 PROCEDURE — 93294 REM INTERROG EVL PM/LDLS PM: CPT | Performed by: INTERNAL MEDICINE

## 2022-08-17 PROCEDURE — 97110 THERAPEUTIC EXERCISES: CPT | Performed by: PHYSICAL THERAPIST

## 2022-08-17 PROCEDURE — 93296 REM INTERROG EVL PM/IDS: CPT | Performed by: INTERNAL MEDICINE

## 2022-08-17 PROCEDURE — 97112 NEUROMUSCULAR REEDUCATION: CPT | Performed by: PHYSICAL THERAPIST

## 2022-08-17 NOTE — PROGRESS NOTES
Results for orders placed or performed in visit on 08/17/22   Cardiac EP device report    Narrative    MDT-DUAL CHAMBER PPM/ ACTIVE SYSTEM IS MRI CONDITIONAL  CARELINK TRANSMISSION: BATTERY STATUS "7 YRS " AP 95%  99%  ALL AVAILABLE LEAD PARAMETERS & TRENDS WITHIN NORMAL LIMITS  1 DEVICE CLASSIFIED VHR  20 DEVICE CLASSIFIED AT/AF NOTED; 2% BURDEN; LONGEST 3 MINS  AVAIL EGRAMS PRESENT W/NOISE-ARTIFACT  PT STATES THEY HAVEN'T HAD ANY RESENT PROCEDURE & DOESN'T USE PT ON AMIO & EF 60% (2022)  PT TO COME FOR LEAD ASSESS   NC

## 2022-08-17 NOTE — PROGRESS NOTES
Daily Note     Today's date: 2022  Patient name: Raghu Ya  : 1935  MRN: [id number]  Referring provider: Ector Rodriges DO  Dx:   Encounter Diagnosis     ICD-10-CM    1  Muscle weakness  M62 81    2  Other abnormalities of gait and mobility  R26 89        Start Time: 930  Stop Time: 1015  Total time in clinic (min): 45 minutes    Subjective: pt reports no new complaints upon arrival  Pt reports that he is compliant with HEP and has no complications from this  Objective: See treatment diary below      Assessment: Tolerated treatment well  Pt completed all exercises well with no complaints of pain, mild soreness in the anterior hips and increased fatigue within appropriate levels  HEP progressed and pt was educated how to complete at home with improved safety in mind  We will continue to progress as tolerated  Patient would benefit from continued PT      Plan: Continue per plan of care  Precautions: HTN, A-fib, SSS, cervical stenosis      Manuals                                                                Neuro Re-Ed             Foam beam  tandem walkign 5 laps            Tandem walking  above           Side stepping  4 laps rtb                                                               Ther Ex             bike  5'           Standing marches x10 ea  alt x20 ea  Alt  HR x10  x20           standing hip abd/ext x10 ea  b/l x20 ea  b/l           Step ups  6" x10 ea  Lateral step ups  6" x10 ea             Mini squats  x20                        Ther Activity                                       Gait Training                                       Modalities

## 2022-08-18 ENCOUNTER — IN-CLINIC DEVICE VISIT (OUTPATIENT)
Dept: CARDIOLOGY CLINIC | Facility: CLINIC | Age: 87
End: 2022-08-18

## 2022-08-18 DIAGNOSIS — Z95.0 CARDIAC PACEMAKER IN SITU: Primary | ICD-10-CM

## 2022-08-18 PROCEDURE — RECHECK: Performed by: INTERNAL MEDICINE

## 2022-08-18 NOTE — PROGRESS NOTES
Results for orders placed or performed in visit on 08/18/22   Cardiac EP device report    Narrative    MDT-DUAL CHAMBER PPM/ ACTIVE SYSTEM IS MRI CONDITIONAL  NB/LEAD ASSESS-DEVICE INTERROGATED IN THE [place] OFFICE: ATRIAL LEAD NOISE REPRODUCIBLE W/ARM MOVEMENT & AS PT JUST SAT  ALL LEAD PARAMETERS & TRENDS WITHIN NORMAL LIMITS  PT HAD A FALL 4/2022  STATES HE FEELS VIBRATIONS THRU HIS ARMS & MAINLY HANDS  DR BLAKE, LS, MI MADE AWARE  RA SENS CHANGED FROM 0 3->0 6 PER DR BLAKE  PT TO SEE LIZBETH 8/24 TO DISCUSS WATCHMAN  PT TO HAVE DEVICE RMT Q2WK FOR 4X   NC

## 2022-08-19 ENCOUNTER — OFFICE VISIT (OUTPATIENT)
Dept: PHYSICAL THERAPY | Facility: MEDICAL CENTER | Age: 87
End: 2022-08-19
Payer: MEDICARE

## 2022-08-19 DIAGNOSIS — R26.89 OTHER ABNORMALITIES OF GAIT AND MOBILITY: ICD-10-CM

## 2022-08-19 DIAGNOSIS — M62.81 MUSCLE WEAKNESS: Primary | ICD-10-CM

## 2022-08-19 PROCEDURE — 97110 THERAPEUTIC EXERCISES: CPT | Performed by: PHYSICAL THERAPIST

## 2022-08-19 NOTE — PROGRESS NOTES
Daily Note     Today's date: 2022  Patient name: Josie Grubbs  : 1935  MRN: [id number]  Referring provider: Angela Broussard DO  Dx:   Encounter Diagnosis     ICD-10-CM    1  Muscle weakness  M62 81    2  Other abnormalities of gait and mobility  R26 89        Start Time: 933  Stop Time: 1008  Total time in clinic (min): 35 minutes    Subjective: pt reports to therapy with report of improved ability to walk dog in the morning  Objective: See treatment diary below      Assessment: Tolerated treatment well  Pt completed all exercises with increased intensity well with increased fatigue noted however this was well managed with rest breaks  Pt reported slight increase in low back pain at end of session that quickly resolved with sitting  We will continue to progress as tolerated  Patient would benefit from continued PT      Plan: Continue per plan of care  Precautions: HTN, A-fib, SSS, cervical stenosis      Manuals                                                               Neuro Re-Ed             Foam beam  tandem walkign 5 laps  tandem walkign 5 laps           Tandem walking  above           Side stepping  4 laps rtb 5 laps rtb          LAQ   x20 #1                                                 Ther Ex             bike  5'           Standing marches x10 ea  alt x20 ea  Alt  x20 ea  Alt  #1          HR x10  x20 x20          standing hip abd/ext x10 ea  b/l x20 ea  b/l x20 ea  B/l #1          Step ups  6" x10 ea  6" x20 ea  Lateral step ups  6" x10 ea  6" x20 ea            Mini squats  x20 c20          Standing HR curls   x20 #1          Hurdles                                                     Ther Activity                                       Gait Training                                       Modalities

## 2022-08-23 ENCOUNTER — APPOINTMENT (OUTPATIENT)
Dept: PHYSICAL THERAPY | Facility: MEDICAL CENTER | Age: 87
End: 2022-08-23
Payer: MEDICARE

## 2022-08-24 ENCOUNTER — PREP FOR PROCEDURE (OUTPATIENT)
Dept: CARDIOLOGY CLINIC | Facility: CLINIC | Age: 87
End: 2022-08-24

## 2022-08-24 ENCOUNTER — TELEPHONE (OUTPATIENT)
Dept: CARDIOLOGY CLINIC | Facility: CLINIC | Age: 87
End: 2022-08-24

## 2022-08-24 ENCOUNTER — OFFICE VISIT (OUTPATIENT)
Dept: CARDIOLOGY CLINIC | Facility: CLINIC | Age: 87
End: 2022-08-24
Payer: MEDICARE

## 2022-08-24 VITALS
DIASTOLIC BLOOD PRESSURE: 70 MMHG | HEIGHT: 65 IN | HEART RATE: 71 BPM | WEIGHT: 165.3 LBS | SYSTOLIC BLOOD PRESSURE: 128 MMHG | BODY MASS INDEX: 27.54 KG/M2

## 2022-08-24 DIAGNOSIS — I48.0 PAROXYSMAL ATRIAL FIBRILLATION (HCC): Primary | ICD-10-CM

## 2022-08-24 DIAGNOSIS — I10 ESSENTIAL HYPERTENSION: Primary | ICD-10-CM

## 2022-08-24 DIAGNOSIS — I48.0 PAROXYSMAL ATRIAL FIBRILLATION (HCC): ICD-10-CM

## 2022-08-24 DIAGNOSIS — S06.5XAA SDH (SUBDURAL HEMATOMA): Primary | ICD-10-CM

## 2022-08-24 PROCEDURE — 99214 OFFICE O/P EST MOD 30 MIN: CPT | Performed by: PHYSICIAN ASSISTANT

## 2022-08-24 PROCEDURE — 93000 ELECTROCARDIOGRAM COMPLETE: CPT | Performed by: PHYSICIAN ASSISTANT

## 2022-08-24 RX ORDER — AMIODARONE HYDROCHLORIDE 200 MG/1
200 TABLET ORAL DAILY
Qty: 90 TABLET | Refills: 3 | Status: SHIPPED | OUTPATIENT
Start: 2022-08-24

## 2022-08-24 RX ORDER — AMLODIPINE BESYLATE 2.5 MG/1
2.5 TABLET ORAL DAILY
Qty: 90 TABLET | Refills: 3 | Status: SHIPPED | OUTPATIENT
Start: 2022-08-24

## 2022-08-24 NOTE — TELEPHONE ENCOUNTER
Spoke with Dr Harjinder Serrano, will perform periop MAURA instead of pre op and will use low dose Xarelto 10mg QDay instead of eliquis due to compliance issues with BID dosing  Will price out xarelto with his insurance

## 2022-08-24 NOTE — PROGRESS NOTES
Electrophysiology Office Note    Jorge Lee  1935  [phone]  HEART & VASCULAR [facility] LISA [facility] [phone]        Assessment/Plan     Primary diagnosis:   1  Persistent atrial fibrillation, asymptomatic               * patient presents to B EP office to discuss Watchman implant for the third time  He is a patient of Dr Juan Nam               * since last OV has decided he would under go watchman implant  He understands he needs to be on low dose eliquis for 6 months post procedure  Had definitive "no surgical intervention planned" regarding his cervical disk issues per his NSx  No contraindication for [facility] per his Neurosurgeon  Again reviewed Watchman procedure, risks vs benefits in detail with patient  He explained the procedure back to me and does understand what is being performed  * [id number] is 1 (age, HTN) ; he has history of frequent falls  Most recently in April 2022 fell resulting in nasal fracture  Has hx of fall with SDH in 2018, on most recent CT's there is no SDH  * discussed post watchman need for 45 day MAURA  * in AV paced rhythm today              * no afib since May 20th  Only 1 persistent afib episode thus far from March 16th - May 20th 2022 but given his age he is high risk for afib recurrence  * BB stopped due to prior hypotension   Plan to continue amiodarone unless issue with a side effect from the medication  * schedule watchman      2  Long term amiodarone use               * due to amiodarone use for long term discussed need to monitor his eyes, liver, lungs and thyroid               * recent TSH and CMP WNL               * DLCO from June 2022 WNL     3  Atrial lead noise    * seen on last interrogation, reproducible with arm movement, sensitivity decreased to 0 6, will check again today     Secondary diagnosis:   1  Hx SDH 2/2 fall in 2018   2  HTN   3   S/p right sided MDT DC PPM             Rhythm History:   Atrial fibrillation:     Atrial flutter:     SVT:     VT/VF/PVC:     Device history:   Pacemaker:    Defibrillator:    BIV PPM:    BIV ICD:    ILR:      Cardiac Testing:     ECHO: Results for orders placed during the hospital encounter of 18    Echo complete with contrast if indicated    Narrative  [address]  [facility] [place] [address] [facility]  [phone]    Transthoracic Echocardiogram  2D, M-mode, Doppler, and Color Doppler    Study date:  15-Apr-2018    Patient: Delisa Zhang  MR number: [id number]  Account number: [de-identified]  : [id number]  Age: 81 years  Gender: Male  Status: Inpatient  Location: Bedside  Height:  Weight:  BP: 125/ 52 mmHg    Indications: Syncope  Diagnoses: R55  - Syncope and collapse    Sonographer:  Eloisa Salmeron RDCS  Primary Physician:  Antonio Hu DO  Referring Physician:  Lissette Diaz MD  Group:  [facility]  Cardiology Fellow:  Ebony Varela MD  Interpreting Physician:  Jt Schilling MD    SUMMARY    LEFT VENTRICLE:  Systolic function was normal  Ejection fraction was estimated to be 55 %  There were no regional wall motion abnormalities  Wall thickness was mildly to moderately increased  The changes were consistent with concentric remodeling (increased wall thickness with normal wall mass)  MITRAL VALVE:  There was mild annular calcification  There was mild regurgitation  AORTIC VALVE:  The valve was trileaflet  Leaflets exhibited sclerosis  There was mild regurgitation  TRICUSPID VALVE:  There was mild to moderate regurgitation  Estimated peak PA pressure was 48 mmHg  The findings suggest mild to moderate pulmonary hypertension  IVC, HEPATIC VEINS:  Respirophasic changes were blunted (less than 50% variation)  HISTORY: PRIOR HISTORY: Atrial fibrillation, Hypertension  PROCEDURE: The procedure was performed at the bedside  This was a routine study  The transthoracic approach was used  The study included complete 2D imaging, M-mode, complete spectral Doppler, and color Doppler  The heart rate was 50 bpm,  at the start of the study  Images were obtained from the parasternal, apical, subcostal, and suprasternal notch acoustic windows  Image quality was adequate  LEFT VENTRICLE: Size was normal  Systolic function was normal  Ejection fraction was estimated to be 55 %  There were no regional wall motion abnormalities  Wall thickness was mildly to moderately increased  The changes were consistent  with concentric remodeling (increased wall thickness with normal wall mass)  DOPPLER: Left ventricular diastolic function parameters were normal     RIGHT VENTRICLE: The size was normal  Systolic function was normal     LEFT ATRIUM: Size was normal     RIGHT ATRIUM: Size was normal     MITRAL VALVE: There was mild annular calcification  Valve structure was normal  There was normal leaflet separation  DOPPLER: The transmitral velocity was within the normal range  There was no evidence for stenosis  There was mild  regurgitation  AORTIC VALVE: The valve was trileaflet  Leaflets exhibited sclerosis  DOPPLER: Transaortic velocity was within the normal range  There was no evidence for stenosis  There was mild regurgitation  TRICUSPID VALVE: The valve structure was normal  There was normal leaflet separation  DOPPLER: The transtricuspid velocity was within the normal range  There was no evidence for stenosis  There was mild to moderate regurgitation  The  regurgitant jet was directed centrally  Estimated peak PA pressure was 48 mmHg  The findings suggest mild to moderate pulmonary hypertension  PULMONIC VALVE: DOPPLER: The transpulmonic velocity was within the normal range  There was no evidence for stenosis  There was trace regurgitation  PERICARDIUM: There was no pericardial effusion      AORTA: The root exhibited normal size     SYSTEMIC VEINS: IVC: The inferior vena cava was normal in size  Respirophasic changes were blunted (less than 50% variation)  SYSTEM MEASUREMENT TABLES    2D  %FS: 27 33 %  Ao Diam: 3 74 cm  EDV(Teich): 95 08 ml  EF(Teich): 53 26 %  ESV(Teich): 44 44 ml  IVSd: 1 31 cm  LA Area: 18 96 cm2  LA Diam: 3 73 cm  LVEDV MOD A4C: 97 77 ml  LVEF MOD A4C: 57 52 %  LVESV MOD A4C: 41 53 ml  LVIDd: 4 55 cm  LVIDs: 3 31 cm  LVLd A4C: 8 11 cm  LVLs A4C: 6 6 cm  LVOT Diam: 2 21 cm  LVPWd: 0 92 cm  RA Area: 15 37 cm2  RVIDd: 3 7 cm  SV MOD A4C: 56 23 ml  SV(Teich): 50 63 ml    CW  AR Dec Burleson: 2 11 m/s2  AR Dec Time: 1731 3 ms  AR PHT: 502 08 ms  AR Vmax: 3 66 m/s  AR maxP 49 mmHg  TR Vmax: 3 09 m/s  TR maxP 29 mmHg    MM  TAPSE: 2 42 cm    PW  E': 0 07 m/s  E/E': 14 56  MV A Ilan: 0 69 m/s  MV Dec Burleson: 4 62 m/s2  MV DecT: 228 93 ms  MV E Ilan: 1 06 m/s  MV E/A Ratio: 1 53  MV PHT: 66 39 ms  MVA By PHT: 3 31 cm2    [facility]    Prepared and electronically signed by    Zacarias Terrell MD  Signed 2018 11:02:16        History of Present Illness     HPI/INTERVAL HISTORY: Mee Jones is a 81 y o  male with history as above who presents to B EP office today for f/u discussion of watchman,     Main concern today is vibration in both hands, left worse then right with inability to close both hands the other day and inability to close left 1st finger today  He was unable to work with his son in the yard due to this issue which concerned him  Today feels a "vibration" on his left hand but also has had issues with vibration sense in the right hand  Review of Systems  ROS as noted above, otherwise 12 point review of systems was performed and is negative         Historical Information   Social History     Socioeconomic History    Marital status: /Civil Union     Spouse name: Not on file    Number of children: Not on file    Years of education: Not on file    Highest education level: Not on file   Occupational History    Not on file   Tobacco Use    Smoking status: Never Smoker    Smokeless tobacco: Never Used   Vaping Use    Vaping Use: Never used   Substance and Sexual Activity    Alcohol use: No    Drug use: Never    Sexual activity: Not on file   Other Topics Concern    Not on file   Social History Narrative    Not on file     Social Determinants of Health     Financial Resource Strain: Not on file   Food Insecurity: No Food Insecurity    Worried About Running Out of Food in the Last Year: Never true    Amirah of Food in the Last Year: Never true   Transportation Needs: No Transportation Needs    Lack of Transportation (Medical): No    Lack of Transportation (Non-Medical): No   Physical Activity: Not on file   Stress: Not on file   Social Connections: Not on file   Intimate Partner Violence: Not on file   Housing Stability: Unknown    Unable to Pay for Housing in the Last Year: No    Number of Places Lived in the Last Year: Not on file    Unstable Housing in the Last Year: No     Past Medical History:   Diagnosis Date    A-fib ([address] )     Hernia, abdominal     Hypertension     Subdural hematoma (HCC)      Past Surgical History:   Procedure Laterality Date    A-V CARDIAC PACEMAKER INSERTION      APPENDECTOMY      age 33   [address] [place] [address]      COLONOSCOPY      INGUINAL HERNIA REPAIR Left     FL XCAPSL CTRC RMVL INSJ IO LENS PROSTH W/O ECP Right 4/3/2017    Procedure: EXTRACTION EXTRACAPSULAR CATARACT PHACO INTRAOCULAR LENS (IOL);   Surgeon: Jessika Adorno MD;  [facility] MAIN OR;  Service: Ophthalmology    TONSILLECTOMY      age 12     Social History     Substance and Sexual Activity   Alcohol Use No     Social History     Substance and Sexual Activity   Drug Use Never     Social History     Tobacco Use   Smoking Status Never Smoker   Smokeless Tobacco Never Used Family History   Problem Relation Age of Onset    Cancer Mother         exp age 80    Heart disease Father     Heart disease Brother     Hypertension Brother        Meds/Allergies       Current Outpatient Medications:     gabapentin (NEURONTIN) 100 mg capsule, Take 1 capsule (100 mg total) by mouth in the morning and 1 capsule (100 mg total) in the evening and 1 capsule (100 mg total) before bedtime  , Disp: 1 capsule, Rfl: 0    amiodarone 200 mg tablet, Take 1 tablet (200 mg total) by mouth daily, Disp: 90 tablet, Rfl: 3    amLODIPine (NORVASC) 2 5 mg tablet, Take 1 tablet (2 5 mg total) by mouth daily, Disp: 90 tablet, Rfl: 3    No Known Allergies    Objective   Vitals: Blood pressure 128/70, pulse 71, height 5' 5" (1 651 m), weight 75 kg (165 lb 4 8 oz)  Physical Exam  Constitutional:       Appearance: He is well-developed  HENT:      Head: Normocephalic and atraumatic  Eyes:      Pupils: Pupils are equal, round, and reactive to light  Cardiovascular:      Rate and Rhythm: Normal rate and regular rhythm  Pulmonary:      Effort: Pulmonary effort is normal       Breath sounds: Normal breath sounds  Abdominal:      General: Bowel sounds are normal       Palpations: Abdomen is soft  Musculoskeletal:         General: Normal range of motion  Cervical back: Normal range of motion and neck supple  Skin:     General: Skin is warm and dry  Neurological:      Mental Status: He is alert and oriented to person, place, and time  Labs:  No visits with results within 2 Month(s) from this visit     Latest known visit with results is:   Admission on 04/20/2022, Discharged on 04/22/2022   Component Date Value    WBC 04/20/2022 5 25     RBC 04/20/2022 5 55     Hemoglobin 04/20/2022 11 2 (A)    Hematocrit 04/20/2022 35 7 (A)    MCV 04/20/2022 64 (A)    MCH 04/20/2022 20 2 (A)    MCHC 04/20/2022 31 4     RDW 04/20/2022 18 6 (A)    MPV 04/20/2022 8 6 (A)    Platelets 57/07/3429 209     nRBC 04/20/2022 0     Neutrophils Relative 04/20/2022 77 (A)    Immat GRANS % 04/20/2022 1     Lymphocytes Relative 04/20/2022 12 (A)    Monocytes Relative 04/20/2022 8     Eosinophils Relative 04/20/2022 1     Basophils Relative 04/20/2022 1     Neutrophils Absolute 04/20/2022 4 03     Immature Grans Absolute 04/20/2022 0 05     Lymphocytes Absolute 04/20/2022 0 65     Monocytes Absolute 04/20/2022 0 43     Eosinophils Absolute 04/20/2022 0 06     Basophils Absolute 04/20/2022 0 03     Protime 04/20/2022 13 1     INR 04/20/2022 0 99     PTT 04/20/2022 31     Sodium 04/20/2022 141     Potassium 04/20/2022 4 6     Chloride 04/20/2022 108     CO2 04/20/2022 24     ANION GAP 04/20/2022 9     BUN 04/20/2022 27 (A)    Creatinine 04/20/2022 1 19     Glucose 04/20/2022 129     Calcium 04/20/2022 8 5     AST 04/20/2022 18     ALT 04/20/2022 22     Alkaline Phosphatase 04/20/2022 119 (A)    Total Protein 04/20/2022 6 9     Albumin 04/20/2022 3 5     Total Bilirubin 04/20/2022 0 42     eGFR 04/20/2022 55     Magnesium 04/20/2022 2 4     NT-proBNP 04/20/2022 818 (A)    hs TnI 0hr 04/20/2022 7     LACTIC ACID 04/20/2022 0 9     Color, UA 04/20/2022 Yellow     Clarity, UA 04/20/2022 Clear     Specific Gravity, UA 04/20/2022 1 025     pH, UA 04/20/2022 6 0     Leukocytes, UA 04/20/2022 Negative     Nitrite, UA 04/20/2022 Negative     Protein, UA 04/20/2022 Negative     Glucose, UA 04/20/2022 Negative     Ketones, UA 04/20/2022 Negative     Urobilinogen, UA 04/20/2022 0 2     Bilirubin, UA 04/20/2022 Negative     Occult Blood, UA 04/20/2022 Negative     ABO Grouping 04/20/2022 O     Rh Factor 04/20/2022 Positive     Antibody Screen 04/20/2022 Negative     Specimen Expiration Date 04/20/2022 [id number]     ABO Grouping 04/20/2022 O     Rh Factor 04/20/2022 Positive     Ventricular Rate 04/20/2022 70     Atrial Rate 04/20/2022 227     QRSD Interval 04/20/2022 176     QT Interval 04/20/2022 472     QTC Interval 04/20/2022 509     P Axis 04/20/2022 -75     QRS Axis 04/20/2022 -61     T Wave Axis 04/20/2022 106     hs TnI 2hr 04/20/2022 9     Delta 2hr hsTnI 04/20/2022 2     AV peak gradient 04/20/2022 72     LA size 04/20/2022 5     Triscuspid Valve Regurgi* 04/20/2022 38 0     Tricuspid valve peak reg* 04/20/2022 3 07     LVPWd 04/20/2022 0 90     MV E' Tissue Velocity Se* 04/20/2022 9     TR Peak Ilan 04/20/2022 3 1     IVSd 04/20/2022 3 93     LV DIASTOLIC VOLUME (MOD* 61/85/2777 103     LEFT VENTRICLE SYSTOLIC * 61/53/1651 42     Left ventricular stroke * 04/20/2022 61 00     AV Deceleration Time 04/20/2022 1,308     RVOT PMAX 04/20/2022 2     A4C EF 04/20/2022 53     LVIDd 04/20/2022 4 70     IVS 04/20/2022 1     LVIDS 04/20/2022 3 20     FS 04/20/2022 32     Asc Ao 04/20/2022 3 8     Ao root 04/20/2022 4 20     RVID d 04/20/2022 4 8     AV regurgitation pressur* 04/20/2022 379     AV LVOT peak gradient 04/20/2022 1     MV valve area p 1/2 meth* 04/20/2022 3 55     PV peak gradient antegra* 04/20/2022 4     E wave deceleration time 04/20/2022 214     RVOT peak ilan 04/20/2022 0 61     AV peak gradient 04/20/2022 10     MV Peak E Ilan 04/20/2022 134     MV Peak A Ilan 04/20/2022 0 31     ALDO A4C 04/20/2022 21 4     RAA A4C 04/20/2022 18 9     MV stenosis pressure 1/2* 04/20/2022 62     LVSV, 2D 04/20/2022 61     Ao asc z-score 04/20/2022 5 41     ZLVPWD 04/20/2022 1 42     ZLVIDS 04/20/2022 -0 15     ZLVIDD 04/20/2022 -1 18     ZIVSD 04/20/2022 2 17     LV EF 04/20/2022 60     Est  RA pres 04/20/2022 5 0     Right Ventricular Peak S* 04/20/2022 43 00     ROUTING (BETA-THALASSEMI* 04/20/2022 Comment     WBC 04/21/2022 5 44     RBC 04/21/2022 5 49     Hemoglobin 04/21/2022 10 9 (A)    Hematocrit 04/21/2022 35 4 (A)    MCV 04/21/2022 65 (A)    MCH 04/21/2022 19 9 (A)    MCHC 04/21/2022 30 8 (A)    RDW 04/21/2022 18 4 (A)    Platelets 56/98/9206 203     MPV 04/21/2022 8 6 (A)    Sodium 04/21/2022 141     Potassium 04/21/2022 4 3     Chloride 04/21/2022 108     CO2 04/21/2022 25     ANION GAP 04/21/2022 8     BUN 04/21/2022 21     Creatinine 04/21/2022 1 10     Glucose 04/21/2022 112     Calcium 04/21/2022 8 1 (A)    eGFR 04/21/2022 60     TSH 3RD GENERATON 04/21/2022 3 447     Vitamin B-12 04/21/2022 [facility] Name 04/22/2022 [facility]     Supplier Phone Number 04/22/2022 [phone]     Order Status 04/22/2022 Delivery Successful     Delivery Request Date 04/22/2022 04/22/2022     Date Delivered  04/22/2022 04/24/2022     Item Description 04/22/2022 3 in 1 Commode        Imaging: I have personally reviewed pertinent reports

## 2022-08-26 ENCOUNTER — APPOINTMENT (OUTPATIENT)
Dept: PHYSICAL THERAPY | Facility: MEDICAL CENTER | Age: 87
End: 2022-08-26
Payer: MEDICARE

## 2022-08-30 ENCOUNTER — TELEPHONE (OUTPATIENT)
Dept: NON INVASIVE DIAGNOSTICS | Facility: HOSPITAL | Age: 87
End: 2022-08-30

## 2022-08-30 NOTE — TELEPHONE ENCOUNTER
Xarelto 10 mg daily     30 day supply [phone]  - patient currently in deductible stage    Will be roughly $50 a month

## 2022-08-30 NOTE — TELEPHONE ENCOUNTER
Yes  His deductible will reset in the beginning of the year  His price will also increase if he hits the donut hole before the end of the year  He may not,  since he is still in his deductible stage so late in the year   Their are 4 stages to the Medicare D plans  and med prices will be higher in the first (deductible stage ) and Third (donut hole stage)  and cheaper in the second and third stages     And yes 20 mg will be the same

## 2022-08-30 NOTE — TELEPHONE ENCOUNTER
Discussed cost of xarelto with our office  For 10mg tablets it will be $50 a mo as patient has met his deductible for the year, 20mg is also the same watkins  Will give 30 day free Xarelto of the 20mg as he can split the tablets in half to equal 10mg  This will give him 2 months supply post watchman   Will then call in a 60 day supply of the 20mg tablets, again to split them in half to equal 10mg daily, which will give him a 4 month supply equaling 6 months total

## 2022-08-31 ENCOUNTER — APPOINTMENT (OUTPATIENT)
Dept: PHYSICAL THERAPY | Facility: MEDICAL CENTER | Age: 87
End: 2022-08-31
Payer: MEDICARE

## 2022-09-01 ENCOUNTER — REMOTE DEVICE CLINIC VISIT (OUTPATIENT)
Dept: CARDIOLOGY CLINIC | Facility: CLINIC | Age: 87
End: 2022-09-01

## 2022-09-01 DIAGNOSIS — Z95.0 PRESENCE OF CARDIAC PACEMAKER: Primary | ICD-10-CM

## 2022-09-01 PROCEDURE — RECHECK: Performed by: INTERNAL MEDICINE

## 2022-09-01 NOTE — PROGRESS NOTES
Results for orders placed or performed in visit on 09/01/22   Cardiac EP device report    Narrative    MDT-DUAL CHAMBER PPM/ ACTIVE SYSTEM IS MRI CONDITIONAL  CARELINK TRANSMISSION: BATTERY VOLTAGE ADEQUATE (6 2 YRS)  AP: 97 7%  : 99 5% (>40%~MVP-ON/70)  ALL AVAILABLE LEAD PARAMETERS WITHIN NORMAL LIMITS  NO SIGNIFICANT HIGH RATE EPISODES  PACEMAKER FUNCTIONING APPROPRIATELY    [address] [place] [address]

## 2022-09-15 ENCOUNTER — REMOTE DEVICE CLINIC VISIT (OUTPATIENT)
Dept: CARDIOLOGY CLINIC | Facility: CLINIC | Age: 87
End: 2022-09-15

## 2022-09-15 DIAGNOSIS — Z95.0 CARDIAC PACEMAKER IN SITU: Primary | ICD-10-CM

## 2022-09-15 PROCEDURE — RECHECK: Performed by: INTERNAL MEDICINE

## 2022-09-15 NOTE — PROGRESS NOTES
Pt has not been seen since 8/19/2022  There has been no contact from patient about continuing PT services    We will DC patient at this time secondary to hospital policy

## 2022-09-16 NOTE — PROGRESS NOTES
Results for orders placed or performed in visit on 09/15/22   Cardiac EP device report    Narrative    MDT-DUAL CHAMBER PPM/ ACTIVE SYSTEM IS MRI CONDITIONAL  LEAD ASSESS/NB-CARELINK TRANSMISSION: ALL AVAILABLE LEAD PARAMETERS & TRENDS WITHIN NORMAL LIMITS  1 AT/AF NOTED; 1:10 MINS LONG; NO AC FALL RISK; ON AMIODARONE  BATTERY STATUS "6 YRS " AP 95%  100%   NC

## 2022-09-19 DIAGNOSIS — Z01.818 ENCOUNTER FOR PREADMISSION TESTING: ICD-10-CM

## 2022-09-19 DIAGNOSIS — Z51.89 ENCOUNTER FOR OTHER SPECIFIED AFTERCARE: ICD-10-CM

## 2022-09-19 DIAGNOSIS — I48.19 PERSISTENT ATRIAL FIBRILLATION (HCC): Primary | ICD-10-CM

## 2022-09-19 DIAGNOSIS — Z01.810 PRE-PROCEDURAL CARDIOVASCULAR EXAMINATION: Primary | ICD-10-CM

## 2022-09-19 DIAGNOSIS — Z95.818 PRESENCE OF WATCHMAN LEFT ATRIAL APPENDAGE CLOSURE DEVICE: ICD-10-CM

## 2022-09-20 ENCOUNTER — TELEPHONE (OUTPATIENT)
Dept: CARDIAC SURGERY | Facility: CLINIC | Age: 87
End: 2022-09-20

## 2022-09-20 ENCOUNTER — TELEPHONE (OUTPATIENT)
Dept: CARDIOLOGY CLINIC | Facility: CLINIC | Age: 87
End: 2022-09-20

## 2022-09-20 DIAGNOSIS — I48.91 ATRIAL FIBRILLATION, UNSPECIFIED TYPE (HCC): Primary | ICD-10-CM

## 2022-09-20 NOTE — TELEPHONE ENCOUNTER
----- Message from Seferino Walker [place] sent at 9/19/2022  5:09 PM EDT -----  Here is the scoop on this trudi, he is going for watchman implant 9-29 with [facility] Oropeza wants him on low dose Xarelto 10mg tabs starting tomorrow  The xarelto is $50 a mo  Money is tight  I was thinking of calling in 20mg tabs and seeing if pharmacy can split them  Tomorrow can someone  help her get the 30 day free of the 20mg tablets? This would give them two months free since he would only be taking 10mg a day  If the pharmacy cant split the tabs the daughter is OK with calling in the 10mg tablets

## 2022-09-20 NOTE — TELEPHONE ENCOUNTER
Spoke w patient's daughter  She is aware of xarelto 10mg tabs sent to pharmacy  Will also be picking up 30 day free trial card

## 2022-09-20 NOTE — TELEPHONE ENCOUNTER
Patient's daughter called in stating the pharmacy will not cut the tablets  10mg tablets sent to pharmacy  Pt called and I offered a 30day free trial card  However, patient seemed very confused and not comfortable with what I was providing   I instructed pt to call his daughter to touch base and then call us back

## 2022-09-21 ENCOUNTER — DOCUMENTATION (OUTPATIENT)
Dept: CARDIOLOGY CLINIC | Facility: CLINIC | Age: 87
End: 2022-09-21

## 2022-09-21 ENCOUNTER — TELEPHONE (OUTPATIENT)
Dept: CARDIOLOGY CLINIC | Facility: CLINIC | Age: 87
End: 2022-09-21

## 2022-09-21 NOTE — TELEPHONE ENCOUNTER
Patient called upset because Xarelto 10mg script was about $363 dollars  I apologized for the cost but explained he needed to met his medical deductible before the Xarelto drops down to $50 a month  For the first time in 3 office visits and over 5 telephone calls between myself, him and his daughter patient now concerned with frequent bleeding hemorrhoids  Does not want to be on Xarelto until hemorrhoids are taken care of  Did follow with Dr Dreama Severance at [facility] for this issue with banding offered in the past but patient declined  I am fine post boning the watching until hemorrhoids taken care of but I feel like patient does not really want the procedure  Want family to have final discussion today if they want to proceed or not as we have been discussing this procedure for months

## 2022-09-27 ENCOUNTER — TELEPHONE (OUTPATIENT)
Dept: CARDIOLOGY CLINIC | Facility: CLINIC | Age: 87
End: 2022-09-27

## 2022-09-27 NOTE — TELEPHONE ENCOUNTER
Dr Perlita Ray office called and stated that Yesi Edwards is cleared to have watchman procedure done  Dr Santana Wong is not concerned with the patients rectal bleeding as far as the watchman goes  Should I have this scheduled?

## 2022-09-28 NOTE — TELEPHONE ENCOUNTER
Spoke with the patient and with patients daughter and they came top the conclusion that they would like to have the watchman procedure done  Am I ok to reach out to scheduling to have that set up?

## 2022-09-29 ENCOUNTER — REMOTE DEVICE CLINIC VISIT (OUTPATIENT)
Dept: CARDIOLOGY CLINIC | Facility: CLINIC | Age: 87
End: 2022-09-29

## 2022-09-29 DIAGNOSIS — Z95.0 PRESENCE OF CARDIAC PACEMAKER: Primary | ICD-10-CM

## 2022-09-29 PROCEDURE — RECHECK: Performed by: INTERNAL MEDICINE

## 2022-09-29 NOTE — PROGRESS NOTES
Results for orders placed or performed in visit on 09/29/22   Cardiac EP device report    Narrative    MDT-DUAL CHAMBER PPM/ ACTIVE SYSTEM IS MRI CONDITIONAL  NON-BILLABLE CARELINK TRANSMISSION: LEAD ASSESS: BATTERY VOLTAGE ADEQUATE (6 4 YRS)  AP: 89 9%  : 99 6% (>40%~MVP-ON/70)  ALL AVAILABLE LEAD PARAMETERS WITHIN NORMAL LIMITS  NO SIGNIFICANT HIGH RATE EPISODES  PACEMAKER FUNCTIONING APPROPRIATELY    [address] [place] [address]

## 2022-10-05 ENCOUNTER — TELEPHONE (OUTPATIENT)
Dept: CARDIOLOGY CLINIC | Facility: CLINIC | Age: 87
End: 2022-10-05

## 2022-10-05 NOTE — TELEPHONE ENCOUNTER
Called to discuss starting xarelto with daughter, he did not start the xarelto yet, want him to start Xarelto tonight and he will

## 2022-10-06 NOTE — TELEPHONE ENCOUNTER
Patients daughter is requesting a call back from Rosemary Rivera or anyone in the clinical team to discuss if her father can start another medication before starting Xarelto   Please advise

## 2022-10-07 NOTE — TELEPHONE ENCOUNTER
Received a voice message from patients daughter asking to speak with you about a different medication (Xarelto)   Please call her back at [phone]

## 2022-10-13 ENCOUNTER — REMOTE DEVICE CLINIC VISIT (OUTPATIENT)
Dept: CARDIOLOGY CLINIC | Facility: CLINIC | Age: 87
End: 2022-10-13

## 2022-10-13 DIAGNOSIS — Z95.0 PRESENCE OF CARDIAC PACEMAKER: Primary | ICD-10-CM

## 2022-10-13 PROCEDURE — RECHECK

## 2022-10-13 NOTE — PROGRESS NOTES
Results for orders placed or performed in visit on 10/13/22   Cardiac EP device report    Narrative    MDT-DUAL CHAMBER PPM/ ACTIVE SYSTEM IS MRI CONDITIONAL  N/B;CARELINK TRANSMISSION - 2 WK LEAD ASSESS PER SN: BATTERY VOLTAGE ADEQUATE (6 4 YRS)  AP 87 6%  99 6% (>40%/AIR -DDDR 70)  7 SHORT V-V INTERVALS NOTED  ALL AVAILABLE LEAD PARAMETERS WITHIN NORMAL LIMITS/STABLE; 1 DEVICE CLASSIFIED VT-NS EPISODE WITH BRIEF RA & RV LEAD NOISE (NO VT) & HX OF SAME  PATIENT WAS SEEN FOR IN-CLINIC INTERROGATION ON 8/18/22 - LEAD ASSESSMENT  NORMAL DEVICE FUNCTION     ES

## 2022-10-17 ENCOUNTER — IN-CLINIC DEVICE VISIT (OUTPATIENT)
Dept: CARDIOLOGY CLINIC | Facility: CLINIC | Age: 87
End: 2022-10-17

## 2022-10-17 DIAGNOSIS — Z95.0 PRESENCE OF CARDIAC PACEMAKER: Primary | ICD-10-CM

## 2022-10-17 PROCEDURE — RECHECK: Performed by: INTERNAL MEDICINE

## 2022-10-17 NOTE — PROGRESS NOTES
Results for orders placed or performed in visit on 10/17/22   Cardiac EP device report    Narrative    MDT-DUAL [address]  N/B; DEVICE INTERROGATED IN THE [place] OFFICE - REPROGRAMMING A/P DR BLAKE: BATTERY VOLTAGE ADEQUATE (6 5 YRS)  AP 91 5%  99 6% (>40%/AVB/AAIR-DDDR 70); ALL LEAD PARAMETERS WITHIN NORMAL LIMITS  NO UNREPORTED  HIGH RATE EPISODES  PATIENT SCHEDULED FOR WATCHMAN IMPLANT IN NEAR FUTURE  ISOMETRICS, CAN MANIPULATION DONE AT TODAY'S VISIT - NO NOISE REPRODUCED  RV SENSITIVITY PROGRAMMED TO 11 30 MV A/P DR BLAKE DIRECTION DUE TO LEAD NOISE REPORTED ON RECENT REMOTE (10/13/22) & PPM DEPENDENCY  NORMAL DEVICE FUNCTION    ES

## 2022-10-20 DIAGNOSIS — I48.91 ATRIAL FIBRILLATION, UNSPECIFIED TYPE (HCC): ICD-10-CM

## 2022-10-27 DIAGNOSIS — I48.91 ATRIAL FIBRILLATION, UNSPECIFIED TYPE (HCC): ICD-10-CM

## 2022-10-27 DIAGNOSIS — I10 ESSENTIAL HYPERTENSION: ICD-10-CM

## 2022-10-27 NOTE — TELEPHONE ENCOUNTER
Philomena called an said that she needs a Prior Auth for the Sudha sent over to [facility] bc it is for a 90 day supply  She is concerned bc he will be out of medication in less than 2 wks   Please call her back to verify this has been done

## 2022-10-28 DIAGNOSIS — I48.91 ATRIAL FIBRILLATION, UNSPECIFIED TYPE (HCC): ICD-10-CM

## 2022-10-28 NOTE — TELEPHONE ENCOUNTER
Requested medication(s) are due for refill today: Yes  Patient has already received a courtesy refill: No  Other reason request has been forwarded to provider: It has been going to the wrong pharmacy   Patient is now requesting mail order

## 2022-11-02 ENCOUNTER — TELEPHONE (OUTPATIENT)
Dept: CARDIOLOGY CLINIC | Facility: CLINIC | Age: 87
End: 2022-11-02

## 2022-11-02 NOTE — TELEPHONE ENCOUNTER
Received call from daughter Darin Rodriguez script not at [email or web address] despite being sent on 10/28/22  [facility], they were waiting on "consent" to refill from us  Consent given  Med to be shipped out today for patient  90 day supply for a total of $286 00  Script confirmation # [id number]    [facility] at retail in September 2022 30 day supply for 386 00  deductible was not full met and the remainder is being applied to 90 day script sent 10/28  After which if would have dropped to an estimated 34 00  however by that time it will be the new year and deductible will have reset  Patient only on med until possible February and was told to call office in new years we can supply with 2 months of samples

## 2022-11-15 ENCOUNTER — TELEPHONE (OUTPATIENT)
Dept: CARDIOLOGY CLINIC | Facility: CLINIC | Age: 87
End: 2022-11-15

## 2022-11-15 DIAGNOSIS — I48.91 ATRIAL FIBRILLATION, UNSPECIFIED TYPE (HCC): ICD-10-CM

## 2022-11-17 ENCOUNTER — IN-CLINIC DEVICE VISIT (OUTPATIENT)
Dept: CARDIOLOGY CLINIC | Facility: CLINIC | Age: 87
End: 2022-11-17

## 2022-11-17 DIAGNOSIS — Z95.0 CARDIAC PACEMAKER IN SITU: Primary | ICD-10-CM

## 2022-11-17 NOTE — PROGRESS NOTES
Results for orders placed or performed in visit on 11/17/22   Cardiac EP device report    Narrative    MDT-DUAL CHAMBER PPM/ ACTIVE SYSTEM IS MRI CONDITIONAL  DEVICE INTERROGATED IN THE [facility]ICE: BATTERY VOLTAGE ADEQUATE (6 3 YRS)  AP 92%  100% (DEPENDENT); ALL LEAD PARAMETERS WITHIN NORMAL LIMITS  NO SIGNIFICANT HIGH RATE EPISODES  NO PROGRAMMING CHANGES MADE TO DEVICE PARAMETERS  PATIENT ON XARELTO, AMIODORONE &  PATIENT SCHEDULED FOR WATCHMAN IMPLANT IN NEAR FUTURE  PACEMAKER FUNCTIONING APPROPRIATELY   ES

## 2022-11-22 ENCOUNTER — TELEPHONE (OUTPATIENT)
Dept: CARDIAC SURGERY | Facility: CLINIC | Age: 87
End: 2022-11-22

## 2022-11-22 NOTE — TELEPHONE ENCOUNTER
Called patient and spoke with his daughter  Reviewed all pre Watchman instructions for 2022  Answered all questions  Pre-Watchman Instructions      Watchman Procedure Is Scheduled For:     Patient Name: Pallavi Cisneros       : 1935    Procedure Date: 2022      Physician Name: Dr Tyler Hinojosa Time:     ** You will receive a phone call from the hospital between 2:00 pm and 8:00 pm, the day prior to your Watchman procedure to confirm arrival time and location  **    If you have any questions regarding your arrival time for this procedure, please call:  Elective Admissions Unit at [phone] (5:30 am-8:00 pm)      • Please take Aspirin 81 mg the day before your procedure and the morning of your procedure with a sip of water    • Do not drink or eat anything after midnight the night before your procedure  This includes medications (with the exception of the ASA 81 mg), candy, gum, lozenges, lifesavers, etc      • You are required to have lab tests done before your procedure  Please go to [facility] in [place] on 22 for blood work  • Please see attached Watchman Blood Thinner Protocol  • DO NOT take your Blood thinner (Xarelto) the MORNING of your procedure; but please DO take it the day/night before as usual      • Shower as usual the night before your procedure  • Please bring with you to the hospital any aide items that you will need to ambulate such as shoes, braces, etc  Do not bring any walkers or canes as the hospital will provide temporary use of these items  • Call the [facility] Team if you have any questions at [facility] [address]  1935  WATCHMAN BLOOD THINNER PROTOCOL      PRE-PROCEDURE:     1) Aspirin 81mg start the day before procedure if not already taking  Please also take Aspirin 81mg the morning of procedure with a sip of water  2)  DO NOT take your blood thinner (Xarelto) the morning of the Watchman procedure   It is important that you take it the day before as regularly scheduled  POST-PROCEDURE:    You will have your blood thinner resumed the evening of the procedure  You will be instructed on exact dose to take prior to hospital discharge  You will also resume taking ASA 81 mg daily  45 Day MAURA:    You will have a MAURA approximately 45 days after the Watchman procedure to check your device  You will be contacted by the [facility] team regarding anticoagulation/ASA recommendations  Please do not stop any blood thinner until you are contacted by the [facility] team        IMPORTANT:  Please contact the [facility] team with any questions or uncertainty about how and when to take your blood thinners  This includes uncertainty after receiving a call from the [place] the afternoon before your procedure          Watchman Team contact number: [phone]

## 2022-11-30 ENCOUNTER — LAB REQUISITION (OUTPATIENT)
Dept: LAB | Facility: HOSPITAL | Age: 87
End: 2022-11-30

## 2022-11-30 ENCOUNTER — APPOINTMENT (OUTPATIENT)
Dept: LAB | Facility: MEDICAL CENTER | Age: 87
End: 2022-11-30

## 2022-11-30 ENCOUNTER — DOCUMENTATION (OUTPATIENT)
Dept: CARDIOLOGY CLINIC | Facility: CLINIC | Age: 87
End: 2022-11-30

## 2022-11-30 DIAGNOSIS — Z01.810 PRE-OPERATIVE CARDIOVASCULAR EXAMINATION: ICD-10-CM

## 2022-11-30 DIAGNOSIS — Z95.818 OTHER SPECIFIED CARDIAC DEVICE IN SITU: ICD-10-CM

## 2022-11-30 DIAGNOSIS — Z01.818 OTHER SPECIFIED PRE-OPERATIVE EXAMINATION: ICD-10-CM

## 2022-11-30 DIAGNOSIS — Z01.818 ENCOUNTER FOR OTHER PREPROCEDURAL EXAMINATION: ICD-10-CM

## 2022-11-30 LAB
ABO GROUP BLD: NORMAL
ALBUMIN SERPL BCP-MCNC: 3.8 G/DL (ref 3.5–5)
ALP SERPL-CCNC: 91 U/L (ref 46–116)
ALT SERPL W P-5'-P-CCNC: 31 U/L (ref 12–78)
ANION GAP SERPL CALCULATED.3IONS-SCNC: 5 MMOL/L (ref 4–13)
AST SERPL W P-5'-P-CCNC: 27 U/L (ref 5–45)
BASOPHILS # BLD AUTO: 0.02 THOUSANDS/ÂΜL (ref 0–0.1)
BASOPHILS NFR BLD AUTO: 1 % (ref 0–1)
BILIRUB SERPL-MCNC: 0.64 MG/DL (ref 0.2–1)
BLD GP AB SCN SERPL QL: NEGATIVE
BUN SERPL-MCNC: 22 MG/DL (ref 5–25)
CALCIUM SERPL-MCNC: 9.1 MG/DL (ref 8.3–10.1)
CHLORIDE SERPL-SCNC: 107 MMOL/L (ref 96–108)
CO2 SERPL-SCNC: 24 MMOL/L (ref 21–32)
CREAT SERPL-MCNC: 1.14 MG/DL (ref 0.6–1.3)
EOSINOPHIL # BLD AUTO: 0.13 THOUSAND/ÂΜL (ref 0–0.61)
EOSINOPHIL NFR BLD AUTO: 3 % (ref 0–6)
ERYTHROCYTE [DISTWIDTH] IN BLOOD BY AUTOMATED COUNT: 17.3 % (ref 11.6–15.1)
GFR SERPL CREATININE-BSD FRML MDRD: 57 ML/MIN/1.73SQ M
GLUCOSE P FAST SERPL-MCNC: 104 MG/DL (ref 65–99)
HCT VFR BLD AUTO: 35 % (ref 36.5–49.3)
HGB BLD-MCNC: 10.8 G/DL (ref 12–17)
IMM GRANULOCYTES # BLD AUTO: 0.03 THOUSAND/UL (ref 0–0.2)
IMM GRANULOCYTES NFR BLD AUTO: 1 % (ref 0–2)
INR PPP: 1.99 (ref 0.84–1.19)
LYMPHOCYTES # BLD AUTO: 0.97 THOUSANDS/ÂΜL (ref 0.6–4.47)
LYMPHOCYTES NFR BLD AUTO: 24 % (ref 14–44)
MAGNESIUM SERPL-MCNC: 2.4 MG/DL (ref 1.6–2.6)
MCH RBC QN AUTO: 20.3 PG (ref 26.8–34.3)
MCHC RBC AUTO-ENTMCNC: 30.9 G/DL (ref 31.4–37.4)
MCV RBC AUTO: 66 FL (ref 82–98)
MONOCYTES # BLD AUTO: 0.51 THOUSAND/ÂΜL (ref 0.17–1.22)
MONOCYTES NFR BLD AUTO: 13 % (ref 4–12)
NEUTROPHILS # BLD AUTO: 2.37 THOUSANDS/ÂΜL (ref 1.85–7.62)
NEUTS SEG NFR BLD AUTO: 58 % (ref 43–75)
NRBC BLD AUTO-RTO: 0 /100 WBCS
PLATELET # BLD AUTO: 209 THOUSANDS/UL (ref 149–390)
PMV BLD AUTO: 9.3 FL (ref 8.9–12.7)
POTASSIUM SERPL-SCNC: 4.6 MMOL/L (ref 3.5–5.3)
PROT SERPL-MCNC: 6.8 G/DL (ref 6.4–8.4)
PROTHROMBIN TIME: 22.9 SECONDS (ref 11.6–14.5)
RBC # BLD AUTO: 5.32 MILLION/UL (ref 3.88–5.62)
RH BLD: POSITIVE
SODIUM SERPL-SCNC: 136 MMOL/L (ref 135–147)
SPECIMEN EXPIRATION DATE: NORMAL
WBC # BLD AUTO: 4.03 THOUSAND/UL (ref 4.31–10.16)

## 2022-12-01 ENCOUNTER — PREP FOR PROCEDURE (OUTPATIENT)
Dept: CARDIOLOGY CLINIC | Facility: CLINIC | Age: 87
End: 2022-12-01

## 2022-12-01 DIAGNOSIS — I48.91 ATRIAL FIBRILLATION, UNSPECIFIED TYPE (HCC): Primary | ICD-10-CM

## 2022-12-07 ENCOUNTER — ANESTHESIA EVENT (OUTPATIENT)
Dept: PERIOP | Facility: HOSPITAL | Age: 87
End: 2022-12-07

## 2022-12-07 NOTE — H&P
H&P Exam - Cardiology   Fabian Ram 81 y o  male MRN: [id number]  Unit/Bed#:  Encounter: [id number]      Office cardiologist: Dr Lugenia Gilford, DO    BP (!) 189/98   Pulse 74   Temp (!) 96 3 °F (35 7 °C) (Temporal)   Resp 16   SpO2 99%     Assessment/Plan   Persistent atrial fibrillation [id number]-[id number]= 3 and HAS-BLED=4  On Xarelto  History of frequent falls most recently in April 2022 resulting in nasal fracture  History of fall with subdural hematoma in 2018  Status post Medtronic dual-chamber pacemaker      History of Present Illness   HPI:  Fabian Ram is a 81 y o  male history of persistent atrial fibrillation (HBB0QC0-KARi= 3 and HAS-BLED=4) on Xarelto therapy  He has a history of multiple falls leading nasal fracture and subdural hematoma  Patient is status post Medtronic dual-chamber pacemaker implantation  Patient has been evaluated for appropriateness of watchman device  Admission creatinine 1 14 and GFR 57 (11/30/22)  He took ASA 81 mg today, 12/8/2022  Xarelto held this morning        +++++++++++++++++++++++++++++++++++++++++++++++  The second opinion from Dr Aniyah Mitchell below  Interface, Transcription Incoming         Historical Information   Past Medical History:   Diagnosis Date   • A-fib ([facility] [address] )    • Hernia, abdominal    • Hypertension    • Subdural hematoma ([facility] [address] )      Past Surgical History:   Procedure Laterality Date   • A-V CARDIAC PACEMAKER INSERTION     • APPENDECTOMY      age 33   • CARDIAC PACEMAKER PLACEMENT     • CARPAL TUNNEL RELEASE     • COLONOSCOPY     • INGUINAL HERNIA REPAIR Left    • CT XCAPSL CTRC RMVL INSJ IO LENS PROSTH W/O ECP Right 4/3/2017    Procedure: EXTRACTION EXTRACAPSULAR CATARACT PHACO INTRAOCULAR LENS (IOL);   Surgeon: Justice Negro MD;  Location: [facility] MAIN OR;  Service: Ophthalmology   • TONSILLECTOMY      age 12     Social History   Social History     Substance and Sexual Activity   Alcohol Use No     Social History     Substance and Sexual Activity   Drug Use Never     Social History     Tobacco Use   Smoking Status Never   Smokeless Tobacco Never     Family History:   Family History   Problem Relation Age of Onset   • Cancer Mother         exp age 80   • Heart disease Father    • Heart disease Brother    • Hypertension Brother        Meds/Allergies   all medications and allergies reviewed  No Known Allergies    Review of Systems   Constitutional: Negative for chills, fatigue and fever  HENT: Negative  Negative for congestion  Eyes: Negative  Respiratory: Negative  Negative for chest tightness, shortness of breath and wheezing  Cardiovascular: Positive for palpitations  Negative for chest pain and leg swelling  Gastrointestinal: Negative  Negative for abdominal pain  Endocrine: Negative  Genitourinary: Negative  Musculoskeletal: Negative  Negative for joint swelling  Allergic/Immunologic: Negative  Neurological: Negative  Negative for dizziness and weakness  Hematological: Negative  Psychiatric/Behavioral: Negative  Physical Exam  Constitutional:       General: He is not in acute distress  Appearance: Normal appearance  He is normal weight  He is not ill-appearing  HENT:      Head: Normocephalic and atraumatic  Right Ear: Tympanic membrane normal       Nose: Nose normal       Mouth/Throat:      Mouth: Mucous membranes are moist    Eyes:      Conjunctiva/sclera: Conjunctivae normal    Neck:      Vascular: No carotid bruit  Cardiovascular:      Rate and Rhythm: Regular rhythm  Heart sounds: Normal heart sounds  No murmur heard  No friction rub  No gallop  Pulmonary:      Breath sounds: No wheezing, rhonchi or rales  Abdominal:      General: Bowel sounds are normal       Palpations: Abdomen is soft  Musculoskeletal:         General: No swelling  Cervical back: Neck supple  No tenderness  Right lower leg: No edema  Left lower leg: No edema     Skin:     General: Skin is warm and dry  Capillary Refill: Capillary refill takes less than 2 seconds  Neurological:      General: No focal deficit present  Mental Status: He is alert and oriented to person, place, and time  Psychiatric:         Mood and Affect: Mood normal          Thought Content: Thought content normal          Judgment: Judgment normal          EKG:  AV paced rate 78       ECHO: 4/22 ejection fraction 60% no wall motion abnormality bilateral atrial dilatation

## 2022-12-07 NOTE — ANESTHESIA PREPROCEDURE EVALUATION
Procedure:  CARDIAC ATRIAL APPENDAGE CLOSURE (CATH) (Chest)  CARDIAC ATRIAL APPENDAGE CLOSURE (EP) (Chest)    Relevant Problems   CARDIO   (+) Atrial fibrillation (HCC)   (+) Chest pain   (+) Essential hypertension   (+) Sick sinus syndrome (HCC)      NEURO/PSYCH   (+) Paresthesias   (+) Subdural hematoma      Medtronic PPM    Normal biventricular systolic function, mild AS (mean 5mmHg), mild AI, mild MR, mild-mod TR       Anesthesia Plan  ASA Score- 3     Anesthesia Type- general with ASA Monitors  Additional Monitors: arterial line  Airway Plan: ETT  Comment: General anesthesia, endotracheal tube; standard ASA monitors  Risks and benefits discussed with patient; patient consented and agrees to proceed  I saw and evaluated the patient  If seen with CRNA, we have discussed the anesthetic plan and I am in agreement that the plan is appropriate for the patient   MAURA  Plan Factors-    Chart reviewed  Existing labs reviewed  Induction- intravenous  Postoperative Plan- Plan for postoperative opioid use  Planned trial extubation    Informed Consent- Anesthetic plan and risks discussed with patient  I personally reviewed this patient with the CRNA  Discussed and agreed on the Anesthesia Plan with the CRNA  Ximena Nicholson

## 2022-12-08 ENCOUNTER — ANESTHESIA (OUTPATIENT)
Dept: PERIOP | Facility: HOSPITAL | Age: 87
End: 2022-12-08

## 2022-12-08 ENCOUNTER — APPOINTMENT (OUTPATIENT)
Dept: NON INVASIVE DIAGNOSTICS | Facility: HOSPITAL | Age: 87
End: 2022-12-08

## 2022-12-08 ENCOUNTER — HOSPITAL ENCOUNTER (INPATIENT)
Facility: HOSPITAL | Age: 87
LOS: 1 days | Discharge: HOME | End: 2022-12-09
Attending: INTERNAL MEDICINE | Admitting: INTERNAL MEDICINE

## 2022-12-08 DIAGNOSIS — I48.91 ATRIAL FIBRILLATION (HCC): ICD-10-CM

## 2022-12-08 DIAGNOSIS — I48.91 ATRIAL FIBRILLATION, UNSPECIFIED TYPE (HCC): ICD-10-CM

## 2022-12-08 DIAGNOSIS — N18.9 CKD (CHRONIC KIDNEY DISEASE): Primary | ICD-10-CM

## 2022-12-08 LAB
ATRIAL RATE: 79 BPM
ATRIAL RATE: 87 BPM
KCT BLD-ACNC: 246 SEC (ref 89–137)
P AXIS: 28 DEGREES
P AXIS: 52 DEGREES
PR INTERVAL: 254 MS
PR INTERVAL: 256 MS
QRS AXIS: -55 DEGREES
QRS AXIS: -59 DEGREES
QRSD INTERVAL: 176 MS
QRSD INTERVAL: 178 MS
QT INTERVAL: 442 MS
QT INTERVAL: 454 MS
QTC INTERVAL: 520 MS
QTC INTERVAL: 531 MS
SPECIMEN SOURCE: ABNORMAL
T WAVE AXIS: 94 DEGREES
T WAVE AXIS: 97 DEGREES
VENTRICULAR RATE: 79 BPM
VENTRICULAR RATE: 87 BPM

## 2022-12-08 PROCEDURE — 02L73DK OCCLUSION OF LEFT ATRIAL APPENDAGE WITH INTRALUMINAL DEVICE, PERCUTANEOUS APPROACH: ICD-10-PCS | Performed by: INTERNAL MEDICINE

## 2022-12-08 PROCEDURE — B24BZZ4 ULTRASONOGRAPHY OF HEART WITH AORTA, TRANSESOPHAGEAL: ICD-10-PCS | Performed by: INTERNAL MEDICINE

## 2022-12-08 PROCEDURE — 4A023N7 MEASUREMENT OF CARDIAC SAMPLING AND PRESSURE, LEFT HEART, PERCUTANEOUS APPROACH: ICD-10-PCS | Performed by: INTERNAL MEDICINE

## 2022-12-08 DEVICE — PERCLOSE™ PROSTYLE™ SUTURE-MEDIATED CLOSURE AND REPAIR SYSTEM
Type: IMPLANTABLE DEVICE | Site: GROIN | Status: FUNCTIONAL
Brand: PERCLOSE™ PROSTYLE™

## 2022-12-08 DEVICE — LEFT ATRIAL APPENDAGE CLOSURE DEVICE WITH DELIVERY SYSTEM
Type: IMPLANTABLE DEVICE | Site: HEART | Status: FUNCTIONAL
Brand: WATCHMAN FLX™

## 2022-12-08 RX ORDER — FENTANYL CITRATE 50 UG/ML
INJECTION, SOLUTION INTRAMUSCULAR; INTRAVENOUS AS NEEDED
Status: DISCONTINUED | OUTPATIENT
Start: 2022-12-08 | End: 2022-12-08

## 2022-12-08 RX ORDER — PROPOFOL 10 MG/ML
INJECTION, EMULSION INTRAVENOUS AS NEEDED
Status: DISCONTINUED | OUTPATIENT
Start: 2022-12-08 | End: 2022-12-08

## 2022-12-08 RX ORDER — SODIUM CHLORIDE 9 MG/ML
100 INJECTION, SOLUTION INTRAVENOUS CONTINUOUS
Status: CANCELLED | OUTPATIENT
Start: 2022-12-08

## 2022-12-08 RX ORDER — ACETAMINOPHEN 325 MG/1
650 TABLET ORAL EVERY 4 HOURS PRN
Status: CANCELLED | OUTPATIENT
Start: 2022-12-08

## 2022-12-08 RX ORDER — ASPIRIN 81 MG/1
81 TABLET ORAL DAILY
Status: DISCONTINUED | OUTPATIENT
Start: 2022-12-08 | End: 2022-12-08

## 2022-12-08 RX ORDER — CEFAZOLIN SODIUM 2 G/50ML
2000 SOLUTION INTRAVENOUS ONCE
Status: DISCONTINUED | OUTPATIENT
Start: 2022-12-08 | End: 2022-12-08

## 2022-12-08 RX ORDER — ACETAMINOPHEN 325 MG/1
650 TABLET ORAL EVERY 4 HOURS PRN
Status: DISCONTINUED | OUTPATIENT
Start: 2022-12-08 | End: 2022-12-09 | Stop reason: HOSPADM

## 2022-12-08 RX ORDER — AMIODARONE HYDROCHLORIDE 200 MG/1
200 TABLET ORAL DAILY
Status: DISCONTINUED | OUTPATIENT
Start: 2022-12-08 | End: 2022-12-09 | Stop reason: HOSPADM

## 2022-12-08 RX ORDER — CEFAZOLIN SODIUM 1 G/3ML
INJECTION, POWDER, FOR SOLUTION INTRAMUSCULAR; INTRAVENOUS AS NEEDED
Status: DISCONTINUED | OUTPATIENT
Start: 2022-12-08 | End: 2022-12-08

## 2022-12-08 RX ORDER — ASPIRIN 81 MG/1
81 TABLET ORAL DAILY
Status: DISCONTINUED | OUTPATIENT
Start: 2022-12-09 | End: 2022-12-09 | Stop reason: HOSPADM

## 2022-12-08 RX ORDER — SODIUM CHLORIDE 9 MG/ML
INJECTION, SOLUTION INTRAVENOUS CONTINUOUS PRN
Status: DISCONTINUED | OUTPATIENT
Start: 2022-12-08 | End: 2022-12-08

## 2022-12-08 RX ORDER — SODIUM CHLORIDE 9 MG/ML
125 INJECTION, SOLUTION INTRAVENOUS CONTINUOUS
Status: CANCELLED | OUTPATIENT
Start: 2022-12-08 | End: 2022-12-08

## 2022-12-08 RX ORDER — SODIUM CHLORIDE 9 MG/ML
125 INJECTION, SOLUTION INTRAVENOUS CONTINUOUS
Status: DISPENSED | OUTPATIENT
Start: 2022-12-08 | End: 2022-12-08

## 2022-12-08 RX ORDER — GABAPENTIN 100 MG/1
100 CAPSULE ORAL 3 TIMES DAILY
Status: DISCONTINUED | OUTPATIENT
Start: 2022-12-08 | End: 2022-12-09 | Stop reason: HOSPADM

## 2022-12-08 RX ORDER — ASPIRIN 81 MG/1
81 TABLET, CHEWABLE ORAL DAILY
COMMUNITY

## 2022-12-08 RX ORDER — ONDANSETRON 2 MG/ML
4 INJECTION INTRAMUSCULAR; INTRAVENOUS ONCE AS NEEDED
Status: DISCONTINUED | OUTPATIENT
Start: 2022-12-08 | End: 2022-12-08 | Stop reason: HOSPADM

## 2022-12-08 RX ORDER — ONDANSETRON 2 MG/ML
INJECTION INTRAMUSCULAR; INTRAVENOUS AS NEEDED
Status: DISCONTINUED | OUTPATIENT
Start: 2022-12-08 | End: 2022-12-08

## 2022-12-08 RX ORDER — FENTANYL CITRATE/PF 50 MCG/ML
50 SYRINGE (ML) INJECTION
Status: DISCONTINUED | OUTPATIENT
Start: 2022-12-08 | End: 2022-12-08 | Stop reason: HOSPADM

## 2022-12-08 RX ORDER — ASPIRIN 81 MG/1
81 TABLET ORAL DAILY
Status: CANCELLED | OUTPATIENT
Start: 2022-12-09

## 2022-12-08 RX ORDER — LIDOCAINE HYDROCHLORIDE 20 MG/ML
INJECTION, SOLUTION EPIDURAL; INFILTRATION; INTRACAUDAL; PERINEURAL AS NEEDED
Status: DISCONTINUED | OUTPATIENT
Start: 2022-12-08 | End: 2022-12-08

## 2022-12-08 RX ORDER — ROCURONIUM BROMIDE 10 MG/ML
INJECTION, SOLUTION INTRAVENOUS AS NEEDED
Status: DISCONTINUED | OUTPATIENT
Start: 2022-12-08 | End: 2022-12-08

## 2022-12-08 RX ORDER — OXYCODONE HYDROCHLORIDE AND ACETAMINOPHEN 5; 325 MG/1; MG/1
1 TABLET ORAL EVERY 4 HOURS PRN
Status: DISCONTINUED | OUTPATIENT
Start: 2022-12-08 | End: 2022-12-09 | Stop reason: HOSPADM

## 2022-12-08 RX ORDER — HEPARIN SODIUM 1000 [USP'U]/ML
INJECTION, SOLUTION INTRAVENOUS; SUBCUTANEOUS AS NEEDED
Status: DISCONTINUED | OUTPATIENT
Start: 2022-12-08 | End: 2022-12-08

## 2022-12-08 RX ORDER — AMLODIPINE BESYLATE 2.5 MG/1
2.5 TABLET ORAL DAILY
Status: DISCONTINUED | OUTPATIENT
Start: 2022-12-08 | End: 2022-12-09 | Stop reason: HOSPADM

## 2022-12-08 RX ADMIN — LIDOCAINE HYDROCHLORIDE 50 MG: 20 INJECTION, SOLUTION EPIDURAL; INFILTRATION; INTRACAUDAL; PERINEURAL at 09:31

## 2022-12-08 RX ADMIN — ONDANSETRON 4 MG: 2 INJECTION INTRAMUSCULAR; INTRAVENOUS at 10:17

## 2022-12-08 RX ADMIN — SUGAMMADEX 50 MG: 100 INJECTION, SOLUTION INTRAVENOUS at 10:27

## 2022-12-08 RX ADMIN — GABAPENTIN 100 MG: 100 CAPSULE ORAL at 17:40

## 2022-12-08 RX ADMIN — ROCURONIUM BROMIDE 40 MG: 50 INJECTION INTRAVENOUS at 09:31

## 2022-12-08 RX ADMIN — FENTANYL CITRATE 50 MCG: 50 INJECTION INTRAMUSCULAR; INTRAVENOUS at 09:31

## 2022-12-08 RX ADMIN — PROPOFOL 150 MG: 10 INJECTION, EMULSION INTRAVENOUS at 09:31

## 2022-12-08 RX ADMIN — SODIUM CHLORIDE: 0.9 INJECTION, SOLUTION INTRAVENOUS at 08:10

## 2022-12-08 RX ADMIN — RIVAROXABAN 10 MG: 10 TABLET, FILM COATED ORAL at 17:40

## 2022-12-08 RX ADMIN — SODIUM CHLORIDE 1000 ML: 0.9 INJECTION, SOLUTION INTRAVENOUS at 08:10

## 2022-12-08 RX ADMIN — GABAPENTIN 100 MG: 100 CAPSULE ORAL at 20:43

## 2022-12-08 RX ADMIN — AMIODARONE HYDROCHLORIDE 200 MG: 200 TABLET ORAL at 14:28

## 2022-12-08 RX ADMIN — SUGAMMADEX 150 MG: 100 INJECTION, SOLUTION INTRAVENOUS at 10:20

## 2022-12-08 RX ADMIN — CEFAZOLIN 2000 MG: 1 INJECTION, POWDER, FOR SOLUTION INTRAMUSCULAR; INTRAVENOUS at 09:37

## 2022-12-08 RX ADMIN — SODIUM CHLORIDE 125 ML/HR: 0.9 INJECTION, SOLUTION INTRAVENOUS at 11:45

## 2022-12-08 RX ADMIN — AMLODIPINE BESYLATE 2.5 MG: 2.5 TABLET ORAL at 14:28

## 2022-12-08 RX ADMIN — SODIUM CHLORIDE: 9 INJECTION, SOLUTION INTRAVENOUS at 09:35

## 2022-12-08 RX ADMIN — HEPARIN SODIUM 10000 UNITS: 1000 INJECTION INTRAVENOUS; SUBCUTANEOUS at 09:58

## 2022-12-08 NOTE — ANESTHESIA PROCEDURE NOTES
Procedure Performed: MAURA Anesthesia  Start Time:         Preanesthesia Checklist    Patient identified, IV assessed, risks and benefits discussed, monitors and equipment assessed, procedure being performed at surgeon's request and anesthesia consent obtained  Procedure    Diagnostic Indications for MAURA:  defect repair evaluation, hemodynamic monitoring and suspected pericardial effusion  Type of MAURA: interventional MAURA with real time guidance of intracardiac procedure  Images Saved: ultrasound permanent image saved  Physician Requesting Echo: Zaki Burgos MD   Location performed: OR  Intubated  Bite block not placed  Heart visualized  Insertion of MAURA Probe:  Atraumatic  Probe Type:  Multiplane  Modalities:  3D, continuous wave Doppler, color flow mapping and pulse wave Doppler  Echocardiographic and Doppler Measurements    PREPROCEDURE    LEFT VENTRICLE:  Systolic Function: normal          RIGHT VENTRICLE:  Systolic Function: normal   Cavity size normal               AORTIC VALVE:  Leaflets: normal and trileaflet  Leaflet motions normal and normal  Stenosis: at worst mild by visualization  Mean Gradient: 4 mmHg  Regurgitation: trace  MITRAL VALVE:  Leaflets: normal  Leaflet Motions: normal  Regurgitation: mild  Stenosis: none  TRICUSPID VALVE:     Regurgitation: moderate and mod-sev  ASCENDING AORTA:    Dissection not present  AORTIC ARCH:    dissection not present  Grade 3: atheroma protruding < 0 5 cm into lumen  DESCENDING AORTA:    Dissection not present  Grade 3: atheroma protruding < 0 5 cm into lumen  LEFT ATRIAL APPENDAGE:   No spontaneous echo contrast     OTHER ATRIAL FINDINGS:  No ALDO clot  ATRIAL SEPTUM:  Intra-atrial septal morphology: patent foramen ovale and left to right flow by CFD  Patent foramen ovale shunt: left to right shunt  POSTPROCEDURE    LEFT VENTRICLE: Unchanged            RIGHT VENTRICLE: Unchanged Tati Nunez AORTIC VALVE: Unchanged   MITRAL VALVE: Unchanged   TRICUSPID VALVE: Unchanged   ATRIA:   Left atrial appendage ligation or closure: watchman  Residual Flow in Left Atrial Appendage by Color Flow Doppler: No        AORTA: Unchanged   Dissection: Dissection not present  REMOVAL:  Probe Removal: atraumatic

## 2022-12-08 NOTE — ANESTHESIA PROCEDURE NOTES
Arterial Line Insertion  Performed by: Bettyjane Cranker, CRNA  Authorized by: Zayda Oropeza MD   Preparation: Patient was prepped and draped in the usual sterile fashion    Indications: hemodynamic monitoring  Orientation:  Right  Location: radial artery  Sedation:  Patient sedated: yes  Sedatives: see MAR for details  Analgesia: see MAR for details    Procedure Details:  Needle gauge: 20  Number of attempts: 1    Post-procedure:  Post-procedure: dressing applied  Waveform: good waveform  Patient tolerance: Patient tolerated the procedure well with no immediate complications

## 2022-12-08 NOTE — OP NOTE
OPERATIVE REPORT  PATIENT NAME: Livier Ma    :  1935  MRN: [id number]  Pt Location: [facility] HYBRID OR ROOM 02    SURGERY DATE: 2022    Surgeon(s) and Role:  Panel 1:     * Pennelope Kayser, MD - Primary     * Cheryl Granados DO - Assisting  Panel 2:     * Pennelope Kayser, MD - Primary    Preop Diagnosis:  Atrial fibrillation, unspecified type ([address] ) [I48 91]    Post-Op Diagnosis Codes:     * Atrial fibrillation, unspecified type ([address] ) [I48 91]    Procedure(s) (LRB):  CARDIAC ATRIAL APPENDAGE CLOSURE (CATH) (N/A)  CARDIAC ATRIAL APPENDAGE CLOSURE (EP) (N/A)    Specimen(s):  * No specimens in log *    Estimated Blood Loss:   Minimal    Drains:  * No LDAs found *    Anesthesia Type:   General    Operative Indications:  Atrial fibrillation, unspecified type ([address] ) [I48 91] Paroxysmal  Lower GI Bleed  Frequent afalls    Operative Findings:    The patient was draped in normal sterile fashion chest and bilateral groins  An 8F sheath was placed in right femoral vein with Seldinger technique  Heparin was infused, weight based, to keep -350s  A transseptal puncture with a Versacross Connect and 14F Double Curve Watchman delivery sheath  We then used a 5mm pigtail catheter and placed in anterior lobe of appendage and did venogram to measure appendage depth  A 24mm Watchman device was used, the pigtail was removed and the watch to tip of sheath, marker bands aligned with fluoroscopy  We then unsheathed the Watchman  The device was expanded 2x the sheath width in ball formation and it was advanced and retracted as needed until shoulders were plane of Os and then unsheathed completely  One deployments were required  After the final deployment we checked usual echo views at 0,45,90, 135 degrees w MAURA and no leak was seen, and there was not excess shoulder of device seen in any view  The device was compressed approximately 10-14% appropriately  A tug test showed normal recoil without dislodgement   After this the device was release from delivery cable and sheaths removed  Perclose x2 was used for hemostasis              Complications:   None    Procedure and Technique:     I was present for the entire procedure    Patient Disposition:  PACU         SIGNATURE: Mark Spence MD  DATE: December 8, 2022  TIME: 10:32 AM

## 2022-12-08 NOTE — DISCHARGE INSTRUCTIONS
1  Please see the post cardiac catheterization dishcarge instructions  No heavy lifting, greater than 10 lbs  or strenuous  activity for 5 days    2  Remove band aid tomorrow  Shower and wash area- groin gently with soap and water- beginning tomorrow  Rinse and pat dry  Apply new water seal band aid  Repeat this process for 5 days  No powders, creams lotions or antibiotic ointments  for 5 days  No tub baths, hot tubs or swimming for 5 days  3  Please call our office ([phone]) if you have any fever, redness, swelling, discharge from your groin access site  4 No driving for 2 days    5  Perclose Booklet     6  Continue aspirin 81 mg daily and Xarelto 10 mg daily    7  Follow-up appointment with Antonio Zelaya PAC on 1/5/2023 at [address] [facility]    8     Postop MAURA 1/26/2023 at 10 AM at [facility] Department of cardiology

## 2022-12-08 NOTE — ANESTHESIA POSTPROCEDURE EVALUATION
Post-Op Assessment Note    CV Status:  Stable  Pain Score: 0    Pain management: adequate     Mental Status:  Alert and awake   Hydration Status:  Euvolemic   PONV Controlled:  Controlled   Airway Patency:  Patent      Post Op Vitals Reviewed: Yes      Staff: CRNA         There were no known notable events for this encounter      /67 (12/08/22 1036)    Temp (!) 97 3 °F (36 3 °C) (12/08/22 1036)    Pulse 70 (12/08/22 1036)   Resp 20 (12/08/22 1036)    SpO2 98 % (12/08/22 1036)

## 2022-12-08 NOTE — DISCHARGE SUMMARY
Discharge Summary - Mee Jones 81 y o  male MRN: [id number]    Unit/Bed#: [facility] 409-01 Encounter: [id number]    Admission Date: 12/8/2022   Discharge Date: 12/9/2022    Disposition: Home    Condition at Discharge: good     PCP: Tj Winkler DO    OP Cardiologist: Dr Nacho Wright      Admitting Diagnosis: Persistent atrial fibrillation      Secondary Diagnoses:   Frequent falls, most recent April 2022 resulting in nasal fracture  History of fall with subdural hematoma 2018  Status post Medtronic dual pacemaker     Discharge Diagnosis: Status post percutaneous placement of left atrial appendage occluder device, 24 mm, watchman device  /79 (BP Location: Left arm)   Pulse 71   Temp (!) 97 3 °F (36 3 °C) (Oral)   Resp 17   Ht 5' 5" (1 651 m)   Wt 73 9 kg (163 lb)   SpO2 95%   BMI 27 12 kg/m²       Review of Systems   All other systems reviewed and are negative  Physical Exam  Constitutional:       Appearance: Normal appearance  HENT:      Head: Normocephalic and atraumatic  Mouth/Throat:      Mouth: Mucous membranes are moist    Cardiovascular:      Rate and Rhythm: Normal rate and regular rhythm  Heart sounds: Normal heart sounds  Pulmonary:      Effort: Pulmonary effort is normal       Breath sounds: Normal breath sounds  Abdominal:      General: Abdomen is flat  Bowel sounds are normal       Palpations: Abdomen is soft  Musculoskeletal:      Right lower leg: Edema present  Left lower leg: Edema present  Skin:     Capillary Refill: Capillary refill takes 2 to 3 seconds  Neurological:      Mental Status: He is alert and oriented to person, place, and time  Psychiatric:         Behavior: Behavior normal        HPI and Hospital Course: Jitendra Sarabia, an 28-year-old male, presented to [address] [place] [address] for elective placement of left atrial appendage occluder device      Past medical history significant for persistent atrial fibrillation (HASBLED 4, [id number]=EQQk 3) on Xarelto therapy  He has a history of multiple falls leading to nasal fracture and subdural hematoma  Status post Medtronic dual-chamber pacemaker implantation  Admission creatinine 1 14 and GFR 57  On the morning of admission patient took aspirin 81 mg and Xarelto was held in preparation of the percutaneous left atrial appendage occluder device implantation  Procedure:    Right and left femoral venous access was obtained  Using fluoroscopy, transesophageal echocardiogram, cineangiography as well as dye injection in the left atrial appendage to guide position of the delivery sheath, a 24 mm left atrial appendage occluder device was deployed  No evidence of leak around the occluder device was noted  Following deployment fluoroscopy and transesophageal echocardiogram images were obtained and the device was in stable position  Bilateral femoral venous sheaths were pulled and hemostasis was achieved with probe glide closure  Xarelto 10 mg was given in PACU  Discharge H&H-10 2 and 31 9  Discharge creatinine 0 93 and GFR 74  Right groin no trauma, ecchymosis or bruit    Discharge plan:  1  Aspirin 81 mg daily and Xarelto 10 mg daily  2  Follow up appointment with Piyush Barker PA-C on 1/5/2023 at [place] [address] location  4  Postop MAURA scheduled for 1/26/2023 at 10 AM at [address] [place] [address] Department of cardiology  5  Right groin care reviewed with patient  6    BMP next week  Chest x-ray shows appropriate device position      Current Facility-Administered Medications   Medication Dose Route Frequency   • acetaminophen (TYLENOL) tablet 650 mg  650 mg Oral Q4H PRN   • amiodarone tablet 200 mg  200 mg Oral Daily   • amLODIPine (NORVASC) tablet 2 5 mg  2 5 mg Oral Daily   • [START ON 12/9/2022] aspirin (ECOTRIN LOW STRENGTH) EC tablet 81 mg  81 mg Oral Daily   • gabapentin (NEURONTIN) capsule 100 mg  100 mg Oral TID   • oxyCODONE-acetaminophen (PERCOCET) 5-325 mg per tablet 1 tablet  1 tablet Oral Q4H PRN   • rivaroxaban (XARELTO) tablet 10 mg  10 mg Oral Daily With Dinner       Pertinent Labs/diagnostics:        Lab Ressults:  Recent Results (from the past 24 hour(s))   Basic metabolic panel    Collection Time: 12/09/22  5:40 AM   Result Value Ref Range    Sodium 140 135 - 147 mmol/L    Potassium 4 1 3 5 - 5 3 mmol/L    Chloride 109 (H) 96 - 108 mmol/L    CO2 27 21 - 32 mmol/L    ANION GAP 4 4 - 13 mmol/L    BUN 19 5 - 25 mg/dL    Creatinine 0 93 0 60 - 1 30 mg/dL    Glucose 103 65 - 140 mg/dL    Calcium 8 3 8 3 - 10 1 mg/dL    eGFR 74 ml/min/1 73sq m   Magnesium    Collection Time: 12/09/22  5:40 AM   Result Value Ref Range    Magnesium 2 5 1 6 - 2 6 mg/dL   CBC    Collection Time: 12/09/22  5:40 AM   Result Value Ref Range    WBC 4 29 (L) 4 31 - 10 16 Thousand/uL    RBC 4 96 3 88 - 5 62 Million/uL    Hemoglobin 10 2 (L) 12 0 - 17 0 g/dL    Hematocrit 31 9 (L) 36 5 - 49 3 %    MCV 64 (L) 82 - 98 fL    MCH 20 6 (L) 26 8 - 34 3 pg    MCHC 32 0 31 4 - 37 4 g/dL    RDW 16 7 (H) 11 6 - 15 1 %    Platelets 776 607 - 689 Thousands/uL    MPV 9 2 8 9 - 12 7 fL   Prepare Leukoreduced RBC: 4 Units    Collection Time: 12/09/22  8:18 AM   Result Value Ref Range    Unit Product Code Q1366E12     Unit Number [id number]     Unit ABO O     Unit RH POS     Crossmatch Compatible     Unit Dispense Status Return to [facility]     Unit Product Volume 350 mL    Unit Product Code J7676A63     Unit Number [id number]     Unit ABO O     Unit RH POS     Crossmatch Compatible     Unit Dispense Status Return to [facility]     Unit Product Volume 350 mL    Unit Product Code V7628D73     Unit Number [id number]     Unit ABO O     Unit RH POS     Crossmatch Compatible     Unit Dispense Status Return to [facility]     Unit Product Volume 350 mL    Unit Product Code Q0682Q56     Unit Number [id number]     Unit ABO O     Unit RH POS     Crossmatch Compatible     Unit Dispense Status Return to [facility]     Unit Product Volume 350 mL           Lipid Profile:   No results found for: CHOL  Lab Results   Component Value Date     05/06/2020     (H) 04/13/2019    HDL 78 (H) 11/09/2017     Lab Results   Component Value Date    LDLCALC 81 05/06/2020    LDLCALC 73 04/13/2019    LDLCALC 53 11/09/2017     Lab Results   Component Value Date    TRIG 77 05/06/2020    TRIG 82 04/13/2019    TRIG 83 11/09/2017         Tele: Sinus rhythm    Discharge instructions/Information to patient and family:   See after visit summary for information provided to patient and family  Provisions for Follow-Up Care:  See after visit summary for information related to follow-up care and any pertinent home health orders  Planned Readmission: No    Discharge Statement:  I spent 45 minutes minutes discharging the patient  This time was spent on the day of discharge  I had direct contact with the patient on the day of discharge  Additional documentation is required if more than 30 minutes were spent on discharge  ** Please Note: Fluency Direct Dictation voice to text software may have been used in the creation of this document   **

## 2022-12-09 ENCOUNTER — APPOINTMENT (OUTPATIENT)
Dept: RADIOLOGY | Facility: HOSPITAL | Age: 87
End: 2022-12-09

## 2022-12-09 VITALS
HEART RATE: 70 BPM | WEIGHT: 163 LBS | TEMPERATURE: 97.4 F | OXYGEN SATURATION: 96 % | BODY MASS INDEX: 27.16 KG/M2 | SYSTOLIC BLOOD PRESSURE: 138 MMHG | RESPIRATION RATE: 18 BRPM | HEIGHT: 65 IN | DIASTOLIC BLOOD PRESSURE: 71 MMHG

## 2022-12-09 PROBLEM — Q20.8: Status: ACTIVE | Noted: 2022-12-09

## 2022-12-09 PROBLEM — Z95.818 PRESENCE OF LEFT ATRIAL APPENDAGE CLOSURE DEVICE: Status: ACTIVE | Noted: 2019-12-06

## 2022-12-09 LAB
ABO GROUP BLD BPU: NORMAL
ANION GAP SERPL CALCULATED.3IONS-SCNC: 4 MMOL/L (ref 4–13)
BPU ID: NORMAL
BUN SERPL-MCNC: 19 MG/DL (ref 5–25)
CALCIUM SERPL-MCNC: 8.3 MG/DL (ref 8.3–10.1)
CHLORIDE SERPL-SCNC: 109 MMOL/L (ref 96–108)
CO2 SERPL-SCNC: 27 MMOL/L (ref 21–32)
CREAT SERPL-MCNC: 0.93 MG/DL (ref 0.6–1.3)
CROSSMATCH: NORMAL
ERYTHROCYTE [DISTWIDTH] IN BLOOD BY AUTOMATED COUNT: 16.7 % (ref 11.6–15.1)
GFR SERPL CREATININE-BSD FRML MDRD: 74 ML/MIN/1.73SQ M
GLUCOSE SERPL-MCNC: 103 MG/DL (ref 65–140)
HCT VFR BLD AUTO: 31.9 % (ref 36.5–49.3)
HGB BLD-MCNC: 10.2 G/DL (ref 12–17)
MAGNESIUM SERPL-MCNC: 2.5 MG/DL (ref 1.6–2.6)
MCH RBC QN AUTO: 20.6 PG (ref 26.8–34.3)
MCHC RBC AUTO-ENTMCNC: 32 G/DL (ref 31.4–37.4)
MCV RBC AUTO: 64 FL (ref 82–98)
PLATELET # BLD AUTO: 163 THOUSANDS/UL (ref 149–390)
PMV BLD AUTO: 9.2 FL (ref 8.9–12.7)
POTASSIUM SERPL-SCNC: 4.1 MMOL/L (ref 3.5–5.3)
RBC # BLD AUTO: 4.96 MILLION/UL (ref 3.88–5.62)
SODIUM SERPL-SCNC: 140 MMOL/L (ref 135–147)
UNIT DISPENSE STATUS: NORMAL
UNIT PRODUCT CODE: NORMAL
UNIT PRODUCT VOLUME: 350 ML
UNIT RH: NORMAL
WBC # BLD AUTO: 4.29 THOUSAND/UL (ref 4.31–10.16)

## 2022-12-09 RX ADMIN — GABAPENTIN 100 MG: 100 CAPSULE ORAL at 08:05

## 2022-12-09 RX ADMIN — ASPIRIN 81 MG: 81 TABLET, COATED ORAL at 08:05

## 2022-12-09 RX ADMIN — AMLODIPINE BESYLATE 2.5 MG: 2.5 TABLET ORAL at 08:05

## 2022-12-09 RX ADMIN — AMIODARONE HYDROCHLORIDE 200 MG: 200 TABLET ORAL at 08:05

## 2022-12-09 NOTE — RESTORATIVE TECHNICIAN NOTE
Restorative Technician Note      Patient Name: Franck Jordan     Restorative Tech Visit Date: 12/09/22  Note Type: Mobility  Patient Position Upon Consult: Supine  Activity Performed: Ambulated  Assistive Device: Other (Comment) (none)  Patient Position at End of Consult: All needs within reach;  Bedside chair

## 2022-12-13 ENCOUNTER — APPOINTMENT (OUTPATIENT)
Dept: LAB | Facility: MEDICAL CENTER | Age: 87
End: 2022-12-13

## 2022-12-13 DIAGNOSIS — N18.9 CKD (CHRONIC KIDNEY DISEASE): ICD-10-CM

## 2022-12-13 LAB
ANION GAP SERPL CALCULATED.3IONS-SCNC: 5 MMOL/L (ref 4–13)
BUN SERPL-MCNC: 20 MG/DL (ref 5–25)
CALCIUM SERPL-MCNC: 9.1 MG/DL (ref 8.3–10.1)
CHLORIDE SERPL-SCNC: 105 MMOL/L (ref 96–108)
CO2 SERPL-SCNC: 27 MMOL/L (ref 21–32)
CREAT SERPL-MCNC: 1.09 MG/DL (ref 0.6–1.3)
GFR SERPL CREATININE-BSD FRML MDRD: 61 ML/MIN/1.73SQ M
GLUCOSE P FAST SERPL-MCNC: 118 MG/DL (ref 65–99)
POTASSIUM SERPL-SCNC: 4.6 MMOL/L (ref 3.5–5.3)
SODIUM SERPL-SCNC: 137 MMOL/L (ref 135–147)

## 2022-12-18 ENCOUNTER — APPOINTMENT (EMERGENCY)
Dept: CT IMAGING | Facility: HOSPITAL | Age: 87
End: 2022-12-18

## 2022-12-18 ENCOUNTER — APPOINTMENT (EMERGENCY)
Dept: RADIOLOGY | Facility: HOSPITAL | Age: 87
End: 2022-12-18

## 2022-12-18 ENCOUNTER — HOSPITAL ENCOUNTER (INPATIENT)
Facility: HOSPITAL | Age: 87
LOS: 1 days | End: 2022-12-19
Attending: SURGERY | Admitting: GENERAL PRACTICE

## 2022-12-18 DIAGNOSIS — R55 SYNCOPE AND COLLAPSE: ICD-10-CM

## 2022-12-18 DIAGNOSIS — R42 DIZZINESS: ICD-10-CM

## 2022-12-18 DIAGNOSIS — R55 RECURRENT SYNCOPE: Primary | ICD-10-CM

## 2022-12-18 LAB
ANION GAP SERPL CALCULATED.3IONS-SCNC: 5 MMOL/L (ref 4–13)
BASE EXCESS BLDA CALC-SCNC: 0 MMOL/L (ref -2–3)
BASOPHILS # BLD AUTO: 0.03 THOUSANDS/ÂΜL (ref 0–0.1)
BASOPHILS NFR BLD AUTO: 1 % (ref 0–1)
BUN SERPL-MCNC: 21 MG/DL (ref 5–25)
CA-I BLD-SCNC: 0.99 MMOL/L (ref 1.12–1.32)
CALCIUM SERPL-MCNC: 8.3 MG/DL (ref 8.4–10.2)
CHLORIDE SERPL-SCNC: 104 MMOL/L (ref 96–108)
CO2 SERPL-SCNC: 24 MMOL/L (ref 21–32)
CREAT SERPL-MCNC: 1.35 MG/DL (ref 0.6–1.3)
EOSINOPHIL # BLD AUTO: 0.09 THOUSAND/ÂΜL (ref 0–0.61)
EOSINOPHIL NFR BLD AUTO: 2 % (ref 0–6)
ERYTHROCYTE [DISTWIDTH] IN BLOOD BY AUTOMATED COUNT: 16.9 % (ref 11.6–15.1)
GFR SERPL CREATININE-BSD FRML MDRD: 47 ML/MIN/1.73SQ M
GLUCOSE SERPL-MCNC: 147 MG/DL (ref 65–140)
GLUCOSE SERPL-MCNC: 158 MG/DL (ref 65–140)
HCO3 BLDA-SCNC: 23.2 MMOL/L (ref 24–30)
HCT VFR BLD AUTO: 32.3 % (ref 36.5–49.3)
HCT VFR BLD CALC: 33 % (ref 36.5–49.3)
HGB BLD-MCNC: 10.1 G/DL (ref 12–17)
HGB BLDA-MCNC: 11.2 G/DL (ref 12–17)
IMM GRANULOCYTES # BLD AUTO: 0.02 THOUSAND/UL (ref 0–0.2)
IMM GRANULOCYTES NFR BLD AUTO: 1 % (ref 0–2)
LYMPHOCYTES # BLD AUTO: 0.65 THOUSANDS/ÂΜL (ref 0.6–4.47)
LYMPHOCYTES NFR BLD AUTO: 15 % (ref 14–44)
MCH RBC QN AUTO: 20.5 PG (ref 26.8–34.3)
MCHC RBC AUTO-ENTMCNC: 31.3 G/DL (ref 31.4–37.4)
MCV RBC AUTO: 66 FL (ref 82–98)
MONOCYTES # BLD AUTO: 0.56 THOUSAND/ÂΜL (ref 0.17–1.22)
MONOCYTES NFR BLD AUTO: 13 % (ref 4–12)
NEUTROPHILS # BLD AUTO: 2.88 THOUSANDS/ÂΜL (ref 1.85–7.62)
NEUTS SEG NFR BLD AUTO: 68 % (ref 43–75)
NRBC BLD AUTO-RTO: 0 /100 WBCS
PCO2 BLD: 24 MMOL/L (ref 21–32)
PCO2 BLD: 30.7 MM HG (ref 42–50)
PH BLD: 7.49 [PH] (ref 7.3–7.4)
PLATELET # BLD AUTO: 168 THOUSANDS/UL (ref 149–390)
PMV BLD AUTO: 8.7 FL (ref 8.9–12.7)
PO2 BLD: 50 MM HG (ref 35–45)
POTASSIUM BLD-SCNC: 5 MMOL/L (ref 3.5–5.3)
POTASSIUM SERPL-SCNC: 4.2 MMOL/L (ref 3.5–5.3)
RBC # BLD AUTO: 4.92 MILLION/UL (ref 3.88–5.62)
SAO2 % BLD FROM PO2: 88 % (ref 60–85)
SODIUM BLD-SCNC: 133 MMOL/L (ref 136–145)
SODIUM SERPL-SCNC: 133 MMOL/L (ref 135–147)
SPECIMEN SOURCE: ABNORMAL
WBC # BLD AUTO: 4.23 THOUSAND/UL (ref 4.31–10.16)

## 2022-12-19 ENCOUNTER — ANESTHESIA EVENT (INPATIENT)
Dept: NON INVASIVE DIAGNOSTICS | Facility: HOSPITAL | Age: 87
End: 2022-12-19

## 2022-12-19 ENCOUNTER — APPOINTMENT (INPATIENT)
Dept: RADIOLOGY | Facility: HOSPITAL | Age: 87
End: 2022-12-19

## 2022-12-19 ENCOUNTER — EPISODE CHANGES (OUTPATIENT)
Dept: CASE MANAGEMENT | Facility: OTHER | Age: 87
End: 2022-12-19

## 2022-12-19 ENCOUNTER — HOSPITAL ENCOUNTER (INPATIENT)
Facility: HOSPITAL | Age: 87
LOS: 2 days | Discharge: HOME | End: 2022-12-21
Attending: INTERNAL MEDICINE | Admitting: INTERNAL MEDICINE

## 2022-12-19 VITALS
OXYGEN SATURATION: 95 % | HEART RATE: 69 BPM | RESPIRATION RATE: 16 BRPM | TEMPERATURE: 97.4 F | BODY MASS INDEX: 30.08 KG/M2 | WEIGHT: 180.78 LBS | SYSTOLIC BLOOD PRESSURE: 163 MMHG | DIASTOLIC BLOOD PRESSURE: 76 MMHG

## 2022-12-19 DIAGNOSIS — I49.5 SICK SINUS SYNDROME (HCC): Primary | ICD-10-CM

## 2022-12-19 DIAGNOSIS — R42 DIZZINESS: ICD-10-CM

## 2022-12-19 DIAGNOSIS — T82.110A FAILURE OF PACEMAKER LEAD, INITIAL ENCOUNTER: ICD-10-CM

## 2022-12-19 PROBLEM — R71.8 LOW MEAN CORPUSCULAR VOLUME (MCV): Status: ACTIVE | Noted: 2022-12-19

## 2022-12-19 PROBLEM — N17.9 AKI (ACUTE KIDNEY INJURY) (HCC): Status: ACTIVE | Noted: 2022-12-19

## 2022-12-19 PROBLEM — R79.89 ELEVATED SERUM CREATININE: Status: ACTIVE | Noted: 2022-12-19

## 2022-12-19 LAB
2HR DELTA HS TROPONIN: -3 NG/L
4HR DELTA HS TROPONIN: -7 NG/L
ABO GROUP BLD: NORMAL
ANION GAP SERPL CALCULATED.3IONS-SCNC: 2 MMOL/L (ref 4–13)
APTT PPP: 37 SECONDS (ref 23–37)
BASOPHILS # BLD AUTO: 0.03 THOUSANDS/ÂΜL (ref 0–0.1)
BASOPHILS NFR BLD AUTO: 1 % (ref 0–1)
BLD GP AB SCN SERPL QL: NEGATIVE
BUN SERPL-MCNC: 19 MG/DL (ref 5–25)
CA-I BLD-SCNC: 1.08 MMOL/L (ref 1.12–1.32)
CALCIUM SERPL-MCNC: 8.3 MG/DL (ref 8.4–10.2)
CARDIAC TROPONIN I PNL SERPL HS: 40 NG/L
CARDIAC TROPONIN I PNL SERPL HS: 44 NG/L
CARDIAC TROPONIN I PNL SERPL HS: 47 NG/L
CHLORIDE SERPL-SCNC: 106 MMOL/L (ref 96–108)
CO2 SERPL-SCNC: 27 MMOL/L (ref 21–32)
CREAT SERPL-MCNC: 1.13 MG/DL (ref 0.6–1.3)
EOSINOPHIL # BLD AUTO: 0.06 THOUSAND/ÂΜL (ref 0–0.61)
EOSINOPHIL NFR BLD AUTO: 1 % (ref 0–6)
ERYTHROCYTE [DISTWIDTH] IN BLOOD BY AUTOMATED COUNT: 16.8 % (ref 11.6–15.1)
FERRITIN SERPL-MCNC: 340 NG/ML (ref 8–388)
FERRITIN SERPL-MCNC: 373 NG/ML (ref 8–388)
GFR SERPL CREATININE-BSD FRML MDRD: 58 ML/MIN/1.73SQ M
GLUCOSE SERPL-MCNC: 129 MG/DL (ref 65–140)
HCT VFR BLD AUTO: 32.2 % (ref 36.5–49.3)
HGB BLD-MCNC: 10.1 G/DL (ref 12–17)
IMM GRANULOCYTES # BLD AUTO: 0.02 THOUSAND/UL (ref 0–0.2)
IMM GRANULOCYTES NFR BLD AUTO: 1 % (ref 0–2)
INR PPP: 1.32 (ref 0.84–1.19)
IRON SATN MFR SERPL: 22 % (ref 20–50)
IRON SATN MFR SERPL: 29 % (ref 20–50)
IRON SERPL-MCNC: 63 UG/DL (ref 65–175)
IRON SERPL-MCNC: 85 UG/DL (ref 65–175)
LYMPHOCYTES # BLD AUTO: 0.76 THOUSANDS/ÂΜL (ref 0.6–4.47)
LYMPHOCYTES NFR BLD AUTO: 17 % (ref 14–44)
MAGNESIUM SERPL-MCNC: 2 MG/DL (ref 1.9–2.7)
MAGNESIUM SERPL-MCNC: 2.2 MG/DL (ref 1.9–2.7)
MCH RBC QN AUTO: 20.4 PG (ref 26.8–34.3)
MCHC RBC AUTO-ENTMCNC: 31.4 G/DL (ref 31.4–37.4)
MCV RBC AUTO: 65 FL (ref 82–98)
MONOCYTES # BLD AUTO: 0.5 THOUSAND/ÂΜL (ref 0.17–1.22)
MONOCYTES NFR BLD AUTO: 11 % (ref 4–12)
NEUTROPHILS # BLD AUTO: 3 THOUSANDS/ÂΜL (ref 1.85–7.62)
NEUTS SEG NFR BLD AUTO: 69 % (ref 43–75)
NRBC BLD AUTO-RTO: 0 /100 WBCS
PHOSPHATE SERPL-MCNC: 2.6 MG/DL (ref 2.3–4.1)
PLATELET # BLD AUTO: 162 THOUSANDS/UL (ref 149–390)
PMV BLD AUTO: 8.7 FL (ref 8.9–12.7)
POTASSIUM SERPL-SCNC: 5 MMOL/L (ref 3.5–5.3)
PROCALCITONIN SERPL-MCNC: 0.05 NG/ML
PROTHROMBIN TIME: 16.6 SECONDS (ref 11.6–14.5)
RBC # BLD AUTO: 4.95 MILLION/UL (ref 3.88–5.62)
RH BLD: POSITIVE
SODIUM SERPL-SCNC: 135 MMOL/L (ref 135–147)
SPECIMEN EXPIRATION DATE: NORMAL
TIBC SERPL-MCNC: 286 UG/DL (ref 250–450)
TIBC SERPL-MCNC: 294 UG/DL (ref 250–450)
WBC # BLD AUTO: 4.37 THOUSAND/UL (ref 4.31–10.16)

## 2022-12-19 PROCEDURE — B51M1ZZ FLUOROSCOPY OF RIGHT UPPER EXTREMITY VEINS USING LOW OSMOLAR CONTRAST: ICD-10-PCS | Performed by: RADIOLOGY

## 2022-12-19 PROCEDURE — 02HK3JZ INSERTION OF PACEMAKER LEAD INTO RIGHT VENTRICLE, PERCUTANEOUS APPROACH: ICD-10-PCS | Performed by: INTERNAL MEDICINE

## 2022-12-19 PROCEDURE — 5A1223Z PERFORMANCE OF CARDIAC PACING, CONTINUOUS: ICD-10-PCS | Performed by: INTERNAL MEDICINE

## 2022-12-19 RX ORDER — AMLODIPINE BESYLATE 2.5 MG/1
2.5 TABLET ORAL DAILY
Status: DISCONTINUED | OUTPATIENT
Start: 2022-12-19 | End: 2022-12-21 | Stop reason: HOSPADM

## 2022-12-19 RX ORDER — TAMSULOSIN HYDROCHLORIDE 0.4 MG/1
0.4 CAPSULE ORAL
Status: DISCONTINUED | OUTPATIENT
Start: 2022-12-19 | End: 2022-12-19 | Stop reason: HOSPADM

## 2022-12-19 RX ORDER — AMIODARONE HYDROCHLORIDE 200 MG/1
200 TABLET ORAL DAILY
Status: DISCONTINUED | OUTPATIENT
Start: 2022-12-19 | End: 2022-12-19 | Stop reason: HOSPADM

## 2022-12-19 RX ORDER — GABAPENTIN 100 MG/1
100 CAPSULE ORAL 3 TIMES DAILY
Status: DISCONTINUED | OUTPATIENT
Start: 2022-12-19 | End: 2022-12-19 | Stop reason: HOSPADM

## 2022-12-19 RX ORDER — AMIODARONE HYDROCHLORIDE 200 MG/1
200 TABLET ORAL DAILY
Status: CANCELLED | OUTPATIENT
Start: 2022-12-19

## 2022-12-19 RX ORDER — ASPIRIN 81 MG/1
81 TABLET, CHEWABLE ORAL DAILY
Status: DISCONTINUED | OUTPATIENT
Start: 2022-12-19 | End: 2022-12-21 | Stop reason: HOSPADM

## 2022-12-19 RX ORDER — ASPIRIN 81 MG/1
81 TABLET, CHEWABLE ORAL DAILY
Status: CANCELLED | OUTPATIENT
Start: 2022-12-19

## 2022-12-19 RX ORDER — TAMSULOSIN HYDROCHLORIDE 0.4 MG/1
0.4 CAPSULE ORAL
Status: DISCONTINUED | OUTPATIENT
Start: 2022-12-19 | End: 2022-12-21 | Stop reason: HOSPADM

## 2022-12-19 RX ORDER — MIDAZOLAM HYDROCHLORIDE 2 MG/2ML
INJECTION, SOLUTION INTRAMUSCULAR; INTRAVENOUS
Status: COMPLETED
Start: 2022-12-19 | End: 2022-12-19

## 2022-12-19 RX ORDER — GABAPENTIN 100 MG/1
100 CAPSULE ORAL 3 TIMES DAILY
Status: CANCELLED | OUTPATIENT
Start: 2022-12-19

## 2022-12-19 RX ORDER — LIDOCAINE HYDROCHLORIDE 10 MG/ML
INJECTION, SOLUTION EPIDURAL; INFILTRATION; INTRACAUDAL; PERINEURAL CODE/TRAUMA/SEDATION MEDICATION
Status: DISCONTINUED | OUTPATIENT
Start: 2022-12-19 | End: 2022-12-19 | Stop reason: HOSPADM

## 2022-12-19 RX ORDER — MIDAZOLAM HYDROCHLORIDE 2 MG/2ML
INJECTION, SOLUTION INTRAMUSCULAR; INTRAVENOUS CODE/TRAUMA/SEDATION MEDICATION
Status: DISCONTINUED | OUTPATIENT
Start: 2022-12-19 | End: 2022-12-19 | Stop reason: HOSPADM

## 2022-12-19 RX ORDER — SODIUM CHLORIDE, SODIUM GLUCONATE, SODIUM ACETATE, POTASSIUM CHLORIDE, MAGNESIUM CHLORIDE, SODIUM PHOSPHATE, DIBASIC, AND POTASSIUM PHOSPHATE .53; .5; .37; .037; .03; .012; .00082 G/100ML; G/100ML; G/100ML; G/100ML; G/100ML; G/100ML; G/100ML
100 INJECTION, SOLUTION INTRAVENOUS CONTINUOUS
Status: DISCONTINUED | OUTPATIENT
Start: 2022-12-19 | End: 2022-12-19 | Stop reason: HOSPADM

## 2022-12-19 RX ORDER — SODIUM CHLORIDE, SODIUM GLUCONATE, SODIUM ACETATE, POTASSIUM CHLORIDE, MAGNESIUM CHLORIDE, SODIUM PHOSPHATE, DIBASIC, AND POTASSIUM PHOSPHATE .53; .5; .37; .037; .03; .012; .00082 G/100ML; G/100ML; G/100ML; G/100ML; G/100ML; G/100ML; G/100ML
100 INJECTION, SOLUTION INTRAVENOUS CONTINUOUS
Status: DISPENSED | OUTPATIENT
Start: 2022-12-19 | End: 2022-12-19

## 2022-12-19 RX ORDER — GABAPENTIN 100 MG/1
100 CAPSULE ORAL 3 TIMES DAILY
Status: DISCONTINUED | OUTPATIENT
Start: 2022-12-19 | End: 2022-12-21 | Stop reason: HOSPADM

## 2022-12-19 RX ORDER — AMIODARONE HYDROCHLORIDE 200 MG/1
200 TABLET ORAL DAILY
Status: DISCONTINUED | OUTPATIENT
Start: 2022-12-19 | End: 2022-12-21 | Stop reason: HOSPADM

## 2022-12-19 RX ORDER — TAMSULOSIN HYDROCHLORIDE 0.4 MG/1
0.4 CAPSULE ORAL
Status: CANCELLED | OUTPATIENT
Start: 2022-12-19

## 2022-12-19 RX ORDER — CEFAZOLIN SODIUM 2 G/50ML
2000 SOLUTION INTRAVENOUS ONCE
Status: COMPLETED | OUTPATIENT
Start: 2022-12-20 | End: 2022-12-20

## 2022-12-19 RX ORDER — SODIUM CHLORIDE, SODIUM GLUCONATE, SODIUM ACETATE, POTASSIUM CHLORIDE, MAGNESIUM CHLORIDE, SODIUM PHOSPHATE, DIBASIC, AND POTASSIUM PHOSPHATE .53; .5; .37; .037; .03; .012; .00082 G/100ML; G/100ML; G/100ML; G/100ML; G/100ML; G/100ML; G/100ML
100 INJECTION, SOLUTION INTRAVENOUS CONTINUOUS
Status: CANCELLED | OUTPATIENT
Start: 2022-12-19 | End: 2022-12-19

## 2022-12-19 RX ORDER — MIDAZOLAM HYDROCHLORIDE 2 MG/2ML
0.5 INJECTION, SOLUTION INTRAMUSCULAR; INTRAVENOUS ONCE
Status: COMPLETED | OUTPATIENT
Start: 2022-12-19 | End: 2022-12-19

## 2022-12-19 RX ORDER — FENTANYL CITRATE 50 UG/ML
INJECTION, SOLUTION INTRAMUSCULAR; INTRAVENOUS CODE/TRAUMA/SEDATION MEDICATION
Status: DISCONTINUED | OUTPATIENT
Start: 2022-12-19 | End: 2022-12-19 | Stop reason: HOSPADM

## 2022-12-19 RX ORDER — HEPARIN SODIUM 5000 [USP'U]/ML
5000 INJECTION, SOLUTION INTRAVENOUS; SUBCUTANEOUS EVERY 8 HOURS SCHEDULED
Status: DISCONTINUED | OUTPATIENT
Start: 2022-12-19 | End: 2022-12-19

## 2022-12-19 RX ORDER — HYDRALAZINE HYDROCHLORIDE 20 MG/ML
10 INJECTION INTRAMUSCULAR; INTRAVENOUS EVERY 6 HOURS PRN
Status: DISCONTINUED | OUTPATIENT
Start: 2022-12-19 | End: 2022-12-21 | Stop reason: HOSPADM

## 2022-12-19 RX ORDER — ASPIRIN 81 MG/1
81 TABLET, CHEWABLE ORAL DAILY
Status: DISCONTINUED | OUTPATIENT
Start: 2022-12-19 | End: 2022-12-19 | Stop reason: HOSPADM

## 2022-12-19 RX ADMIN — TAMSULOSIN HYDROCHLORIDE 0.4 MG: 0.4 CAPSULE ORAL at 17:22

## 2022-12-19 RX ADMIN — GABAPENTIN 100 MG: 100 CAPSULE ORAL at 17:22

## 2022-12-19 RX ADMIN — MIDAZOLAM 0.5 MG: 1 INJECTION INTRAMUSCULAR; INTRAVENOUS at 03:30

## 2022-12-19 RX ADMIN — AMLODIPINE BESYLATE 2.5 MG: 2.5 TABLET ORAL at 11:06

## 2022-12-19 RX ADMIN — IOHEXOL 15 ML: 350 INJECTION, SOLUTION INTRAVENOUS at 16:55

## 2022-12-19 RX ADMIN — MIDAZOLAM HYDROCHLORIDE 0.5 MG: 2 INJECTION, SOLUTION INTRAMUSCULAR; INTRAVENOUS at 03:30

## 2022-12-19 RX ADMIN — SODIUM CHLORIDE, SODIUM GLUCONATE, SODIUM ACETATE, POTASSIUM CHLORIDE, MAGNESIUM CHLORIDE, SODIUM PHOSPHATE, DIBASIC, AND POTASSIUM PHOSPHATE 100 ML/HR: .53; .5; .37; .037; .03; .012; .00082 INJECTION, SOLUTION INTRAVENOUS at 08:50

## 2022-12-19 RX ADMIN — ASPIRIN 81 MG CHEWABLE TABLET 81 MG: 81 TABLET CHEWABLE at 09:09

## 2022-12-19 RX ADMIN — GABAPENTIN 100 MG: 100 CAPSULE ORAL at 09:09

## 2022-12-19 RX ADMIN — SODIUM CHLORIDE, SODIUM GLUCONATE, SODIUM ACETATE, POTASSIUM CHLORIDE, MAGNESIUM CHLORIDE, SODIUM PHOSPHATE, DIBASIC, AND POTASSIUM PHOSPHATE 100 ML/HR: .53; .5; .37; .037; .03; .012; .00082 INJECTION, SOLUTION INTRAVENOUS at 01:47

## 2022-12-19 RX ADMIN — AMIODARONE HYDROCHLORIDE 200 MG: 200 TABLET ORAL at 09:09

## 2022-12-19 RX ADMIN — GABAPENTIN 100 MG: 100 CAPSULE ORAL at 21:43

## 2022-12-19 NOTE — ASSESSMENT & PLAN NOTE
· Presenting with lightheadedness with prodromal symptoms  · Likely secondary to orthostatic hypotension given poor intake and low blood pressure at home    · IV hydration  · Orthostatic blood pressure  · Will arrange pacemaker interrogation  · 24-hour telemetry  · CBC, BMP a m   · Monitor vital signs  · EKG

## 2022-12-19 NOTE — DISCHARGE SUMMARY
[facility]  Discharge- Lashay Diez 1935, 81 y o  male MRN: [id number]  Unit/Bed#: ICU 07 Encounter: [id number]  Primary Care Provider: Harish Elder DO   Date and time admitted to hospital: 12/18/2022 11:19 PM    * Pacemaker lead fracture  Assessment & Plan  · Pt presented to ED with dizziness associated with lightheadedness, blurry vision and episodes of confusion  · Bradycardia and pacer malfunction noted in ED  · Seen on last interrogation, reproducible with arm movement, sensitivity decreased  · Pt to cath lab at [facility] for TVP  · Plan to transfer to [facility] for new pacer    Syncope and collapse  Assessment & Plan  · Pt presented to ED with dizziness associated with lightheadedness, blurry vision and episodes of confusion  · Pt noted to have bradycardia and pacer failure on EKG  · Pacer interrogated in ED, see plan above      Atrial fibrillation (HCC)  Assessment & Plan  · Hx Afib  · S/p watchman device  · Currently rate controlled  · EKG - Paced rhythm with probable pacer failure, see plan above  · Continue home regimen: Amiodarone and Xarelto  · Continuous tele monitoring    CHANA (acute kidney injury) ([facility] [address] )  Assessment & Plan  · Cr - 1 35 on admission now down to 1 13  · Baseline 1 0 to 1 1  · Likely low flow d/t pacer malfunction  · Avoid nephrotoxins, CT dye if possible  · Monitor and replete electrolytes  · Repeat labs in AM    Essential hypertension  Assessment & Plan  · Home regimen: Norvasc 2 5mg daily  · Hold for now given dizziness, syncope  · Likely restart once pacer malfunction adressed          Medical Problems     Resolved Problems  Date Reviewed: 12/19/2022   None         Admission Date:   Admission Orders (From admission, onward)     Ordered        12/19/22 0031  Inpatient Admission  Once                        Admitting Diagnosis: Syncope and collapse [R55]  Dizziness [R42]  Recurrent syncope [R55]    HPI: Lashay Diez is a 81 y o  male with past medical history of A  fib on Xarelto, s/p pacemaker who presents with dizziness  Dizziness started when he was taking out trash this evening  Dizziness is being described as" lightheadedness with blurry vision", gets better with sitting or laying  Worse with standing  He called daughter after first episode  Daughter advised to drink water and lay on the couch until she arrives home  Later during the night was confused calling his dead wife when he was getting the wood from backyard  Confusion lasted for about 1 5 min  Fell on the wood, did not hit his head  Did not lose consciousness  Did not feel chest pain, palpitations  Not wobbly on the feet  Only had a piece of pie and cup of coffee this morning for the whole day  Daughter arrived and checked blood pressure  Initial BP was lower than his usual normal 100/55-58 with laying and then slowly started going up  Of note, patient had recurrent syncope  Patient had recent watchman device procedure in November at [place]  In the ED, labs significant for hyponatremia, elevated creatinine, low MCV  CT head, spine was normal    Procedures Performed:   Orders Placed This Encounter   Procedures   • Cardiac catheterization   • Fast Ultrasound       Summary of Hospital Course: In the ED was noted to have bradycardia, pacer failure/failure to capture  Pacer interrogation confirmed this  Pt initially required transcutaneous pacing and eventually taken to cath lab for TVP placement  Significant Findings, Care, Treatment and Services Provided:  CT head - No intracranial hemorrhage or calvarial fracture  CT Cervical spine - No cervical spine fracture or traumatic malalignment  Complications: None    Condition at Discharge: stable     Discharge instructions/Information to patient and family:   See after visit summary for information provided to patient and family        Provisions for Follow-Up Care:  See after visit summary for information related to follow-up care and any pertinent home health orders  PCP: Gaye Jimenez DO    Disposition: Transfer to [facility]    Planned Readmission: Yes [facility]    Discharge Statement   I spent 15 minutes discharging the patient  This time was spent on the day of discharge  I had direct contact with the patient on the day of discharge  Additional documentation is required if more than 30 minutes were spent on discharge  Discharge Medications:  See after visit summary for reconciled discharge medications provided to patient and family

## 2022-12-19 NOTE — ASSESSMENT & PLAN NOTE
· Cr - 1 35 on admission now down to 1 13  · Baseline 1 0 to 1 1  · Likely low flow d/t pacer malfunction  · Avoid nephrotoxins, CT dye if possible  · Monitor and replete electrolytes  · Repeat labs in AM

## 2022-12-19 NOTE — ASSESSMENT & PLAN NOTE
Baseline creatinine 0 9-1 1  POA-1 35  CKD stage III at baseline  Likely secondary to prerenal (poor fluid intake)  We will IV hydrate  BMP a m    Avoid hypotension and nephrotoxins

## 2022-12-19 NOTE — ASSESSMENT & PLAN NOTE
· Pt presented to ED with dizziness associated with lightheadedness, blurry vision and episodes of confusion  · Bradycardia and pacer malfunction noted in ED  · Seen on last interrogation, reproducible with arm movement, sensitivity decreased  · Pt to cath lab at [facility] for TVP  · Plan to transfer to [facility] for new pacer

## 2022-12-19 NOTE — PROCEDURES
POC FAST US    Date/Time: 12/18/2022 11:45 PM  Performed by: Maria D Elias MD  Authorized by: Maria D Elias MD     Patient location:  Trauma  Procedure details:     Exam Type:  Diagnostic    Indications: blunt abdominal trauma and blunt chest trauma      Indications comment:  Syncope    Assess for:  Hemothorax, intra-abdominal fluid and pericardial effusion    Technique: FAST      Views obtained:  Heart - Pericardial sac, RUQ - Lazo's Pouch, LUQ - Splenorenal space and Suprapubic - Pouch of Jacob    Image quality: diagnostic      Image availability:  Images available in PACS  FAST Findings:     RUQ (Hepatorenal) free fluid: absent      LUQ (Splenorenal) free fluid: absent      Suprapubic free fluid: absent      Cardiac wall motion: identified      Pericardial effusion: absent    Interpretation:     Impressions: negative

## 2022-12-19 NOTE — BRIEF OP NOTE (RAD/CATH)
INTERVENTIONAL RADIOLOGY PROCEDURE NOTE    Date: 12/19/2022    Procedure: RUE venogram  Procedure Summary     Date:  Room / Location:     Anesthesia Start:  Anesthesia Stop:     Procedure:  Diagnosis:     Scheduled Providers:  Responsible Provider:     Anesthesia Type: Not recorded ASA Status: Not recorded          Preoperative diagnosis:   1  Sick sinus syndrome ([address] )    2  Dizziness    3  Failure of pacemaker lead, initial encounter         Postoperative diagnosis: Same  Surgeon: Rose Marie Goddard MD     Assistant: None  No qualified resident was available  Blood loss: None    Specimens: None     Findings:   1  Mild stenosis in the right brachiocephalic vein  2  Right brachial, axillary, subclavian veins and SVC were patent  Complications: None immediate      Anesthesia: none

## 2022-12-19 NOTE — ED NOTES
RN in room monitoring pt status  Pt needing close monitoring due to changing need for external pacing  Pt internal pace maker stops functioning properly for periods of time after pt moved right arm which was advised against by [employer] emergency response personnel  Pt has external pacer attached and set to 50ma at a rate of 60BPM  set to demand pace whenever pace maker malfunctions        Saundra Hameed RN  12/19/22 5007

## 2022-12-19 NOTE — ASSESSMENT & PLAN NOTE
· Home regimen: Norvasc 2 5mg daily  · Hold for now given dizziness, syncope  · Likely restart once pacer malfunction adressed

## 2022-12-19 NOTE — CONSULTS
Consultation - Electrophysiology  Alexey March 81 y o  male MRN: [id number]  Unit/Bed#: [facility] 515-01 Encounter: [id number]      Inpatient consult to Electrophysiology  Consult performed by: Laura Branch MD  Consult ordered by: Joao Joshi PA-C        PCP: Curly Frye DO   Outpatient Cardiologist: Jad Avalos MD    History of Present Illness   Physician Requesting Consult: Susan Toribio MD  Reason for Consult / Principal Problem: syncope, pacemaker failure     HPI: Alexey March is a 81y o  year old male who presented with 1 day of dizziness and lightheadedness associated with standing and walking at home, culminating in an episode of syncope while he was bending over to  wood  He reports slumping over almost without warning, with a sensation of severe dizziness just before the event  He was taken to [facility] ED where initial evaluation showed no evidence of fractures or intracranial bleeding  Overnight, the patient developed recurrent dizziness and syncope with pauses noted on telemetry  His device was checked by the [employer] representative with suspicion for RV lead fracture based on marked elevation in lead impedance  The device was placed in ODO (sensing) mode and a right femoral TVP was placed and transfer initiated to [facility] for EP evaluation  Since TVP placement the patient has not reported recurrent symptoms and is currently resting comfortably  He has a history of sick sinus syndrome and persistent atrial fibrillation for which he had a [employer] dual-chamber pacemaker placed in April 2018  He has had several ED visits in the past for dizziness and falls with a subdural hematoma in April 2022, which was ascribed to orthostatic syncope at the time  He is very active at home, collecting wood for fires, working on tractors and in the yard and had been in overall good health prior to this recent episode  He regularly lifts 100 pounds or so while working around the home   He reports no other recent trauma/injury  He recently had a Watchman LAAO device placed earlier this month due to his prior history of SDH and desire to avoid long-term anticoagulation  He does report mild blood spotting due to hemorrhoids, but continues to take half-dose Rivaroxaban  Review of Systems  Review of system was conducted and was negative except for as stated in the HPI  Historical Information   Past Medical History:   Diagnosis Date   • A-fib ([address] )    • Hernia, abdominal    • Hypertension    • Subdural hematoma      Past Surgical History:   Procedure Laterality Date   • A-V CARDIAC PACEMAKER INSERTION     • APPENDECTOMY      age 33   • CARDIAC ELECTROPHYSIOLOGY PROCEDURE N/A 12/8/2022    Procedure: CARDIAC ATRIAL APPENDAGE CLOSURE (EP);  Surgeon: German Pickering MD;  Location: [facility] MAIN [facility];  Service: Cardiology   • CARDIAC PACEMAKER PLACEMENT     • CARPAL TUNNEL RELEASE     • COLONOSCOPY     • INGUINAL HERNIA REPAIR Left    • DE PERQ CLSR TCAT L ATR APNDGE W/ENDOCARDIAL IMPLNT N/A 12/8/2022    Procedure: CARDIAC ATRIAL APPENDAGE CLOSURE (CATH); Surgeon: German Pickering MD;  Location: [facility];  Service: Cardiology   • DE XCAPSL CTRC RMVL INSJ IO LENS PROSTH W/O ECP Right 4/3/2017    Procedure: EXTRACTION EXTRACAPSULAR CATARACT PHACO INTRAOCULAR LENS (IOL);   Surgeon: Meet Taylor MD;  Location: [facility] MAIN OR;  Service: Ophthalmology   • TONSILLECTOMY      age 12     Social History     Substance and Sexual Activity   Alcohol Use Never     Social History     Substance and Sexual Activity   Drug Use Never     Social History     Tobacco Use   Smoking Status Never   Smokeless Tobacco Never     Family History: non-contributory    Meds/Allergies   Hospital Medications:   Current Facility-Administered Medications   Medication Dose Route Frequency   • amiodarone tablet 200 mg  200 mg Oral Daily   • amLODIPine (NORVASC) tablet 2 5 mg  2 5 mg Oral Daily   • aspirin chewable tablet 81 mg  81 mg Oral Daily   • gabapentin (NEURONTIN) capsule 100 mg  100 mg Oral TID   • hydrALAZINE (APRESOLINE) injection 10 mg  10 mg Intravenous Q6H PRN   • multi-electrolyte (PLASMALYTE-A/ISOLYTE-S PH 7 4) IV solution  100 mL/hr Intravenous Continuous   • rivaroxaban (XARELTO) tablet 10 mg  10 mg Oral Daily   • tamsulosin (FLOMAX) capsule 0 4 mg  0 4 mg Oral Daily With Dinner     Home Medications:   Medications Prior to Admission   Medication   • amiodarone 200 mg tablet   • amLODIPine (NORVASC) 2 5 mg tablet   • aspirin 81 mg chewable tablet   • gabapentin (NEURONTIN) 100 mg capsule   • rivaroxaban (Xarelto) 10 mg tablet       No Known Allergies    Objective   Vitals: Blood pressure 159/72, pulse 60, temperature (!) 97 4 °F (36 3 °C), temperature source Axillary, resp  rate 22, height 5' 5" (1 651 m), weight 82 kg (180 lb 12 4 oz), SpO2 99 %  Orthostatic Blood Pressures    Flowsheet Row Most Recent Value   Blood Pressure 159/72 filed at 12/19/2022 1030   Patient Position - Orthostatic VS Lying filed at 12/19/2022 1030            Invasive Devices     Peripheral Intravenous Line  Duration           Peripheral IV 12/19/22 Distal;Right;Upper;Ventral (anterior) Arm <1 day    Peripheral IV 12/19/22 Left;Proximal;Ventral (anterior) Forearm <1 day          Line  Duration           Venous Sheath 6 Fr   Right Femoral <1 day                Physical Exam  GEN: Vel Montiel appears well, alert and oriented x 3, pleasant and cooperative   HEENT:  Normocephalic, atraumatic, anicteric, moist mucous membranes  NECK: No JVD or carotid bruits   HEART: Regular rate and rhythm   LUNGS: Clear to auscultation bilaterally; no wheezes, rales, or rhonchi; respiration nonlabored   ABDOMEN:  Normoactive bowel sounds, soft, no tenderness, no distention  EXTREMITIES: peripheral pulses palpable; no edema  NEURO: no gross focal findings; cranial nerves grossly intact   SKIN:  Dry, intact, warm to touch    Lab Results: I have personally reviewed pertinent lab results  Results from last 7 days   Lab Units 22  0336 22  0125 22  2334   HS TNI 0HR ng/L  --   --  47   HS TNI 2HR ng/L  --  44  --    HS TNI 4HR ng/L 40  --   --          Results from last 7 days   Lab Units 22  0552 22  2334 22  2333 22  1016   POTASSIUM mmol/L 5 0 4 2  --  4 6   CO2 mmol/L 27 24  --  27   CO2, I-STAT mmol/L  --   --  24  --    CHLORIDE mmol/L 106 104  --  105   BUN mg/dL 19 21  --  20   CREATININE mg/dL 1 13 1 35*  --  1 09     Results from last 7 days   Lab Units 22  0552 22   HEMOGLOBIN g/dL 10 1* 10 1*  --    I STAT HEMOGLOBIN g/dl  --   --  11 2*   HEMATOCRIT % 32 2* 32 3*  --    HEMATOCRIT, ISTAT %  --   --  33*   PLATELETS Thousands/uL 162 168  --            Imaging: I have personally reviewed pertinent reports  Telemetry:   V-pacing at 60 bpm    ECHO:  Results for orders placed during the hospital encounter of 18    Echo complete with contrast if indicated    Narrative  [facility] [address]  [facility] [address] [facility]  [phone]    Transthoracic Echocardiogram  2D, M-mode, Doppler, and Color Doppler    Study date:  15-Apr-2018    Patient: Dae Clarke  MR number: [id number]  Account number: [de-identified]  : [id number]  Age: 81 years  Gender: Male  Status: Inpatient  Location: Bedside  Height:  Weight:  BP: 125/ 52 mmHg    Indications: Syncope  Diagnoses: R55  - Syncope and collapse    Sonographer:  Nasra Oliver RDCS  Primary Physician:  Marina Jones DO  Referring Physician:  Yordan Bermeo MD  Group:  [facility]  Cardiology Fellow:  Andres Norwood MD  Interpreting Physician:  Evangelista Bueno MD    SUMMARY    LEFT VENTRICLE:  Systolic function was normal  Ejection fraction was estimated to be 55 %  There were no regional wall motion abnormalities  Wall thickness was mildly to moderately increased    The changes were consistent with concentric remodeling (increased wall thickness with normal wall mass)  MITRAL VALVE:  There was mild annular calcification  There was mild regurgitation  AORTIC VALVE:  The valve was trileaflet  Leaflets exhibited sclerosis  There was mild regurgitation  TRICUSPID VALVE:  There was mild to moderate regurgitation  Estimated peak PA pressure was 48 mmHg  The findings suggest mild to moderate pulmonary hypertension  IVC, HEPATIC VEINS:  Respirophasic changes were blunted (less than 50% variation)  HISTORY: PRIOR HISTORY: Atrial fibrillation, Hypertension  PROCEDURE: The procedure was performed at the bedside  This was a routine study  The transthoracic approach was used  The study included complete 2D imaging, M-mode, complete spectral Doppler, and color Doppler  The heart rate was 50 bpm,  at the start of the study  Images were obtained from the parasternal, apical, subcostal, and suprasternal notch acoustic windows  Image quality was adequate  LEFT VENTRICLE: Size was normal  Systolic function was normal  Ejection fraction was estimated to be 55 %  There were no regional wall motion abnormalities  Wall thickness was mildly to moderately increased  The changes were consistent  with concentric remodeling (increased wall thickness with normal wall mass)  DOPPLER: Left ventricular diastolic function parameters were normal     RIGHT VENTRICLE: The size was normal  Systolic function was normal     LEFT ATRIUM: Size was normal     RIGHT ATRIUM: Size was normal     MITRAL VALVE: There was mild annular calcification  Valve structure was normal  There was normal leaflet separation  DOPPLER: The transmitral velocity was within the normal range  There was no evidence for stenosis  There was mild  regurgitation  AORTIC VALVE: The valve was trileaflet  Leaflets exhibited sclerosis  DOPPLER: Transaortic velocity was within the normal range   There was no evidence for stenosis  There was mild regurgitation  TRICUSPID VALVE: The valve structure was normal  There was normal leaflet separation  DOPPLER: The transtricuspid velocity was within the normal range  There was no evidence for stenosis  There was mild to moderate regurgitation  The  regurgitant jet was directed centrally  Estimated peak PA pressure was 48 mmHg  The findings suggest mild to moderate pulmonary hypertension  PULMONIC VALVE: DOPPLER: The transpulmonic velocity was within the normal range  There was no evidence for stenosis  There was trace regurgitation  PERICARDIUM: There was no pericardial effusion  AORTA: The root exhibited normal size  SYSTEMIC VEINS: IVC: The inferior vena cava was normal in size  Respirophasic changes were blunted (less than 50% variation)      SYSTEM MEASUREMENT TABLES    2D  %FS: 27 33 %  Ao Diam: 3 74 cm  EDV(Teich): 95 08 ml  EF(Teich): 53 26 %  ESV(Teich): 44 44 ml  IVSd: 1 31 cm  LA Area: 18 96 cm2  LA Diam: 3 73 cm  LVEDV MOD A4C: 97 77 ml  LVEF MOD A4C: 57 52 %  LVESV MOD A4C: 41 53 ml  LVIDd: 4 55 cm  LVIDs: 3 31 cm  LVLd A4C: 8 11 cm  LVLs A4C: 6 6 cm  LVOT Diam: 2 21 cm  LVPWd: 0 92 cm  RA Area: 15 37 cm2  RVIDd: 3 7 cm  SV MOD A4C: 56 23 ml  SV(Teich): 50 63 ml    CW  AR Dec Jerauld: 2 11 m/s2  AR Dec Time: 1731 3 ms  AR PHT: 502 08 ms  AR Vmax: 3 66 m/s  AR maxP 49 mmHg  TR Vmax: 3 09 m/s  TR maxP 29 mmHg    MM  TAPSE: 2 42 cm    PW  E': 0 07 m/s  E/E': 14 56  MV A Ilan: 0 69 m/s  MV Dec Jerauld: 4 62 m/s2  MV DecT: 228 93 ms  MV E Ilan: 1 06 m/s  MV E/A Ratio: 1 53  MV PHT: 66 39 ms  MVA By PHT: 3 31 cm2    [employer] Accredited Echocardiography Laboratory    Prepared and electronically signed by    Marcelle Martino MD  Signed 2018 11:02:16    Results for orders placed during the hospital encounter of 22    Echo complete w/ contrast if indicated    Interpretation Summary  •  Left Ventricle: Left ventricular cavity size is normal  Wall thickness is mildly increased  The left ventricular ejection fraction is 60%  Systolic function is normal  Wall motion is normal   •  IVS: There is abnormal septal motion consistent with right ventricular pacing  •  Right Ventricle: Right ventricular cavity size is mildly dilated  Systolic function is normal   •  Left Atrium: The atrium is mildly dilated  •  Aortic Valve: The aortic valve is trileaflet  The leaflets are mildly thickened  The leaflets are moderately calcified  There is mildly reduced mobility  There is mild regurgitation  There is mild stenosis (DEE DEE 1 6 cm2/MG 5 mmHg/DI 0 5)  The aortic valve velocity is increased due to stenosis but lower than expected due to the presence of decreased flow  •  Mitral Valve: There is moderate annular calcification  There is mild regurgitation  •  Tricuspid Valve: There is mild to moderate regurgitation  The right ventricular systolic pressure is mildly elevated  The estimated right ventricular systolic pressure is 18 30 mmHg  •  Aorta: The aortic root is mildly dilated at 4 2 cm  Assessment/Plan     Syncope due to intermittent pacemaker loss of capture from RV lead malfunction    Permanent atrial fibrillation and sick sinus syndrome s/p LAAO implant and dual-chamber pacemaker implantation    History of subdural hematoma    Mr Cait Woodruff is an 81year old gentleman with asymptomatic long-standing atrial fibrillation with high bleeding risk due to frequent falls  LV EF has been preserved and he has had no symptoms of heart failure  He now presents with recurrent syncope and dizziness with identification of loss of ventricular capture on ECG and telemetry corresponding to his symptoms  In April 2018 he had a [employer] Kaya dual chamber pacemaker placed for the above issues  His pacemaker function has been stable and within normal parameters since that time with steady pacing thresholds and lead impedances   He underwent Watchman implantation on Presenting ECG with normal AV sequential pacing (12/19 @ 2:18 AM)      ECG at 3:18 AM showing normal atrial pacing with intermittent loss of ventricular capture and the initiation of a ventricular escape beat:      Underlying rhythm, sinus with complete AV block and wide complex escape      Device interrogation from 12/19 showing normal atrial and ventricular lead impedance trends with a single high RV lead impedance measurement  Patient currently V-paced via right femoral TVP with appropriate capture and capture threshold of 1 mA  He was diagnosed with RV lead fracture on the basis of a single high impedance measurement, but this has since resolved  His loss of capture is intermittent and highly positional  I suspect that his lead is either dislodged or experiencing problems from anatomic impingement worsened by motion  AP CXR appears to show normal RV lead position at the apex  He will require lead revision, but given duration of implantation over 2 years will need surgical evaluation and venogram to assess the feasability of extraction  Patient has excellent functional capacity and would benefit long-term from correction of his device  He expressed a strong desire to avoid any surgical procedures, and we offered the possibility of abandoning and capping the old leads with implantation of new leads, assuming venogram is favorable  However, we explained that he will no longer be able to go for MRI scans after this  He understands and agrees with this plan  Will proceed with new lead implantation tentatively for tomorrow and leave NPO after midnight       Sneha Swenson MD  Cardiology Fellow

## 2022-12-19 NOTE — ED NOTES
Pt to be transported to cath lab for placement of transvenous pacemaker prior to transport to wai Rodriguez RN  12/19/22 8091

## 2022-12-19 NOTE — PROGRESS NOTES
[facility]  Progress Note - Mar Herrmann 1935, 81 y o  male MRN: [id number]  Unit/Bed#: ICU 07 Encounter: [id number]  Primary Care Provider: Obinna Buitrago DO   Date and time admitted to hospital: 12/18/2022 11:19 PM    Syncope and collapse  Assessment & Plan  · x1 episode of syncope at home PTA and another episode in ED triage  · No headstrike  · On Xarelto   · Felt "bad" all day today  · No complaints currently  · CT head w/o contrast: negative  · CT C spine w/o contrast: negative  · No traumatic injuries, patient be admitted to [facility] for further work-up of syncopal episodes    Atrial fibrillation ([facility] [address] )  Assessment & Plan  · Xarelto for h/o afib s/p pacemaker and recent Watchman device placed 12/8/2022  · Pt reports feeling "dizzy" and "heart pounding" prior to initial episode  · Device to be interrogated once admitted to [address] hypertension  Assessment & Plan  · Continue home medications per primary team      TRAUMA TERTIARY SURVEY NOTE    VTE Prophylaxis:Sequential compression device (Venodyne)  Xarelto    Disposition: Continue M/S level of care    Code status:  Level 1 - Full Code    Consultants: IP CONSULT TO CASE MANAGEMENT    Subjective     Mechanism of Injury:Fall     Chief Complaint: ***    HPI/Last 24 hour events: ***     Objective   Vitals:   Temp:  [97 4 °F (36 3 °C)-97 9 °F (36 6 °C)] 97 4 °F (36 3 °C)  HR:  [69-82] 69  Resp:  [15-20] 16  BP: (125-201)/() 163/76    I/O     None           Physical Exam:   GENERAL APPEARANCE: Well appearing, NAD  NEURO: No focal neuro deficits, axo x3  HEENT: EOMI BILAT  CV: RRR, No M/R/G   BILAT radial pulse 2+, BILAT DP pulse 2+  LUNGS: Normal effort, CTA BILAT  GI: Soft, No TTP, No R/G  MSK: No TTP of all extremities, moving all extremities equally and without difficulty  SKIN: Warm, well perfused, no jaundice    Invasive Devices     Peripheral Intravenous Line  Duration           Peripheral IV 12/18/22 Left Antecubital <1 day    Peripheral IV 12/19/22 Distal;Right;Upper;Ventral (anterior) Arm <1 day          Line  Duration           Venous Sheath 6 Fr  Right Femoral <1 day                   1  Before the illness or injury that brought you to the Emergency, did you need someone to help you on a regular basis? 0=No   2  Since the illness or injury that brought you to the Emergency, have you needed more help than usual to take care of yourself? 0=No   3  Have you been hospitalized for one or more nights during the past 6 months (excluding a stay in the Emergency Department)? 1=Yes   4  In general, do you see well? 0=Yes   5  In general, do you have serious problems with your memory? 0=No   6  Do you take more than three different medications everyday? 1=Yes   TOTAL   2     Did you order a geriatric consult if the score was 2 or greater?: per primary team         Lab Results: Results: I have personally reviewed all pertinent laboratory/tests results    Imaging Results: I have personally reviewed pertinent reports      Chest Xray(s): negative for acute findings   FAST exam(s): negative for acute findings   CT Scan(s): negative for acute findings   Additional Xray(s): negative for acute findings

## 2022-12-19 NOTE — ASSESSMENT & PLAN NOTE
Presented to [address] [place] [address] after a syncopal episode, found to have multiple pauses on telemetry  Concern for PPM lead fracture      Plan:  · EP consult appreciated  · TVP in place - VVI at 60  · Intrinsic pacer initially competing w/ TVP - [employer] adjusted to backup of 40bpm

## 2022-12-19 NOTE — QUICK NOTE
The patient had a CT scan of the cervical spine demonstrating no acute fractures or traumatic malalignment  On exam, the patient had no midline cervical spine tenderness  The patient had full range of motion (was then able to flex, extend, and rotate head side to side) without pain  There were no distracting injuries and the patient was not intoxicated  The patients cervical collar was cleared radiologically and clinically      Ebony Hooks MD  12/19/2022  0000

## 2022-12-19 NOTE — H&P
[facility]  H&P- Brent Mendoza 1935, 81 y o  male MRN: [id number]  Unit/Bed#: ED-33 Encounter: [id number]  Primary Care Provider: Laurie Howell DO   Date and time admitted to hospital: 12/18/2022 11:19 PM    * Dizziness  Assessment & Plan  · Presenting with lightheadedness with prodromal symptoms  · Likely secondary to orthostatic hypotension given poor intake and low blood pressure at home  Also could be cardiac syncope related- given his history of recurrent syncope  · IV hydration  · Orthostatic blood pressure  · Will arrange pacemaker interrogation  · 24-hour telemetry  · CBC, BMP a m   · Monitor vital signs  · EKG    Atrial fibrillation (HCC)  Assessment & Plan  Rate normal  Continue amiodarone, Xarelto  S/p watchman device  Twelve-lead EKG  Pacemaker interrogation      Elevated serum creatinine  Assessment & Plan  Baseline creatinine 0 9-1 1  POA-1 35  CKD stage III at baseline  Likely secondary to prerenal (poor fluid intake)  We will IV hydrate  BMP a m  Avoid hypotension and nephrotoxins        Low mean corpuscular volume (MCV)  Assessment & Plan  Patient has chronically low MCV  Hemoglobin 10-11 7  Will do iron panel  Unsure if patient has ever followed up  Ambulatory follow-up with PCP      Essential hypertension  Assessment & Plan  Hold amlodipine tonight  Orthostatic blood pressure in a m  VTE Prophylaxis: Rivaroxaban (Xarelto)  /    Code Status: Level 3-discussed with patient and daughter bedside  POLST: POLST form is not discussed and not completed at this time  Anticipated Length of Stay:  Patient will be admitted on an Inpatient basis with an anticipated length of stay of more than 2 midnights  Justification for Hospital Stay: Dizziness    Chief Complaint:   Dizziness    History of Present Illness:    Brent Mendoza is a 81 y o  male with past medical history of A  fib on Xarelto, s/p pacemaker who presents with dizziness    Dizziness started when he was taking out trash this evening  Dizziness is being described as" lightheadedness with blurry vision", gets better with sitting or laying  Worse with standing  He called daughter after first episode  Daughter advised to drink water and lay on the couch until she arrives home  Later during the night was confused calling his dead wife when he was getting the wood from backyard  Confusion lasted for about 1 5 min  Fell on the wood, did not hit his head  Did not lose consciousness  Did not feel chest pain, palpitations  Not wobbly on the feet  Only had a piece of pie and cup of coffee this morning for the whole day  Daughter arrived and checked blood pressure  Initial BP was lower than his usual normal 100/55-58 with laying and then slowly started going up  Of note, patient had recurrent syncope  Patient had recent watchman device procedure in November at [place]  In the ED, labs significant for hyponatremia, elevated creatinine, low MCV  Patient was admitted for trauma  CT head, spine was normal       Review of Systems:    Review of Systems   Constitutional: Negative for chills and fever  HENT: Negative for ear pain and sore throat  Eyes: Negative for pain and visual disturbance  Respiratory: Negative for cough and shortness of breath  Cardiovascular: Negative for chest pain and palpitations  Gastrointestinal: Negative for abdominal pain and vomiting  Genitourinary: Negative for dysuria and hematuria  Musculoskeletal: Negative for arthralgias and back pain  Skin: Negative for color change and rash  Neurological: Positive for dizziness and light-headedness  Negative for seizures and syncope  All other systems reviewed and are negative        Past Medical and Surgical History:     Past Medical History:   Diagnosis Date   • A-fib [facility])    • Hernia, abdominal    • Hypertension    • Subdural hematoma        Past Surgical History:   Procedure Laterality Date   • A-V CARDIAC PACEMAKER INSERTION     • APPENDECTOMY      age 33   • CARDIAC ELECTROPHYSIOLOGY PROCEDURE N/A 12/8/2022    Procedure: CARDIAC ATRIAL APPENDAGE CLOSURE (EP);  Surgeon: Amandeep Calles MD;  Location[facility];  Service: Cardiology   • CARDIAC PACEMAKER PLACEMENT     • CARPAL TUNNEL RELEASE     • COLONOSCOPY     • INGUINAL HERNIA REPAIR Left    • CT PERQ CLSR TCAT L ATR APNDGE W/ENDOCARDIAL IMPLNT N/A 12/8/2022    Procedure: CARDIAC ATRIAL APPENDAGE CLOSURE (CATH); Surgeon: Amandeep Calles MD;  Location: [facility];  Service: Cardiology   • CT XCAPSL CTRC RMVL INSJ IO LENS PROSTH W/O ECP Right 4/3/2017    Procedure: EXTRACTION EXTRACAPSULAR CATARACT PHACO INTRAOCULAR LENS (IOL); Surgeon: Glenis Stevens MD;  Location: [facility] MAIN OR;  Service: Ophthalmology   • TONSILLECTOMY      age 12       Meds/Allergies:    Prior to Admission medications    Medication Sig Start Date End Date Taking? Authorizing Provider   amiodarone 200 mg tablet Take 1 tablet (200 mg total) by mouth daily 8/24/22   Daniel Kingsley PA-C   amLODIPine (NORVASC) 2 5 mg tablet Take 1 tablet (2 5 mg total) by mouth daily 8/24/22   Aminah Turcios PA-C   aspirin 81 mg chewable tablet Chew 81 mg daily    Historical Provider, MD   gabapentin (NEURONTIN) 100 mg capsule Take 1 capsule (100 mg total) by mouth in the morning and 1 capsule (100 mg total) in the evening and 1 capsule (100 mg total) before bedtime  5/24/22   Luke Kingsley PA-C   rivaroxaban (Xarelto) 10 mg tablet Take 1 tablet (10 mg total) by mouth daily 11/15/22   Amandeep Calles MD     I have reviewed home medications with patient personally      Allergies: No Known Allergies    Social History:     Marital Status: /Civil Union   Occupation: Retired  Patient Pre-hospital Living Situation: At home  Patient Pre-hospital Level of Mobility: Does not use any assistive device  Patient Pre-hospital Diet Restrictions: None  Substance Use History:   Social History     Substance and Sexual Activity Alcohol Use No     Social History     Tobacco Use   Smoking Status Never   Smokeless Tobacco Never     Social History     Substance and Sexual Activity   Drug Use Never       Family History:    Family History   Problem Relation Age of Onset   • Cancer Mother         exp age 80   • Heart disease Father    • Heart disease Brother    • Hypertension Brother        Physical Exam:     Vitals:   Blood Pressure: 155/74 (12/19/22 0245)  Pulse: 72 (12/19/22 0245)  Temperature: (!) 97 4 °F (36 3 °C) (12/18/22 2328)  Temp Source: Oral (12/18/22 2345)  Respirations: 20 (12/19/22 0245)  Weight - Scale: 82 kg (180 lb 12 4 oz) (12/18/22 2328)  SpO2: 95 % (12/19/22 0245)    Physical Exam  Vitals and nursing note reviewed  Constitutional:       Appearance: Normal appearance  HENT:      Head: Normocephalic and atraumatic  Nose: Nose normal       Mouth/Throat:      Mouth: Mucous membranes are dry  Pharynx: Oropharynx is clear  Eyes:      Conjunctiva/sclera: Conjunctivae normal    Cardiovascular:      Rate and Rhythm: Normal rate  Pulses: Normal pulses  Heart sounds: Normal heart sounds  Pulmonary:      Effort: Pulmonary effort is normal       Breath sounds: Normal breath sounds  Abdominal:      General: Bowel sounds are normal       Palpations: Abdomen is soft  Musculoskeletal:      Cervical back: Normal range of motion and neck supple  Skin:     General: Skin is warm and dry  Capillary Refill: Capillary refill takes 2 to 3 seconds  Neurological:      Mental Status: He is alert and oriented to person, place, and time  Psychiatric:         Mood and Affect: Mood normal          Behavior: Behavior normal          Thought Content: Thought content normal          Judgment: Judgment normal        Additional Data:     Lab Results: I have personally reviewed pertinent reports        Results from last 7 days   Lab Units 12/18/22  2204   WBC Thousand/uL 4 23*   HEMOGLOBIN g/dL 10 1*   HEMATOCRIT % 32 3*   PLATELETS Thousands/uL 168   NEUTROS PCT % 68   LYMPHS PCT % 15   MONOS PCT % 13*   EOS PCT % 2     Results from last 7 days   Lab Units 12/18/22  2334 12/18/22  2333   POTASSIUM mmol/L 4 2  --    CHLORIDE mmol/L 104  --    CO2 mmol/L 24  --    CO2, I-STAT mmol/L  --  24   BUN mg/dL 21  --    CREATININE mg/dL 1 35*  --    CALCIUM mg/dL 8 3*  --    GLUCOSE, ISTAT mg/dl  --  158*     Results from last 7 days   Lab Units 12/18/22  2334   INR  1 32*       Imaging: I have personally reviewed pertinent reports  XR chest pa & lateral    Result Date: 12/9/2022  Narrative: CHEST INDICATION:   Post watchman  COMPARISON:  4/20/2022 EXAM PERFORMED/VIEWS:  XR CHEST PA & LATERAL Images: 4 FINDINGS: Right-sided cardiac pacer is unchanged The right costophrenic sulcus has become mildly blunted suggesting trace pleural effusion Cardiomediastinal silhouette appears unremarkable  The lungs are clear  No pneumothorax Osseous structures appear within normal limits for patient age  Impression: No acute cardiopulmonary disease  Development of suspected trace right pleural effusion Workstation performed: [id number]     TRAUMA - CT head wo contrast    Result Date: 12/18/2022  Narrative: CT BRAIN - WITHOUT CONTRAST INDICATION:   TRAUMA  COMPARISON:  Multiple priors most recently 4/20/2022 TECHNIQUE:  CT examination of the brain was performed  In addition to axial images, sagittal and coronal 2D reformatted images were created and submitted for interpretation  Radiation dose length product (DLP) for this visit:  1023 mGy-cm   This examination, like all CT scans performed in the [facility], was performed utilizing techniques to minimize radiation dose exposure, including the use of iterative reconstruction and automated exposure control  IMAGE QUALITY:  Diagnostic   FINDINGS: PARENCHYMA: Decreased attenuation is noted in periventricular and subcortical white matter demonstrating an appearance that is statistically most likely to represent mild microangiopathic change  No CT signs of acute infarction  No intracranial mass, mass effect or midline shift  No acute parenchymal hemorrhage  VENTRICLES AND EXTRA-AXIAL SPACES:  Volume loss without hydrocephalus  VISUALIZED ORBITS AND PARANASAL SINUSES:  Right 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  Mild mucosal thickening of the visualized paranasal sinuses  Chronic nasal bone deformities  CALVARIUM AND EXTRACRANIAL SOFT TISSUES:  Normal      Impression: No intracranial hemorrhage or calvarial fracture  I personally discussed this study with Yudelka Hameed on 12/18/2022 at 11:54 PM  Workstation performed: [id number]     TRAUMA - CT spine cervical wo contrast    Result Date: 12/18/2022  Narrative: CT CERVICAL SPINE - WITHOUT CONTRAST INDICATION:   TRAUMA  COMPARISON:  Multiple priors most recently 4/20/2022 TECHNIQUE:  CT examination of the cervical spine was performed without intravenous contrast   Contiguous axial images were obtained  Sagittal and coronal reconstructions were performed  Radiation dose length product (DLP) for this visit:  437 mGy-cm   This examination, like all CT scans performed in the [facility], was performed utilizing techniques to minimize radiation dose exposure, including the use of iterative reconstruction and automated exposure control  IMAGE QUALITY:  Diagnostic  FINDINGS: ALIGNMENT:  No traumatic subluxation  Minimal degenerative retrolisthesis C5 on C6 C6 on C7 VERTEBRAL BODIES:  No fracture  DEGENERATIVE CHANGES:  Severe multilevel cervical degenerative changes are noted  No critical central canal stenosis  PREVERTEBRAL AND PARASPINAL SOFT TISSUES: Unremarkable THORACIC INLET:  Normal      Impression: No cervical spine fracture or traumatic malalignment     I personally discussed this study with Yudelka Hameed on 12/18/2022 at 11:54 PM  Workstation performed: [id number]     Cardiac catheterization    Addendum Date: 12/8/2022 Addendum: OPERATIVE REPORT PATIENT NAME: Crescencio Moreno  :  1935 MRN: [id number] Pt Location: [facility] ROOM 02 SURGERY DATE: 2022 SURGEON: Kevin Coronado DO CO-SURGEON: Dr Alejandra Hagan MD PREOPERATIVE DIAGNOSIS: Elective left atrial appendage occluder, 24mm Watchman device, percutaneously POSTOPERATIVE DIAGNOSIS: same PROCEDURE: Left atrial appendage occluder, via a percutaneous left transfemoral approach  ANESTHESIA Dr Dar Thompson  general endotracheal anesthesia with transesophageal echocardiogram guidance  Prodedure Note:  After informed consent was obtained, patient brought to the hybrid operating room in a fasting state  Plans for left atrial appendage occlusion via percutaneous approach due to patient's inability to take anticoagulation in the setting of atrial fibrillation  Time-out performed  Transesophageal images reviewed  Heparinization to achieve ACT >300 given  Using modified Seldinger technique sheath placed the right common femoral vein  ProGlide closure device was then inserted and  used for pre close technique  Transseptal technique was performed using [employer] system dilator with Pigtail wire placed in Watchman Delivery sheath  Pigtail wire was then advanced once across the septum  Through this sheath the Watchman left atrial appendage occluder device system sheath was positioned into the appropriate lobe of the left atrium  This was over a pigtail catheter and a 035 J wire  Using fluoroscopy, transesophageal echocardiogram,  cine angiography as well as dye injection in the left atrial appendage to guide position of the delivery sheath the Watchman occluder device was deployed  Upon deployment, device was deployed and in appropriate position without any evidence of leak around the occluder device  After review of fluoroscopic and transesophageal echocardiogram images, the device was released and in stable position    Sheath was pulled and hemostasis achieved with ProGlide closure  Patient left the hybrid OR in stable condition Impression 1  Successful placement of a 24 mm left atrial appendage occluder device, Watchman device, percutaneous approach SIGNATURE: Imelda Garsia DO DATE: 2022 TIME: 10:30 AM NOTE: An electrophysiologist  as co-surgeon was used for complimentary catheter based skills with transeptal access, hemodynamic monitoring and use of catheters requiring contrast dye injection  Result Date: 2022  Narrative: OPERATIVE REPORT PATIENT NAME: Jessie Sal  :  1935 MRN: [id number] Pt Location: [facility] OR ROOM 02 SURGERY DATE: 2022 SURGEON: Imelda Garsia DO CO-SURGEON: Dr Yasmin Sorensen MD PREOPERATIVE DIAGNOSIS: Elective left atrial appendage occluder, 24mm Watchman device, percutaneously POSTOPERATIVE DIAGNOSIS: same PROCEDURE: Left atrial appendage occluder, via a percutaneous left transfemoral approach  ANESTHESIA Dr Edmond Odell  general endotracheal anesthesia with transesophageal echocardiogram guidance  Prodedure Note:  After informed consent was obtained, patient brought to the hybrid operating room in a fasting state  Plans for left atrial appendage occlusion via percutaneous approach due to patient's inability to take anticoagulation in the setting of atrial fibrillation  Time-out performed  Transesophageal images reviewed  Heparinization to achieve ACT >300 given  Using modified Seldinger technique sheath placed the right common femoral vein  ProGlide closure device was then inserted and  used for pre close technique  Transseptal technique was performed using [employer] system dilator with Pigtail wire placed in Watchman Delivery sheath  Pigtail wire was then advanced once across the septum  Through this sheath the Watchman left atrial appendage occluder device system sheath was positioned into the appropriate lobe of the left atrium    This was over a pigtail catheter and a 035 J wire  Using fluoroscopy, transesophageal echocardiogram,  cine angiography as well as dye injection in the left atrial appendage to guide position of the delivery sheath the Watchman occluder device was deployed  Upon deployment, device was deployed and in appropriate position without any evidence of leak around the occluder device  After review of fluoroscopic and transesophageal echocardiogram images, the device was released and in stable position  Sheath was pulled and hemostasis achieved with ProGlide closure  Patient left the hybrid OR in stable condition Impression 1  Successful placement of a 24 mm left atrial appendage occluder device, Watchman device, percutaneous approach SIGNATURE: Giselle Reddy DO DATE: 2022 TIME: 10:30 AM NOTE: An electrophysiologist  as co-surgeon was used for complimentary catheter based skills with transeptal access, hemodynamic monitoring and use of catheters requiring contrast dye injection  Cardiac ep lab eps/ablations    Result Date: 2022  Narrative: OPERATIVE REPORT PATIENT NAME: Danya Felton  :  1935 MRN: [id number] Pt Location: [facility] RO[facility] 02 SURGERY DATE: 2022 Surgeon(s) and Role: Panel 1:    * Hollis Amato MD - Primary    * Giselle Reddy DO - Assisting Panel 2:    * Hollis Amato MD - Primary Preop Diagnosis: Atrial fibrillation, unspecified type ([address] ) [I48 91] Post-Op Diagnosis Codes:    * Atrial fibrillation, unspecified type ([address] ) [I48 91] Procedure(s) (LRB): CARDIAC ATRIAL APPENDAGE CLOSURE (CATH) (N/A) CARDIAC ATRIAL APPENDAGE CLOSURE (EP) (N/A) Specimen(s): * No specimens in log * Estimated Blood Loss: Minimal Drains: * No LDAs found * Anesthesia Type: General Operative Indications: Atrial fibrillation, unspecified type ([address] ) [I48 91] Paroxysmal Lower GI Bleed Frequent afalls Operative Findings: The patient was draped in normal sterile fashion chest and bilateral groins   An 8F sheath was placed in right femoral vein with Seldinger technique  Heparin was infused, weight based, to keep -350s  A transseptal puncture with a Versac[employer] Connect and 14F Double Curve Watchman delivery sheath  We then used a 5mm pigtail catheter and placed in anterior lobe of appendage and did venogram to measure appendage depth  A 24mm Watchman device was used, the pigtail was removed and the watch to tip of sheath, marker bands aligned with fluoroscopy  We then unsheathed the Watchman  The device was expanded 2x the sheath width in ball formation and it was advanced and retracted as needed until shoulders were plane of Os and then unsheathed completely  One deployments were required  After the final deployment we checked usual echo views at 0,45,90, 135 degrees w MAURA and no leak was seen, and there was not excess shoulder of device seen in any view  The device was compressed approximately 10-14% appropriately  A tug test showed normal recoil without dislodgement  After this the device was release from delivery cable and sheaths removed  Perclose x2 was used for hemostasis    Complications: None Procedure and Technique:  I was present for the entire procedure Patient Disposition: PACU SIGNATURE: Tatyana Yang MD DATE: December 8, 2022 TIME: 10:32 AM    MAURA Anesthesia    Result Date: 12/8/2022  Narrative: Jorje Mcburney, MD     12/8/2022 11:40 AM Procedure Performed: MAURA Anesthesia Start Time: Preanesthesia Checklist Patient identified, IV assessed, risks and benefits discussed, monitors and equipment assessed, procedure being performed at surgeon's request and anesthesia consent obtained  Procedure Diagnostic Indications for MAURA:  defect repair evaluation, hemodynamic monitoring and suspected pericardial effusion  Type of MAURA: interventional MAURA with real time guidance of intracardiac procedure  Images Saved: ultrasound permanent image saved   Physician Requesting Echo: Tatyana Yang MD   Location performed: OR  Intubated  Bite block not placed  Heart visualized  Insertion of MAURA Probe:  Atraumatic  Probe Type:  Multiplane  Modalities:  3D, continuous wave Doppler, color flow mapping and pulse wave Doppler  Echocardiographic and Doppler Measurements PREPROCEDURE LEFT VENTRICLE: Systolic Function: normal      RIGHT VENTRICLE: Systolic Function: normal   Cavity size normal   AORTIC VALVE: Leaflets: normal and trileaflet  Leaflet motions normal and normal  Stenosis: at worst mild by visualization  Mean Gradient: 4 mmHg  Regurgitation: trace  MITRAL VALVE: Leaflets: normal  Leaflet Motions: normal  Regurgitation: mild  Stenosis: none  TRICUSPID VALVE:    Regurgitation: moderate and mod-sev  ASCENDING AORTA:   Dissection not present  AORTIC ARCH:   dissection not present  Grade 3: atheroma protruding < 0 5 cm into lumen  DESCENDING AORTA:   Dissection not present  Grade 3: atheroma protruding < 0 5 cm into lumen  LEFT ATRIAL APPENDAGE:  No spontaneous echo contrast OTHER ATRIAL FINDINGS: No ALDO clot  ATRIAL SEPTUM: Intra-atrial septal morphology: patent foramen ovale and left to right flow by CFD  Patent foramen ovale shunt: left to right shunt  POSTPROCEDURE LEFT VENTRICLE: Unchanged   RIGHT VENTRICLE: Unchanged   AORTIC VALVE: Unchanged   MITRAL VALVE: Unchanged   TRICUSPID VALVE: Unchanged   ATRIA: Left atrial appendage ligation or closure: watchman  Residual Flow in Left Atrial Appendage by Color Flow Doppler: No   AORTA: Unchanged   Dissection: Dissection not present  REMOVAL: Probe Removal: atraumatic  MAURA intraop interventional w/realtime guidance of cardiac procedures    Result Date: 12/9/2022  Narrative: This order contains the linked images for the MAURA that was performed by the Anesthesiologist   Please see the  CARDIAC MAURA ANESTHESIA procedure for results  EKG, Pathology, and Other Studies Reviewed on Admission:   · EKG:  We will review after done    Epic / Care Everywhere Records Reviewed: Yes     ** Please Note: This note has been constructed using a voice recognition system   **

## 2022-12-19 NOTE — H&P
[facility]  Progress Note - Theron Hein 1935, 81 y o  male MRN: [id number]  Unit/Bed#: ED-33 Encounter: [id number]  Primary Care Provider: David Sherman DO   Date and time admitted to hospital: 12/18/2022 11:19 PM    Syncope and collapse  Assessment & Plan  · x1 episode of syncope at home PTA and another episode in ED triage  · No headstrike  · On Xarelto   · Felt "bad" all day today  · No complaints currently  · CT head w/o contrast: negative  · CT C spine w/o contrast: negative  · No traumatic injuries, patient be admitted to [facility] for further work-up of syncopal episodes    Atrial fibrillation ([facility] [address] )  Assessment & Plan  · Xarelto for h/o afib s/p pacemaker and recent Watchman device placed 12/8/2022  · Pt reports feeling "dizzy" and "heart pounding" prior to initial episode  · Device to be interrogated once admitted to  [address] hypertension  Assessment & Plan  · Continue home medications per primary team      Trauma Alert: Level B  Model of Arrival: Self  Trauma Team: Attending Abhilash Santos and Residents Linda  Consultants:     Other: {routine consult; Epic consult order placed; SLIM, to be admitted under SLIM     Trauma Active Problems:   Syncope and collapse  Afib on Xarelto, recent Watchman placed    Trauma Plan:   CT head w/o contrast: negative  CT C spine w/o contrast: negative  EKG  CXR  CBC, CMP, troponin    Chief Complaint: "I felt dizzy then passed out"    History of Present Illness   HPI:  Theron Hein is a 81 y o  male with PMHx afib s/p pacemaker and recent watchman device placement on Xarelto, HTN, presents for evaluation after x1 syncopal episode at home observed by daughter who described the event as patient slumping over a pile of wood, unconscious for about "10 seconds "  She then helped patient to stand on his feet, but she was able to do however was confused and did not know who she was and thought she was at her mother for about 1 to 2 minutes  Patient reports he remembers feeling "dizzy" and "heart pounding" prior to syncope  Patient did not fall to the ground at this time and there was no head strike  Patient then had another syncopal episode in the ED triage when going from sitting to standing  He states he has not been eating/drinking throughout today because he has been feeling "unwell "  He currently denies dizziness, lightheadedness, focal weakness/numbness/tingling, chest pain, abdominal pain, N/V/D, shortness of breath or any other symptoms  Patient states his Rx amlodipine was recently decreased  No other issues  Mechanism: syncope and collapse    Review of Systems   Constitutional: Negative for chills and fever  HENT: Negative for ear pain and sore throat  Eyes: Negative for pain and visual disturbance  Respiratory: Negative for cough and shortness of breath  Cardiovascular: Positive for palpitations  Negative for chest pain  Gastrointestinal: Negative for abdominal pain and vomiting  Genitourinary: Negative for dysuria and hematuria  Musculoskeletal: Negative for arthralgias and back pain  Skin: Negative for color change and rash  Neurological: Positive for dizziness and syncope  Negative for seizures  All other systems reviewed and are negative        Historical Information       Past Medical History:   Past Medical History:   Diagnosis Date   • A-fib ([facility] [address] )    • Hernia, abdominal    • Hypertension    • Subdural hematoma        Past Surgical History:   Past Surgical History:   Procedure Laterality Date   • A-V CARDIAC PACEMAKER INSERTION     • APPENDECTOMY      age 33   • CARDIAC ELECTROPHYSIOLOGY PROCEDURE N/A 12/8/2022    Procedure: CARDIAC ATRIAL APPENDAGE CLOSURE (EP);  Surgeon: Chuyita Heredia MD;  Location: [facility] MAIN OR;  Service: Cardiology   • CARDIAC PACEMAKER PLACEMENT     • CARPAL TUNNEL RELEASE     • COLONOSCOPY     • INGUINAL HERNIA REPAIR Left    • TX PERQ CLSR TCAT L R [address] IMPLNT N/A 12/8/2022    Procedure: CARDIAC ATRIAL APPENDAGE CLOSURE (CATH); Surgeon: Michael Maldonado MD;  Location:  MAIN OR;  Service: Cardiology   • NM XCAPSL CTRC RMVL INSJ IO LENS PROSTH W/O ECP Right 4/3/2017    Procedure: EXTRACTION EXTRACAPSULAR CATARACT PHACO INTRAOCULAR LENS (IOL); Surgeon: Serge Hines MD;  Location: [facility] MAIN OR;  Service: Ophthalmology   • TONSILLECTOMY      age 12       Social History:  Alcohol Use:   Social History     Substance and Sexual Activity   Alcohol Use No     Drug Use:   Social History     Substance and Sexual Activity   Drug Use Never     Tobacco Use:   Social History     Tobacco Use   Smoking Status Never   Smokeless Tobacco Never       Immunizations:   Immunization History   Administered Date(s) Administered   • COVID-19 MODERNA VACC 0 5 ML IM 01/15/2021, 02/15/2021, 10/29/2021   • Pneumococcal Conjugate 13-Valent 02/12/2020   • Tdap 04/14/2018, 04/20/2022       Last Tetanus: Noncontributory  Family History: non-contributory      Meds/Allergies   all current active meds have been reviewed     No Known Allergies    PHYSICAL EXAM      Objective   Vitals:   First set: Temperature: 97 9 °F (36 6 °C) (12/18/22 2320)  Pulse: 78 (12/18/22 2320)  Respirations: 20 (12/18/22 2320)  Blood Pressure: 125/58 (12/18/22 2320)    Primary Survey:   (A) Airway: Intact  (B) Breathing: present BILAT  (C) Circulation: Pulses:   Normal; radial 4/4, DP 4/4  (D) Disabliity:  GCS Total:  15  (E) Expose:  Completed    Secondary Survey: (Click on Physical Exam tab above)    Physical Exam  Vitals and nursing note reviewed  Constitutional:       Appearance: Normal appearance  HENT:      Head: Normocephalic and atraumatic  Right Ear: External ear normal       Left Ear: External ear normal       Nose: Nose normal       Mouth/Throat:      Mouth: Mucous membranes are moist    Eyes:      Extraocular Movements: Extraocular movements intact        Conjunctiva/sclera: Conjunctivae normal  Pupils: Pupils are equal, round, and reactive to light  Neck:      Comments: Patient is in a c-collar    No tenderness palpation or step-offs of the cervical spine  Cardiovascular:      Rate and Rhythm: Normal rate and regular rhythm  Pulses: Normal pulses  Heart sounds: Normal heart sounds  Pulmonary:      Effort: Pulmonary effort is normal  No respiratory distress  Breath sounds: Normal breath sounds  No wheezing or rales  Chest:      Chest wall: No tenderness  Abdominal:      General: Abdomen is flat  There is no distension  Palpations: Abdomen is soft  Tenderness: There is no abdominal tenderness  There is no guarding or rebound  Genitourinary:     Comments: Normal rectal tone  Musculoskeletal:         General: Normal range of motion  Cervical back: Neck supple  No tenderness  Comments: No midline tenderness to palpation throughout the spine   Skin:     General: Skin is warm and dry  Capillary Refill: Capillary refill takes less than 2 seconds  Neurological:      Mental Status: He is alert and oriented to person, place, and time  Mental status is at baseline  Sensory: No sensory deficit  Motor: No weakness  Psychiatric:         Mood and Affect: Mood normal          Behavior: Behavior normal          Invasive Devices     Peripheral Intravenous Line  Duration           Peripheral IV 12/18/22 Left Antecubital <1 day                Lab Results: Results: I have personally reviewed all pertinent laboratory/tests results  Imaging/EKG Studies: negative for acute findings      Code Status: Level 3 - DNAR and DNI      Counseling / Coordination of Care  Total floor / unit time spent today 35 minutes  This involved direct patient contact where I performed a full history and physical, reviewed previous records, and reviewed laboratory and other diagnostic studies  Total Critical Care time spent 35 minutes excluding procedures, teaching and family updates

## 2022-12-19 NOTE — Clinical Note
Called and spoke with patient. Advised that I spoke with the nurse navigator at Dr. Huang office. If they get any cancellations between now and jan 8th they will call her ot get her in sooner. Until then I instructed patient to continue doing wet to dry dressings. Advised if she notices a change in color in her wound other than pinkish red, or if she notices greenish yellow drainage with a foul odor or fever and chills to call us and let us know. Advised we will see her next week to evaluate. She verbalizes understanding.    Screw extended under fluoro

## 2022-12-19 NOTE — PLAN OF CARE
Problem: MOBILITY - ADULT  Goal: Maintain or return to baseline ADL function  Description: INTERVENTIONS:  -  Assess patient's ability to carry out ADLs; assess patient's baseline for ADL function and identify physical deficits which impact ability to perform ADLs (bathing, care of mouth/teeth, toileting, grooming, dressing, etc )  - Assess/evaluate cause of self-care deficits   - Assess range of motion  - Assess patient's mobility; develop plan if impaired  - Assess patient's need for assistive devices and provide as appropriate  - Encourage maximum independence but intervene and supervise when necessary  - Involve family in performance of ADLs  - Assess for home care needs following discharge   - Consider OT consult to assist with ADL evaluation and planning for discharge  - Provide patient education as appropriate  Outcome: Progressing  Goal: Maintains/Returns to pre admission functional level  Description: INTERVENTIONS:  - Perform BMAT or MOVE assessment daily    - Set and communicate daily mobility goal to care team and patient/family/caregiver  - Collaborate with rehabilitation services on mobility goals if consulted  - Perform Range of Motion 4 times a day  - Reposition patient every 2 hours    - Record patient progress and toleration of activity level   Outcome: Progressing     Problem: Potential for Falls  Goal: Patient will remain free of falls  Description: INTERVENTIONS:  - Educate patient/family on patient safety including physical limitations  - Instruct patient to call for assistance with activity   - Consult OT/PT to assist with strengthening/mobility   - Keep Call bell within reach  - Keep bed low and locked with side rails adjusted as appropriate  - Keep care items and personal belongings within reach  - Initiate and maintain comfort rounds  - Make Fall Risk Sign visible to staff  - Offer Toileting every 2 Hours, in advance of need  - Initiate/Maintain bed alarm  - Obtain necessary fall risk management equipment  - Apply yellow socks and bracelet for high fall risk patients  - Consider moving patient to room near nurses station  Outcome: Progressing     Problem: Prexisting or High Potential for Compromised Skin Integrity  Goal: Skin integrity is maintained or improved  Description: INTERVENTIONS:  - Identify patients at risk for skin breakdown  - Assess and monitor skin integrity  - Assess and monitor nutrition and hydration status  - Monitor labs   - Assess for incontinence   - Turn and reposition patient  - Assist with mobility/ambulation  - Relieve pressure over bony prominences  - Avoid friction and shearing  - Provide appropriate hygiene as needed including keeping skin clean and dry  - Evaluate need for skin moisturizer/barrier cream  - Collaborate with interdisciplinary team   - Patient/family teaching  - Consider wound care consult   Outcome: Progressing     Problem: PAIN - ADULT  Goal: Verbalizes/displays adequate comfort level or baseline comfort level  Description: Interventions:  - Encourage patient to monitor pain and request assistance  - Assess pain using appropriate pain scale  - Administer analgesics based on type and severity of pain and evaluate response  - Implement non-pharmacological measures as appropriate and evaluate response  - Consider cultural and social influences on pain and pain management  - Notify physician/advanced practitioner if interventions unsuccessful or patient reports new pain  Outcome: Progressing     Problem: INFECTION - ADULT  Goal: Absence or prevention of progression during hospitalization  Description: INTERVENTIONS:  - Assess and monitor for signs and symptoms of infection  - Monitor lab/diagnostic results  - Monitor all insertion sites, i e  indwelling lines, tubes, and drains  - Monitor endotracheal if appropriate and nasal secretions for changes in amount and color  - [place] appropriate cooling/warming therapies per order  - Administer medications as ordered  - Instruct and encourage patient and family to use good hand hygiene technique  - Identify and instruct in appropriate isolation precautions for identified infection/condition  Outcome: Progressing  Goal: Absence of fever/infection during neutropenic period  Description: INTERVENTIONS:  - Monitor WBC    Outcome: Progressing

## 2022-12-19 NOTE — H&P
[facility]  H&P- Helena Ruiz 1935, 81 y o  male MRN: [id number]  Unit/Bed#: [facility] 545-10 Encounter: [id number]  Primary Care Provider: Chantel Chapman DO   Date and time admitted to hospital: 12/19/2022  7:57 AM    * Pacemaker lead fracture  Assessment & Plan  Presented to [address] [place] [address] after a syncopal episode, found to have multiple pauses on telemetry  Concern for PPM lead fracture  Plan:  · EP consult appreciated  · TVP in place - VVI at 60  · Intrinsic pacer initially competing w/ TVP - [employer] adjusted to backup of 40bpm    Atrial fibrillation [facility])  Assessment & Plan  Hx of AFib on Xarelto, s/p Watchman 12/8  Plan:  · Hold Xarelto pending procedures  · Continue ASA  · Continue PO amio  · Telemetry monitoring    CHANA (acute kidney injury) ([facility] [address] )  Assessment & Plan  · Patient reportedly had poor PO intake leading up to event  Likely pre-renal in the setting of dehydration  · Continue maintenance fluids for now while NPO  · Renal function improving    Essential hypertension  Assessment & Plan  · Continue home Norvasc    -------------------------------------------------------------------------------------------------------------  Chief Complaint: "I feel better now"    History of Present Illness   HX and PE limited by: Lisa Franks is a 81 y o  male with PMHx of AFib on xarelto, HTN, s/p PPM placement, and recent Watchman procedure 12/8 who presented to the [address] [place] [address] ER after a syncopal event  Patient states he started to feel dizzy yesterday afternoon and felt that she should get up and move around  He went outside to chop wood when he reportedly slumped over and was found by his daughter  He quickly regained consciousness and was taken to the emergency room  He was a trauma alert and was found to have no traumatic injuries  He was initially admitted to [facility], but was noted to have frequent pauses on telemetry   He was transcutaneously paced for several hours and ultimately had R femoral TVP placed by cardiology  He was transferred to [facility] for EP evaluation  History obtained from chart review and the patient   -------------------------------------------------------------------------------------------------------------  Dispo: Admit to Critical Care     Code Status: Level 1 - Full Code  --------------------------------------------------------------------------------------------------------------  Review of Systems    A 12-point, complete review of systems was reviewed and negative except as stated above     Physical Exam  Vitals and nursing note reviewed  Constitutional:       General: He is not in acute distress  Appearance: He is overweight  He is not ill-appearing  Interventions: Nasal cannula in place  HENT:      Head: Normocephalic and atraumatic  Eyes:      Pupils: Pupils are equal, round, and reactive to light  Cardiovascular:      Rate and Rhythm: Normal rate and regular rhythm  Heart sounds: Normal heart sounds  Heart sounds not distant  No murmur heard  No friction rub  No gallop  Pulmonary:      Effort: Pulmonary effort is normal       Breath sounds: Normal breath sounds  No decreased breath sounds, wheezing, rhonchi or rales  Abdominal:      General: There is no distension  Palpations: Abdomen is soft  Tenderness: There is no abdominal tenderness  Musculoskeletal:      Right lower leg: No edema  Left lower leg: No edema  Comments: R femoral TVP in place   Skin:     General: Skin is warm and dry  Neurological:      Mental Status: He is alert and oriented to person, place, and time  GCS: GCS eye subscore is 4  GCS verbal subscore is 5  GCS motor subscore is 6     Psychiatric:         Behavior: Behavior is cooperative        --------------------------------------------------------------------------------------------------------------  Vitals:   Vitals:    12/19/22 1100 12/19/22 1130 12/19/22 1200 12/19/22 1300   BP: 161/72 134/69 123/69 124/70   BP Location:  Right arm Right arm Right arm   Pulse: 60 60 60 60   Resp: 21 20 (!) 24 (!) 33   Temp:   (!) 97 °F (36 1 °C)    TempSrc:   Axillary    SpO2: 98% 99% 98% 95%   Weight:       Height:         Temp  Min: 97 °F (36 1 °C)  Max: 97 9 °F (36 6 °C)  IBW (Ideal Body Weight): 61 5 kg  Height: 5' 5" (165 1 cm)  Body mass index is 30 08 kg/m²  Laboratory and Diagnostics:  Results from last 7 days   Lab Units 12/19/22 0552 12/18/22 2334 12/18/22 2333   WBC Thousand/uL 4 37 4 23*  --    HEMOGLOBIN g/dL 10 1* 10 1*  --    I STAT HEMOGLOBIN g/dl  --   --  11 2*   HEMATOCRIT % 32 2* 32 3*  --    HEMATOCRIT, ISTAT %  --   --  33*   PLATELETS Thousands/uL 162 168  --    NEUTROS PCT % 69 68  --    MONOS PCT % 11 13*  --      Results from last 7 days   Lab Units 12/19/22 0552 12/18/22 2334 12/18/22 2333 12/13/22  1016   SODIUM mmol/L 135 133*  --  137   POTASSIUM mmol/L 5 0 4 2  --  4 6   CHLORIDE mmol/L 106 104  --  105   CO2 mmol/L 27 24  --  27   CO2, I-STAT mmol/L  --   --  24  --    ANION GAP mmol/L 2* 5  --  5   BUN mg/dL 19 21  --  20   CREATININE mg/dL 1 13 1 35*  --  1 09   CALCIUM mg/dL 8 3* 8 3*  --  9 1   GLUCOSE RANDOM mg/dL 129 147*  --   --      Results from last 7 days   Lab Units 12/19/22 0552 12/18/22 2334   MAGNESIUM mg/dL 2 2 2 0   PHOSPHORUS mg/dL 2 6  --       Results from last 7 days   Lab Units 12/18/22 2334   INR  1 32*   PTT seconds 37              ABG:    VBG:    Results from last 7 days   Lab Units 12/19/22 0552   PROCALCITONIN ng/ml 0 05       Imaging: I have personally reviewed pertinent reports          Historical Information   Past Medical History:   Diagnosis Date   • A-fib ([facility] [address] )    • Hernia, abdominal    • Hypertension    • Subdural hematoma      Past Surgical History:   Procedure Laterality Date   • A-V CARDIAC PACEMAKER INSERTION     • APPENDECTOMY      age 33   • CARDIAC ELECTROPHYSIOLOGY PROCEDURE N/A 12/8/2022    Procedure: CARDIAC ATRIAL APPENDAGE CLOSURE (EP);  Surgeon: Debbie Rocha MD;  [facility];  Service: Cardiology   • CARDIAC PACEMAKER PLACEMENT     • CARPAL TUNNEL RELEASE     • COLONOSCOPY     • INGUINAL HERNIA REPAIR Left    • KS PERQ CLSR TCAT L ATR APNDGE W/ENDOCARDIAL IMPLNT N/A 12/8/2022    Procedure: CARDIAC ATRIAL APPENDAGE CLOSURE (CATH); Surgeon: Debbie Rocha MD;  [facility]: [facility];  Service: Cardiology   • KS XCAPSL CTRC RMVL INSJ IO LENS PROSTH W/O ECP Right 4/3/2017    Procedure: EXTRACTION EXTRACAPSULAR CATARACT PHACO INTRAOCULAR LENS (IOL);   Surgeon: Olimpia Ivey MD;  Location: [facility] MAIN OR;  Service: Ophthalmology   • TONSILLECTOMY      age 12     Social History   Social History     Substance and Sexual Activity   Alcohol Use Never     Social History     Substance and Sexual Activity   Drug Use Never     Social History     Tobacco Use   Smoking Status Never   Smokeless Tobacco Never     Family History:   Family History   Problem Relation Age of Onset   • Cancer Mother         exp age 80   • Heart disease Father    • Heart disease Brother    • Hypertension Brother      I have reviewed this patient's family history and commented on sigificant items within the HPI      Medications:  Current Facility-Administered Medications   Medication Dose Route Frequency   • amiodarone tablet 200 mg  200 mg Oral Daily   • amLODIPine (NORVASC) tablet 2 5 mg  2 5 mg Oral Daily   • aspirin chewable tablet 81 mg  81 mg Oral Daily   • gabapentin (NEURONTIN) capsule 100 mg  100 mg Oral TID   • hydrALAZINE (APRESOLINE) injection 10 mg  10 mg Intravenous Q6H PRN   • multi-electrolyte (PLASMALYTE-A/ISOLYTE-S PH 7 4) IV solution  100 mL/hr Intravenous Continuous   • rivaroxaban (XARELTO) tablet 10 mg  10 mg Oral Daily   • tamsulosin (FLOMAX) capsule 0 4 mg  0 4 mg Oral Daily With Dinner     Home medications:  Prior to Admission Medications   Prescriptions Last Dose Informant Patient Reported? Taking? amLODIPine (NORVASC) 2 5 mg tablet 12/18/2022  No Yes   Sig: Take 1 tablet (2 5 mg total) by mouth daily   amiodarone 200 mg tablet 12/18/2022  No Yes   Sig: Take 1 tablet (200 mg total) by mouth daily   aspirin 81 mg chewable tablet 12/18/2022  Yes Yes   Sig: Chew 81 mg daily   gabapentin (NEURONTIN) 100 mg capsule 12/18/2022  No Yes   Sig: Take 1 capsule (100 mg total) by mouth in the morning and 1 capsule (100 mg total) in the evening and 1 capsule (100 mg total) before bedtime     rivaroxaban (Xarelto) 10 mg tablet 12/18/2022  No Yes   Sig: Take 1 tablet (10 mg total) by mouth daily      Facility-Administered Medications: None     Allergies:  No Known Allergies    ------------------------------------------------------------------------------------------------------------  Advance Directive and Living Will:      Power of :    POLST:    ------------------------------------------------------------------------------------------------------------  Anticipated Length of Stay is > 2 midnights    Care Time Delivered:   No Critical Care time spent       Mike Chen PA-C

## 2022-12-19 NOTE — ASSESSMENT & PLAN NOTE
Patient has chronically low MCV  Hemoglobin 10-11 7  Will do iron panel  Unsure if patient has ever followed up  Ambulatory follow-up with PCP

## 2022-12-19 NOTE — PROGRESS NOTES
[facility]  Progress Note - Aung Alejo 1935, 81 y o  male MRN: [id number]  Unit/Bed#: [facility] 441-64 Encounter: [id number]  Primary Care Provider: Cb Cast DO   Date and time admitted to hospital: 12/19/2022  7:57 AM    Syncope and collapse  Assessment & Plan  - Patient presented status post episode of syncope and collapse as a trauma evaluation at [address] [place] [address]  - During work-up of syncope, the patient was noted to have bradycardia and pacer failure on EKG with concern for lead failure in his device  - No acute traumatic injuries identified during initial trauma evaluation or tertiary survey completed at [facility] on 12/19/2022   - Continued work-up and management per Internal Medicine and Cardiology   - No further work-up or intervention necessary from a trauma service standpoint; trauma service will sign off, but remains available for further work-up or evaluation if indicated  TRAUMA TERTIARY SURVEY NOTE    VTE Prophylaxis: Demi Peels     Disposition: No further trauma work-up necessary  Continued management per primary service  Code status:  Level 1 - Full Code    Consultants: IP CONSULT TO CASE MANAGEMENT  IP CONSULT TO ELECTROPHYSIOLOGY    Subjective   Transfer from: [address] [place] [address]  Mechanism of Injury:Other: Syncope and collapse/fall  Chief Complaint: "I am okay "    HPI/Last 24 hour events: Patient offers no complaints at this time  He is not the most comfortable lying in bed, but understands he is waiting for his cardiac procedure  He denies having any pain other than some chronic back pain with no acute change       Objective   Vitals:   Temp:  [97 °F (36 1 °C)-97 9 °F (36 6 °C)] 97 °F (36 1 °C)  HR:  [60-82] 60  Resp:  [15-37] 37  BP: (122-201)/() 127/64    I/O       12/17 0701  12/18 0700 12/18 0701  12/19 0700 12/19 0701 12/20 0700    I V  (mL/kg)   591 7 (7 2)    Total Intake(mL/kg)   591 7 (7 2) Urine (mL/kg/hr)   1050 (1 4)    Total Output   1050    Net   -458  3                  Physical Exam:   GENERAL APPEARANCE: Patient in no acute distress  HEENT: NCAT; PERRL, EOMs intact; Mucous membranes moist  NECK / BACK: No midline cervical, thoracic or lumbar spine tenderness, step-offs or deformities  No paraspinal muscular tenderness in the neck or back  CV: Regular rate and rhythm; no murmur/gallops/rubs appreciated  CHEST / LUNGS: Clear to auscultation; no wheezes/rales/rhonci  ABD: NABS; soft; non-distended; non-tender  : Voiding spontaneously  EXT: +2 pulses bilaterally upper & lower extremities; no edema  Normal range of motion in all 4 extremities without pain, tenderness or deformity  NEURO: GCS 15; no focal neurologic deficits; neurovascularly intact  SKIN: Warm, dry and well perfused; no rash; no jaundice  Invasive Devices     Peripheral Intravenous Line  Duration           Peripheral IV 12/19/22 Distal;Right;Upper;Ventral (anterior) Arm <1 day    Peripheral IV 12/19/22 Left;Proximal;Ventral (anterior) Forearm <1 day          Line  Duration           Venous Sheath 6 Fr  Right Femoral <1 day                   1  Before the illness or injury that brought you to the Emergency, did you need someone to help you on a regular basis? 0=No   2  Since the illness or injury that brought you to the Emergency, have you needed more help than usual to take care of yourself? 0=No   3  Have you been hospitalized for one or more nights during the past 6 months (excluding a stay in the Emergency Department)? 1=Yes   4  In general, do you see well? 0=Yes   5  In general, do you have serious problems with your memory? 0=No   6  Do you take more than three different medications everyday? 1=Yes   TOTAL   3     Did you order a geriatric consult if the score was 2 or greater?: Defer to primary service           Lab Results: Results: I have personally reviewed all pertinent laboratory/tests results    Imaging Results: I have personally reviewed pertinent reports      Chest Xray(s): negative for acute findings   FAST exam(s): N/A   CT Scan(s): negative for acute findings   Additional Xray(s): N/A     Other Studies: N/A        Gracie Harris PA-C  12/19/2022 1:09 PM

## 2022-12-19 NOTE — ASSESSMENT & PLAN NOTE
Hx of AFib on Xarelto, s/p Watchman 12/8      Plan:  · Hold Xarelto pending procedures  · Continue ASA  · Continue PO amio  · Telemetry monitoring

## 2022-12-19 NOTE — ASSESSMENT & PLAN NOTE
· Hx Afib  · S/p watchman device  · Currently rate controlled  · EKG - Paced rhythm with probable pacer failure, see plan above  · Continue home regimen: Amiodarone and Xarelto  · Continuous tele monitoring

## 2022-12-19 NOTE — ED PROVIDER NOTES
Emergency Department Airway Evaluation and Management Form    History  Obtained from: Patient  Patient has no known allergies  Chief Complaint   Patient presents with   • Fall     Patient comes in for fall, reports he does not fully remember fall  Reports feeling dizzy before hand  Hx of afib and pacemaker in place  On xerelto      HPI    44-year-old male on Xarelto presented after falling forward striking head  Possible syncope  Awake and alert on arrival   Breathing comfortably on room air with normal oxygen saturation  No acute airway intervention needed  Further management per trauma team     Past Medical History:   Diagnosis Date   • A-fib ([facility] [address] )    • Hernia, abdominal    • Hypertension    • Subdural hematoma      Past Surgical History:   Procedure Laterality Date   • A-V CARDIAC PACEMAKER INSERTION     • APPENDECTOMY      age 33   • CARDIAC ELECTROPHYSIOLOGY PROCEDURE N/A 12/8/2022    Procedure: CARDIAC ATRIAL APPENDAGE CLOSURE (EP);  Surgeon: Olive Shaw MD;  [facility]: [facility];  Service: Cardiology   • CARDIAC PACEMAKER PLACEMENT     • CARPAL TUNNEL RELEASE     • COLONOSCOPY     • INGUINAL HERNIA REPAIR Left    • DC PERQ CLSR TCAT L ATR APNDGE W/ENDOCARDIAL IMPLNT N/A 12/8/2022    Procedure: CARDIAC ATRIAL APPENDAGE CLOSURE (CATH); Surgeon: Olive Shaw MD;  Location[facility];  Service: Cardiology   • DC XCAPSL CTRC RMVL INSJ IO LENS PROSTH W/O ECP Right 4/3/2017    Procedure: EXTRACTION EXTRACAPSULAR CATARACT PHACO INTRAOCULAR LENS (IOL);   Surgeon: Araseli Zaragoza MD;  Location: [facility] MAIN OR;  Service: Ophthalmology   • TONSILLECTOMY      age 12     Family History   Problem Relation Age of Onset   • Cancer Mother         exp age 80   • Heart disease Father    • Heart disease Brother    • Hypertension Brother      Social History     Tobacco Use   • Smoking status: Never   • Smokeless tobacco: Never   Vaping Use   • Vaping Use: Never used   Substance Use Topics   • Alcohol use: No   • Drug use: Never     I have reviewed and agree with the history as documented      Review of Systems    Physical Exam  /73   Pulse 74   Temp (!) 97 4 °F (36 3 °C) (Oral)   Resp 18   Wt 82 kg (180 lb 12 4 oz)   SpO2 96%   BMI 30 08 kg/m²     Physical Exam    ED Medications  Medications - No data to display    Intubation  Procedures    Notes      Final Diagnosis  Final diagnoses:   None       ED Provider  Electronically Signed by     Michael Ortiz MD  12/18/22 0166

## 2022-12-19 NOTE — ASSESSMENT & PLAN NOTE
- Patient presented status post episode of syncope and collapse as a trauma evaluation at [address] [place] [address]  - During work-up of syncope, the patient was noted to have bradycardia and pacer failure on EKG with concern for lead failure in his device  - No acute traumatic injuries identified during initial trauma evaluation or tertiary survey completed at [facility] on 12/19/2022   - Continued work-up and management per Internal Medicine and Cardiology   - No further work-up or intervention necessary from a trauma service standpoint; trauma service will sign off, but remains available for further work-up or evaluation if indicated

## 2022-12-19 NOTE — ASSESSMENT & PLAN NOTE
· Xarelto for h/o afib s/p pacemaker and recent Watchman device placed 12/8/2022  · Pt reports feeling "dizzy" and "heart pounding" prior to initial episode  · Device to be interrogated once admitted to [facility] service

## 2022-12-19 NOTE — ASSESSMENT & PLAN NOTE
· Pt presented to ED with dizziness associated with lightheadedness, blurry vision and episodes of confusion  · Pt noted to have bradycardia and pacer failure on EKG  · Pacer interrogated in ED, see plan above

## 2022-12-19 NOTE — ASSESSMENT & PLAN NOTE
· Presenting with lightheadedness with prodromal symptoms  · Likely secondary to orthostatic hypotension given poor intake and low blood pressure at home   Also could be cardiac syncope related- given his history of recurrent syncope  · IV hydration  · Orthostatic blood pressure  · Will arrange pacemaker interrogation  · 24-hour telemetry  · CBC, BMP a m   · Monitor vital signs  · EKG

## 2022-12-19 NOTE — QUICK NOTE
Significant pauses noted on Tele  Reached out to Cardiology Oncall  Advised it's device failure and to reach out to [employer] Rep  Called [employer] rep  Patient noted to be symptomatic  More frequent pauses noted on Tele  Cardiology advised to place a magnet over the device and and pace  Ordered midazolam and paced at 40 mA, OH 70 bpm   Patient had double pacing noted    [employer] rep arrived and is reprogramming the pacemaker  Patient changed the code status  Ordered Level- 1 full code  Will initiate transfer to [place]

## 2022-12-19 NOTE — ASSESSMENT & PLAN NOTE
· Patient reportedly had poor PO intake leading up to event   Likely pre-renal in the setting of dehydration  · Continue maintenance fluids for now while NPO  · Renal function improving

## 2022-12-19 NOTE — CASE MANAGEMENT
Case Management Assessment & Discharge Planning Note    Patient name Connie Preciado  Location [address] [place] [address] [id number] MRN [id number]  : 1935 Date 2022       Current Admission Date: 2022  Current Admission Diagnosis:Pacemaker lead fracture   Patient Active Problem List    Diagnosis Date Noted   • Dizziness 2022   • Elevated serum creatinine 2022   • Low mean corpuscular volume (MCV) 2022   • CHANA (acute kidney injury) ([facility] [address] ) 2022   • Pacemaker lead fracture 2022   • Syncope and collapse 2022   • Paresthesias 2022   • Closed fracture of nasal bone 2022   • Sick sinus syndrome ([facility] [address] ) 2019   • Presence of left atrial appendage closure device 2019   • Pre-op evaluation 2019   • Chest pain 2019   • On amiodarone therapy 2018   • Temporal bone fracture ([facility] [address] ) 2018   • Subdural hematoma 04/15/2018   • 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   • Closed nondisplaced fracture of distal phalanx of right thumb 04/15/2018   • Atrial fibrillation ([address] ) 04/15/2018   • Essential hypertension 2017      LOS (days): 0  Geometric Mean LOS (GMLOS) (days): 2 30  Days to GMLOS:2     OBJECTIVE:  PATIENT READMITTED TO HOSPITAL  Risk of Unplanned Readmission Score: 15 96         Current admission status: Inpatient       Preferred Pharmacy:   [facility]/pharmacy #[id number]- [place], [place] - [address]  [address] [place] [address]  Phone: [phone] Fax: [phone] [address] [facility] [place] [address]  Phone: [phone] Fax: [phone]    Primary Care Provider: Angelina Rachel DO    Primary Insurance: MEDICARE  Secondary Insurance: AARP    ASSESSMENT:  Asael 26 Proxies    There are no active Health Care Proxies on file         Advance Directives  Does patient have a Health Care POA?: Yes (requested copy)  Does patient have Advance Directives?: Yes  Advance Directives:  (requested copy)  Primary Contact: Niko Gallego- daughter         Readmission Root Cause  30 Day Readmission: No    Patient Information  Admitted from[de-identified] Facility (transfer from [facility])  Mental Status: Alert  During Assessment patient was accompanied by: Daughter (Met with patient and his daughters)  Assessment information provided by[de-identified] Daughter, Patient  Primary Caregiver: Self  Support Systems: Daughter, Caden Sal Dr of Residence: [address] [place] [address] do you live in?: [address] [facility] entry access options   Select all that apply : Stairs  Number of steps to enter home : 3  Do the steps have railings?: Yes  Type of Current Residence: 2 story home  Upon entering residence, is there a bedroom on the main floor (no further steps)?: Yes  Upon entering residence, is there a bathroom on the main floor (no further steps)?: Yes  In the last 12 months, was there a time when you were not able to pay the mortgage or rent on time?: No  In the last 12 months, how many places have you lived?: 1  In the last 12 months, was there a time when you did not have a steady place to sleep or slept in a shelter (including now)?: No  Homeless/housing insecurity resource given?: N/A  Living Arrangements: Lives Alone  Is patient a ?: Yes ([employer])  Is patient active with [address]  ([address])?: No    Activities of Daily Living Prior to Admission  Functional Status: Independent  Completes ADLs independently?: Yes  Ambulates independently?: Yes  Does patient use assisted devices?: No  Does patient currently own DME?: No  Does patient have a history of Outpatient Therapy (PT/OT)?: No  Does the patient have a history of Short-Term Rehab?: No  Does patient have a history of HHC?: No  Does patient currently have [facility]?: No         Patient Information Continued  Income Source: Pension/intermediate  Does patient have prescription coverage?: Yes  Within the past 12 months, you worried that your food would run out before you got the money to buy more : Never true  Within the past 12 months, the food you bought just didn't last and you didn't have money to get more : Never true  Food insecurity resource given?: N/A  Does patient receive dialysis treatments?: No  Does patient have a history of substance abuse?: No  Does patient have a history of Mental Health Diagnosis?: No         Means of Transportation  Means of Transport to Appts[de-identified] Drives Self  In the past 12 months, has lack of transportation kept you from medical appointments or from getting medications?: No  In the past 12 months, has lack of transportation kept you from meetings, work, or from getting things needed for daily living?: No  Was application for public transport provided?: N/A        DISCHARGE DETAILS:    Discharge planning discussed with[de-identified] patient and two daughters  Freedom of Choice: Yes  Comments - Freedom of Choice: discussed FOC  CM contacted family/caregiver?: Yes  Were Treatment Team discharge recommendations reviewed with patient/caregiver?: Yes  Did patient/caregiver verbalize understanding of patient care needs?: Yes       Contacts  Patient Contacts: Philomena Dave-daughter  Relationship to Patient[de-identified] Family  Contact Method:  In Person  Reason/Outcome: Continuity of Care, Emergency Contact, Discharge Planning

## 2022-12-19 NOTE — ASSESSMENT & PLAN NOTE
Rate normal  Continue amiodarone, Xarelto  S/p watchman device  Twelve-lead EKG  Pacemaker interrogation

## 2022-12-19 NOTE — ASSESSMENT & PLAN NOTE
· x1 episode of syncope at home PTA and another episode in ED triage  · No headstrike  · On Xarelto   · Felt "bad" all day today  · No complaints currently  · CT head w/o contrast: negative  · CT C spine w/o contrast: negative  · No traumatic injuries, patient be admitted to [facility] for further work-up of syncopal episodes

## 2022-12-20 ENCOUNTER — APPOINTMENT (OUTPATIENT)
Dept: RADIOLOGY | Facility: HOSPITAL | Age: 87
End: 2022-12-20

## 2022-12-20 ENCOUNTER — ANESTHESIA (INPATIENT)
Dept: NON INVASIVE DIAGNOSTICS | Facility: HOSPITAL | Age: 87
End: 2022-12-20

## 2022-12-20 LAB
ANION GAP SERPL CALCULATED.3IONS-SCNC: 4 MMOL/L (ref 4–13)
ATRIAL RATE: 105 BPM
ATRIAL RATE: 29 BPM
ATRIAL RATE: 30 BPM
ATRIAL RATE: 326 BPM
ATRIAL RATE: 60 BPM
ATRIAL RATE: 64 BPM
ATRIAL RATE: 69 BPM
ATRIAL RATE: 70 BPM
ATRIAL RATE: 70 BPM
ATRIAL RATE: 72 BPM
ATRIAL RATE: 79 BPM
BASOPHILS # BLD AUTO: 0.02 THOUSANDS/ÂΜL (ref 0–0.1)
BASOPHILS NFR BLD AUTO: 0 % (ref 0–1)
BUN SERPL-MCNC: 15 MG/DL (ref 5–25)
CALCIUM SERPL-MCNC: 8.3 MG/DL (ref 8.3–10.1)
CHLORIDE SERPL-SCNC: 109 MMOL/L (ref 96–108)
CO2 SERPL-SCNC: 27 MMOL/L (ref 21–32)
CREAT SERPL-MCNC: 1.03 MG/DL (ref 0.6–1.3)
EOSINOPHIL # BLD AUTO: 0.09 THOUSAND/ÂΜL (ref 0–0.61)
EOSINOPHIL NFR BLD AUTO: 2 % (ref 0–6)
ERYTHROCYTE [DISTWIDTH] IN BLOOD BY AUTOMATED COUNT: 16.7 % (ref 11.6–15.1)
GFR SERPL CREATININE-BSD FRML MDRD: 65 ML/MIN/1.73SQ M
GLUCOSE SERPL-MCNC: 125 MG/DL (ref 65–140)
HCT VFR BLD AUTO: 33.5 % (ref 36.5–49.3)
HGB BLD-MCNC: 10.4 G/DL (ref 12–17)
IMM GRANULOCYTES # BLD AUTO: 0.02 THOUSAND/UL (ref 0–0.2)
IMM GRANULOCYTES NFR BLD AUTO: 0 % (ref 0–2)
INR PPP: 1.04 (ref 0.84–1.19)
LYMPHOCYTES # BLD AUTO: 0.89 THOUSANDS/ÂΜL (ref 0.6–4.47)
LYMPHOCYTES NFR BLD AUTO: 14 % (ref 14–44)
MAGNESIUM SERPL-MCNC: 2.6 MG/DL (ref 1.6–2.6)
MCH RBC QN AUTO: 20.1 PG (ref 26.8–34.3)
MCHC RBC AUTO-ENTMCNC: 31 G/DL (ref 31.4–37.4)
MCV RBC AUTO: 65 FL (ref 82–98)
MONOCYTES # BLD AUTO: 0.67 THOUSAND/ÂΜL (ref 0.17–1.22)
MONOCYTES NFR BLD AUTO: 11 % (ref 4–12)
NEUTROPHILS # BLD AUTO: 4.47 THOUSANDS/ÂΜL (ref 1.85–7.62)
NEUTS SEG NFR BLD AUTO: 73 % (ref 43–75)
NRBC BLD AUTO-RTO: 0 /100 WBCS
P AXIS: -17 DEGREES
P AXIS: 26 DEGREES
P AXIS: 48 DEGREES
P AXIS: 53 DEGREES
P AXIS: 53 DEGREES
P AXIS: 71 DEGREES
P AXIS: 76 DEGREES
P AXIS: 78 DEGREES
PLATELET # BLD AUTO: 166 THOUSANDS/UL (ref 149–390)
PMV BLD AUTO: 9.3 FL (ref 8.9–12.7)
POTASSIUM SERPL-SCNC: 4.4 MMOL/L (ref 3.5–5.3)
PR INTERVAL: 230 MS
PR INTERVAL: 240 MS
PR INTERVAL: 248 MS
PR INTERVAL: 266 MS
PR INTERVAL: 268 MS
PROTHROMBIN TIME: 13.8 SECONDS (ref 11.6–14.5)
QRS AXIS: -69 DEGREES
QRS AXIS: -75 DEGREES
QRS AXIS: -76 DEGREES
QRS AXIS: -78 DEGREES
QRS AXIS: -78 DEGREES
QRS AXIS: -88 DEGREES
QRS AXIS: 216 DEGREES
QRS AXIS: 267 DEGREES
QRS AXIS: 78 DEGREES
QRS AXIS: 95 DEGREES
QRSD INTERVAL: 136 MS
QRSD INTERVAL: 144 MS
QRSD INTERVAL: 148 MS
QRSD INTERVAL: 160 MS
QRSD INTERVAL: 164 MS
QRSD INTERVAL: 166 MS
QRSD INTERVAL: 170 MS
QRSD INTERVAL: 186 MS
QRSD INTERVAL: 188 MS
QT INTERVAL: 440 MS
QT INTERVAL: 466 MS
QT INTERVAL: 472 MS
QT INTERVAL: 474 MS
QT INTERVAL: 476 MS
QT INTERVAL: 494 MS
QT INTERVAL: 498 MS
QT INTERVAL: 498 MS
QT INTERVAL: 544 MS
QT INTERVAL: 564 MS
QT INTERVAL: 566 MS
QTC INTERVAL: 328 MS
QTC INTERVAL: 393 MS
QTC INTERVAL: 398 MS
QTC INTERVAL: 410 MS
QTC INTERVAL: 467 MS
QTC INTERVAL: 494 MS
QTC INTERVAL: 513 MS
QTC INTERVAL: 545 MS
QTC INTERVAL: 567 MS
QTC INTERVAL: 587 MS
QTC INTERVAL: 601 MS
RBC # BLD AUTO: 5.17 MILLION/UL (ref 3.88–5.62)
SODIUM SERPL-SCNC: 140 MMOL/L (ref 135–147)
T WAVE AXIS: -63 DEGREES
T WAVE AXIS: -70 DEGREES
T WAVE AXIS: -77 DEGREES
T WAVE AXIS: -79 DEGREES
T WAVE AXIS: 102 DEGREES
T WAVE AXIS: 103 DEGREES
T WAVE AXIS: 90 DEGREES
T WAVE AXIS: 93 DEGREES
T WAVE AXIS: 94 DEGREES
T WAVE AXIS: 97 DEGREES
T WAVE AXIS: 99 DEGREES
VENTRICULAR RATE: 100 BPM
VENTRICULAR RATE: 100 BPM
VENTRICULAR RATE: 29 BPM
VENTRICULAR RATE: 29 BPM
VENTRICULAR RATE: 30 BPM
VENTRICULAR RATE: 45 BPM
VENTRICULAR RATE: 58 BPM
VENTRICULAR RATE: 60 BPM
VENTRICULAR RATE: 64 BPM
VENTRICULAR RATE: 70 BPM
VENTRICULAR RATE: 72 BPM
WBC # BLD AUTO: 6.16 THOUSAND/UL (ref 4.31–10.16)

## 2022-12-20 PROCEDURE — 02WA3MZ REVISION OF CARDIAC LEAD IN HEART, PERCUTANEOUS APPROACH: ICD-10-PCS | Performed by: INTERNAL MEDICINE

## 2022-12-20 PROCEDURE — 3E0132A INTRODUCTION OF ANTI-INFECTIVE ENVELOPE INTO SUBCUTANEOUS TISSUE, PERCUTANEOUS APPROACH: ICD-10-PCS | Performed by: INTERNAL MEDICINE

## 2022-12-20 DEVICE — ENVELOPE CMRM6122 ABSORB MED MR
Type: IMPLANTABLE DEVICE | Site: CHEST | Status: FUNCTIONAL
Brand: TYRX™

## 2022-12-20 RX ORDER — GENTAMICIN SULFATE 40 MG/ML
INJECTION, SOLUTION INTRAMUSCULAR; INTRAVENOUS CODE/TRAUMA/SEDATION MEDICATION
Status: DISCONTINUED | OUTPATIENT
Start: 2022-12-20 | End: 2022-12-20 | Stop reason: HOSPADM

## 2022-12-20 RX ORDER — EPHEDRINE SULFATE 50 MG/ML
INJECTION INTRAVENOUS AS NEEDED
Status: DISCONTINUED | OUTPATIENT
Start: 2022-12-20 | End: 2022-12-20

## 2022-12-20 RX ORDER — LIDOCAINE HYDROCHLORIDE 10 MG/ML
INJECTION, SOLUTION EPIDURAL; INFILTRATION; INTRACAUDAL; PERINEURAL CODE/TRAUMA/SEDATION MEDICATION
Status: DISCONTINUED | OUTPATIENT
Start: 2022-12-20 | End: 2022-12-20 | Stop reason: HOSPADM

## 2022-12-20 RX ORDER — SODIUM CHLORIDE 9 MG/ML
INJECTION, SOLUTION INTRAVENOUS CONTINUOUS PRN
Status: DISCONTINUED | OUTPATIENT
Start: 2022-12-20 | End: 2022-12-20

## 2022-12-20 RX ORDER — PROPOFOL 10 MG/ML
INJECTION, EMULSION INTRAVENOUS CONTINUOUS PRN
Status: DISCONTINUED | OUTPATIENT
Start: 2022-12-20 | End: 2022-12-20

## 2022-12-20 RX ADMIN — EPHEDRINE SULFATE 5 MG: 50 INJECTION INTRAVENOUS at 13:38

## 2022-12-20 RX ADMIN — SODIUM CHLORIDE: 0.9 INJECTION, SOLUTION INTRAVENOUS at 12:57

## 2022-12-20 RX ADMIN — TAMSULOSIN HYDROCHLORIDE 0.4 MG: 0.4 CAPSULE ORAL at 15:37

## 2022-12-20 RX ADMIN — GABAPENTIN 100 MG: 100 CAPSULE ORAL at 21:58

## 2022-12-20 RX ADMIN — PROPOFOL 40 MCG/KG/MIN: 10 INJECTION, EMULSION INTRAVENOUS at 13:02

## 2022-12-20 RX ADMIN — EPHEDRINE SULFATE 5 MG: 50 INJECTION INTRAVENOUS at 13:41

## 2022-12-20 RX ADMIN — EPHEDRINE SULFATE 10 MG: 50 INJECTION INTRAVENOUS at 13:56

## 2022-12-20 RX ADMIN — CEFAZOLIN SODIUM 2000 MG: 2 SOLUTION INTRAVENOUS at 12:58

## 2022-12-20 RX ADMIN — AMIODARONE HYDROCHLORIDE 200 MG: 200 TABLET ORAL at 08:57

## 2022-12-20 RX ADMIN — GABAPENTIN 100 MG: 100 CAPSULE ORAL at 08:57

## 2022-12-20 RX ADMIN — EPHEDRINE SULFATE 5 MG: 50 INJECTION INTRAVENOUS at 13:28

## 2022-12-20 RX ADMIN — GABAPENTIN 100 MG: 100 CAPSULE ORAL at 15:37

## 2022-12-20 RX ADMIN — ASPIRIN 81 MG CHEWABLE TABLET 81 MG: 81 TABLET CHEWABLE at 08:57

## 2022-12-20 RX ADMIN — AMLODIPINE BESYLATE 2.5 MG: 2.5 TABLET ORAL at 08:57

## 2022-12-20 NOTE — ASSESSMENT & PLAN NOTE
Presented to [address] [place] [address] after a syncopal episode, found to have multiple pauses on telemetry  Concern for PPM lead fracture      Plan:  · EP consult appreciated   · TVP in place - VVI at 60  · Intrinsic pacer initially competing w/ TVP - [employer] adjusted to backup of 40bpm  · Pending addition of new RV lead today, patient would like to avoid extraction

## 2022-12-20 NOTE — ANESTHESIA POSTPROCEDURE EVALUATION
Post-Op Assessment Note    CV Status:  Stable  Pain Score: 0    Pain management: adequate     Mental Status:  Alert and awake   Hydration Status:  Euvolemic   PONV Controlled:  Controlled   Airway Patency:  Patent      Post Op Vitals Reviewed: Yes      Staff: CRNA         No notable events documented      BP   136/ 74   Temp      Pulse  60 paced     Resp      SpO2   98% RA

## 2022-12-20 NOTE — PROGRESS NOTES
[facility]  Progress Note - Curlie Proper 1935, 81 y o  male MRN: [id number]  Unit/Bed#: [facility] 193-81 Encounter: [id number]  Primary Care Provider: Ellen Zheng DO   Date and time admitted to hospital: 12/19/2022  7:57 AM    * Pacemaker lead fracture  Assessment & Plan  Presented to [address] [place] [address] after a syncopal episode, found to have multiple pauses on telemetry  Concern for PPM lead fracture  Plan:  · EP consult appreciated   · TVP in place - VVI at 60  · Intrinsic pacer initially competing w/ TVP - [employer] adjusted to backup of 40bpm  · Pending addition of new RV lead today, patient would like to avoid extraction     Syncope and collapse  Assessment & Plan  · Secondary to PPM malfunction as above     Atrial fibrillation [facility])  Assessment & Plan  Hx of AFib on Xarelto, s/p Watchman 12/8  Plan:  · Holding Xarelto pending procedures   · Continue ASA  · Continue PO amio  · Telemetry monitoring    CHANA (acute kidney injury) ([facility] [address] )  Assessment & Plan  · Patient reportedly had poor PO intake leading up to event  Likely pre-renal in the setting of dehydration  · Continue maintenance fluids for now while NPO  · Renal function improving    Essential hypertension  Assessment & Plan  · Continue home Norvasc    ----------------------------------------------------------------------------------------  HPI/24hr events: PPM dropped to backup of 40 to prevent competing with TVP (VVI 60)  No acute events overnight, pending procedure with EP today  Patient appropriate for transfer out of the ICU today?: Patient does not meet criteria for referral to the ICU Follow-Up Clinic; referral has not been made     Disposition: Transfer to Med Surg with Telemetry after PPM procedure/TVP removed  Code Status: Level 1 - Full Code  ---------------------------------------------------------------------------------------  SUBJECTIVE  No complaints     Review of Systems  Review of systems was reviewed and negative unless stated above in HPI/24-hour events   ---------------------------------------------------------------------------------------  OBJECTIVE    Vitals   Vitals:    22 2200 22 2300 22 0000 22 0100   BP: 143/64 136/64 132/61 117/62   BP Location:   Right arm    Pulse: 60 60 60 60   Resp: 16 12 (!) 11 12   Temp:       TempSrc:       SpO2: 94% 98% 97% 98%   Weight:       Height:         Temp (24hrs), Av 3 °F (36 3 °C), Min:97 °F (36 1 °C), Max:97 6 °F (36 4 °C)  Current: Temperature: 97 6 °F (36 4 °C)          Respiratory:  SpO2: SpO2: 98 %  Nasal Cannula O2 Flow Rate (L/min): 2 L/min    Invasive/non-invasive ventilation settings   Respiratory    Lab Data (Last 4 hours)    None         O2/Vent Data (Last 4 hours)    None                Physical Exam  Vitals reviewed  Constitutional:       General: He is awake  He is not in acute distress  Appearance: He is overweight  He is not toxic-appearing or diaphoretic  Eyes:      Pupils: Pupils are equal, round, and reactive to light  Cardiovascular:      Rate and Rhythm: Normal rate and regular rhythm  Heart sounds: Normal heart sounds  Pulmonary:      Effort: Pulmonary effort is normal       Breath sounds: Normal breath sounds  Abdominal:      General: Abdomen is protuberant  There is no distension  Palpations: Abdomen is soft  Tenderness: There is no abdominal tenderness  Skin:     Comments: R groin sheath and TVP in place, no hematoma or ecchymosis    Neurological:      General: No focal deficit present  Mental Status: He is alert and oriented to person, place, and time               Laboratory and Diagnostics:  Results from last 7 days   Lab Units 22  0552 22  2334 22  2333   WBC Thousand/uL 4 37 4 23*  --    HEMOGLOBIN g/dL 10 1* 10 1*  --    I STAT HEMOGLOBIN g/dl  --   --  11 2*   HEMATOCRIT % 32 2* 32 3*  --    HEMATOCRIT, ISTAT %  -- --  33*   PLATELETS Thousands/uL 162 168  --    NEUTROS PCT % 69 68  --    MONOS PCT % 11 13*  --      Results from last 7 days   Lab Units 12/19/22  0552 12/18/22  2334 12/18/22 2333 12/13/22  1016   SODIUM mmol/L 135 133*  --  137   POTASSIUM mmol/L 5 0 4 2  --  4 6   CHLORIDE mmol/L 106 104  --  105   CO2 mmol/L 27 24  --  27   CO2, I-STAT mmol/L  --   --  24  --    ANION GAP mmol/L 2* 5  --  5   BUN mg/dL 19 21  --  20   CREATININE mg/dL 1 13 1 35*  --  1 09   CALCIUM mg/dL 8 3* 8 3*  --  9 1   GLUCOSE RANDOM mg/dL 129 147*  --   --      Results from last 7 days   Lab Units 12/19/22 0552 12/18/22 2334   MAGNESIUM mg/dL 2 2 2 0   PHOSPHORUS mg/dL 2 6  --       Results from last 7 days   Lab Units 12/18/22  2334   INR  1 32*   PTT seconds 37              ABG:    VBG:    Results from last 7 days   Lab Units 12/19/22  0552   PROCALCITONIN ng/ml 0 05       Micro        EKG: V paced at 60   Imaging: I have personally reviewed pertinent reports  Intake and Output  I/O       12/18 0701 12/19 0700 12/19 0701  12/20 0700    P  O   480    I V  (mL/kg)  591 7 (7 2)    Total Intake(mL/kg)  1071 7 (13 1)    Urine (mL/kg/hr)  2250 (1 1)    Emesis/NG output  0    Stool  0    Total Output  2250    Net  -1178 3          Unmeasured Stool Occurrence  0 x          Height and Weights   Height: 5' 5" (165 1 cm)  IBW (Ideal Body Weight): 61 5 kg  Body mass index is 30 08 kg/m²  Weight (last 2 days)     Date/Time Weight    12/19/22 0800 82 (180 78)            Nutrition       Diet Orders   (From admission, onward)             Start     Ordered    12/20/22 0001  Diet NPO  Diet effective midnight        References:    Nutrtion Support Algorithm Enteral vs  Parenteral   Question Answer Comment   Diet Type NPO    RD to adjust diet per protocol?  Yes        12/19/22 1438                  Active Medications  Scheduled Meds:  Current Facility-Administered Medications   Medication Dose Route Frequency Provider Last Rate   • amiodarone 200 mg Oral Daily Wash BASIL Franco     • amLODIPine  2 5 mg Oral Daily Wash Armando Franco     • aspirin  81 mg Oral Daily Wash BASIL Franco     • cefazolin  2,000 mg Intravenous Once Anabelle Urena PA-C     • gabapentin  100 mg Oral TID Wash BASIL Franco     • hydrALAZINE  10 mg Intravenous Q6H PRN Wash BASIL Franco     • rivaroxaban  10 mg Oral Daily Wash BASIL Franco     • tamsulosin  0 4 mg Oral Daily With [employer], PA-C       Continuous Infusions:     PRN Meds:   hydrALAZINE, 10 mg, Q6H PRN        Invasive Devices Review  Invasive Devices     Peripheral Intravenous Line  Duration           Peripheral IV 12/19/22 Distal;Right;Upper;Ventral (anterior) Arm 1 day    Peripheral IV 12/19/22 Left;Proximal;Ventral (anterior) Forearm <1 day          Line  Duration           Venous Sheath 6 Fr  Right Femoral <1 day                Rationale for remaining devices: Sheath/TVP - pacing requirements   ---------------------------------------------------------------------------------------  Advance Directive and Living Will:      Power of :    POLST:    ---------------------------------------------------------------------------------------  Care Time Delivered:   No Critical Care time spent       Kika Delgado PA-C      Portions of the record may have been created with voice recognition software  Occasional wrong word or "sound a like" substitutions may have occurred due to the inherent limitations of voice recognition software    Read the chart carefully and recognize, using context, where substitutions have occurred

## 2022-12-20 NOTE — ANESTHESIA PREPROCEDURE EVALUATION
Procedure:  Cardiac lead revision (Right: Chest)  81 y o  man who presents with 1 day sof dizziness/lightheadedness leading to syncope due to RV lead malfunction  RV lead had impedence of >3000 ohms and loss of capture noted at time of his symptoms  He underwent TVP placement  Relevant Problems   CARDIO   (+) Atrial fibrillation (HCC)   (+) Chest pain   (+) Essential hypertension   (+) Sick sinus syndrome (HCC)      /RENAL   (+) CHANA (acute kidney injury) (HCC) (Resolved)      NEURO/PSYCH   (+) Paresthesias   (+) Subdural hematoma      Normal biventricular systolic function, mild AS (mean 5mmHg), mild AI, mild MR, mild-mod TR       Anesthesia Plan  ASA Score- 3     Anesthesia Type- IV sedation with anesthesia with ASA Monitors  Additional Monitors:   Airway Plan:           Plan Factors-    Chart reviewed  Patient summary reviewed  Induction- intravenous  Postoperative Plan-     Informed Consent- Anesthetic plan and risks discussed with patient  I personally reviewed this patient with the CRNA  Discussed and agreed on the Anesthesia Plan with the CRNA  Ravi Dumont

## 2022-12-20 NOTE — PLAN OF CARE
Problem: MOBILITY - ADULT  Goal: Maintain or return to baseline ADL function  Description: INTERVENTIONS:  -  Assess patient's ability to carry out ADLs; assess patient's baseline for ADL function and identify physical deficits which impact ability to perform ADLs (bathing, care of mouth/teeth, toileting, grooming, dressing, etc )  - Assess/evaluate cause of self-care deficits   - Assess range of motion  - Assess patient's mobility; develop plan if impaired  - Assess patient's need for assistive devices and provide as appropriate  - Encourage maximum independence but intervene and supervise when necessary  - Involve family in performance of ADLs  - Assess for home care needs following discharge   - Consider OT consult to assist with ADL evaluation and planning for discharge  - Provide patient education as appropriate  Outcome: Progressing  Goal: Maintains/Returns to pre admission functional level  Description: INTERVENTIONS:  - Perform BMAT or MOVE assessment daily    - Set and communicate daily mobility goal to care team and patient/family/caregiver  - Collaborate with rehabilitation services on mobility goals if consulted  - Perform Range of Motion  times a day  - Reposition patient every  hours    - Dangle patient  times a day  - Stand patient  times a day  - Ambulate patient  times a day  - Out of bed to chair  times a day   - Out of bed for meals  times a day  - Out of bed for toileting  - Record patient progress and toleration of activity level   Outcome: Progressing     Problem: Potential for Falls  Goal: Patient will remain free of falls  Description: INTERVENTIONS:  - Educate patient/family on patient safety including physical limitations  - Instruct patient to call for assistance with activity   - Consult OT/PT to assist with strengthening/mobility   - Keep Call bell within reach  - Keep bed low and locked with side rails adjusted as appropriate  - Keep care items and personal belongings within reach  - Initiate and maintain comfort rounds  - Make Fall Risk Sign visible to staff  - Offer Toileting every  Hours, in advance of need  - Initiate/Maintain alarm  - Obtain necessary fall risk management equipment:   - Apply yellow socks and bracelet for high fall risk patients  - Consider moving patient to room near nurses station  Outcome: Progressing     Problem: Prexisting or High Potential for Compromised Skin Integrity  Goal: Skin integrity is maintained or improved  Description: INTERVENTIONS:  - Identify patients at risk for skin breakdown  - Assess and monitor skin integrity  - Assess and monitor nutrition and hydration status  - Monitor labs   - Assess for incontinence   - Turn and reposition patient  - Assist with mobility/ambulation  - Relieve pressure over bony prominences  - Avoid friction and shearing  - Provide appropriate hygiene as needed including keeping skin clean and dry  - Evaluate need for skin moisturizer/barrier cream  - Collaborate with interdisciplinary team   - Patient/family teaching  - Consider wound care consult   Outcome: Progressing     Problem: PAIN - ADULT  Goal: Verbalizes/displays adequate comfort level or baseline comfort level  Description: Interventions:  - Encourage patient to monitor pain and request assistance  - Assess pain using appropriate pain scale  - Administer analgesics based on type and severity of pain and evaluate response  - Implement non-pharmacological measures as appropriate and evaluate response  - Consider cultural and social influences on pain and pain management  - Notify physician/advanced practitioner if interventions unsuccessful or patient reports new pain  Outcome: Progressing     Problem: INFECTION - ADULT  Goal: Absence or prevention of progression during hospitalization  Description: INTERVENTIONS:  - Assess and monitor for signs and symptoms of infection  - Monitor lab/diagnostic results  - Monitor all insertion sites, i e  indwelling lines, tubes, and drains  - Monitor endotracheal if appropriate and nasal secretions for changes in amount and color  - [place] appropriate cooling/warming therapies per order  - Administer medications as ordered  - Instruct and encourage patient and family to use good hand hygiene technique  - Identify and instruct in appropriate isolation precautions for identified infection/condition  Outcome: Progressing  Goal: Absence of fever/infection during neutropenic period  Description: INTERVENTIONS:  - Monitor WBC    Outcome: Progressing     Problem: SAFETY ADULT  Goal: Maintain or return to baseline ADL function  Description: INTERVENTIONS:  -  Assess patient's ability to carry out ADLs; assess patient's baseline for ADL function and identify physical deficits which impact ability to perform ADLs (bathing, care of mouth/teeth, toileting, grooming, dressing, etc )  - Assess/evaluate cause of self-care deficits   - Assess range of motion  - Assess patient's mobility; develop plan if impaired  - Assess patient's need for assistive devices and provide as appropriate  - Encourage maximum independence but intervene and supervise when necessary  - Involve family in performance of ADLs  - Assess for home care needs following discharge   - Consider OT consult to assist with ADL evaluation and planning for discharge  - Provide patient education as appropriate  Outcome: Progressing  Goal: Maintains/Returns to pre admission functional level  Description: INTERVENTIONS:  - Perform BMAT or MOVE assessment daily    - Set and communicate daily mobility goal to care team and patient/family/caregiver  - Collaborate with rehabilitation services on mobility goals if consulted  - Perform Range of Motion  times a day  - Reposition patient every  hours    - Dangle patient times a day  - Stand patient  times a day  - Ambulate patient  times a day  - Out of bed to chair  times a day   - Out of bed for meals  times a day  - Out of bed for toileting  - Record patient progress and toleration of activity level   Outcome: Progressing  Goal: Patient will remain free of falls  Description: INTERVENTIONS:  - Educate patient/family on patient safety including physical limitations  - Instruct patient to call for assistance with activity   - Consult OT/PT to assist with strengthening/mobility   - Keep Call bell within reach  - Keep bed low and locked with side rails adjusted as appropriate  - Keep care items and personal belongings within reach  - Initiate and maintain comfort rounds  - Make Fall Risk Sign visible to staff  - Offer Toileting every  Hours, in advance of need  - Initiate/Maintain alarm  - Obtain necessary fall risk management equipment:   - Apply yellow socks and bracelet for high fall risk patients  - Consider moving patient to room near nurses station  Outcome: Progressing     Problem: DISCHARGE PLANNING  Goal: Discharge to home or other facility with appropriate resources  Description: INTERVENTIONS:  - Identify barriers to discharge w/patient and caregiver  - Arrange for needed discharge resources and transportation as appropriate  - Identify discharge learning needs (meds, wound care, etc )  - Arrange for interpretive services to assist at discharge as needed  - Refer to Case Management Department for coordinating discharge planning if the patient needs post-hospital services based on physician/advanced practitioner order or complex needs related to functional status, cognitive ability, or social support system  Outcome: Progressing     Problem: Knowledge Deficit  Goal: Patient/family/caregiver demonstrates understanding of disease process, treatment plan, medications, and discharge instructions  Description: Complete learning assessment and assess knowledge base    Interventions:  - Provide teaching at level of understanding  - Provide teaching via preferred learning methods  Outcome: Progressing

## 2022-12-20 NOTE — ASSESSMENT & PLAN NOTE
Hx of AFib on Xarelto, s/p Watchman 12/8      Plan:  · Holding Xarelto pending procedures   · Continue ASA  · Continue PO amio  · Telemetry monitoring

## 2022-12-21 ENCOUNTER — APPOINTMENT (OUTPATIENT)
Dept: RADIOLOGY | Facility: HOSPITAL | Age: 87
End: 2022-12-21

## 2022-12-21 VITALS
DIASTOLIC BLOOD PRESSURE: 59 MMHG | WEIGHT: 162.92 LBS | HEIGHT: 65 IN | OXYGEN SATURATION: 99 % | RESPIRATION RATE: 18 BRPM | HEART RATE: 64 BPM | TEMPERATURE: 98 F | BODY MASS INDEX: 27.14 KG/M2 | SYSTOLIC BLOOD PRESSURE: 117 MMHG

## 2022-12-21 PROBLEM — N17.9 AKI (ACUTE KIDNEY INJURY) (HCC): Status: RESOLVED | Noted: 2022-12-19 | Resolved: 2022-12-21

## 2022-12-21 LAB
ANION GAP SERPL CALCULATED.3IONS-SCNC: 5 MMOL/L (ref 4–13)
BASOPHILS # BLD AUTO: 0.02 THOUSANDS/ÂΜL (ref 0–0.1)
BASOPHILS NFR BLD AUTO: 0 % (ref 0–1)
BUN SERPL-MCNC: 18 MG/DL (ref 5–25)
CALCIUM SERPL-MCNC: 8 MG/DL (ref 8.3–10.1)
CHLORIDE SERPL-SCNC: 108 MMOL/L (ref 96–108)
CO2 SERPL-SCNC: 26 MMOL/L (ref 21–32)
CREAT SERPL-MCNC: 1.04 MG/DL (ref 0.6–1.3)
EOSINOPHIL # BLD AUTO: 0.11 THOUSAND/ÂΜL (ref 0–0.61)
EOSINOPHIL NFR BLD AUTO: 2 % (ref 0–6)
ERYTHROCYTE [DISTWIDTH] IN BLOOD BY AUTOMATED COUNT: 16.7 % (ref 11.6–15.1)
GFR SERPL CREATININE-BSD FRML MDRD: 64 ML/MIN/1.73SQ M
GLUCOSE SERPL-MCNC: 115 MG/DL (ref 65–140)
HCT VFR BLD AUTO: 32.2 % (ref 36.5–49.3)
HGB BLD-MCNC: 10 G/DL (ref 12–17)
IMM GRANULOCYTES # BLD AUTO: 0.01 THOUSAND/UL (ref 0–0.2)
IMM GRANULOCYTES NFR BLD AUTO: 0 % (ref 0–2)
LYMPHOCYTES # BLD AUTO: 1.09 THOUSANDS/ÂΜL (ref 0.6–4.47)
LYMPHOCYTES NFR BLD AUTO: 20 % (ref 14–44)
MAGNESIUM SERPL-MCNC: 2.2 MG/DL (ref 1.6–2.6)
MCH RBC QN AUTO: 20.2 PG (ref 26.8–34.3)
MCHC RBC AUTO-ENTMCNC: 31.1 G/DL (ref 31.4–37.4)
MCV RBC AUTO: 65 FL (ref 82–98)
MONOCYTES # BLD AUTO: 0.69 THOUSAND/ÂΜL (ref 0.17–1.22)
MONOCYTES NFR BLD AUTO: 13 % (ref 4–12)
NEUTROPHILS # BLD AUTO: 3.47 THOUSANDS/ÂΜL (ref 1.85–7.62)
NEUTS SEG NFR BLD AUTO: 65 % (ref 43–75)
NRBC BLD AUTO-RTO: 0 /100 WBCS
PLATELET # BLD AUTO: 141 THOUSANDS/UL (ref 149–390)
PMV BLD AUTO: 9.3 FL (ref 8.9–12.7)
POTASSIUM SERPL-SCNC: 4.4 MMOL/L (ref 3.5–5.3)
RBC # BLD AUTO: 4.95 MILLION/UL (ref 3.88–5.62)
SODIUM SERPL-SCNC: 139 MMOL/L (ref 135–147)
WBC # BLD AUTO: 5.39 THOUSAND/UL (ref 4.31–10.16)

## 2022-12-21 RX ADMIN — ASPIRIN 81 MG CHEWABLE TABLET 81 MG: 81 TABLET CHEWABLE at 08:17

## 2022-12-21 RX ADMIN — GABAPENTIN 100 MG: 100 CAPSULE ORAL at 08:17

## 2022-12-21 RX ADMIN — AMIODARONE HYDROCHLORIDE 200 MG: 200 TABLET ORAL at 08:17

## 2022-12-21 RX ADMIN — RIVAROXABAN 10 MG: 10 TABLET, FILM COATED ORAL at 08:17

## 2022-12-21 RX ADMIN — AMLODIPINE BESYLATE 2.5 MG: 2.5 TABLET ORAL at 08:17

## 2022-12-21 NOTE — DISCHARGE SUMMARY
[address] [facility]  Discharge- Theron Huge 1935, 81 y o  male MRN: [id number]  Unit/Bed#: [facility] 506-01 Encounter: [id number]  Primary Care Provider: David Sherman DO   Date and time admitted to hospital: 12/19/2022  7:57 AM    * Pacemaker lead fracture  Assessment & Plan  Presented initially to [facility] after syncopal episode, found to have multiple pauses on telemetry with concern for PPM lead fracture  · Transferred to [facility] for EP evaluation  Status post new RV lead with EP on 12/20  He tolerated this procedure well  · He has been cleared now by EP, he is stable for discharge  Patient reports he feels well, he denies any lightheadedness or dizziness    He is ambulating without difficulty  · Outpatient follow-up with EP with device check in 2 weeks  · Resume home Rx on discharge    Syncope and collapse  Assessment & Plan  · Secondary to primary problem    Atrial fibrillation [facility])  Assessment & Plan  Status post watchman 12/8  Resume home Xarelto, amiodarone on discharge  Outpatient follow-up    Essential hypertension  Assessment & Plan  Resume amiodarone and Norvasc on discharge    CHANA (acute kidney injury) (HCC)-resolved as of 12/21/2022  Assessment & Plan  Likely prerenal in setting of dehydration given reported poor p o  intake  Was given IV fluids  Renal function has improved, creatinine now at baseline  Resolved      Medical Problems     Resolved Problems  Date Reviewed: 12/21/2022          Resolved    CHANA (acute kidney injury) ([facility] [address] ) 12/21/2022     Resolved by  Jasper Levin PA-C        Discharging Physician / Practitioner: Jasper Levin PA-C  PCP: David Sherman DO  Admission Date:   Admission Orders (From admission, onward)     Ordered        12/19/22 0832  Inpatient Admission  Once                      Discharge Date: 12/21/22    Consultations During Hospital Stay:  · EP    Procedures Performed:   · 12/20: Addition of new RV lead    Significant Findings / Test Results:   · RV lead dysfunction resulting in syncope/pauses  · CHANA, resolved     Incidental Findings:   · None    Test Results Pending at Discharge (will require follow up): · None     Outpatient Tests Requested:  · Device check in two weeks     Complications:  None    Reason for Admission: syncope     Hospital Course:   Glenny Newton is a 81 y o  male patient who originally presented to the hospital on 12/19/2022 due to dizziness, lightheadedness with episode of syncope  Medical work-up negative  He was monitored on telemetry noted to have pauses on telemetry  PPM was interrogated by [employer], with suspicion for RV lead fracture  He was transferred to [facility] for EP evaluation, and is status post notion of new RV lead on 12/20  He tolerated this procedure well with no complications  He has been cleared by EP at this time for discharge  The patient feels well today, and denies any lightheadedness or dizziness  He is ambulating without difficulty  He should have device check in 2 weeks with outpatient follow-up with EP  He may resume home medications  Please see above list of diagnoses and related plan for additional information  Condition at Discharge: good    Discharge Day Visit / Exam:   Subjective:  Pt has no acute complaints, denies lightheadedness, dizziness  Ambulating ok   Vitals: Blood Pressure: 117/59 (12/21/22 1036)  Pulse: 64 (12/21/22 1036)  Temperature: 98 °F (36 7 °C) (12/21/22 1036)  Temp Source: Oral (12/21/22 1036)  Respirations: 18 (12/21/22 1036)  Height: 5' 5" (165 1 cm) (12/19/22 0800)  Weight - Scale: 73 9 kg (162 lb 14 7 oz) (12/20/22 0553)  SpO2: 99 % (12/21/22 1036)  Exam:   Physical Exam  Vitals reviewed  Constitutional:       General: He is not in acute distress  Appearance: He is not toxic-appearing  HENT:      Head: Normocephalic and atraumatic  Eyes:      Extraocular Movements: Extraocular movements intact     Cardiovascular:      Rate and Rhythm: Normal rate and regular rhythm  Pulmonary:      Effort: Pulmonary effort is normal  No respiratory distress  Breath sounds: Normal breath sounds  No wheezing or rales  Abdominal:      General: Bowel sounds are normal  There is no distension  Palpations: Abdomen is soft  Musculoskeletal:         General: Normal range of motion  Cervical back: Normal range of motion  Neurological:      General: No focal deficit present  Mental Status: He is alert and oriented to person, place, and time  Psychiatric:         Mood and Affect: Mood normal          Behavior: Behavior normal          Thought Content: Thought content normal           Discussion with Family: Updated  (family member) via phone  Discharge instructions/Information to patient and family:   See after visit summary for information provided to patient and family  Provisions for Follow-Up Care:  See after visit summary for information related to follow-up care and any pertinent home health orders  Disposition:   Home    Planned Readmission: no     Discharge Statement:  I spent 28 minutes discharging the patient  This time was spent on the day of discharge  I had direct contact with the patient on the day of discharge  Greater than 50% of the total time was spent examining patient, answering all patient questions, arranging and discussing plan of care with patient as well as directly providing post-discharge instructions  Additional time then spent on discharge activities  Discharge Medications:  See after visit summary for reconciled discharge medications provided to patient and/or family        **Please Note: This note may have been constructed using a voice recognition system**

## 2022-12-21 NOTE — DISCHARGE INSTR - AVS FIRST PAGE
Please refer to post pacemaker implantation discharge instructions and restrictions and your pacemaker booklet/temporary card  Keep incision dry for one week  Do not use lotions/powders/creams on incision  Leave outer bandage in place for 1 week - it is water proof, and as long as it is fully adhered to your skin you may shower with it  If it appears as though the bandage is coming off and/or there is any communication to the area of device incision, please then keep the whole area dry for the remaining week  After 1 week, please remove by pulling all edges away from the center of the bandage  No overhead reaching/pushing/pulling/lifting greater than 5-10lbs with left arm for six weeks  Please call the office if you notice redness, swelling, bleeding, or drainage from incision or if you develop fevers  AFTER PACEMAKER CARE:    If you have any questions, please call [phone] to speak with a nurse (8:30am-4pm, or [phone] after hours)  For appointments, please call [phone]  WHAT YOU SHOULD KNOW:   A pacemaker is a small, battery-powered device that is placed under your skin in your upper chest area with wires placed through a vein that lead directly into the heart  It helps regulate your heart rate and prevent your heart from beating too slowly  AFTER YOU LEAVE:     Medicines:     Pain medicine: You may need medicine to take away or decrease pain  Learn how to take your medicine  Ask what medicine and how much you should take  Be sure you know how, when, and how often to take it  Usually Over the counter pain medicine is sufficient to control pain (Acetominophen or Ibuprofen) Ask your doctor if you may take these  If this does not control your pain, narcotic pain killers may be prescribed, please call if you need prescription  Do not wait until the pain is severe before you take your medicine  Tell caregivers if your pain does not decrease      Pain medicine can make you dizzy or sleepy  Prevent falls by calling someone when you get out of bed or if you need help  Take your medicine as directed  Call your healthcare provider if you think your medicine is not helping or if you have side effects  Tell him if you are allergic to any medicine  Follow up with your cardiologist after your procedure: You will need a follow-up visit approximately 2 weeks after you leave the hospital  Your cardiologist will check your wound and make sure that your pacemaker is working correctly  Follow the instructions to check your pacemaker: Your cardiologist or primary healthcare provider will check your pacemaker and the battery regularly  He will use a computer to check your pacemaker over the telephone or wireless device which will be given to you  Pacemaker batteries usually last 8 to 10 years  The pacemaker unit will be replaced when the battery gets low  This is a simpler procedure than the original one to implant your pacemaker  Wound care:  Keep your incision dry for one week  Do not use lotions/powders/creams on incision  Leave outer bandage in place for 1 week - it is water proof, and as long as it is fully adhered to your skin you may shower with it  If it appears as though the bandage is coming off and/or there is any communication to the area of device incision, please then keep the whole area dry for the remaining week  After 1 week, please remove by pulling all edges away from the center of the bandage  Please call the office if you notice redness, swelling, bleeding, or drainage from incision or if you develop fevers  Activity:   Arm movement and lifting:  Be careful using the arm on the side of your pacemaker  Do not move your arm for the first 24 hours after your procedure  Do not  lift your arm above your shoulder or lift more than 10 pounds for one month after your procedure   Avoid pushing, pulling, or repetitive arm movements for one month  This helps the leads stay in place and helps your wound heal  Ask your caregiver when you can drive after your procedure  You may move your arm side to side without lifting above your shoulder, and do not need to wear a sling at home  Driving: you are ok to drive 48 hours after pacemaker is implanted   Sports:  Ask your caregiver when it is okay to play tennis, golf, basketball, or any sport that requires you to lift your arms  Do not play full contact sports, such as football, that could damage your pacemaker  Ask your cardiologist or primary healthcare provider how much and what kinds of physical activity are safe for you  Living with a pacemaker:   Tell all caregivers you have a pacemaker: This includes surgeons, radiologists, and medical technicians  You may want to wear a medical alert ID bracelet or necklace that states that you have a pacemaker  Carry your pacemaker ID card: Make sure you receive a pacemaker ID card  Carry it with you at all times  It lists important information about your pacemaker  Show it to airport security if you travel  Avoid electrical interference:  Avoid welding equipment and other equipment with large magnets or electric fields  These things could interfere with how your pacemaker works  Use your cell phone on the ear opposite from your pacemaker  Do not carry your cell phone in your shirt pocket over your chest      Some Pacemakers are MRI safe  Ask you doctor if it is safe to proceed with MRI and let the radiologist and staff know you have a pacemaker  Do not touch the skin around your pacemaker: This can cause damage to the lead wires or move the pacemaker unit from where it should be  Contact your cardiologist or primary healthcare provider if:   The area around your pacemaker has increasing amount of pain after surgery  The pain should improve over first few days after implantation       The skin around your stitches has increasing redness, swelling, or has drainage  This may mean that you have an infection  You have a fever  You have chills, a cough, and feel weak or achy  These are also signs of infection  Your feet or ankles are more swollen than your baseline  Your Heart rate is less than 50 beats per minute     Seek care immediately if:   Your bandage becomes soaked with blood  Your pacemaker is swelling rapidly    Your stitches open up  You feel your heart suddenly beating very slowly or quickly  You become too weak or dizzy to stand, or you pass out  Your arm or leg feels warm, tender, and painful  It may look swollen and red  You have chest pain that does not go away with rest or medicine  You feel lightheaded, short of breath, and have chest pain  You cough up blood  © 2014 [address] is for End User's use only and may not be sold, redistributed or otherwise used for commercial purposes  All illustrations and images included in CareNotes® are the copyrighted property of A D A M , Inc  or Bruce Edwards  The above information is an  only  It is not intended as medical advice for individual conditions or treatments  Talk to your doctor, nurse or pharmacist before following any medical regimen to see if it is safe and effective for you

## 2022-12-21 NOTE — PROGRESS NOTES
Device interrogation today shows appropriate device function, including lead sensing, thresholds, and impedances  Incision is clean, dry, intact without swelling, hematoma, redness, bleeding, drainage, or signs of infection  Chest xray this morning shows appropriate lead placement without pneumothorax  All post implantation discharge instructions and restrictions were reviewed in detail, all questions were answered  Follow up has been arranged  Ok to resume amiodarone and Xarelto as previously instructed  OK to discharge from EP standpoint, please contact us with questions or concerns

## 2022-12-21 NOTE — ASSESSMENT & PLAN NOTE
Likely prerenal in setting of dehydration given reported poor p o  intake  Was given IV fluids  Renal function has improved, creatinine now at baseline  Resolved

## 2022-12-21 NOTE — ASSESSMENT & PLAN NOTE
Presented initially to [address] [place] [address] after syncopal episode, found to have multiple pauses on telemetry with concern for PPM lead fracture  · Transferred to [facility] for EP evaluation  Status post new RV lead with EP on 12/20  He tolerated this procedure well  · He has been cleared now by EP, he is stable for discharge  Patient reports he feels well, he denies any lightheadedness or dizziness    He is ambulating without difficulty  · Outpatient follow-up with EP with device check in 2 weeks  · Resume home Rx on discharge

## 2022-12-22 ENCOUNTER — PATIENT OUTREACH (OUTPATIENT)
Dept: CASE MANAGEMENT | Facility: OTHER | Age: 87
End: 2022-12-22

## 2022-12-22 NOTE — PROGRESS NOTES
In basket message received with hospital discharge  Chart reviewed  Patient was admitted to [address] [place] [address] on 12/18 with syncope and pacemaker lead fracture  He was transferred to [facility] on 12/19 with sick sinus syndrome and to have his lead replaced  He discharged to home on 12/21/22  I called and spoke with daughter who reports patient feels good  I explained care management however she declined  She did tale my contact information and I advised her to call if I can assist them

## 2022-12-27 ENCOUNTER — EPISODE CHANGES (OUTPATIENT)
Dept: CASE MANAGEMENT | Facility: OTHER | Age: 87
End: 2022-12-27

## 2022-12-27 ENCOUNTER — PATIENT OUTREACH (OUTPATIENT)
Dept: CASE MANAGEMENT | Facility: OTHER | Age: 87
End: 2022-12-27

## 2023-01-05 ENCOUNTER — IN-CLINIC DEVICE VISIT (OUTPATIENT)
Dept: CARDIOLOGY CLINIC | Facility: CLINIC | Age: 88
End: 2023-01-05

## 2023-01-05 DIAGNOSIS — Z95.0 CARDIAC PACEMAKER IN SITU: Primary | ICD-10-CM

## 2023-01-05 NOTE — PROGRESS NOTES
Results for orders placed or performed in visit on 01/05/23   Cardiac EP device report    Narrative    MDT-DUAL [address] INTERROGATED IN THE [facility] OFFICE  BATTERY VOLTAGE ADEQUATE (6 5 YRS)  AP-74%, >99% (DEPENDENT/CHB)  ALL LEAD PARAMETERS WITHIN NORMAL LIMITS  NO SIGNIFICANT HIGH RATE EPISODES  NORMAL DEVICE FUNCTION  WOUND CHECK: INCISION CLEAN AND DRY WITH EDGES APPROXIMATED; WOUND CARE AND RESTRICTIONS REVIEWED WITH PATIENT   GV

## 2023-01-25 ENCOUNTER — TELEPHONE (OUTPATIENT)
Dept: CARDIOLOGY CLINIC | Facility: CLINIC | Age: 88
End: 2023-01-25

## 2023-01-25 NOTE — TELEPHONE ENCOUNTER
Daughter/Philomena asking about taking medication, Dad is scheduled for a MAURA,    Went upstairs and spoke to  [place] and advised can take all [employer] verbally understood

## 2023-01-26 ENCOUNTER — ANESTHESIA (OUTPATIENT)
Dept: NON INVASIVE DIAGNOSTICS | Facility: HOSPITAL | Age: 88
End: 2023-01-26

## 2023-01-26 ENCOUNTER — HOSPITAL ENCOUNTER (OUTPATIENT)
Dept: NON INVASIVE DIAGNOSTICS | Facility: HOSPITAL | Age: 88
Discharge: HOME | End: 2023-01-26

## 2023-01-26 ENCOUNTER — ANESTHESIA EVENT (OUTPATIENT)
Dept: NON INVASIVE DIAGNOSTICS | Facility: HOSPITAL | Age: 88
End: 2023-01-26

## 2023-01-26 VITALS
SYSTOLIC BLOOD PRESSURE: 118 MMHG | OXYGEN SATURATION: 93 % | RESPIRATION RATE: 16 BRPM | HEART RATE: 67 BPM | BODY MASS INDEX: 26.99 KG/M2 | DIASTOLIC BLOOD PRESSURE: 62 MMHG | HEIGHT: 65 IN | WEIGHT: 162 LBS

## 2023-01-26 LAB — SL CV LV EF: 60

## 2023-01-26 RX ORDER — LIDOCAINE HYDROCHLORIDE 10 MG/ML
INJECTION, SOLUTION EPIDURAL; INFILTRATION; INTRACAUDAL; PERINEURAL AS NEEDED
Status: DISCONTINUED | OUTPATIENT
Start: 2023-01-26 | End: 2023-01-26

## 2023-01-26 RX ORDER — PROPOFOL 10 MG/ML
INJECTION, EMULSION INTRAVENOUS CONTINUOUS PRN
Status: DISCONTINUED | OUTPATIENT
Start: 2023-01-26 | End: 2023-01-26

## 2023-01-26 RX ORDER — PROPOFOL 10 MG/ML
INJECTION, EMULSION INTRAVENOUS AS NEEDED
Status: DISCONTINUED | OUTPATIENT
Start: 2023-01-26 | End: 2023-01-26

## 2023-01-26 RX ORDER — SODIUM CHLORIDE 9 MG/ML
INJECTION, SOLUTION INTRAVENOUS CONTINUOUS PRN
Status: DISCONTINUED | OUTPATIENT
Start: 2023-01-26 | End: 2023-01-26

## 2023-01-26 RX ORDER — FENTANYL CITRATE 50 UG/ML
INJECTION, SOLUTION INTRAMUSCULAR; INTRAVENOUS AS NEEDED
Status: DISCONTINUED | OUTPATIENT
Start: 2023-01-26 | End: 2023-01-26

## 2023-01-26 RX ADMIN — SODIUM CHLORIDE: 9 INJECTION, SOLUTION INTRAVENOUS at 10:09

## 2023-01-26 RX ADMIN — LIDOCAINE HYDROCHLORIDE 80 MG: 10 INJECTION, SOLUTION EPIDURAL; INFILTRATION; INTRACAUDAL; PERINEURAL at 10:21

## 2023-01-26 RX ADMIN — PROPOFOL 50 MCG/KG/MIN: 10 INJECTION, EMULSION INTRAVENOUS at 10:21

## 2023-01-26 RX ADMIN — FENTANYL CITRATE 25 MCG: 50 INJECTION INTRAMUSCULAR; INTRAVENOUS at 10:21

## 2023-01-26 RX ADMIN — NOREPINEPHRINE BITARTRATE 2 MCG/MIN: 1 INJECTION INTRAVENOUS at 10:21

## 2023-01-26 RX ADMIN — FENTANYL CITRATE 25 MCG: 50 INJECTION INTRAMUSCULAR; INTRAVENOUS at 10:15

## 2023-01-26 RX ADMIN — PROPOFOL 80 MG: 10 INJECTION, EMULSION INTRAVENOUS at 10:21

## 2023-01-26 NOTE — ANESTHESIA PREPROCEDURE EVALUATION
Procedure:  MAURA    Relevant Problems   CARDIO   (+) Atrial fibrillation (HCC)   (+) Essential hypertension   (+) Sick sinus syndrome (HCC)      NEURO/PSYCH   (+) Paresthesias   (+) Subdural hematoma      Other   (+) Pacemaker lead fracture   (+) Presence of left atrial appendage closure device        Physical Exam    Airway    Mallampati score: III  TM Distance: >3 FB  Neck ROM: full     Dental       Cardiovascular      Pulmonary      Other Findings        Anesthesia Plan  ASA Score- 3     Anesthesia Type- IV sedation with anesthesia with ASA Monitors  Additional Monitors:   Airway Plan:           Plan Factors-    Chart reviewed  EKG reviewed  Existing labs reviewed  Patient summary reviewed  Induction- intravenous  Postoperative Plan-     Informed Consent- Anesthetic plan and risks discussed with patient  I personally reviewed this patient with the CRNA  Discussed and agreed on the Anesthesia Plan with the CRNA  Magaly Maria

## 2023-01-26 NOTE — ANESTHESIA POSTPROCEDURE EVALUATION
Post-Op Assessment Note    CV Status:  Stable  Pain Score: 0    Pain management: adequate     Mental Status:  Alert and awake   Hydration Status:  Stable   PONV Controlled:  Controlled   Airway Patency:  Patent      Post Op Vitals Reviewed: Yes      Staff: Anesthesiologist, CRNA         No notable events documented      BP   105/56   Temp      Pulse 64   Resp 18   SpO2 100

## 2023-01-27 ENCOUNTER — TELEPHONE (OUTPATIENT)
Dept: CARDIOLOGY CLINIC | Facility: CLINIC | Age: 88
End: 2023-01-27

## 2023-01-27 NOTE — TELEPHONE ENCOUNTER
----- Message from Rere Cr RN sent at 1/27/2023 11:26 AM EST -----  Regarding: RE: 45 day s/p Watchman MAURA  Can you please place note in chart after you speak with patient? Thank you   ----- Message -----  From: Emily Rey MD  Sent: 1/26/2023   3:56 PM EST  To: Rere Cr RN, Cardiology Ep Clincal, #  Subject: RE: 45 day s/p Watchman MAURA                      MAURA looks good, he can come off Xarelto May 9,   He can should remain on asa 81mg indefinitely  ----- Message -----  From: Rere Cr RN  Sent: 1/26/2023  10:23 AM EST  To: Emily Rey MD, Cardiology Ep Clincal, #  Subject: 45 day s/p Watchman MAURA                          Reminder: Patient getting MAURA today, can you please review and have staff call patient regarding anticoagulation? Thank you

## 2023-02-01 NOTE — PRE-PROCEDURE INSTRUCTIONS
Pre-Surgery Instructions:   Medication Instructions   • amiodarone 200 mg tablet Take day of surgery  • amLODIPine (NORVASC) 2 5 mg tablet Take day of surgery  • aspirin 81 mg chewable tablet Hold day of surgery  • gabapentin (NEURONTIN) 100 mg capsule Hold day of surgery  • rivaroxaban (Xarelto) 10 mg tablet Hold day of surgery  Pre op instructions reviewed with pt's daughter Damian Cherry, also  [phone], via phone  Instructed to take amiodarone and amlodipine a m of surgery  My Surgical Experience    The following information was developed to assist you to prepare for your operation  What do I need to do before coming to the hospital?  • Arrange for a responsible person to drive you to and from the hospital   • Arrange care for your children at home  Children are not allowed in the recovery areas of the hospital  • Plan to wear clothing that is easy to put on and take off  If you are having shoulder surgery, wear a shirt that buttons or zippers in the front  Bathing  o Shower the evening before and the morning of your surgery with an antibacterial soap  Please refer to the Pre Op Showering Instructions for Surgery Patients Sheet   o Remove nail polish and all body piercing jewelry  o Do not shave any body part for at least 24 hours before surgery-this includes face, arms, legs and upper body  Food  o Nothing to eat or drink after midnight the night before your surgery   This includes candy and chewing gum  o Exception: If your surgery is after 12:00pm (noon), you may have clear liquids such as 7-Up®, ginger ale, apple or cranberry juice, Jell-O®, water, or clear broth until 8:00 am  o Do not drink milk or juice with pulp on the morning before surgery  o Do not drink alcohol 24 hours before surgery  Medicine  o Follow instructions you received from your surgeon about which medicines you may take on the day of surgery  o If instructed to take medicine on the morning of surgery, take pills with just a small sip of water  Call your prescribing doctor for specific information on what to do if you take insulin    What should I bring to the hospital?    Bring:  • Crutches or a walker, if you have them, for foot or knee surgery  • A list of the daily medicines, vitamins, minerals, herbals and nutritional supplements you take  Include the dosages of medicines and the time you take them each day  • Glasses, dentures or hearing aids  • Minimal clothing; you will be wearing hospital sleepwear  • Photo ID; required to verify your identity  • If you have a Living Will or Power of , bring a copy of the documents  • If you have an ostomy, bring an extra pouch and any supplies you use    Do not bring  • Medicines or inhalers  • Money, valuables or jewelry    What other information should I know about the day of surgery? • Notify your surgeons if you develop a cold, sore throat, cough, fever, rash or any other illness  • Report to the Ambulatory Surgical/Same Day Surgery Unit  • You will be instructed to stop at Registration only if you have not been pre-registered  • Inform your  fi they do not stay that they will be asked by the staff to leave a phone number where they can be reached  • Be available to be reached before surgery  In the event the operating room schedule changes, you may be asked to come in earlier or later than expected    *It is important to tell your doctor and others involved in your health care if you are taking or have been taking any non-prescription drugs, vitamins, minerals, herbals or other nutritional supplements   Any of these may interact with some food or medicines and cause a reaction

## 2023-02-06 ENCOUNTER — ANESTHESIA (OUTPATIENT)
Dept: PERIOP | Facility: AMBULARY SURGERY CENTER | Age: 88
End: 2023-02-06

## 2023-02-06 ENCOUNTER — ANESTHESIA EVENT (OUTPATIENT)
Dept: PERIOP | Facility: AMBULARY SURGERY CENTER | Age: 88
End: 2023-02-06

## 2023-02-06 ENCOUNTER — HOSPITAL ENCOUNTER (OUTPATIENT)
Facility: AMBULARY SURGERY CENTER | Age: 88
Setting detail: OUTPATIENT SURGERY
Discharge: HOME | End: 2023-02-06
Attending: OPHTHALMOLOGY | Admitting: OPHTHALMOLOGY

## 2023-02-06 VITALS
TEMPERATURE: 97.1 F | DIASTOLIC BLOOD PRESSURE: 60 MMHG | SYSTOLIC BLOOD PRESSURE: 129 MMHG | RESPIRATION RATE: 16 BRPM | HEART RATE: 73 BPM | OXYGEN SATURATION: 96 %

## 2023-02-06 DIAGNOSIS — H25.042 POSTERIOR SUBCAPSULAR POLAR AGE-RELATED CATARACT OF LEFT EYE: Primary | ICD-10-CM

## 2023-02-06 DEVICE — ACRYSOF(R) IQ ASPHERIC NATURAL IOL, SINGLE-PIECE ACRYLIC FOLDABLE PCL, UV WITH BLUE LIGHTFILTER, 13.0MM LENGTH, 6.0MM ANTERIORASYMMETRIC BICONVEX OPTIC, PLANAR HAPTICS.
Type: IMPLANTABLE DEVICE | Site: EYE | Status: FUNCTIONAL
Brand: ACRYSOF®

## 2023-02-06 RX ORDER — TETRACAINE HYDROCHLORIDE 5 MG/ML
SOLUTION OPHTHALMIC AS NEEDED
Status: DISCONTINUED | OUTPATIENT
Start: 2023-02-06 | End: 2023-02-06 | Stop reason: HOSPADM

## 2023-02-06 RX ORDER — TETRACAINE HYDROCHLORIDE 5 MG/ML
1 SOLUTION OPHTHALMIC ONCE
Status: COMPLETED | OUTPATIENT
Start: 2023-02-06 | End: 2023-02-06

## 2023-02-06 RX ORDER — PHENYLEPHRINE HCL 2.5 %
1 DROPS OPHTHALMIC (EYE)
Status: COMPLETED | OUTPATIENT
Start: 2023-02-06 | End: 2023-02-06

## 2023-02-06 RX ORDER — KETOROLAC TROMETHAMINE 5 MG/ML
1 SOLUTION OPHTHALMIC 4 TIMES DAILY
Qty: 5 ML | Refills: 0
Start: 2023-02-06

## 2023-02-06 RX ORDER — MIDAZOLAM HYDROCHLORIDE 2 MG/2ML
INJECTION, SOLUTION INTRAMUSCULAR; INTRAVENOUS AS NEEDED
Status: DISCONTINUED | OUTPATIENT
Start: 2023-02-06 | End: 2023-02-06

## 2023-02-06 RX ORDER — CYCLOPENTOLATE HYDROCHLORIDE 10 MG/ML
1 SOLUTION/ DROPS OPHTHALMIC
Status: COMPLETED | OUTPATIENT
Start: 2023-02-06 | End: 2023-02-06

## 2023-02-06 RX ORDER — GATIFLOXACIN 5 MG/ML
SOLUTION/ DROPS OPHTHALMIC AS NEEDED
Status: DISCONTINUED | OUTPATIENT
Start: 2023-02-06 | End: 2023-02-06 | Stop reason: HOSPADM

## 2023-02-06 RX ORDER — GATIFLOXACIN 5 MG/ML
1 SOLUTION/ DROPS OPHTHALMIC 2 TIMES DAILY
Qty: 3 ML | Refills: 0
Start: 2023-02-06

## 2023-02-06 RX ORDER — LIDOCAINE HYDROCHLORIDE 20 MG/ML
1 JELLY TOPICAL
Status: COMPLETED | OUTPATIENT
Start: 2023-02-06 | End: 2023-02-06

## 2023-02-06 RX ORDER — KETOROLAC TROMETHAMINE 5 MG/ML
1 SOLUTION OPHTHALMIC
Status: COMPLETED | OUTPATIENT
Start: 2023-02-06 | End: 2023-02-06

## 2023-02-06 RX ORDER — BALANCED SALT SOLUTION 6.4; .75; .48; .3; 3.9; 1.7 MG/ML; MG/ML; MG/ML; MG/ML; MG/ML; MG/ML
SOLUTION OPHTHALMIC AS NEEDED
Status: DISCONTINUED | OUTPATIENT
Start: 2023-02-06 | End: 2023-02-06 | Stop reason: HOSPADM

## 2023-02-06 RX ORDER — ONDANSETRON 2 MG/ML
4 INJECTION INTRAMUSCULAR; INTRAVENOUS ONCE AS NEEDED
Status: DISCONTINUED | OUTPATIENT
Start: 2023-02-06 | End: 2023-02-06 | Stop reason: HOSPADM

## 2023-02-06 RX ADMIN — CYCLOPENTOLATE HYDROCHLORIDE 1 DROP: 10 SOLUTION/ DROPS OPHTHALMIC at 13:45

## 2023-02-06 RX ADMIN — PHENYLEPHRINE HYDROCHLORIDE 1 DROP: 25 SOLUTION/ DROPS OPHTHALMIC at 14:00

## 2023-02-06 RX ADMIN — MIDAZOLAM 1 MG: 1 INJECTION INTRAMUSCULAR; INTRAVENOUS at 14:15

## 2023-02-06 RX ADMIN — KETOROLAC TROMETHAMINE 1 DROP: 5 SOLUTION OPHTHALMIC at 13:45

## 2023-02-06 RX ADMIN — PHENYLEPHRINE HYDROCHLORIDE 1 DROP: 25 SOLUTION/ DROPS OPHTHALMIC at 14:14

## 2023-02-06 RX ADMIN — KETOROLAC TROMETHAMINE 1 DROP: 5 SOLUTION OPHTHALMIC at 14:00

## 2023-02-06 RX ADMIN — LIDOCAINE HYDROCHLORIDE 1 APPLICATION: 20 JELLY TOPICAL at 13:45

## 2023-02-06 RX ADMIN — KETOROLAC TROMETHAMINE 1 DROP: 5 SOLUTION OPHTHALMIC at 13:30

## 2023-02-06 RX ADMIN — LIDOCAINE HYDROCHLORIDE 1 APPLICATION: 20 JELLY TOPICAL at 14:00

## 2023-02-06 RX ADMIN — KETOROLAC TROMETHAMINE 1 DROP: 5 SOLUTION OPHTHALMIC at 14:14

## 2023-02-06 RX ADMIN — TETRACAINE HYDROCHLORIDE 1 DROP: 5 SOLUTION OPHTHALMIC at 13:30

## 2023-02-06 RX ADMIN — MIDAZOLAM 1 MG: 1 INJECTION INTRAMUSCULAR; INTRAVENOUS at 14:18

## 2023-02-06 RX ADMIN — CYCLOPENTOLATE HYDROCHLORIDE 1 DROP: 10 SOLUTION/ DROPS OPHTHALMIC at 14:00

## 2023-02-06 RX ADMIN — CYCLOPENTOLATE HYDROCHLORIDE 1 DROP: 10 SOLUTION/ DROPS OPHTHALMIC at 14:14

## 2023-02-06 RX ADMIN — CYCLOPENTOLATE HYDROCHLORIDE 1 DROP: 10 SOLUTION/ DROPS OPHTHALMIC at 13:30

## 2023-02-06 RX ADMIN — PHENYLEPHRINE HYDROCHLORIDE 1 DROP: 25 SOLUTION/ DROPS OPHTHALMIC at 13:45

## 2023-02-06 RX ADMIN — PHENYLEPHRINE HYDROCHLORIDE 1 DROP: 25 SOLUTION/ DROPS OPHTHALMIC at 13:30

## 2023-02-06 RX ADMIN — LIDOCAINE HYDROCHLORIDE 1 APPLICATION: 20 JELLY TOPICAL at 13:30

## 2023-02-06 NOTE — OP NOTE
OPERATIVE REPORT    PATIENT NAME: Alvenia Phoenix    :  1935  MRN: [id number]  Pt Location: [place] [address] OR ROOM 01    Surgery Date: 2023    Surgeon(s) and Role:     * Matt Patel MD - Primary    Age-related nuclear cataract, left eye [H25 12]    Post-Op Diagnosis Codes:     * Age-related nuclear cataract, left eye [H25 12]    Procedure(s):  EXTRACTION EXTRACAPSULAR CATARACT PHACO INTRAOCULAR LENS (IOL)    Anesthesia Type:   IV Sedation with Anesthesia    Operative Indications:  Age-related nuclear cataract, left eye [H25 12]  Decreased vision to 20/50  With problems watching television and driving  Pt requested cataract sx the left eye    Procedure and Technique:    Procedure Details     The patient was brought in the OR in stable condition and placed on the operative table  The left eye was prepped and draped in the usual sterile manner  Attention was directed to the left eye where a lid speculum was placed  A 2 4 mm clear corneal incision was made temperally  1 cc of Shugarcaine was irrigated into the anterior chamber followed by viscoat  The pupil stayed small  A Mylugun Ring was broaght up and inserted in to the [facility] giving good dilation  The side port incision was placed superiorly  The capsularrhexis was made and the nucleus was hydrodissected with BSS  The nucleus was then removed with the phaco handpiece followed by removal of the cortical material with the I/A handpiece  The capsular bag was then filled with Provisc  The IOL was folded and placed in to the capsular bag and centered well  More viscoat was added to the [facility] and the Mylugin Ring was removed from the [facility]  The remaining Viscoat and Provisc was removed from the eye with the I/A  The wounds were hydrated with BSS and found to be water tight  The lid speculum was removed and 2 drops of Gatifloxicin were placed over the cornea  A protective eye shield was taped over the eye and the patient went to PACU in stable condition   I will see the patient in the office tomorrow and the expected post op period is a few weeks         Complications: None        Disposition: PACU   Condition: Stable    SIGNATURE: Adrienne Ramos MD  DATE: February 6, 2023  TIME: 2:44 PM

## 2023-02-06 NOTE — ANESTHESIA PREPROCEDURE EVALUATION
Procedure:  EXTRACTION EXTRACAPSULAR CATARACT PHACO INTRAOCULAR LENS (IOL) (Left: Eye)    Relevant Problems   ANESTHESIA (within normal limits)      CARDIO   (+) Atrial fibrillation (HCC)   (+) Chest pain   (+) Essential hypertension   (+) Sick sinus syndrome (HCC)      NEURO/PSYCH   (+) Paresthesias   (+) Subdural hematoma      PULMONARY (within normal limits)        Physical Exam    Airway    Mallampati score: II  TM Distance: >3 FB  Neck ROM: full     Dental       Cardiovascular  Rhythm: irregular, Rate: normal,     Pulmonary  Breath sounds clear to auscultation,     Other Findings        Anesthesia Plan  ASA Score- 3     Anesthesia Type- IV sedation with anesthesia with ASA Monitors  Additional Monitors:   Airway Plan:           Plan Factors-Exercise tolerance (METS): >4 METS  Chart reviewed  EKG reviewed  Existing labs reviewed  Patient summary reviewed  Induction-     Postoperative Plan-     Informed Consent- Anesthetic plan and risks discussed with patient  I personally reviewed this patient with the CRNA  Discussed and agreed on the Anesthesia Plan with the CRNA  Jose Angel

## 2023-02-06 NOTE — DISCHARGE INSTR - AVS FIRST PAGE
Dr Yvonne Blake Cataract Instructions    Activity:     1  No Driving until instructed   2  Keep shield on until seen tomorrow except when administering drops   3  No heavy lifting   4  No water in eye     Diet:     1  Resume normal diet    Normal Symptoms:     1  Mild Headache   2  Scratchy or picky feeling around eye    Call the office if:     1  You have any questions or concerns   2  If eye pain is not relieved by extra strength tylenol    Office phone number:  [phone]      Next appointment:     1  See Dr Yvonne Blake at his office tomorrow as scheduled   __________________________________________________________   2  Bring blue eye kit with you and eyedrops to the office    A new set of comprehensive instructions will be given and reviewed with you during your office visit tomorrow

## 2023-03-29 ENCOUNTER — APPOINTMENT (OUTPATIENT)
Dept: LAB | Facility: MEDICAL CENTER | Age: 88
End: 2023-03-29

## 2023-03-29 DIAGNOSIS — D50.9 IRON DEFICIENCY ANEMIA, UNSPECIFIED IRON DEFICIENCY ANEMIA TYPE: ICD-10-CM

## 2023-03-29 DIAGNOSIS — I10 ESSENTIAL HYPERTENSION, BENIGN: ICD-10-CM

## 2023-03-29 DIAGNOSIS — E78.2 MIXED HYPERLIPIDEMIA: ICD-10-CM

## 2023-03-29 DIAGNOSIS — R73.01 IMPAIRED FASTING GLUCOSE: ICD-10-CM

## 2023-03-29 LAB
ALBUMIN SERPL BCP-MCNC: 3.5 G/DL (ref 3.5–5)
ALP SERPL-CCNC: 115 U/L (ref 46–116)
ALT SERPL W P-5'-P-CCNC: 29 U/L (ref 12–78)
ANION GAP SERPL CALCULATED.3IONS-SCNC: 4 MMOL/L (ref 4–13)
AST SERPL W P-5'-P-CCNC: 24 U/L (ref 5–45)
BASOPHILS # BLD AUTO: 0.02 THOUSANDS/ÂΜL (ref 0–0.1)
BASOPHILS NFR BLD AUTO: 0 % (ref 0–1)
BILIRUB SERPL-MCNC: 0.59 MG/DL (ref 0.2–1)
BUN SERPL-MCNC: 19 MG/DL (ref 5–25)
CALCIUM SERPL-MCNC: 9.1 MG/DL (ref 8.3–10.1)
CHLORIDE SERPL-SCNC: 105 MMOL/L (ref 96–108)
CHOLEST SERPL-MCNC: 179 MG/DL
CO2 SERPL-SCNC: 26 MMOL/L (ref 21–32)
CREAT SERPL-MCNC: 1.04 MG/DL (ref 0.6–1.3)
EOSINOPHIL # BLD AUTO: 0.22 THOUSAND/ÂΜL (ref 0–0.61)
EOSINOPHIL NFR BLD AUTO: 5 % (ref 0–6)
ERYTHROCYTE [DISTWIDTH] IN BLOOD BY AUTOMATED COUNT: 19.1 % (ref 11.6–15.1)
EST. AVERAGE GLUCOSE BLD GHB EST-MCNC: 117 MG/DL
GFR SERPL CREATININE-BSD FRML MDRD: 64 ML/MIN/1.73SQ M
GLUCOSE P FAST SERPL-MCNC: 97 MG/DL (ref 65–99)
HBA1C MFR BLD: 5.7 %
HCT VFR BLD AUTO: 38.1 % (ref 36.5–49.3)
HDLC SERPL-MCNC: 91 MG/DL
HGB BLD-MCNC: 11.3 G/DL (ref 12–17)
IMM GRANULOCYTES # BLD AUTO: 0.03 THOUSAND/UL (ref 0–0.2)
IMM GRANULOCYTES NFR BLD AUTO: 1 % (ref 0–2)
LDLC SERPL CALC-MCNC: 77 MG/DL (ref 0–100)
LYMPHOCYTES # BLD AUTO: 0.9 THOUSANDS/ÂΜL (ref 0.6–4.47)
LYMPHOCYTES NFR BLD AUTO: 19 % (ref 14–44)
MCH RBC QN AUTO: 19.3 PG (ref 26.8–34.3)
MCHC RBC AUTO-ENTMCNC: 29.7 G/DL (ref 31.4–37.4)
MCV RBC AUTO: 65 FL (ref 82–98)
MONOCYTES # BLD AUTO: 0.66 THOUSAND/ÂΜL (ref 0.17–1.22)
MONOCYTES NFR BLD AUTO: 14 % (ref 4–12)
NEUTROPHILS # BLD AUTO: 2.98 THOUSANDS/ÂΜL (ref 1.85–7.62)
NEUTS SEG NFR BLD AUTO: 61 % (ref 43–75)
NONHDLC SERPL-MCNC: 88 MG/DL
NRBC BLD AUTO-RTO: 0 /100 WBCS
PLATELET # BLD AUTO: 224 THOUSANDS/UL (ref 149–390)
PMV BLD AUTO: 9.3 FL (ref 8.9–12.7)
POTASSIUM SERPL-SCNC: 4.5 MMOL/L (ref 3.5–5.3)
PROT SERPL-MCNC: 6.9 G/DL (ref 6.4–8.4)
RBC # BLD AUTO: 5.86 MILLION/UL (ref 3.88–5.62)
SODIUM SERPL-SCNC: 135 MMOL/L (ref 135–147)
TRIGL SERPL-MCNC: 55 MG/DL
WBC # BLD AUTO: 4.81 THOUSAND/UL (ref 4.31–10.16)

## 2023-03-31 LAB — IRON SERPL-MCNC: 41 UG/DL (ref 65–175)

## 2023-04-06 ENCOUNTER — IN-CLINIC DEVICE VISIT (OUTPATIENT)
Dept: CARDIOLOGY CLINIC | Facility: CLINIC | Age: 88
End: 2023-04-06

## 2023-04-06 DIAGNOSIS — Z95.0 PRESENCE OF PERMANENT CARDIAC PACEMAKER: Primary | ICD-10-CM

## 2023-04-06 NOTE — PROGRESS NOTES
Results for orders placed or performed in visit on 04/06/23   Cardiac EP device report    Narrative    MDT-DUAL CHAMBER PPM/ ACTIVE SYSTEM IS MRI CONDITIONAL  DEVICE INTERROGATED IN THE [facility] OFFICE  BATTERY VOLTAGE ADEQUATE  (5 9 YRS) AP 85%  99%  ALL LEAD PARAMETERS WITHIN NORMAL LIMITS  NO SIGNIFICANT HIGH RATE EPISODES  5 AT/AF EPISODES DETECTED LONGEST <4 MIN  PATIENT IS ON Merryl Busing  NO PROGRAMMING CHANGES MADE TO DEVICE PARAMETERS  NORMAL DEVICE FUNCTION  ----LEONARD
Airway patent, Nasal mucosa clear. Mouth with normal mucosa. Throat has no vesicles, no oropharyngeal exudates and uvula is midline.

## 2023-05-12 ENCOUNTER — OFFICE VISIT (OUTPATIENT)
Dept: CARDIOLOGY CLINIC | Facility: CLINIC | Age: 88
End: 2023-05-12

## 2023-05-12 VITALS
SYSTOLIC BLOOD PRESSURE: 120 MMHG | DIASTOLIC BLOOD PRESSURE: 70 MMHG | WEIGHT: 167.6 LBS | HEIGHT: 65 IN | HEART RATE: 72 BPM | BODY MASS INDEX: 27.92 KG/M2

## 2023-05-12 DIAGNOSIS — N18.31 CHRONIC KIDNEY DISEASE, STAGE 3A (HCC): ICD-10-CM

## 2023-05-12 DIAGNOSIS — I48.91 ATRIAL FIBRILLATION, UNSPECIFIED TYPE (HCC): Primary | ICD-10-CM

## 2023-05-12 DIAGNOSIS — I73.89 OTHER SPECIFIED PERIPHERAL VASCULAR DISEASES (HCC): ICD-10-CM

## 2023-05-12 DIAGNOSIS — I48.0 PAROXYSMAL ATRIAL FIBRILLATION (HCC): ICD-10-CM

## 2023-05-12 DIAGNOSIS — Z79.899 ON AMIODARONE THERAPY: ICD-10-CM

## 2023-05-12 RX ORDER — AMIODARONE HYDROCHLORIDE 200 MG/1
100 TABLET ORAL DAILY
Qty: 90 TABLET | Refills: 3 | Status: SHIPPED | OUTPATIENT
Start: 2023-05-12

## 2023-05-12 RX ORDER — TAMSULOSIN HYDROCHLORIDE 0.4 MG/1
0.4 CAPSULE ORAL
COMMUNITY

## 2023-05-12 NOTE — PROGRESS NOTES
HEART AND VASCULAR  CARDIAC [address] [facility]    Outpatient Follow-up  Today's Date: 05/12/23        Patient name: Eric Mckoy  YOB: 1935  Sex: male         Chief Complaint: f/u Afib, pacemaker      ASSESSMENT:  Problem List Items Addressed This Visit        Cardiovascular and Mediastinum    Atrial fibrillation ([address] ) - Primary    Relevant Medications    amiodarone 200 mg tablet    Other Relevant Orders    POCT ECG    Other specified peripheral vascular diseases ([address] )       Genitourinary    Chronic kidney disease, stage 3a ([address] )       Other    On amiodarone therapy    Relevant Orders    TSH, 3rd generation with Free T4 reflex    Comprehensive metabolic panel    Spirometry with diffusing capacity     80 yo  1) Post Watchman Dec, just stopped anticoagulation  Now on Asa 81mg only  2) Paroxysmal afib, in NSR on device checks  ON Amiodarone 200mg daily  3) CHB, had lead fracture and syncope/temp wire a few months ago, lead revision done and device working well  Came in April because dizzy, we increased base rate to 70bpm  4) h/o subdural hematoma    PLAN:  1) Check TSH, LFT in 6 months on Amio  Check PFTs  2) Cut back amio 100mg daily for maintenance          Follow up in: 1 year    Orders Placed This Encounter   Procedures   • TSH, 3rd generation with Free T4 reflex   • Comprehensive metabolic panel   • POCT ECG   • Spirometry with diffusing capacity     Medications Discontinued During This Encounter   Medication Reason   • gatifloxacin (ZYMAXID) 0 5 % Therapy completed   • ketorolac (ACULAR) 0 5 % ophthalmic solution Therapy completed   • rivaroxaban (Xarelto) 10 mg tablet Therapy completed   • amiodarone 200 mg tablet              HPI/Subjective:     80 yo  1) Post Watchman Dec, just stopped anticoagulation   Now on Asa 81mg only  2) Paroxysmal afib, in NSR on device checks  ON Amiodarone 200mg daily  3) CHB, had lead fracture and syncope/temp wire a few months ago, lead revision done and device working well  Came in April because dizzy, we increased base rate to 70bpm  4) h/o subdural hematoma    Please note HPI is listed by problem with with update following it, it is copied again in the assessment above and reflects medical decision making as well  Complete 12 point ROS reviewed and otherwise non pertinent or negative except as per HPI pertinent positives in Cardiovascular and Respiratory emphasized  Please see paper chart for outpatient clinic patients where the patient completed the 12 point ROS survey  Past Medical History:   Diagnosis Date   • A-fib [facility])    • CHANA (acute kidney injury) ([facility] [address] ) 12/19/2022   • Hernia, abdominal    • Hypertension    • Subdural hematoma ([facility])        No Known Allergies  I reviewed the Home Medication list and Allergies in the chart  Scheduled Meds:  Current Outpatient Medications   Medication Sig Dispense Refill   • amiodarone 200 mg tablet Take 0 5 tablets (100 mg total) by mouth daily 90 tablet 3   • amLODIPine (NORVASC) 2 5 mg tablet Take 1 tablet (2 5 mg total) by mouth daily 90 tablet 3   • aspirin 81 mg chewable tablet Chew 81 mg daily     • gabapentin (NEURONTIN) 100 mg capsule Take 1 capsule (100 mg total) by mouth in the morning and 1 capsule (100 mg total) in the evening and 1 capsule (100 mg total) before bedtime  (Patient taking differently: Take 100 mg by mouth 2 (two) times a day) 1 capsule 0   • tamsulosin (FLOMAX) 0 4 mg Take 0 4 mg by mouth daily with dinner       No current facility-administered medications for this visit       PRN Meds:         Family History   Problem Relation Age of Onset   • Cancer Mother         exp age 80   • Heart disease Father    • Heart disease Brother    • Hypertension Brother        Social History     Socioeconomic History   • Marital status: /Civil Union "Spouse name: Not on file   • Number of children: Not on file   • Years of education: Not on file   • Highest education level: Not on file   Occupational History   • Not on file   Tobacco Use   • Smoking status: Never   • Smokeless tobacco: Never   Vaping Use   • Vaping Use: Never used   Substance and Sexual Activity   • Alcohol use: Never   • Drug use: Never   • Sexual activity: Not on file   Other Topics Concern   • Not on file   Social History Narrative   • Not on file     Social Determinants of Health     Financial Resource Strain: Not on file   Food Insecurity: No Food Insecurity   • Worried About Running Out of Food in the Last Year: Never true   • Ran Out of Food in the Last Year: Never true   Transportation Needs: No Transportation Needs   • Lack of Transportation (Medical): No   • Lack of Transportation (Non-Medical): No   Physical Activity: Not on file   Stress: Not on file   Social Connections: Not on file   Intimate Partner Violence: Not on file   Housing Stability: Low Risk    • Unable to Pay for Housing in the Last Year: No   • Number of Places Lived in the Last Year: 1   • Unstable Housing in the Last Year: No         OBJECTIVE:    /70   Pulse 72   Ht 5' 5\" (1 651 m)   Wt 76 kg (167 lb 9 6 oz)   BMI 27 89 kg/m²   Vitals:    05/12/23 0935   Weight: 76 kg (167 lb 9 6 oz)     GEN: No acute distress, Alert and oriented, well appearing  HEENT:External ears normal, oral pharynx clear, mucous membranes moist  EYES: Pupils equal, sclera anicteric, midline, normal conjuctiva  NECK: No JVD, supple, no obvious masses or thryomegaly or goiter  CARDIOVASCULAR:  RRR, No murmur, rub, gallops S1,S2  LUNGS: Clear To auscultation bilaterally, normal effort, no rales, rhonchi, crackles   ABDOMEN:  nondistended,  without obvious organomegaly or ascites  EXTREMITIES/VASCULAR:  No edema  warm an well perfused  PSYCH: Normal Affect,  linear speech pattern without evidence of psychosis     NEURO: Grossly intact, " moving all extremiteis equal, face symmetric, alert and responsive, no obvious focal defecits   GAIT: Ambulates normally without difficulty  HEME: No bleeding, bruising, petechia, purpura   SKIN: No significant rashes on visibile skin, warm, no diaphoresis or pallor  Lab Results:       LABS:      Chemistry        Component Value Date/Time    K 4 5 03/29/2023 1003     03/29/2023 1003    CO2 26 03/29/2023 1003    CO2 24 12/18/2022 2333    BUN 19 03/29/2023 1003    CREATININE 1 04 03/29/2023 1003        Component Value Date/Time    CALCIUM 9 1 03/29/2023 1003    ALKPHOS 115 03/29/2023 1003    AST 24 03/29/2023 1003    ALT 29 03/29/2023 1003            No results found for: CHOL  Lab Results   Component Value Date    HDL 91 03/29/2023     05/06/2020     (H) 04/13/2019     Lab Results   Component Value Date    LDLCALC 77 03/29/2023    LDLCALC 81 05/06/2020    LDLCALC 73 04/13/2019     Lab Results   Component Value Date    TRIG 55 03/29/2023    TRIG 77 05/06/2020    TRIG 82 04/13/2019     No results found for: CHOLHDL    IMAGING: Cardiac EP device report    Result Date: 4/13/2023  Narrative: MDT-DUAL CHAMBER PPM/ ACTIVE SYSTEM IS MRI CONDITIONAL DEVICE INTERROGATED IN THE [place] OFFICE FOR REPROGRAMMING- NB  PER DR BLAKE, INCREASED LOWER RATE TO 70 BPM FROM 60 BPM  BATTERY VOLTAGE ADEQUATE (5 8 YRS)  AP-88%, -99%  ALL AVAILABLE LEAD PARAMETERS WITHIN NORMAL LIMITS  NO SIGNIFICANT HIGH RATE EPISODES  NORMAL DEVICE FUNCTION  GV     Cardiac EP device report    Result Date: 4/13/2023  Narrative: MDT-DUAL CHAMBER PPM/ ACTIVE SYSTEM IS MRI CONDITIONAL CARELINK TRANSMISSION: N/B-- PATIENT IS FEELING DIZZY  ALL AVAILABLE LEAD PARAMETERS WITHIN NORMAL LIMITS  NO SIGNIFICANT HIGH RATE EPISODES  NORMAL DEVICE FUNCTION  ---LEONARD        Cardiac testing:   Results for orders placed during the hospital encounter of 04/14/18    Echo complete with contrast if indicated    Narrative  [address]  [phone]    Transthoracic Echocardiogram  2D, M-mode, Doppler, and Color Doppler    Study date:  15-Apr-2018    Patient: Lary Perez  MR number: [id number]  Account number: [de-identified]  : [id number]  Age: 81 years  Gender: Male  Status: Inpatient  Location: Bedside  Height:  Weight:  BP: 125/ 52 mmHg    Indications: Syncope  Diagnoses: R55  - Syncope and collapse    Sonographer:  Evita Nichols RDCS  Primary Physician:  Roni Robles DO  Referring Physician:  Phillip Cade MD  Group:  [id number]  Cardiology Fellow:  Imelda Snyder MD  Interpreting Physician:  Andrea Terrazas MD    SUMMARY    LEFT VENTRICLE:  Systolic function was normal  Ejection fraction was estimated to be 55 %  There were no regional wall motion abnormalities  Wall thickness was mildly to moderately increased  The changes were consistent with concentric remodeling (increased wall thickness with normal wall mass)  MITRAL VALVE:  There was mild annular calcification  There was mild regurgitation  AORTIC VALVE:  The valve was trileaflet  Leaflets exhibited sclerosis  There was mild regurgitation  TRICUSPID VALVE:  There was mild to moderate regurgitation  Estimated peak PA pressure was 48 mmHg  The findings suggest mild to moderate pulmonary hypertension  IVC, HEPATIC VEINS:  Respirophasic changes were blunted (less than 50% variation)  HISTORY: PRIOR HISTORY: Atrial fibrillation, Hypertension  PROCEDURE: The procedure was performed at the bedside  This was a routine study  The transthoracic approach was used  The study included complete 2D imaging, M-mode, complete spectral Doppler, and color Doppler  The heart rate was 50 bpm,  at the start of the study  Images were obtained from the parasternal, apical, subcostal, and suprasternal notch acoustic windows  Image quality was adequate      LEFT VENTRICLE: Size was normal  Systolic function was normal  Ejection fraction was estimated to be 55 %  There were no regional wall motion abnormalities  Wall thickness was mildly to moderately increased  The changes were consistent  with concentric remodeling (increased wall thickness with normal wall mass)  DOPPLER: Left ventricular diastolic function parameters were normal     RIGHT VENTRICLE: The size was normal  Systolic function was normal     LEFT ATRIUM: Size was normal     RIGHT ATRIUM: Size was normal     MITRAL VALVE: There was mild annular calcification  Valve structure was normal  There was normal leaflet separation  DOPPLER: The transmitral velocity was within the normal range  There was no evidence for stenosis  There was mild  regurgitation  AORTIC VALVE: The valve was trileaflet  Leaflets exhibited sclerosis  DOPPLER: Transaortic velocity was within the normal range  There was no evidence for stenosis  There was mild regurgitation  TRICUSPID VALVE: The valve structure was normal  There was normal leaflet separation  DOPPLER: The transtricuspid velocity was within the normal range  There was no evidence for stenosis  There was mild to moderate regurgitation  The  regurgitant jet was directed centrally  Estimated peak PA pressure was 48 mmHg  The findings suggest mild to moderate pulmonary hypertension  PULMONIC VALVE: DOPPLER: The transpulmonic velocity was within the normal range  There was no evidence for stenosis  There was trace regurgitation  PERICARDIUM: There was no pericardial effusion  AORTA: The root exhibited normal size  SYSTEMIC VEINS: IVC: The inferior vena cava was normal in size  Respirophasic changes were blunted (less than 50% variation)      SYSTEM MEASUREMENT TABLES    2D  %FS: 27 33 %  Ao Diam: 3 74 cm  EDV(Teich): 95 08 ml  EF(Teich): 53 26 %  ESV(Teich): 44 44 ml  IVSd: 1 31 cm  LA Area: 18 96 cm2  LA Diam: 3 73 cm  LVEDV MOD A4C: 97 77 ml  LVEF MOD A4C: 57 52 %  LVESV MOD A4C: 41 53 ml  LVIDd: 4 55 cm  LVIDs: 3 31 cm  LVLd A4C: 8 11 cm  LVLs A4C: 6 6 cm  LVOT Diam: 2 21 cm  LVPWd: 0 92 cm  RA Area: 15 37 cm2  RVIDd: 3 7 cm  SV MOD A4C: 56 23 ml  SV(Teich): 50 63 ml    CW  AR Dec Laurens: 2 11 m/s2  AR Dec Time: 1731 3 ms  AR PHT: 502 08 ms  AR Vmax: 3 66 m/s  AR maxP 49 mmHg  TR Vmax: 3 09 m/s  TR maxP 29 mmHg    MM  TAPSE: 2 42 cm    PW  E': 0 07 m/s  E/E': 14 56  MV A Ilan: 0 69 m/s  MV Dec Laurens: 4 62 m/s2  MV DecT: 228 93 ms  MV E Ilan: 1 06 m/s  MV E/A Ratio: 1 53  MV PHT: 66 39 ms  MVA By PHT: 3 31 cm2    [employer] Accredited Echocardiography Laboratory    Prepared and electronically signed by    Oscar Garcia MD  Signed 2018 11:02:16    No results found for this or any previous visit  No results found for this or any previous visit  No results found for this or any previous visit  I reviewed and interpreted the following LABS/EKG/TELE/IMAGING and below is summary of my interpretation (if data available):    LABS:LFT wnl,     Current EKG and Rhythm Strip:A V pace rhyth   Wide QRS

## 2023-05-27 ENCOUNTER — HOSPITAL ENCOUNTER (OUTPATIENT)
Dept: PULMONOLOGY | Facility: HOSPITAL | Age: 88
Discharge: HOME | End: 2023-05-27
Attending: INTERNAL MEDICINE

## 2023-05-27 DIAGNOSIS — Z79.899 ON AMIODARONE THERAPY: ICD-10-CM

## 2023-05-30 ENCOUNTER — TELEPHONE (OUTPATIENT)
Dept: CARDIOLOGY CLINIC | Facility: CLINIC | Age: 88
End: 2023-05-30

## 2023-05-30 NOTE — TELEPHONE ENCOUNTER
Spoke with patient's daughter Stanley Ang about normal PFT results and instructed to continue Amiodarone

## 2023-05-30 NOTE — TELEPHONE ENCOUNTER
----- Message from Elsy Bishop MD sent at 5/30/2023 12:13 PM EDT -----  Let him know PFT is normal  Ok to cont amiodarone

## 2023-06-02 ENCOUNTER — TELEPHONE (OUTPATIENT)
Dept: CARDIOLOGY CLINIC | Facility: CLINIC | Age: 88
End: 2023-06-02

## 2023-06-02 NOTE — TELEPHONE ENCOUNTER
My name is Micheal Hernandez  My cardiologist   is Doctor Mike Harding  Recently he changed my medication and he said if there's any change, I should call him right away  Today  I'm experiencing a  irrregular heartbeat again  So could you please tell him?

## 2023-06-02 NOTE — TELEPHONE ENCOUNTER
Pt called stating this morning his BP machine said irregular and could see his heart skipping beats  His BP was 120/70 and HR 59  Pt is experiencing no cardiac symptoms  Pt just took it again and the machine stated irregular HR  BP is 142/66 and HR 77     Pt is currently taking amiodarone 100mg daily, amlodipine 2 5mg daily, gabapentin 100mg daily, aspirin 81mg daily, and tamsulosin 0 4mg daily  Please advise

## 2023-06-05 NOTE — TELEPHONE ENCOUNTER
Spoke with pt's daughter and informed her Dr Kasey Santo is not concerned at this time as no cardiac symptoms are present and HR is controlled

## 2023-07-12 ENCOUNTER — REMOTE DEVICE CLINIC VISIT (OUTPATIENT)
Dept: CARDIOLOGY CLINIC | Facility: CLINIC | Age: 88
End: 2023-07-12
Payer: MEDICARE

## 2023-07-12 DIAGNOSIS — Z95.0 CARDIAC PACEMAKER IN SITU: Primary | ICD-10-CM

## 2023-07-12 PROCEDURE — 93296 REM INTERROG EVL PM/IDS: CPT | Performed by: INTERNAL MEDICINE

## 2023-07-12 PROCEDURE — 93294 REM INTERROG EVL PM/LDLS PM: CPT | Performed by: INTERNAL MEDICINE

## 2023-07-12 NOTE — PROGRESS NOTES
MDT-DUAL CHAMBER PPM/ ACTIVE SYSTEM IS MRI CONDITIONAL   CARELINK TRANSMISSION: BATTERY VOLTAGE ADEQUATE (5.2 YRS). AP 92.4%  98.2% (>40%/DDDR 70PPM); ALL AVAILABLE LEAD PARAMETERS WITHIN NORMAL LIMITS.  24 AT/AF EPISODES (SAME DATE 7/3/23) WITH MAX EPISODE 03:28:49 HRS; AT/AF BURDEN 5.0%; PATIENT HAS WATCHMAN IMPLANT, TAKING ASA 81 & AMIODORONE. NORMAL DEVICE FUNCTION.  ES

## 2023-08-03 ENCOUNTER — TELEPHONE (OUTPATIENT)
Dept: CARDIOLOGY CLINIC | Facility: CLINIC | Age: 88
End: 2023-08-03

## 2023-08-03 NOTE — TELEPHONE ENCOUNTER
Call from patient's daughter. Patient is having a significant amount of lower extremity edema which she feels is new. She states she has never seen his legs this swollen. He does have SOB which he states is unchanged. Unable to get a BP reading. I had patient send a device transmission yesterday which I had sent to you for review to make sure this wasn't associated with his rhythm. I also discussed his sodium intake with him. It appears he may be eating canned food items. Patient is not on a diuretic. Should we set him up with general cards?

## 2023-08-04 ENCOUNTER — APPOINTMENT (OUTPATIENT)
Dept: LAB | Facility: MEDICAL CENTER | Age: 88
End: 2023-08-04
Payer: MEDICARE

## 2023-08-04 ENCOUNTER — APPOINTMENT (OUTPATIENT)
Dept: RADIOLOGY | Facility: MEDICAL CENTER | Age: 88
End: 2023-08-04
Payer: MEDICARE

## 2023-08-04 DIAGNOSIS — I48.91 ATRIAL FIBRILLATION, UNSPECIFIED TYPE (HCC): Primary | ICD-10-CM

## 2023-08-04 DIAGNOSIS — R60.0 LOCALIZED EDEMA: Primary | ICD-10-CM

## 2023-08-04 DIAGNOSIS — I48.91 ATRIAL FIBRILLATION, UNSPECIFIED TYPE (HCC): ICD-10-CM

## 2023-08-04 DIAGNOSIS — R60.0 LOCALIZED EDEMA: ICD-10-CM

## 2023-08-04 LAB
ALBUMIN SERPL BCP-MCNC: 3.6 G/DL (ref 3.5–5)
ALP SERPL-CCNC: 99 U/L (ref 46–116)
ALT SERPL W P-5'-P-CCNC: 31 U/L (ref 12–78)
ANION GAP SERPL CALCULATED.3IONS-SCNC: 5 MMOL/L
AST SERPL W P-5'-P-CCNC: 25 U/L (ref 5–45)
BASOPHILS # BLD AUTO: 0.03 THOUSANDS/ÂΜL (ref 0–0.1)
BASOPHILS NFR BLD AUTO: 1 % (ref 0–1)
BILIRUB SERPL-MCNC: 0.46 MG/DL (ref 0.2–1)
BNP SERPL-MCNC: 86 PG/ML (ref 0–100)
BUN SERPL-MCNC: 15 MG/DL (ref 5–25)
CALCIUM SERPL-MCNC: 9 MG/DL (ref 8.3–10.1)
CHLORIDE SERPL-SCNC: 109 MMOL/L (ref 96–108)
CO2 SERPL-SCNC: 26 MMOL/L (ref 21–32)
CREAT SERPL-MCNC: 1.24 MG/DL (ref 0.6–1.3)
EOSINOPHIL # BLD AUTO: 0.23 THOUSAND/ÂΜL (ref 0–0.61)
EOSINOPHIL NFR BLD AUTO: 5 % (ref 0–6)
ERYTHROCYTE [DISTWIDTH] IN BLOOD BY AUTOMATED COUNT: 18.4 % (ref 11.6–15.1)
GFR SERPL CREATININE-BSD FRML MDRD: 52 ML/MIN/1.73SQ M
GLUCOSE SERPL-MCNC: 133 MG/DL (ref 65–140)
HCT VFR BLD AUTO: 33.8 % (ref 36.5–49.3)
HGB BLD-MCNC: 10.6 G/DL (ref 12–17)
IMM GRANULOCYTES # BLD AUTO: 0.03 THOUSAND/UL (ref 0–0.2)
IMM GRANULOCYTES NFR BLD AUTO: 1 % (ref 0–2)
LYMPHOCYTES # BLD AUTO: 1.28 THOUSANDS/ÂΜL (ref 0.6–4.47)
LYMPHOCYTES NFR BLD AUTO: 30 % (ref 14–44)
MCH RBC QN AUTO: 20.1 PG (ref 26.8–34.3)
MCHC RBC AUTO-ENTMCNC: 31.4 G/DL (ref 31.4–37.4)
MCV RBC AUTO: 64 FL (ref 82–98)
MONOCYTES # BLD AUTO: 0.57 THOUSAND/ÂΜL (ref 0.17–1.22)
MONOCYTES NFR BLD AUTO: 13 % (ref 4–12)
NEUTROPHILS # BLD AUTO: 2.19 THOUSANDS/ÂΜL (ref 1.85–7.62)
NEUTS SEG NFR BLD AUTO: 50 % (ref 43–75)
NRBC BLD AUTO-RTO: 0 /100 WBCS
PLATELET # BLD AUTO: 182 THOUSANDS/UL (ref 149–390)
PMV BLD AUTO: 9.5 FL (ref 8.9–12.7)
POTASSIUM SERPL-SCNC: 4 MMOL/L (ref 3.5–5.3)
PROT SERPL-MCNC: 6.8 G/DL (ref 6.4–8.4)
RBC # BLD AUTO: 5.27 MILLION/UL (ref 3.88–5.62)
SODIUM SERPL-SCNC: 140 MMOL/L (ref 135–147)
T4 FREE SERPL-MCNC: 0.89 NG/DL (ref 0.61–1.12)
TSH SERPL DL<=0.05 MIU/L-ACNC: 5.19 UIU/ML (ref 0.45–4.5)
WBC # BLD AUTO: 4.33 THOUSAND/UL (ref 4.31–10.16)

## 2023-08-04 PROCEDURE — 85025 COMPLETE CBC W/AUTO DIFF WBC: CPT

## 2023-08-04 PROCEDURE — 71046 X-RAY EXAM CHEST 2 VIEWS: CPT

## 2023-08-04 PROCEDURE — 83880 ASSAY OF NATRIURETIC PEPTIDE: CPT

## 2023-08-04 NOTE — TELEPHONE ENCOUNTER
Patient actually had an appointment to see his PCP today who d/c'd his amlodipine and started Torsemide 20mg daily. He will be going for CXR and labs this afternoon but I will be unable to get a stat echo today. Will have to schedule for Monday and will see about scheduling him for general cardiology soon after that.

## 2023-08-07 NOTE — TELEPHONE ENCOUNTER
Pt's daughter called back asking about BW and CXR that was obtained on Friday. Stat echo was scheduled for Tomorrow at 7am. Pt and daughter aware. I will send message to hopefully get established w gen cards per [place]/Dr. Han Benavides. Dr. Han Benavides, can you let me know how previous testing looks so I can relay to the daughter. Can we also get this pt scheduled w gen cards this week or next pls?

## 2023-08-10 ENCOUNTER — HOSPITAL ENCOUNTER (OUTPATIENT)
Dept: NON INVASIVE DIAGNOSTICS | Facility: HOSPITAL | Age: 88
Discharge: HOME | End: 2023-08-10
Attending: INTERNAL MEDICINE
Payer: MEDICARE

## 2023-08-10 VITALS
HEIGHT: 65 IN | BODY MASS INDEX: 27.82 KG/M2 | SYSTOLIC BLOOD PRESSURE: 120 MMHG | WEIGHT: 167 LBS | DIASTOLIC BLOOD PRESSURE: 70 MMHG | HEART RATE: 72 BPM

## 2023-08-10 DIAGNOSIS — R60.0 LOCALIZED EDEMA: ICD-10-CM

## 2023-08-10 DIAGNOSIS — I48.91 ATRIAL FIBRILLATION, UNSPECIFIED TYPE (HCC): ICD-10-CM

## 2023-08-10 LAB
AORTIC ROOT: 4.3 CM
AORTIC VALVE MEAN VELOCITY: 10.8 M/S
APICAL FOUR CHAMBER EJECTION FRACTION: 47 %
ASCENDING AORTA: 3.8 CM
AV AREA BY CONTINUOUS VTI: 1.8 CM2
AV AREA PEAK VELOCITY: 1.9 CM2
AV LVOT MEAN GRADIENT: 1 MMHG
AV LVOT PEAK GRADIENT: 2 MMHG
AV MEAN GRADIENT: 5 MMHG
AV PEAK GRADIENT: 10 MMHG
AV VALVE AREA: 1.82 CM2
AV VELOCITY RATIO: 0.45
AVA (PLAN): 1.7 CM2
DOP CALC AO PEAK VEL: 1.55 M/S
DOP CALC AO VTI: 32.77 CM
DOP CALC LVOT AREA: 4.15 CM2
DOP CALC LVOT CARDIAC INDEX: 2.33 L/MIN/M2
DOP CALC LVOT CARDIAC OUTPUT: 4.27 L/MIN
DOP CALC LVOT DIAMETER: 2.3 CM
DOP CALC LVOT PEAK VEL VTI: 14.34 CM
DOP CALC LVOT PEAK VEL: 0.7 M/S
DOP CALC LVOT STROKE INDEX: 33.9 ML/M2
DOP CALC LVOT STROKE VOLUME: 59.55 CM3
E WAVE DECELERATION TIME: 187 MS
FRACTIONAL SHORTENING: 28 % (ref 28–44)
INTERVENTRICULAR SEPTUM IN DIASTOLE (PARASTERNAL SHORT AXIS VIEW): 1.5 CM
INTERVENTRICULAR SEPTUM: 1.5 CM (ref 0.6–1.1)
LAAS-AP2: 29.3 CM2
LAAS-AP4: 30.1 CM2
LEFT ATRIUM SIZE: 4.1 CM
LEFT ATRIUM VOLUME (MOD BIPLANE): 108 ML
LEFT INTERNAL DIMENSION IN SYSTOLE: 3.3 CM (ref 2.1–4)
LEFT VENTRICLE DIASTOLIC VOLUME (MOD BIPLANE): 76 ML
LEFT VENTRICLE SYSTOLIC VOLUME (MOD BIPLANE): 37 ML
LEFT VENTRICULAR INTERNAL DIMENSION IN DIASTOLE: 4.6 CM (ref 3.5–6)
LEFT VENTRICULAR POSTERIOR WALL IN END DIASTOLE: 1.2 CM
LEFT VENTRICULAR STROKE VOLUME: 52 ML
LV EF: 51 %
LVSV (TEICH): 52 ML
MV E'TISSUE VEL-SEP: 6 CM/S
MV PEAK A VEL: 0.39 M/S
MV PEAK E VEL: 110 CM/S
MV STENOSIS PRESSURE HALF TIME: 54 MS
MV VALVE AREA P 1/2 METHOD: 4.07 CM2
RA PRESSURE ESTIMATED: 5 MMHG
RIGHT ATRIAL 2D VOLUME: 64 ML
RIGHT ATRIUM AREA SYSTOLE A4C: 21.6 CM2
RIGHT VENTRICLE ID DIMENSION: 4.4 CM
RV PSP: 44 MMHG
SINOTUBULAR JUNCTION: 3.6 CM
SL CV LEFT ATRIUM LENGTH A2C: 6.5 CM
SL CV LV EF: 50
SL CV PED ECHO LEFT VENTRICLE DIASTOLIC VOLUME (MOD BIPLANE) 2D: 95 ML
SL CV PED ECHO LEFT VENTRICLE SYSTOLIC VOLUME (MOD BIPLANE) 2D: 43 ML
STJ: 3.6 CM
TR MAX PG: 39 MMHG
TR PEAK VELOCITY: 3.1 M/S
TRICUSPID ANNULAR PLANE SYSTOLIC EXCURSION: 2.5 CM
TRICUSPID VALVE PEAK REGURGITATION VELOCITY: 3.12 M/S

## 2023-08-10 PROCEDURE — 93306 TTE W/DOPPLER COMPLETE: CPT | Performed by: INTERNAL MEDICINE

## 2023-08-10 PROCEDURE — 93306 TTE W/DOPPLER COMPLETE: CPT

## 2023-08-15 ENCOUNTER — OFFICE VISIT (OUTPATIENT)
Dept: CARDIOLOGY CLINIC | Facility: CLINIC | Age: 88
End: 2023-08-15
Payer: MEDICARE

## 2023-08-15 VITALS
SYSTOLIC BLOOD PRESSURE: 116 MMHG | HEIGHT: 65 IN | DIASTOLIC BLOOD PRESSURE: 64 MMHG | BODY MASS INDEX: 27.66 KG/M2 | OXYGEN SATURATION: 98 % | WEIGHT: 166 LBS | HEART RATE: 71 BPM

## 2023-08-15 DIAGNOSIS — Z95.0 CARDIAC PACEMAKER IN SITU: ICD-10-CM

## 2023-08-15 DIAGNOSIS — I48.0 PAROXYSMAL ATRIAL FIBRILLATION (HCC): ICD-10-CM

## 2023-08-15 DIAGNOSIS — I35.0 NONRHEUMATIC AORTIC VALVE STENOSIS: ICD-10-CM

## 2023-08-15 DIAGNOSIS — I50.32 CHRONIC DIASTOLIC HEART FAILURE (HCC): Primary | ICD-10-CM

## 2023-08-15 DIAGNOSIS — N18.31 CHRONIC KIDNEY DISEASE, STAGE 3A (HCC): ICD-10-CM

## 2023-08-15 DIAGNOSIS — Z95.818 PRESENCE OF WATCHMAN LEFT ATRIAL APPENDAGE CLOSURE DEVICE: ICD-10-CM

## 2023-08-15 PROCEDURE — 99204 OFFICE O/P NEW MOD 45 MIN: CPT | Performed by: INTERNAL MEDICINE

## 2023-08-15 RX ORDER — TORSEMIDE 20 MG/1
20 TABLET ORAL 3 TIMES WEEKLY
COMMUNITY
Start: 2023-08-04

## 2023-08-15 NOTE — PROGRESS NOTES
[facility] CARDIOLOGY Concepcion Herrera   [address] [facility] [address]                                            Cardiology Office Consult  Evan Gaviria, 81 y.o. male  YOB: 1935  MRN: [id number] Encounter: [id number]      PCP - Carlos Manuel Gamble DO  Referring Provider - Self, Referral    Chief Complaint   Patient presents with   • Follow-up     SOB- Sometimes   Edema- lower legs, has gotten better. PCP- started 20 mg torsemide daily. Assessment  Chronic diastolic heart failure  Aortic stenosis  Cardiac pacemaker in-situ  originally implanted n 4/2018  Had RV lead malfunction leading syncope (12/2022)--> needed TVP (12/19) --> s/p lead revision   Paroxysmal Atrial Fibrillation  S/p WATCHMAN ALDO closure  CKD  H/o SDH    Plan  Chronic diastolic heart failure  Has been diuresing well over the last 1-2 weeks, and reports wt being down about 10 lbs  Approaching euvolemia, still has some peripheral edema  Check BMP today  Will decrease torsemide to 20 mg - 3 x/week  Counseled regarding need to monitor weight daily, and if weight uptrending after diuretic change, then he will call us back  Low salt diet    Aortic stenosis  Mild AS on recent TTE  Counseled regarding natural history and progression, and need to monitor periodically  Monitor clinically for now    Paroxysmal Atrial Fibrillation, cardiac pacemaker in-situ, s/p WATCHMAN device  Reviewed recent device check AP 93%,  > 99% (dependent)  No recent clinical issues with rapid Afib  Continue amiodarone 100 mg daily  Continue aspirin 81    No results found for this visit on 08/15/23. Orders Placed This Encounter   Procedures   • Basic metabolic panel       No follow-ups on file. History of Present Illness   81 y.o. male comes in as a new patient for consultation and establishment of care regarding heart failure.   He was following with EP until now for atrial fibrillation, pacemaker, but developed issues with heart failure recently and as a result is referred to see general cardiology for further evaluation. Over the last couple of months he was having increased symptoms of shortness of breath and lower extremity edema and contacted EP office. He was initiated on torsemide daily, and with that has been diuresing well over the last 1 to 2 weeks. His edema is much improved today. He recently had a pacemaker implanted in 2018, when he presented with syncope and intracranial bleed and was found to have sick sinus syndrome and chronotropic incompetence along with atrial fibrillation. He underwent pacemaker placement and was doing well. In December 2022 he underwent Watchman procedure due to his history of bleeding. A few weeks later he returned with an episode of syncope and was found to have RV lead malfunction, and needed lead revision. Since then he reports he has had some lower extremity edema and shortness of breath but has not had any other episodes of dizziness, near-syncope or syncope. Historical Information   Past Medical History:   Diagnosis Date   • A-fib [facility])    • CHANA (acute kidney injury) ([address]) 12/19/2022   • Hernia, abdominal    • Hypertension    • Subdural hematoma (HCC)      Past Surgical History:   Procedure Laterality Date   • A-V CARDIAC PACEMAKER INSERTION     • APPENDECTOMY      age 33   • CARDIAC CATHETERIZATION N/A 12/19/2022    Procedure: Cardiac temporary pacemaker;  Surgeon: Iqra Mckay MD;  Location: [facility] CARDIAC CATH LAB; Service: Cardiology   • CARDIAC ELECTROPHYSIOLOGY PROCEDURE N/A 12/8/2022    Procedure: CARDIAC ATRIAL APPENDAGE CLOSURE (EP);  Surgeon: Verito Garland MD;  Location: [facility] MAIN OR;  Service: Cardiology   • CARDIAC ELECTROPHYSIOLOGY PROCEDURE Right 12/20/2022    Procedure: Cardiac lead revision;  Surgeon: Roger Arias MD;  Location: [facility] CARDIAC CATH LAB;   Service: Cardiology   • CARDIAC PACEMAKER PLACEMENT     • CARPAL TUNNEL RELEASE     • COLONOSCOPY     • INGUINAL HERNIA REPAIR Left    • IR UPPER EXTREMITY VENOGRAM- DIAGNOSTIC  12/19/2022   • AZ PERQ CLSR TCAT L ATR APNDGE W/ENDOCARDIAL IMPLNT N/A 12/8/2022    Procedure: CARDIAC ATRIAL APPENDAGE CLOSURE (CATH); Surgeon: Balaji Linares MD;  Location:  MAIN OR;  Service: Cardiology   • AZ XCAPSL CTRC RMVL INSJ IO LENS PROSTH W/O ECP Right 4/3/2017    Procedure: EXTRACTION EXTRACAPSULAR CATARACT PHACO INTRAOCULAR LENS (IOL); Surgeon: Trena Warner MD;  Location: [facility] MAIN OR;  Service: Ophthalmology   • AZ XCAPSL CTRC RMVL INSJ IO LENS PROSTH W/O ECP Left 2/6/2023    Procedure: EXTRACTION EXTRACAPSULAR CATARACT PHACO INTRAOCULAR LENS (IOL); Surgeon: Trena Warner MD;  Location: [facility] MAIN OR;  Service: Ophthalmology   • TONSILLECTOMY      age 12     Family History   Problem Relation Age of Onset   • Cancer Mother         exp age 80   • Heart disease Father    • Heart disease Brother    • Hypertension Brother      Current Outpatient Medications on File Prior to Visit   Medication Sig Dispense Refill   • amiodarone 200 mg tablet Take 0.5 tablets (100 mg total) by mouth daily 90 tablet 3   • aspirin 81 mg chewable tablet Chew 81 mg daily     • gabapentin (NEURONTIN) 100 mg capsule Take 1 capsule (100 mg total) by mouth in the morning and 1 capsule (100 mg total) in the evening and 1 capsule (100 mg total) before bedtime. (Patient taking differently: Take 100 mg by mouth 2 (two) times a day) 1 capsule 0   • tamsulosin (FLOMAX) 0.4 mg Take 0.4 mg by mouth daily with dinner     • torsemide (DEMADEX) 20 mg tablet Take 20 mg by mouth 3 (three) times a week     • [DISCONTINUED] amLODIPine (NORVASC) 2.5 mg tablet Take 1 tablet (2.5 mg total) by mouth daily (Patient not taking: Reported on 8/15/2023) 90 tablet 3     No current facility-administered medications on file prior to visit.      No Known Allergies  Social History     Socioeconomic History   • Marital status: /Civil Union     Spouse name: Not on file   • Number of children: Not on file   • Years of education: Not on file   • Highest education level: Not on file   Occupational History   • Not on file   Tobacco Use   • Smoking status: Never   • Smokeless tobacco: Never   Vaping Use   • Vaping Use: Never used   Substance and Sexual Activity   • Alcohol use: Never   • Drug use: Never   • Sexual activity: Not on file   Other Topics Concern   • Not on file   Social History Narrative   • Not on file     Social Determinants of Health     Financial Resource Strain: Not on file   Food Insecurity: No Food Insecurity (12/19/2022)    Hunger Vital Sign    • Worried About Running Out of Food in the Last Year: Never true    • Ran Out of Food in the Last Year: Never true   Transportation Needs: No Transportation Needs (12/19/2022)    PRAPARE - Transportation    • Lack of Transportation (Medical): No    • Lack of Transportation (Non-Medical): No   Physical Activity: Not on file   Stress: Not on file   Social Connections: Not on file   Intimate Partner Violence: Not on file   Housing Stability: Low Risk  (12/19/2022)    Housing Stability Vital Sign    • Unable to Pay for Housing in the Last Year: No    • Number of Places Lived in the Last Year: 1    • Unstable Housing in the Last Year: No        Review of Systems   All other systems reviewed and are negative. Vitals:  Vitals:    08/15/23 1317   BP: 116/64   BP Location: Right arm   Patient Position: Sitting   Cuff Size: Standard   Pulse: 71   SpO2: 98%   Weight: 75.3 kg (166 lb)   Height: 5' 5" (1.651 m)     BMI - Body mass index is 27.62 kg/m². Wt Readings from Last 7 Encounters:   08/15/23 75.3 kg (166 lb)   08/10/23 75.8 kg (167 lb)   05/12/23 76 kg (167 lb 9.6 oz)   01/26/23 73.5 kg (162 lb)   12/20/22 73.9 kg (162 lb 14.7 oz)   12/18/22 82 kg (180 lb 12.4 oz)   12/08/22 73.9 kg (163 lb)       Physical Exam  Vitals and nursing note reviewed. Constitutional:       General: He is not in acute distress.      Appearance: Normal appearance. He is well-developed. He is not ill-appearing or diaphoretic. HENT:      Head: Normocephalic and atraumatic. Nose: No congestion. Eyes:      General: No scleral icterus. Conjunctiva/sclera: Conjunctivae normal.   Neck:      Vascular: No carotid bruit or JVD. Cardiovascular:      Rate and Rhythm: Normal rate and regular rhythm. Heart sounds: Normal heart sounds. No murmur heard. No friction rub. No gallop. Comments: Right upper chest wall cardiac pacemaker noted in-situ  Pulmonary:      Effort: Pulmonary effort is normal. No respiratory distress. Breath sounds: Normal breath sounds. No wheezing or rales. Chest:      Chest wall: No tenderness. Abdominal:      General: There is no distension. Palpations: Abdomen is soft. Tenderness: There is no abdominal tenderness. Musculoskeletal:         General: No swelling, tenderness or deformity. Cervical back: Neck supple. No muscular tenderness. Right lower leg: Edema present. Left lower leg: Edema present. Comments: B/L 1-2+ edema upto just above ankle (L>R)   Skin:     General: Skin is warm. Neurological:      General: No focal deficit present. Mental Status: He is alert and oriented to person, place, and time. Mental status is at baseline. Psychiatric:         Mood and Affect: Mood normal.         Behavior: Behavior normal.         Thought Content:  Thought content normal.           Labs:  CBC:   Lab Results   Component Value Date    WBC 4.33 08/04/2023    RBC 5.27 08/04/2023    HGB 10.6 (L) 08/04/2023    HCT 33.8 (L) 08/04/2023    MCV 64 (L) 08/04/2023     08/04/2023    RDW 18.4 (H) 08/04/2023       CMP:   Lab Results   Component Value Date    K 4.0 08/04/2023     (H) 08/04/2023    CO2 26 08/04/2023    BUN 15 08/04/2023    CREATININE 1.24 08/04/2023    EGFR 52 08/04/2023    GLUCOSE 158 (H) 12/18/2022    CALCIUM 9.0 08/04/2023    AST 25 08/04/2023    ALT 31 08/04/2023 ALKPHOS 99 08/04/2023       Magnesium:  Lab Results   Component Value Date    MG 2.2 12/21/2022       Lipid Profile:   Lab Results   Component Value Date    HDL 91 03/29/2023    TRIG 55 03/29/2023    LDLCALC 77 03/29/2023       Thyroid Studies:   Lab Results   Component Value Date    YXU8SWUXLBNZ 5.186 (H) 08/04/2023    FREET4 0.89 08/04/2023       A1c:  No components found for: "HGA1C"    INR:  Lab Results   Component Value Date    INR 1.04 12/20/2022    INR 1.32 (H) 12/18/2022    INR 1.99 (H) 11/30/2022   5    Imaging: Echo complete w/ contrast if indicated    Result Date: 8/10/2023  Narrative: •  Left Ventricle: Left ventricular cavity size is normal. Wall thickness is increased. There is mild to moderate concentric hypertrophy. The left ventricular ejection fraction is 50%. Systolic function is low normal. Wall motion is normal. Diastolic function is moderately abnormal, consistent with grade II (pseudonormal) relaxation. •  Left Atrium: The atrium is moderately dilated. •  Right Atrium: The atrium is mildly dilated. •  Aortic Valve: The aortic valve is trileaflet. The leaflets are moderately thickened. The leaflets are moderately calcified. There is moderately reduced mobility. There is mild stenosis. The aortic valve area is 1.82 cm2. The aortic valve velocity is increased due to stenosis but lower than expected due to the presence of decreased flow. •  Tricuspid Valve: There is mild regurgitation. The right ventricular systolic pressure is mildly elevated. The estimated right ventricular systolic pressure is 94.48 mmHg. XR chest pa & lateral    Result Date: 8/6/2023  Narrative: CHEST INDICATION:   I48.91: Unspecified atrial fibrillation R60.0: Localized edema. COMPARISON: CXR 12/21/2022. EXAM PERFORMED/VIEWS:  XR CHEST PA & LATERAL. FINDINGS: Cardiomediastinal silhouette normal. Right subclavian pacemaker leads in the right atrial appendage with 2 leads in the right ventricular apex.  Watchman device in the expected location of the left atrial appendage. Lungs clear. No effusion or pneumothorax. Upper abdomen normal. Old left rib fractures. Impression: No acute cardiopulmonary disease. Workstation performed: [id number]     Cardiac EP device report    Result Date: 2023  Narrative: MDT-DUAL CHAMBER PPM/ ACTIVE SYSTEM IS MRI CONDITIONAL CARELINK TRANSMISSION-NB: PT C/O LE EDEMA. AP/ @ 72 PPM ON CURRENT EGM. BATTERY VOLTAGE ADEQUATE (5 YRS). AP-93%, >99% (DEPENDENT). ALL AVAILABLE LEAD PARAMETERS WITHIN NORMAL LIMITS. NO SIGNIFICANT HIGH RATE EPISODES. NORMAL DEVICE FUNCTION. GV       Cardiac testing:   Results for orders placed during the hospital encounter of 18    Echo complete with contrast if indicated    Narrative  [address] [place] [address], [address] [place] [address]  [phone]    Transthoracic Echocardiogram  2D, M-mode, Doppler, and Color Doppler    Study date:  15-Apr-2018    Patient: Napoleon Rivas  MR number: [id number]  Account number: [de-identified]  : [id number]  Age: 81 years  Gender: Male  Status: Inpatient  Location: Bedside  Height:  Weight:  BP: 125/ 52 mmHg    Indications: Syncope. Diagnoses: R55. - Syncope and collapse    Sonographer:  Stephen Alexander RDCS  Primary Physician:  Katharine Burns DO  Referring Physician:  Belinda Tucker MD  Group:  [facility]  Cardiology Fellow:  Blanco Escalante MD  Interpreting Physician:  Nael August MD    SUMMARY    LEFT VENTRICLE:  Systolic function was normal. Ejection fraction was estimated to be 55 %. There were no regional wall motion abnormalities. Wall thickness was mildly to moderately increased. The changes were consistent with concentric remodeling (increased wall thickness with normal wall mass). MITRAL VALVE:  There was mild annular calcification. There was mild regurgitation. AORTIC VALVE:  The valve was trileaflet.  Leaflets exhibited sclerosis. There was mild regurgitation. TRICUSPID VALVE:  There was mild to moderate regurgitation. Estimated peak PA pressure was 48 mmHg. The findings suggest mild to moderate pulmonary hypertension. IVC, HEPATIC VEINS:  Respirophasic changes were blunted (less than 50% variation). HISTORY: PRIOR HISTORY: Atrial fibrillation, Hypertension. PROCEDURE: The procedure was performed at the bedside. This was a routine study. The transthoracic approach was used. The study included complete 2D imaging, M-mode, complete spectral Doppler, and color Doppler. The heart rate was 50 bpm,  at the start of the study. Images were obtained from the parasternal, apical, subcostal, and suprasternal notch acoustic windows. Image quality was adequate. LEFT VENTRICLE: Size was normal. Systolic function was normal. Ejection fraction was estimated to be 55 %. There were no regional wall motion abnormalities. Wall thickness was mildly to moderately increased. The changes were consistent  with concentric remodeling (increased wall thickness with normal wall mass). DOPPLER: Left ventricular diastolic function parameters were normal.    RIGHT VENTRICLE: The size was normal. Systolic function was normal.    LEFT ATRIUM: Size was normal.    RIGHT ATRIUM: Size was normal.    MITRAL VALVE: There was mild annular calcification. Valve structure was normal. There was normal leaflet separation. DOPPLER: The transmitral velocity was within the normal range. There was no evidence for stenosis. There was mild  regurgitation. AORTIC VALVE: The valve was trileaflet. Leaflets exhibited sclerosis. DOPPLER: Transaortic velocity was within the normal range. There was no evidence for stenosis. There was mild regurgitation. TRICUSPID VALVE: The valve structure was normal. There was normal leaflet separation. DOPPLER: The transtricuspid velocity was within the normal range. There was no evidence for stenosis.  There was mild to moderate regurgitation. The  regurgitant jet was directed centrally. Estimated peak PA pressure was 48 mmHg. The findings suggest mild to moderate pulmonary hypertension. PULMONIC VALVE: DOPPLER: The transpulmonic velocity was within the normal range. There was no evidence for stenosis. There was trace regurgitation. PERICARDIUM: There was no pericardial effusion. AORTA: The root exhibited normal size. SYSTEMIC VEINS: IVC: The inferior vena cava was normal in size. Respirophasic changes were blunted (less than 50% variation). SYSTEM MEASUREMENT TABLES    2D  %FS: 27.33 %  Ao Diam: 3.74 cm  EDV(Teich): 95.08 ml  EF(Teich): 53.26 %  ESV(Teich): 44.44 ml  IVSd: 1.31 cm  LA Area: 18.96 cm2  LA Diam: 3.73 cm  LVEDV MOD A4C: 97.77 ml  LVEF MOD A4C: 57.52 %  LVESV MOD A4C: 41.53 ml  LVIDd: 4.55 cm  LVIDs: 3.31 cm  LVLd A4C: 8.11 cm  LVLs A4C: 6.6 cm  LVOT Diam: 2.21 cm  LVPWd: 0.92 cm  RA Area: 15.37 cm2  RVIDd: 3.7 cm  SV MOD A4C: 56.23 ml  SV(Teich): 50.63 ml    CW  AR Dec Glynn: 2.11 m/s2  AR Dec Time: 1731.3 ms  AR PHT: 502.08 ms  AR Vmax: 3.66 m/s  AR maxP.49 mmHg  TR Vmax: 3.09 m/s  TR maxP.29 mmHg    MM  TAPSE: 2.42 cm    PW  E': 0.07 m/s  E/E': 14.56  MV A Ilan: 0.69 m/s  MV Dec Glynn: 4.62 m/s2  MV DecT: 228.93 ms  MV E Ilan: 1.06 m/s  MV E/A Ratio: 1.53  MV PHT: 66.39 ms  MVA By PHT: 3.31 cm2    [employer] Accredited Echocardiography Laboratory    Prepared and electronically signed by    Gali Jacinto MD  Signed 2018 11:02:16    No results found for this or any previous visit. No results found for this or any previous visit. No results found for this or any previous visit. Echo complete w/ contrast if indicated  •  Left Ventricle: Left ventricular cavity size is normal. Wall thickness   is increased. There is mild to moderate concentric hypertrophy. The left   ventricular ejection fraction is 50%.  Systolic function is low normal.   Wall motion is normal. Diastolic function is moderately abnormal,   consistent with grade II (pseudonormal) relaxation. •  Left Atrium: The atrium is moderately dilated. •  Right Atrium: The atrium is mildly dilated. •  Aortic Valve: The aortic valve is trileaflet. The leaflets are   moderately thickened. The leaflets are moderately calcified. There is   moderately reduced mobility. There is mild stenosis. The aortic valve area   is 1.82 cm2. The aortic valve velocity is increased due to stenosis but   lower than expected due to the presence of decreased flow. •  Tricuspid Valve: There is mild regurgitation. The right ventricular   systolic pressure is mildly elevated. The estimated right ventricular   systolic pressure is 82.16 mmHg.

## 2023-08-16 ENCOUNTER — APPOINTMENT (OUTPATIENT)
Dept: LAB | Facility: MEDICAL CENTER | Age: 88
End: 2023-08-16
Payer: MEDICARE

## 2023-08-16 DIAGNOSIS — I50.32 CHRONIC DIASTOLIC HEART FAILURE (HCC): ICD-10-CM

## 2023-08-16 LAB
ANION GAP SERPL CALCULATED.3IONS-SCNC: 4 MMOL/L
BUN SERPL-MCNC: 36 MG/DL (ref 5–25)
CALCIUM SERPL-MCNC: 8.9 MG/DL (ref 8.3–10.1)
CHLORIDE SERPL-SCNC: 108 MMOL/L (ref 96–108)
CO2 SERPL-SCNC: 29 MMOL/L (ref 21–32)
CREAT SERPL-MCNC: 1.72 MG/DL (ref 0.6–1.3)
GFR SERPL CREATININE-BSD FRML MDRD: 35 ML/MIN/1.73SQ M
GLUCOSE P FAST SERPL-MCNC: 116 MG/DL (ref 65–99)
POTASSIUM SERPL-SCNC: 3.8 MMOL/L (ref 3.5–5.3)
SODIUM SERPL-SCNC: 141 MMOL/L (ref 135–147)

## 2023-08-16 PROCEDURE — 36415 COLL VENOUS BLD VENIPUNCTURE: CPT

## 2023-08-16 PROCEDURE — 80048 BASIC METABOLIC PNL TOTAL CA: CPT

## 2023-08-21 ENCOUNTER — TELEPHONE (OUTPATIENT)
Dept: CARDIOLOGY CLINIC | Facility: CLINIC | Age: 88
End: 2023-08-21

## 2023-08-21 NOTE — TELEPHONE ENCOUNTER
Spoke with patient's daughter Daysi Wilkins. Patient will repeat BMP this week. Torsemide dose confirmed. Daughter verbalized understanding to instructions given.

## 2023-08-21 NOTE — TELEPHONE ENCOUNTER
----- Message from Zelda Zaragoza MD sent at 8/17/2023  1:22 PM EDT -----  Creatinine slightly up from 1.24 to 1.72. Likely related to the water pills given recently. It should improve with the decrease in dosing of torsemide that I had suggested during the visit.  Continue torsemide 20 mg - 3x/week (M,W,F) and repeat BMP in 10 days

## 2023-08-28 ENCOUNTER — APPOINTMENT (OUTPATIENT)
Dept: LAB | Facility: MEDICAL CENTER | Age: 88
End: 2023-08-28
Payer: MEDICARE

## 2023-08-28 DIAGNOSIS — I50.32 CHRONIC DIASTOLIC HEART FAILURE (HCC): ICD-10-CM

## 2023-08-28 DIAGNOSIS — N18.31 CHRONIC KIDNEY DISEASE, STAGE 3A (HCC): ICD-10-CM

## 2023-08-28 LAB
ANION GAP SERPL CALCULATED.3IONS-SCNC: 11 MMOL/L
BUN SERPL-MCNC: 31 MG/DL (ref 5–25)
CALCIUM SERPL-MCNC: 9.3 MG/DL (ref 8.4–10.2)
CHLORIDE SERPL-SCNC: 105 MMOL/L (ref 96–108)
CO2 SERPL-SCNC: 26 MMOL/L (ref 21–32)
CREAT SERPL-MCNC: 1.73 MG/DL (ref 0.6–1.3)
GFR SERPL CREATININE-BSD FRML MDRD: 34 ML/MIN/1.73SQ M
GLUCOSE SERPL-MCNC: 106 MG/DL (ref 65–140)
POTASSIUM SERPL-SCNC: 3.7 MMOL/L (ref 3.5–5.3)
SODIUM SERPL-SCNC: 142 MMOL/L (ref 135–147)

## 2023-08-28 PROCEDURE — 36415 COLL VENOUS BLD VENIPUNCTURE: CPT

## 2023-08-28 PROCEDURE — 80048 BASIC METABOLIC PNL TOTAL CA: CPT

## 2023-08-30 ENCOUNTER — TELEPHONE (OUTPATIENT)
Dept: CARDIOLOGY CLINIC | Facility: CLINIC | Age: 88
End: 2023-08-30

## 2023-08-30 DIAGNOSIS — N18.31 CHRONIC KIDNEY DISEASE, STAGE 3A (HCC): Primary | ICD-10-CM

## 2023-08-30 NOTE — TELEPHONE ENCOUNTER
Returned call to patient communicating Dr Elsy Howell message. Patient verbalized understanding & will get BMP in 2 weeks as ordered.

## 2023-08-30 NOTE — TELEPHONE ENCOUNTER
Patient called requesting results of BMP from 8/28, specifically his kidney function. Please advise.

## 2023-09-14 ENCOUNTER — APPOINTMENT (OUTPATIENT)
Dept: LAB | Facility: MEDICAL CENTER | Age: 88
End: 2023-09-14
Payer: MEDICARE

## 2023-09-14 LAB
ANION GAP SERPL CALCULATED.3IONS-SCNC: 8 MMOL/L
BUN SERPL-MCNC: 24 MG/DL (ref 5–25)
CALCIUM SERPL-MCNC: 8.7 MG/DL (ref 8.4–10.2)
CHLORIDE SERPL-SCNC: 102 MMOL/L (ref 96–108)
CO2 SERPL-SCNC: 30 MMOL/L (ref 21–32)
CREAT SERPL-MCNC: 1.49 MG/DL (ref 0.6–1.3)
GFR SERPL CREATININE-BSD FRML MDRD: 41 ML/MIN/1.73SQ M
GLUCOSE SERPL-MCNC: 108 MG/DL (ref 65–140)
POTASSIUM SERPL-SCNC: 4.2 MMOL/L (ref 3.5–5.3)
SODIUM SERPL-SCNC: 140 MMOL/L (ref 135–147)

## 2023-10-11 ENCOUNTER — REMOTE DEVICE CLINIC VISIT (OUTPATIENT)
Dept: CARDIOLOGY CLINIC | Facility: CLINIC | Age: 88
End: 2023-10-11
Payer: MEDICARE

## 2023-10-11 DIAGNOSIS — Z95.0 PRESENCE OF PERMANENT CARDIAC PACEMAKER: Primary | ICD-10-CM

## 2023-10-11 PROCEDURE — 93294 REM INTERROG EVL PM/LDLS PM: CPT | Performed by: INTERNAL MEDICINE

## 2023-10-11 PROCEDURE — 93296 REM INTERROG EVL PM/IDS: CPT | Performed by: INTERNAL MEDICINE

## 2023-10-11 NOTE — PROGRESS NOTES
MDT DUAL CHAMBER PPM/ ACTIVE SYSTEM IS MRI CONDITIONAL   CARELINK TRANSMISSION:  BATTERY VOLTAGE ADEQUATE (5.0 YR.). AP 22.1% -LBB 99.7% (>40%/AVB/DDDR 70 PPM, 180-200 MS). ALL AVAILABLE LEAD PARAMETERS WITHIN NORMAL LIMITS. 2,772 AT/AF EPISODES WITH AT/AF BURDEN OF 77% SINCE 8/2/23. INT. ATRIAL UNDERSENSING ON EGMS. WATCHMAN IMPLANTED. PATIENT TAKES ASA AND AMIODARONE.  NORMAL DEVICE FUNCTION.   RG

## 2023-11-02 ENCOUNTER — APPOINTMENT (OUTPATIENT)
Dept: LAB | Facility: MEDICAL CENTER | Age: 88
End: 2023-11-02
Payer: MEDICARE

## 2023-11-02 ENCOUNTER — OFFICE VISIT (OUTPATIENT)
Dept: CARDIOLOGY CLINIC | Facility: MEDICAL CENTER | Age: 88
End: 2023-11-02
Payer: MEDICARE

## 2023-11-02 VITALS
OXYGEN SATURATION: 98 % | WEIGHT: 161 LBS | BODY MASS INDEX: 26.82 KG/M2 | SYSTOLIC BLOOD PRESSURE: 122 MMHG | HEIGHT: 65 IN | DIASTOLIC BLOOD PRESSURE: 80 MMHG | HEART RATE: 65 BPM

## 2023-11-02 DIAGNOSIS — I48.19 PERSISTENT ATRIAL FIBRILLATION (HCC): ICD-10-CM

## 2023-11-02 DIAGNOSIS — N18.31 CHRONIC KIDNEY DISEASE, STAGE 3A (HCC): ICD-10-CM

## 2023-11-02 DIAGNOSIS — D50.9 IRON DEFICIENCY ANEMIA, UNSPECIFIED IRON DEFICIENCY ANEMIA TYPE: ICD-10-CM

## 2023-11-02 DIAGNOSIS — Z95.0 CARDIAC PACEMAKER IN SITU: ICD-10-CM

## 2023-11-02 DIAGNOSIS — Z79.899 ENCOUNTER FOR MONITORING AMIODARONE THERAPY: ICD-10-CM

## 2023-11-02 DIAGNOSIS — Z51.81 ENCOUNTER FOR MONITORING AMIODARONE THERAPY: ICD-10-CM

## 2023-11-02 DIAGNOSIS — I10 ESSENTIAL HYPERTENSION: ICD-10-CM

## 2023-11-02 DIAGNOSIS — N18.31 CHRONIC KIDNEY DISEASE, STAGE 3A (HCC): Primary | ICD-10-CM

## 2023-11-02 DIAGNOSIS — R73.01 IMPAIRED FASTING GLUCOSE: ICD-10-CM

## 2023-11-02 DIAGNOSIS — Z95.818 PRESENCE OF WATCHMAN LEFT ATRIAL APPENDAGE CLOSURE DEVICE: ICD-10-CM

## 2023-11-02 LAB
ANION GAP SERPL CALCULATED.3IONS-SCNC: 10 MMOL/L
BASOPHILS # BLD AUTO: 0.04 THOUSANDS/ÂΜL (ref 0–0.1)
BASOPHILS NFR BLD AUTO: 1 % (ref 0–1)
BUN SERPL-MCNC: 27 MG/DL (ref 5–25)
CALCIUM SERPL-MCNC: 9.2 MG/DL (ref 8.4–10.2)
CHLORIDE SERPL-SCNC: 103 MMOL/L (ref 96–108)
CO2 SERPL-SCNC: 29 MMOL/L (ref 21–32)
CREAT SERPL-MCNC: 1.4 MG/DL (ref 0.6–1.3)
EOSINOPHIL # BLD AUTO: 0.18 THOUSAND/ÂΜL (ref 0–0.61)
EOSINOPHIL NFR BLD AUTO: 4 % (ref 0–6)
ERYTHROCYTE [DISTWIDTH] IN BLOOD BY AUTOMATED COUNT: 18 % (ref 11.6–15.1)
EST. AVERAGE GLUCOSE BLD GHB EST-MCNC: 117 MG/DL
GFR SERPL CREATININE-BSD FRML MDRD: 45 ML/MIN/1.73SQ M
GLUCOSE P FAST SERPL-MCNC: 104 MG/DL (ref 65–99)
HBA1C MFR BLD: 5.7 %
HCT VFR BLD AUTO: 37.9 % (ref 36.5–49.3)
HGB BLD-MCNC: 11.8 G/DL (ref 12–17)
IMM GRANULOCYTES # BLD AUTO: 0.01 THOUSAND/UL (ref 0–0.2)
IMM GRANULOCYTES NFR BLD AUTO: 0 % (ref 0–2)
IRON SERPL-MCNC: 88 UG/DL (ref 50–212)
LYMPHOCYTES # BLD AUTO: 0.99 THOUSANDS/ÂΜL (ref 0.6–4.47)
LYMPHOCYTES NFR BLD AUTO: 24 % (ref 14–44)
MCH RBC QN AUTO: 21 PG (ref 26.8–34.3)
MCHC RBC AUTO-ENTMCNC: 31.1 G/DL (ref 31.4–37.4)
MCV RBC AUTO: 67 FL (ref 82–98)
MONOCYTES # BLD AUTO: 0.56 THOUSAND/ÂΜL (ref 0.17–1.22)
MONOCYTES NFR BLD AUTO: 14 % (ref 4–12)
NEUTROPHILS # BLD AUTO: 2.33 THOUSANDS/ÂΜL (ref 1.85–7.62)
NEUTS SEG NFR BLD AUTO: 57 % (ref 43–75)
NRBC BLD AUTO-RTO: 0 /100 WBCS
PLATELET # BLD AUTO: 189 THOUSANDS/UL (ref 149–390)
PMV BLD AUTO: 9.8 FL (ref 8.9–12.7)
POTASSIUM SERPL-SCNC: 3.9 MMOL/L (ref 3.5–5.3)
RBC # BLD AUTO: 5.62 MILLION/UL (ref 3.88–5.62)
SODIUM SERPL-SCNC: 142 MMOL/L (ref 135–147)
WBC # BLD AUTO: 4.11 THOUSAND/UL (ref 4.31–10.16)

## 2023-11-02 PROCEDURE — 83540 ASSAY OF IRON: CPT

## 2023-11-02 PROCEDURE — 83036 HEMOGLOBIN GLYCOSYLATED A1C: CPT

## 2023-11-02 PROCEDURE — 36415 COLL VENOUS BLD VENIPUNCTURE: CPT

## 2023-11-02 PROCEDURE — 99214 OFFICE O/P EST MOD 30 MIN: CPT | Performed by: INTERNAL MEDICINE

## 2023-11-02 PROCEDURE — 80048 BASIC METABOLIC PNL TOTAL CA: CPT

## 2023-11-02 PROCEDURE — 85025 COMPLETE CBC W/AUTO DIFF WBC: CPT

## 2023-11-02 RX ORDER — AMIODARONE HYDROCHLORIDE 100 MG/1
100 TABLET ORAL DAILY
Qty: 90 TABLET | Refills: 1 | Status: SHIPPED | OUTPATIENT
Start: 2023-11-02

## 2023-11-02 NOTE — PROGRESS NOTES
[facility] [address] [facility] [id number]                                            Cardiology Office Follow up  Amalia Currie, 81 y.o. male  YOB: 1935  MRN: [id number] Encounter: [id number]      PCP - Xochitl Burns DO  Referring Provider - No ref. provider found    Chief Complaint   Patient presents with   • Follow-up     3 month f/up       Assessment  Chronic diastolic heart failure  Aortic stenosis  Cardiac pacemaker in-situ  originally implanted n 4/2018  Had RV lead malfunction leading syncope (12/2022)--> needed TVP (12/19) --> s/p lead revision   Paroxysmal Atrial Fibrillation  S/p WATCHMAN ALDO closure  CKD  H/o SDH    Plan  Chronic diastolic heart failure  He diuresed well with torsemide prescribed by Dr. Serena Peterson and then was already approaching euvolemia by the time I saw him earlier this year  Subsequent BMP showed CHANA and we decrease his diuretics to 2-3 times per week, which improved his creatinine levels  Now euvolemic on exam and using torsemide as needed  Continue torsemide 20 mg as needed for weight gain greater than 3 pounds or greater than 5 pounds per week   Check BMP --> lab completed post visit and reviewed by the time of documentation Cr stable at 1.40  Low salt diet    Aortic stenosis  8/2023 TTE - Mild AS   Asymptomatic  Continue to monitor regarding same  We will follow-up on echocardiogram periodically and annual clinical follow-up     Persistent Atrial Fibrillation, cardiac pacemaker in-situ, s/p WATCHMAN device  Reviewed recent device check   8/2/23 - AP 93%,  > 99% (dependent)  10/11/23: AP 22.1%,  99.7%, AT/Af burden 77% since 8/2/23. Back in Afib since mid Aug 2023, but remains V-paced  Continue amiodarone 100 mg daily, aspirin 81 mg daily for now --> can possibly stop amiodarone and switch to metoprolol, if he remains in Afib    No results found for this visit on 11/02/23.     Orders Placed This Encounter Procedures   • Basic metabolic panel         Return in about 6 months (around 5/2/2024), or if symptoms worsen or fail to improve. History of Present Illness   81 y.o. male comes in as a new patient for consultation and establishment of care regarding heart failure. He was following with EP until now for atrial fibrillation, pacemaker, but developed issues with heart failure recently and as a result is referred to see general cardiology for further evaluation. Over the last couple of months he was having increased symptoms of shortness of breath and lower extremity edema and contacted EP office. He was initiated on torsemide daily, and with that has been diuresing well over the last 1 to 2 weeks. His edema is much improved today. He recently had a pacemaker implanted in 2018, when he presented with syncope and intracranial bleed and was found to have sick sinus syndrome and chronotropic incompetence along with atrial fibrillation. He underwent pacemaker placement and was doing well. In December 2022 he underwent Watchman procedure due to his history of bleeding. A few weeks later he returned with an episode of syncope and was found to have RV lead malfunction, and needed lead revision. Since then he reports he has had some lower extremity edema and shortness of breath but has not had any other episodes of dizziness, near-syncope or syncope. Interval history - 11/2/2023  He returns for follow-up after about 3 months. In the interim, he has been using the diuretics at a lower frequency and with this his creatinine has improved. His weight has remained stable around 161 pounds and he has not had any pedal edema or shortness of breath.   His device check recently again started to show more atrial fibrillation, but he remains free of chest pain, shortness of breath, palpitations or dizziness      Historical Information   Past Medical History:   Diagnosis Date   • A-fib [facility])    • CHANA (acute kidney injury) ([address]) 12/19/2022   • Hernia, abdominal    • Hypertension    • Subdural hematoma (HCC)      Past Surgical History:   Procedure Laterality Date   • A-V CARDIAC PACEMAKER INSERTION     • APPENDECTOMY      age 33   • CARDIAC CATHETERIZATION N/A 12/19/2022    Procedure: Cardiac temporary pacemaker;  Surgeon: Laurent Mcneil MD;  Location: [facility]; Service: Cardiology   • CARDIAC ELECTROPHYSIOLOGY PROCEDURE N/A 12/8/2022    Procedure: CARDIAC ATRIAL APPENDAGE CLOSURE (EP);  Surgeon: Bárbara Cedillo MD;  Location: [facility] MAIN OR;  Service: Cardiology   • CARDIAC ELECTROPHYSIOLOGY PROCEDURE Right 12/20/2022    Procedure: Cardiac lead revision;  Surgeon: Primitivo Patterson MD;  Location: [facility]; Service: Cardiology   • CARDIAC PACEMAKER PLACEMENT     • CARPAL TUNNEL RELEASE     • COLONOSCOPY     • INGUINAL HERNIA REPAIR Left    • IR UPPER EXTREMITY VENOGRAM- DIAGNOSTIC  12/19/2022   • NC PERQ CLSR TCAT L ATR APNDGE W/ENDOCARDIAL IMPLNT N/A 12/8/2022    Procedure: CARDIAC ATRIAL APPENDAGE CLOSURE (CATH); Surgeon: Bárbara Cedillo MD;  Location: [facility];  Service: Cardiology   • NC XCAPSL CTRC RMVL INSJ IO LENS PROSTH W/O ECP Right 4/3/2017    Procedure: EXTRACTION EXTRACAPSULAR CATARACT PHACO INTRAOCULAR LENS (IOL); Surgeon: Gladys Villegas MD;  Location: [facility] MAIN OR;  Service: Ophthalmology   • NC XCAPSL CTRC RMVL INSJ IO LENS PROSTH W/O ECP Left 2/6/2023    Procedure: EXTRACTION EXTRACAPSULAR CATARACT PHACO INTRAOCULAR LENS (IOL);   Surgeon: Gladys Villegas MD;  Location: [facility] MAIN OR;  Service: Ophthalmology   • TONSILLECTOMY      age 12     Family History   Problem Relation Age of Onset   • Cancer Mother         exp age 80   • Heart disease Father    • Heart disease Brother    • Hypertension Brother      Current Outpatient Medications on File Prior to Visit   Medication Sig Dispense Refill   • aspirin 81 mg chewable tablet Chew 81 mg daily     • gabapentin (NEURONTIN) 100 mg capsule Take 1 capsule (100 mg total) by mouth in the morning and 1 capsule (100 mg total) in the evening and 1 capsule (100 mg total) before bedtime. (Patient taking differently: Take 100 mg by mouth 2 (two) times a day) 1 capsule 0   • tamsulosin (FLOMAX) 0.4 mg Take 0.4 mg by mouth daily with dinner     • torsemide (DEMADEX) 20 mg tablet Take 20 mg by mouth as needed (for weight gain > 3lbs/day or wt > 165 lbs)     • [DISCONTINUED] amiodarone 200 mg tablet Take 0.5 tablets (100 mg total) by mouth daily 90 tablet 3     No current facility-administered medications on file prior to visit. No Known Allergies  Social History     Socioeconomic History   • Marital status: /Civil Union     Spouse name: None   • Number of children: None   • Years of education: None   • Highest education level: None   Occupational History   • None   Tobacco Use   • Smoking status: Never   • Smokeless tobacco: Never   Vaping Use   • Vaping Use: Never used   Substance and Sexual Activity   • Alcohol use: Never   • Drug use: Never   • Sexual activity: None   Other Topics Concern   • None   Social History Narrative   • None     Social Determinants of Health     Financial Resource Strain: Not on file   Food Insecurity: No Food Insecurity (12/19/2022)    Hunger Vital Sign    • Worried About Running Out of Food in the Last Year: Never true    • Ran Out of Food in the Last Year: Never true   Transportation Needs: No Transportation Needs (12/19/2022)    PRAPARE - Transportation    • Lack of Transportation (Medical): No    • Lack of Transportation (Non-Medical):  No   Physical Activity: Not on file   Stress: Not on file   Social Connections: Not on file   Intimate Partner Violence: Not on file   Housing Stability: Low Risk  (12/19/2022)    Housing Stability Vital Sign    • Unable to Pay for Housing in the Last Year: No    • Number of Places Lived in the Last Year: 1    • Unstable Housing in the Last Year: No        Review of Systems   All other systems reviewed and are negative. Vitals:  Vitals:    11/02/23 0813   BP: 122/80   BP Location: Left arm   Patient Position: Sitting   Cuff Size: Adult   Pulse: 65   SpO2: 98%   Weight: 73 kg (161 lb)   Height: 5' 5" (1.651 m)     BMI - Body mass index is 26.79 kg/m². Wt Readings from Last 7 Encounters:   11/02/23 73 kg (161 lb)   08/15/23 75.3 kg (166 lb)   08/10/23 75.8 kg (167 lb)   05/12/23 76 kg (167 lb 9.6 oz)   01/26/23 73.5 kg (162 lb)   12/20/22 73.9 kg (162 lb 14.7 oz)   12/18/22 82 kg (180 lb 12.4 oz)       Physical Exam  Vitals and nursing note reviewed. Constitutional:       General: He is not in acute distress. Appearance: Normal appearance. He is well-developed. He is not ill-appearing or diaphoretic. HENT:      Head: Normocephalic and atraumatic. Nose: No congestion. Eyes:      General: No scleral icterus. Conjunctiva/sclera: Conjunctivae normal.   Neck:      Vascular: No carotid bruit or JVD. Cardiovascular:      Rate and Rhythm: Normal rate and regular rhythm. Heart sounds: Murmur heard. Systolic murmur is present with a grade of 2/6. No friction rub. No gallop. Comments: Right upper chest wall cardiac pacemaker noted in-situ  Pulmonary:      Effort: Pulmonary effort is normal. No respiratory distress. Breath sounds: Normal breath sounds. No wheezing or rales. Chest:      Chest wall: No tenderness. Abdominal:      General: There is no distension. Palpations: Abdomen is soft. Tenderness: There is no abdominal tenderness. Musculoskeletal:         General: No swelling, tenderness or deformity. Cervical back: Neck supple. No muscular tenderness. Right lower leg: Edema present. Left lower leg: Edema present. Comments: B/L 1-2+ edema upto just above ankle (L>R)   Skin:     General: Skin is warm. Neurological:      General: No focal deficit present. Mental Status: He is alert and oriented to person, place, and time. Mental status is at baseline. Psychiatric:         Mood and Affect: Mood normal.         Behavior: Behavior normal.         Thought Content: Thought content normal.           Labs:  CBC:   Lab Results   Component Value Date    WBC 4.11 (L) 11/02/2023    RBC 5.62 11/02/2023    HGB 11.8 (L) 11/02/2023    HCT 37.9 11/02/2023    MCV 67 (L) 11/02/2023     11/02/2023    RDW 18.0 (H) 11/02/2023       CMP:   Lab Results   Component Value Date    K 3.9 11/02/2023     11/02/2023    CO2 29 11/02/2023    BUN 27 (H) 11/02/2023    CREATININE 1.40 (H) 11/02/2023    EGFR 45 11/02/2023    GLUCOSE 158 (H) 12/18/2022    CALCIUM 9.2 11/02/2023    AST 25 08/04/2023    ALT 31 08/04/2023    ALKPHOS 99 08/04/2023       Magnesium:  Lab Results   Component Value Date    MG 2.2 12/21/2022       Lipid Profile:   Lab Results   Component Value Date    HDL 91 03/29/2023    TRIG 55 03/29/2023    LDLCALC 77 03/29/2023       Thyroid Studies:   Lab Results   Component Value Date    SAN1VHHPNHYO 5.186 (H) 08/04/2023    FREET4 0.89 08/04/2023       A1c:  No components found for: "HGA1C"    INR:  Lab Results   Component Value Date    INR 1.04 12/20/2022    INR 1.32 (H) 12/18/2022    INR 1.99 (H) 11/30/2022   5    Imaging: Echo complete w/ contrast if indicated    Result Date: 8/10/2023  Narrative: •  Left Ventricle: Left ventricular cavity size is normal. Wall thickness is increased. There is mild to moderate concentric hypertrophy. The left ventricular ejection fraction is 50%. Systolic function is low normal. Wall motion is normal. Diastolic function is moderately abnormal, consistent with grade II (pseudonormal) relaxation. •  Left Atrium: The atrium is moderately dilated. •  Right Atrium: The atrium is mildly dilated. •  Aortic Valve: The aortic valve is trileaflet. The leaflets are moderately thickened. The leaflets are moderately calcified. There is moderately reduced mobility. There is mild stenosis.  The aortic valve area is 1.82 cm2. The aortic valve velocity is increased due to stenosis but lower than expected due to the presence of decreased flow. •  Tricuspid Valve: There is mild regurgitation. The right ventricular systolic pressure is mildly elevated. The estimated right ventricular systolic pressure is 56.68 mmHg. XR chest pa & lateral    Result Date: 2023  Narrative: CHEST INDICATION:   I48.91: Unspecified atrial fibrillation R60.0: Localized edema. COMPARISON: CXR 2022. EXAM PERFORMED/VIEWS:  XR CHEST PA & LATERAL. FINDINGS: Cardiomediastinal silhouette normal. Right subclavian pacemaker leads in the right atrial appendage with 2 leads in the right ventricular apex. Watchman device in the expected location of the left atrial appendage. Lungs clear. No effusion or pneumothorax. Upper abdomen normal. Old left rib fractures. Impression: No acute cardiopulmonary disease. Workstation performed: [id number]     Cardiac EP device report    Result Date: 2023  Narrative: MDT-DUAL CHAMBER PPM/ ACTIVE SYSTEM IS MRI CONDITIONAL CARELINK TRANSMISSION-NB: PT C/O LE EDEMA. AP/ @ 72 PPM ON CURRENT EGM. BATTERY VOLTAGE ADEQUATE (5 YRS). AP-93%, >99% (DEPENDENT). ALL AVAILABLE LEAD PARAMETERS WITHIN NORMAL LIMITS. NO SIGNIFICANT HIGH RATE EPISODES. NORMAL DEVICE FUNCTION. GV       Cardiac testing:   Results for orders placed during the hospital encounter of 18    Echo complete with contrast if indicated    Narrative  [address] [address], [address] [place] [address]  [phone]    Transthoracic Echocardiogram  2D, M-mode, Doppler, and Color Doppler    Study date:  15-Apr-2018    Patient: Reza Chen  MR number: [id number]  Account number: [de-identified]  : [id number]  Age: 81 years  Gender: Male  Status: Inpatient  Location: Bedside  Height:  Weight:  BP: 125/ 52 mmHg    Indications: Syncope.     Diagnoses: R55. - Syncope and collapse    Sonographer:  Morales Wick RDCS  Primary Physician:  Shirley Dumont DO  Referring Physician:  Floyd Escalona MD  Group:  [facility]  Cardiology Fellow:  Courtney Alexis MD  Interpreting Physician:  Chadwick Billings MD    SUMMARY    LEFT VENTRICLE:  Systolic function was normal. Ejection fraction was estimated to be 55 %. There were no regional wall motion abnormalities. Wall thickness was mildly to moderately increased. The changes were consistent with concentric remodeling (increased wall thickness with normal wall mass). MITRAL VALVE:  There was mild annular calcification. There was mild regurgitation. AORTIC VALVE:  The valve was trileaflet. Leaflets exhibited sclerosis. There was mild regurgitation. TRICUSPID VALVE:  There was mild to moderate regurgitation. Estimated peak PA pressure was 48 mmHg. The findings suggest mild to moderate pulmonary hypertension. IVC, HEPATIC VEINS:  Respirophasic changes were blunted (less than 50% variation). HISTORY: PRIOR HISTORY: Atrial fibrillation, Hypertension. PROCEDURE: The procedure was performed at the bedside. This was a routine study. The transthoracic approach was used. The study included complete 2D imaging, M-mode, complete spectral Doppler, and color Doppler. The heart rate was 50 bpm,  at the start of the study. Images were obtained from the parasternal, apical, subcostal, and suprasternal notch acoustic windows. Image quality was adequate. LEFT VENTRICLE: Size was normal. Systolic function was normal. Ejection fraction was estimated to be 55 %. There were no regional wall motion abnormalities. Wall thickness was mildly to moderately increased. The changes were consistent  with concentric remodeling (increased wall thickness with normal wall mass).  DOPPLER: Left ventricular diastolic function parameters were normal.    RIGHT VENTRICLE: The size was normal. Systolic function was normal.    LEFT ATRIUM: Size was normal.    RIGHT ATRIUM: Size was normal.    MITRAL VALVE: There was mild annular calcification. Valve structure was normal. There was normal leaflet separation. DOPPLER: The transmitral velocity was within the normal range. There was no evidence for stenosis. There was mild  regurgitation. AORTIC VALVE: The valve was trileaflet. Leaflets exhibited sclerosis. DOPPLER: Transaortic velocity was within the normal range. There was no evidence for stenosis. There was mild regurgitation. TRICUSPID VALVE: The valve structure was normal. There was normal leaflet separation. DOPPLER: The transtricuspid velocity was within the normal range. There was no evidence for stenosis. There was mild to moderate regurgitation. The  regurgitant jet was directed centrally. Estimated peak PA pressure was 48 mmHg. The findings suggest mild to moderate pulmonary hypertension. PULMONIC VALVE: DOPPLER: The transpulmonic velocity was within the normal range. There was no evidence for stenosis. There was trace regurgitation. PERICARDIUM: There was no pericardial effusion. AORTA: The root exhibited normal size. SYSTEMIC VEINS: IVC: The inferior vena cava was normal in size. Respirophasic changes were blunted (less than 50% variation).     SYSTEM MEASUREMENT TABLES    2D  %FS: 27.33 %  Ao Diam: 3.74 cm  EDV(Teich): 95.08 ml  EF(Teich): 53.26 %  ESV(Teich): 44.44 ml  IVSd: 1.31 cm  LA Area: 18.96 cm2  LA Diam: 3.73 cm  LVEDV MOD A4C: 97.77 ml  LVEF MOD A4C: 57.52 %  LVESV MOD A4C: 41.53 ml  LVIDd: 4.55 cm  LVIDs: 3.31 cm  LVLd A4C: 8.11 cm  LVLs A4C: 6.6 cm  LVOT Diam: 2.21 cm  LVPWd: 0.92 cm  RA Area: 15.37 cm2  RVIDd: 3.7 cm  SV MOD A4C: 56.23 ml  SV(Teich): 50.63 ml    CW  AR Dec [place]: 2.11 m/s2  AR Dec Time: 1731.3 ms  AR PHT: 502.08 ms  AR Vmax: 3.66 m/s  AR maxP.49 mmHg  TR Vmax: 3.09 m/s  TR maxP.29 mmHg    MM  TAPSE: 2.42 cm    PW  E': 0.07 m/s  E/E': 14.56  MV A [place]: 0.69 m/s  MV Dec [place]: 4.62 m/s2  MV DecT: 228.93 ms  MV E Ilan: 1.06 m/s  MV E/A Ratio: 1.53  MV PHT: 66.39 ms  MVA By PHT: 3.31 cm2    [facility] Accredited Echocardiography Laboratory    Prepared and electronically signed by    Sherry Casiano MD  Signed 16-Apr-2018 11:02:16    No results found for this or any previous visit. No results found for this or any previous visit. No results found for this or any previous visit. Cardiac EP device report  MDT DUAL CHAMBER PPM/ ACTIVE SYSTEM IS MRI CONDITIONAL  CARELINK TRANSMISSION:  BATTERY VOLTAGE ADEQUATE (5.0 YR.). AP 22.1% -LBB 99.7% (>40%/AVB/DDDR 70 PPM, 180-200 MS). ALL AVAILABLE LEAD PARAMETERS WITHIN NORMAL LIMITS. 2,772 AT/AF EPISODES WITH AT/AF BURDEN OF 77% SINCE 8/2/23. INT. ATRIAL UNDERSENSING ON EGMS. WATCHMAN IMPLANTED. PATIENT TAKES ASA AND AMIODARONE.  NORMAL DEVICE FUNCTION.   RG

## 2023-11-10 ENCOUNTER — TELEPHONE (OUTPATIENT)
Dept: CARDIOLOGY CLINIC | Facility: CLINIC | Age: 88
End: 2023-11-10

## 2024-01-02 ENCOUNTER — TELEPHONE (OUTPATIENT)
Dept: NEPHROLOGY | Facility: CLINIC | Age: 89
End: 2024-01-02

## 2024-01-10 ENCOUNTER — REMOTE DEVICE CLINIC VISIT (OUTPATIENT)
Dept: CARDIOLOGY CLINIC | Facility: CLINIC | Age: 89
End: 2024-01-10
Payer: MEDICARE

## 2024-01-10 DIAGNOSIS — Z95.0 PRESENCE OF PERMANENT CARDIAC PACEMAKER: Primary | ICD-10-CM

## 2024-01-10 PROCEDURE — 93294 REM INTERROG EVL PM/LDLS PM: CPT | Performed by: INTERNAL MEDICINE

## 2024-01-10 PROCEDURE — 93296 REM INTERROG EVL PM/IDS: CPT | Performed by: INTERNAL MEDICINE

## 2024-01-10 NOTE — PROGRESS NOTES
MDT DC PM/ ACTIVE SYSTEM IS MRI CONDITIONAL   CARELINK TRANSMISSION:  BATTERY VOLTAGE ADEQUATE (4.7 YR.).  AP 18.5% -LPF 98.8% (>40%/CHB/DDDR 60 PPM, -200 MS).  ALL LEAD PARAMETERS WITHIN NORMAL LIMITS.  100% AF WITH AT/AF BURDEN 82.3% SINCE 10/11/2023.  AT/AF BURDEN MAY BE UNDERESTIMATED DUE TO INT ATRIAL UNDERSENSING SEEN ON AVAILABLE EGMS.  PATIENT TAKES ASA AND AMIODARONE, WATCHMAN IMPLANTED.  NORMAL DEVICE FUNCTION.  RG

## 2024-01-19 ENCOUNTER — IN-CLINIC DEVICE VISIT (OUTPATIENT)
Dept: CARDIOLOGY CLINIC | Facility: CLINIC | Age: 89
End: 2024-01-19

## 2024-01-19 DIAGNOSIS — Z95.0 CARDIAC PACEMAKER IN SITU: Primary | ICD-10-CM

## 2024-01-19 PROCEDURE — RECHECK: Performed by: INTERNAL MEDICINE

## 2024-01-19 NOTE — PROGRESS NOTES
Results for orders placed or performed in visit on 01/19/24   Cardiac EP device report    Narrative    MDT DC PM/ ACTIVE SYSTEM IS MRI CONDITIONAL  DEVICE INTERROGATED IN THE [place] OFFICE TO ASSESS ATRIAL LEAD- NB. ATRIAL LEAD IMPEDANCE >3000 OHMS ON REMOTE TRANSMISSION. ATRIAL LEAD IMPEDANCE >3000 OHMS BOTH BIPOLAR & UNIPOLAR WHEN RIGHT ARM REACHED ACROSS CHEST (PM GENERATOR ON RIGHT SIDE) & DURING ISOMETRICS (CLASP FINGERS & PULL APART AT CHEST LEVEL). ATRIAL SENSING WNL. PT CURRENTLY IN AFLUTTER. PT ON ASA 81MG & AMIO. S/P WATCHMAN. PT DENIED ANY SYMPTOMS. ALL RV LEAD PARAMETERS WITHIN NORMAL LIMITS. TIGER TEXTED DR. POLK. PER DR. POLK, REPROGRAMMED MODE TO VVIR. BATTERY VOLTAGE ADEQUATE (4.6 YRS). AP-49%, -99%. NORMAL DEVICE FUNCTION W/ EXCEPTION OF RA LEAD. GV

## 2024-02-01 ENCOUNTER — CONSULT (OUTPATIENT)
Dept: NEPHROLOGY | Facility: CLINIC | Age: 89
End: 2024-02-01
Payer: MEDICARE

## 2024-02-01 VITALS
BODY MASS INDEX: 27.32 KG/M2 | HEIGHT: 65 IN | SYSTOLIC BLOOD PRESSURE: 138 MMHG | WEIGHT: 164 LBS | DIASTOLIC BLOOD PRESSURE: 62 MMHG

## 2024-02-01 DIAGNOSIS — I10 ESSENTIAL HYPERTENSION: ICD-10-CM

## 2024-02-01 DIAGNOSIS — I50.32 CHRONIC DIASTOLIC HEART FAILURE (HCC): ICD-10-CM

## 2024-02-01 DIAGNOSIS — N18.30 BENIGN HYPERTENSION WITH CKD (CHRONIC KIDNEY DISEASE) STAGE III (HCC): ICD-10-CM

## 2024-02-01 DIAGNOSIS — I12.9 BENIGN HYPERTENSION WITH CKD (CHRONIC KIDNEY DISEASE) STAGE III (HCC): ICD-10-CM

## 2024-02-01 DIAGNOSIS — I49.5 SICK SINUS SYNDROME (HCC): Primary | ICD-10-CM

## 2024-02-01 DIAGNOSIS — R79.89 ELEVATED SERUM CREATININE: ICD-10-CM

## 2024-02-01 PROCEDURE — 99204 OFFICE O/P NEW MOD 45 MIN: CPT | Performed by: INTERNAL MEDICINE

## 2024-02-01 NOTE — PATIENT INSTRUCTIONS
1.)  Low 2 g sodium diet    2.)  Monitor weights at home    3.)  Avoid NSAIDs (ibuprofen, Motrin, Advil, Aleve, naproxen)    4.)  Monitor blood pressure at home, call if blood pressure greater than 150/90 persistently    5.) I will plan to discuss all results including blood work, and/or imaging at our next visit, unless there is an urgent indication, in which case I will call you earlier. If you have any questions or concerns about your results, please feel free to call our office.    6.) Blood work now and 6 months

## 2024-02-01 NOTE — PROGRESS NOTES
NEPHROLOGY OUTPATIENT CONSULTATION   Matias Rodriguez 88 y.o. male MRN: [id number]  Date: 2/1/2024  Reason for consultation:   Chief Complaint   Patient presents with    Consult       ASSESSMENT and PLAN:    Thank you for the courtesy of this consultation.  I had the pleasure of seeing Matias today in the renal clinic for the initial management of elevated creatinine with underlying chronic kidney disease.    Elevated creatinine/acute kidney injury--improved  -- Creatinine worsened in August of last year and appears to improved to 1.4 and plateaued  -- Not too far from his prior baseline but at the higher end.  Had adjustments of his diuretics for better volume status given his underlying chronic diastolic heart failure.  -- I suspect he will be at a new baseline creatinine to keep euvolemic.  -- Check repeat blood work now.  Check a urine analysis.  Check a renal ultrasound.  -- No objections to continue torsemide 20 mg daily which is currently what he is doing.  As opposed to Monday Wednesday Friday versus as needed.    Chronic Kidney Disease Stage IIIA  --Imaging: Check a renal ultrasound  --Urinalysis: Check a urine analysis  --Proteinuria: Screen for microalbuminuria  --Baseline Kidney Function: low to mid 1's  --Etiology: Presumed be secondary to cardiorenal syndrome with underlying chronic congestive heart failure along with hypertension and arteriolosclerosis along with age-related nephron loss  --Biopsy Proven: No  --Serologies: No clinical indication for serologies at this time  --RAAS Blockade: None  --Reducing Cardiovascular Risk Factors:  Simvastatin+ Ezetimibe reduced atherosclerotic events in CKD (SHARP trial); Low dose aspirin safe (if no contraindications exist); smoking is an independent risk factor for Chronic Kidney Disease and progression, strongly recommend smoking cessation  --Sodium-Glucose Cotransporter-2 (SGLT2) Inhibitors:  May see an acute drop in the eGFR initially when starting the  medication but then a stabilization of the renal function, with slower loss of renal function as compared to placebo.  Relative risk of end-stage renal disease, doubling of serum creatinine or death from renal causes were also found to be lower as compared to placebo. (CREDENCE).  DAPA-CKD study, showed that patients with chronic kidney disease regardless of the presence or absence of diabetes the risk of composite of a sustained decline in the estimated GFR of at least 50%, end-stage renal disease or death from renal or cardiovascular causes was significantly lower with Dapagliflozin than with placebo. EMPEROR-Reduced study also showed beneficial effects. EMPA-CKD, among wide range of patients with CKD, who were at risk for progression, empagliflozin led a lower risk of kidney disease progression or death from cardiovascular causes than placebo.  If no contraindications exist I would recommend this medication added to the regiment. If eGFR < 60 cc/min (avoid ertugliflozin); avoid or caution with GFR < 20 mL/min, not an absolute contraindication, likely lower benefits.   --Finerenone: In patients with chronic kidney disease with type 2 diabetes, treatment led to lower risks of CKD progression and cardiovascular events than placebo. (FIDELIO-DKD)  --Status: Creatinine appears to have plateaued at around 1.4.  --Management/Recommendations: Check repeat blood work.  Check renal ultrasound and urine analysis no objections to continuing torsemide 20 mg daily to maintain euvolemic status and keep out of congestive heart failure    Chronic diastolic congestive heart failure  -- Cardiologist Dr. Sparks  -- With underlying aortic stenosis and cardiac pacemaker  -- Continue torsemide 20 mg daily  -- Low salt intake.  Daily weights    Paroxysmal atrial fibrillation  -- Pacemaker in situ, status post Watchman device  -- Continue follow-up with cardiology  -- Currently on amiodarone.  Monitor liver enzymes and thyroid  function    Hypertension  --DASH Diet  -- Continue torsemide 20 mg daily  -- Blood pressure at target for his advanced age.    PATIENT INSTRUCTIONS:    Patient Instructions   1.)  Low 2 g sodium diet    2.)  Monitor weights at home    3.)  Avoid NSAIDs (ibuprofen, Motrin, Advil, Aleve, naproxen)    4.)  Monitor blood pressure at home, call if blood pressure greater than 150/90 persistently    5.) I will plan to discuss all results including blood work, and/or imaging at our next visit, unless there is an urgent indication, in which case I will call you earlier. If you have any questions or concerns about your results, please feel free to call our office.    6.) Blood work now and 6 months      HISTORY OF PRESENT ILLNESS:  Requesting Physician: DO Matias Junior BRAYAN Rodriguez is a 88 y.o. male who has a history of underlying chronic kidney disease stage IIIa that fluctuated between 1 to 1.3 mg/dL.  He had an episode of mild acute kidney injury in August of last year with a creatinine went up to 1.8 mg/dL and eventually improved to 1.4 and remained stable.  He had fluctuations in his volume status along with some shortness of breath and adjustments to his diuretics.  He had also been on intermittent Advil at that time as well.  No chest pain or shortness of breath.  Currently taking torsemide almost every day.  Reports no urinary symptoms.  No family history of kidney disease.    PAST MEDICAL HISTORY:  Past Medical History:   Diagnosis Date    A-fib (HCC)     CHANA (acute kidney injury) (HCC) 12/19/2022    Hernia, abdominal     Hypertension     Subdural hematoma (HCC)        PAST SURGICAL HISTORY:  Past Surgical History:   Procedure Laterality Date    A-V CARDIAC PACEMAKER INSERTION      APPENDECTOMY      age 27    CARDIAC CATHETERIZATION N/A 12/19/2022    Procedure: Cardiac temporary pacemaker;  Surgeon: Darien Panda MD;  [facility]: [facility];  Service: Cardiology    CARDIAC ELECTROPHYSIOLOGY  PROCEDURE N/A 12/8/2022    Procedure: CARDIAC ATRIAL APPENDAGE CLOSURE (EP);  Surgeon: Jeremiah Pradhan MD;  Location: [facility];  Service: Cardiology    CARDIAC ELECTROPHYSIOLOGY PROCEDURE Right 12/20/2022    Procedure: Cardiac lead revision;  Surgeon: Juan Wheeler MD;  Location:  CARDIAC CATH LAB;  Service: Cardiology    CARDIAC PACEMAKER PLACEMENT      CARPAL TUNNEL RELEASE      COLONOSCOPY      INGUINAL HERNIA REPAIR Left     IR UPPER EXTREMITY VENOGRAM- DIAGNOSTIC  12/19/2022    MD PERQ CLSR TCAT L ATR APNDGE W/ENDOCARDIAL IMPLNT N/A 12/8/2022    Procedure: CARDIAC ATRIAL APPENDAGE CLOSURE (CATH);  Surgeon: Jeremiah Pradhan MD;  Location:  MAIN OR;  Service: Cardiology    MD XCAPSL CTRC RMVL INSJ IO LENS PROSTH W/O ECP Right 4/3/2017    Procedure: EXTRACTION EXTRACAPSULAR CATARACT PHACO INTRAOCULAR LENS (IOL);  Surgeon: Mario Power MD;  Location: [facility] MAIN OR;  Service: Ophthalmology    MD XCAPSL CTRC RMVL INSJ IO LENS PROSTH W/O ECP Left 2/6/2023    Procedure: EXTRACTION EXTRACAPSULAR CATARACT PHACO INTRAOCULAR LENS (IOL);  Surgeon: Mario Power MD;  Location: [facility] MAIN OR;  Service: Ophthalmology    TONSILLECTOMY      age 6       ALLERGIES:  No Known Allergies    SOCIAL HISTORY:  Social History     Substance and Sexual Activity   Alcohol Use Never     Social History     Substance and Sexual Activity   Drug Use Never     Social History     Tobacco Use   Smoking Status Never   Smokeless Tobacco Never       FAMILY HISTORY:  Family History   Problem Relation Age of Onset    Cancer Mother         exp age 96    Heart disease Father     Heart disease Brother     Hypertension Brother        MEDICATIONS:    Current Outpatient Medications:     amiodarone 100 mg tablet, Take 1 tablet (100 mg total) by mouth daily, Disp: 90 tablet, Rfl: 1    aspirin 81 mg chewable tablet, Chew 81 mg daily, Disp: , Rfl:     gabapentin (NEURONTIN) 100 mg capsule, Take 1 capsule (100 mg total) by mouth in the morning and 1  "capsule (100 mg total) in the evening and 1 capsule (100 mg total) before bedtime. (Patient taking differently: Take 100 mg by mouth 2 (two) times a day), Disp: 1 capsule, Rfl: 0    tamsulosin (FLOMAX) 0.4 mg, Take 0.4 mg by mouth daily with dinner, Disp: , Rfl:     torsemide (DEMADEX) 20 mg tablet, Take 20 mg by mouth as needed (for weight gain > 3lbs/day or wt > 165 lbs), Disp: , Rfl:     REVIEW OF SYSTEMS:  Review of Systems   Constitutional:  Negative for activity change and fever.   Respiratory:  Negative for cough, chest tightness, shortness of breath and wheezing.    Cardiovascular:  Positive for leg swelling. Negative for chest pain.   Gastrointestinal:  Negative for abdominal pain, diarrhea, nausea and vomiting.   Endocrine: Negative for polyuria.   Genitourinary:  Negative for difficulty urinating, dysuria, flank pain, frequency and urgency.   Skin:  Negative for rash.   Neurological:  Negative for dizziness, syncope, light-headedness and headaches.     All the systems were reviewed and were negative except as documented on the HPI.    PHYSICAL EXAM:  Current Weight: Body mass index is 27.29 kg/m².  Vitals:    02/01/24 0859 02/01/24 0930   BP:  138/62   Weight: 74.4 kg (164 lb)    Height: 5' 5\" (1.651 m)        Physical Exam  Vitals and nursing note reviewed. Exam conducted with a chaperone present.   Constitutional:       General: He is not in acute distress.     Appearance: He is well-developed.   HENT:      Head: Normocephalic and atraumatic.   Eyes:      General: No scleral icterus.     Conjunctiva/sclera: Conjunctivae normal.      Pupils: Pupils are equal, round, and reactive to light.   Cardiovascular:      Rate and Rhythm: Normal rate and regular rhythm.      Heart sounds: S1 normal and S2 normal. No murmur heard.     No friction rub. No gallop.   Pulmonary:      Effort: Pulmonary effort is normal. No respiratory distress.      Breath sounds: Normal breath sounds. No wheezing or rales.   Abdominal: "      General: Bowel sounds are normal.      Palpations: Abdomen is soft.      Tenderness: There is no abdominal tenderness. There is no rebound.   Musculoskeletal:         General: Normal range of motion.      Cervical back: Normal range of motion and neck supple.      Right lower leg: Edema present.      Left lower leg: Edema present.   Skin:     Findings: No rash.   Neurological:      Mental Status: He is alert and oriented to person, place, and time.   Psychiatric:         Behavior: Behavior normal.         Laboratory results:   Results for orders placed or performed in visit on 11/02/23   Basic metabolic panel   Result Value Ref Range    Sodium 142 135 - 147 mmol/L    Potassium 3.9 3.5 - 5.3 mmol/L    Chloride 103 96 - 108 mmol/L    CO2 29 21 - 32 mmol/L    ANION GAP 10 mmol/L    BUN 27 (H) 5 - 25 mg/dL    Creatinine 1.40 (H) 0.60 - 1.30 mg/dL    Glucose, Fasting 104 (H) 65 - 99 mg/dL    Calcium 9.2 8.4 - 10.2 mg/dL    eGFR 45 ml/min/1.73sq m   Hemoglobin A1C   Result Value Ref Range    Hemoglobin A1C 5.7 (H) Normal 4.0-5.6%; PreDiabetic 5.7-6.4%; Diabetic >=6.5%; Glycemic control for adults with diabetes <7.0% %     mg/dl   CBC and differential   Result Value Ref Range    WBC 4.11 (L) 4.31 - 10.16 Thousand/uL    RBC 5.62 3.88 - 5.62 Million/uL    Hemoglobin 11.8 (L) 12.0 - 17.0 g/dL    Hematocrit 37.9 36.5 - 49.3 %    MCV 67 (L) 82 - 98 fL    MCH 21.0 (L) 26.8 - 34.3 pg    MCHC 31.1 (L) 31.4 - 37.4 g/dL    RDW 18.0 (H) 11.6 - 15.1 %    MPV 9.8 8.9 - 12.7 fL    Platelets 189 149 - 390 Thousands/uL    nRBC 0 /100 WBCs    Neutrophils Relative 57 43 - 75 %    Immat GRANS % 0 0 - 2 %    Lymphocytes Relative 24 14 - 44 %    Monocytes Relative 14 (H) 4 - 12 %    Eosinophils Relative 4 0 - 6 %    Basophils Relative 1 0 - 1 %    Neutrophils Absolute 2.33 1.85 - 7.62 Thousands/µL    Immature Grans Absolute 0.01 0.00 - 0.20 Thousand/uL    Lymphocytes Absolute 0.99 0.60 - 4.47 Thousands/µL    Monocytes Absolute 0.56  0.17 - 1.22 Thousand/µL    Eosinophils Absolute 0.18 0.00 - 0.61 Thousand/µL    Basophils Absolute 0.04 0.00 - 0.10 Thousands/µL   Iron   Result Value Ref Range    Iron 88 50 - 212 ug/dL

## 2024-02-20 ENCOUNTER — HOSPITAL ENCOUNTER (OUTPATIENT)
Dept: RADIOLOGY | Facility: MEDICAL CENTER | Age: 89
Discharge: HOME | End: 2024-02-20
Payer: MEDICARE

## 2024-02-20 ENCOUNTER — APPOINTMENT (OUTPATIENT)
Dept: LAB | Facility: MEDICAL CENTER | Age: 89
End: 2024-02-20
Payer: MEDICARE

## 2024-02-20 DIAGNOSIS — R79.89 ELEVATED SERUM CREATININE: ICD-10-CM

## 2024-02-20 DIAGNOSIS — N18.30 BENIGN HYPERTENSION WITH CKD (CHRONIC KIDNEY DISEASE) STAGE III (HCC): ICD-10-CM

## 2024-02-20 DIAGNOSIS — I12.9 BENIGN HYPERTENSION WITH CKD (CHRONIC KIDNEY DISEASE) STAGE III (HCC): ICD-10-CM

## 2024-02-27 ENCOUNTER — APPOINTMENT (OUTPATIENT)
Dept: LAB | Facility: MEDICAL CENTER | Age: 89
End: 2024-02-27
Payer: MEDICARE

## 2024-02-27 LAB
ALBUMIN SERPL BCP-MCNC: 4.5 G/DL (ref 3.5–5)
ALP SERPL-CCNC: 76 U/L (ref 34–104)
ALT SERPL W P-5'-P-CCNC: 15 U/L (ref 7–52)
ANION GAP SERPL CALCULATED.3IONS-SCNC: 8 MMOL/L
AST SERPL W P-5'-P-CCNC: 23 U/L (ref 13–39)
BILIRUB SERPL-MCNC: 1.07 MG/DL (ref 0.2–1)
BUN SERPL-MCNC: 26 MG/DL (ref 5–25)
CALCIUM SERPL-MCNC: 9.2 MG/DL (ref 8.4–10.2)
CHLORIDE SERPL-SCNC: 99 MMOL/L (ref 96–108)
CO2 SERPL-SCNC: 30 MMOL/L (ref 21–32)
CREAT SERPL-MCNC: 1.61 MG/DL (ref 0.6–1.3)
GFR SERPL CREATININE-BSD FRML MDRD: 37 ML/MIN/1.73SQ M
GLUCOSE P FAST SERPL-MCNC: 101 MG/DL (ref 65–99)
POTASSIUM SERPL-SCNC: 3.9 MMOL/L (ref 3.5–5.3)
PROT SERPL-MCNC: 6.7 G/DL (ref 6.4–8.4)
SODIUM SERPL-SCNC: 137 MMOL/L (ref 135–147)

## 2024-02-27 PROCEDURE — 36415 COLL VENOUS BLD VENIPUNCTURE: CPT

## 2024-02-27 PROCEDURE — 82610 CYSTATIN C: CPT

## 2024-02-27 PROCEDURE — 80053 COMPREHEN METABOLIC PANEL: CPT

## 2024-02-28 ENCOUNTER — APPOINTMENT (OUTPATIENT)
Dept: LAB | Facility: MEDICAL CENTER | Age: 89
End: 2024-02-28
Payer: MEDICARE

## 2024-02-28 LAB
BACTERIA UR QL AUTO: ABNORMAL /HPF
BILIRUB UR QL STRIP: NEGATIVE
CLARITY UR: CLEAR
COLOR UR: YELLOW
CREAT UR-MCNC: 175.3 MG/DL
GLUCOSE UR STRIP-MCNC: NEGATIVE MG/DL
HGB UR QL STRIP.AUTO: NEGATIVE
HYALINE CASTS #/AREA URNS LPF: ABNORMAL /LPF
KETONES UR STRIP-MCNC: NEGATIVE MG/DL
LEUKOCYTE ESTERASE UR QL STRIP: ABNORMAL
MICROALBUMIN UR-MCNC: 11.2 MG/L
MICROALBUMIN/CREAT 24H UR: 6 MG/G CREATININE (ref 0–30)
MUCOUS THREADS UR QL AUTO: ABNORMAL
NITRITE UR QL STRIP: NEGATIVE
NON-SQ EPI CELLS URNS QL MICRO: ABNORMAL /HPF
PH UR STRIP.AUTO: 6.5 [PH]
PROT UR STRIP-MCNC: ABNORMAL MG/DL
RBC #/AREA URNS AUTO: ABNORMAL /HPF
SP GR UR STRIP.AUTO: 1.02 (ref 1–1.03)
UROBILINOGEN UR STRIP-ACNC: 2 MG/DL
WBC #/AREA URNS AUTO: ABNORMAL /HPF

## 2024-02-28 PROCEDURE — 82570 ASSAY OF URINE CREATININE: CPT

## 2024-02-28 PROCEDURE — 82043 UR ALBUMIN QUANTITATIVE: CPT

## 2024-02-28 PROCEDURE — 81001 URINALYSIS AUTO W/SCOPE: CPT

## 2024-02-29 LAB
CYSTATIN C SERPL-MCNC: 1.56 MG/L (ref 0.87–1.12)
GFR/BSA.PRED SERPLBLD CYS-BASED-ARV: 39 ML/MIN/1.73

## 2024-03-22 ENCOUNTER — HOSPITAL ENCOUNTER (OUTPATIENT)
Dept: RADIOLOGY | Facility: MEDICAL CENTER | Age: 89
Discharge: HOME | End: 2024-03-22
Payer: MEDICARE

## 2024-03-22 PROCEDURE — 76775 US EXAM ABDO BACK WALL LIM: CPT

## 2024-04-02 ENCOUNTER — TELEPHONE (OUTPATIENT)
Dept: CARDIOLOGY CLINIC | Facility: CLINIC | Age: 89
End: 2024-04-02

## 2024-04-02 NOTE — TELEPHONE ENCOUNTER
Pt's Daughter Philomena Dave called stating that pt is going for eye surgery on 6/19 and   the surgeons office would like pt to hold aspirin 1 week prior to surgery.     Please advise if it is okay to hold aspirin for 1 week prior to surgery.

## 2024-04-08 ENCOUNTER — IN-CLINIC DEVICE VISIT (OUTPATIENT)
Dept: CARDIOLOGY CLINIC | Facility: CLINIC | Age: 89
End: 2024-04-08
Payer: MEDICARE

## 2024-04-08 DIAGNOSIS — Z95.0 PRESENCE OF CARDIAC PACEMAKER: Primary | ICD-10-CM

## 2024-04-08 PROCEDURE — 93280 PM DEVICE PROGR EVAL DUAL: CPT | Performed by: INTERNAL MEDICINE

## 2024-04-08 NOTE — PROGRESS NOTES
Results for orders placed or performed in visit on 04/08/24   Cardiac EP device report    Narrative    MDT DC PM/ ACTIVE SYSTEM IS MRI CONDITIONAL  DEVICE INTERROGATED IN THE [place] OFFICE. BATTERY VOLTAGE ADEQUATE (4.6 YRS). AP <0.1%  98% (>40% CHB/SSS VVIR 70). KNOWN ATRIAL LEAD FX, ALL OTHER LEAD PARAMETERS WITHIN NORMAL LIMITS. >5000 DEVICE CLASSIFIED AT AF EPISODES, MAX DURATION 14 MINS 33 SECS, ATRIAL LEAD NOISE SEEN. HX: PAF, S/P WATCHMAN. AT AF BURDEN 36.2%.  PT TAKING ASA, AMIO.  NO PROGRAMMING CHANGES MADE TO DEVICE PARAMETERS.  NORMAL DEVICE FUNCTION W/ATRIAL LEAD FX. PAS/GV

## 2024-05-06 DIAGNOSIS — I48.19 PERSISTENT ATRIAL FIBRILLATION (HCC): ICD-10-CM

## 2024-05-07 RX ORDER — AMIODARONE HYDROCHLORIDE 100 MG/1
100 TABLET ORAL DAILY
Qty: 90 TABLET | Refills: 1 | Status: SHIPPED | OUTPATIENT
Start: 2024-05-07

## 2024-06-10 ENCOUNTER — OFFICE VISIT (OUTPATIENT)
Dept: AUDIOLOGY | Age: 89
End: 2024-06-10
Payer: COMMERCIAL

## 2024-06-10 ENCOUNTER — OFFICE VISIT (OUTPATIENT)
Dept: AUDIOLOGY | Age: 89
End: 2024-06-10
Payer: MEDICARE

## 2024-06-10 DIAGNOSIS — H90.3 SENSORY HEARING LOSS, BILATERAL: Primary | ICD-10-CM

## 2024-06-10 PROCEDURE — V5261 HEARING AID, DIGIT, BIN, BTE: HCPCS

## 2024-06-10 PROCEDURE — 92557 COMPREHENSIVE HEARING TEST: CPT

## 2024-06-10 PROCEDURE — V5160 DISPENSING FEE BINAURAL: HCPCS

## 2024-06-10 PROCEDURE — 92567 TYMPANOMETRY: CPT

## 2024-06-10 NOTE — PROGRESS NOTES
Diagnostic Hearing Evaluation    Name:  Matias Rodriguez  :  1935  Age:  88 y.o.   MRN:  [id number]  Date of Evaluation: 06/10/24     HISTORY:     Reason for visit: Difficulty Understanding    Matias Rodriguez is being seen today at the request of Dr. Reeder for an initial  evaluation of hearing. Patient reports difficulty hearing especially in background noise. Patient does report a significant history of noise exposure.  Patient denies otalgia, otorrhea, dizziness, fullness, tinnitus, and family history of hearing loss.     EVALUATION:    Otoscopic Evaluation:   Right Ear: Unremarkable, canal clear   Left Ear: Unremarkable, canal clear    Tympanometry:   Right Ear: Type A; normal middle ear pressure and static compliance    Left Ear: Type A; normal middle ear pressure and static compliance     Speech Audiometry:  Speech Reception (SRT)    Right Ear: 30 dB HL    Left Ear: 30 dB HL    Word Recognition Scores (WRS):  Right Ear: excellent (88 % correct)     Left Ear: excellent (92 % correct)    Stimuli: [id number]    Pure Tone Audiometry:  Conventional pure tone audiometry from 250 - 8000 Hz  was obtained with good reliability and revealed the following:     Right Ear: Normal sloping to severe sensorineural hearing loss (SNHL)    Left Ear: Normal sloping to profound sensorineural hearing loss (SNHL)     *see attached audiogram    RECOMMENDATIONS:  Annual hearing eval, Return to [facility]. for F/U, and Hearing Aid Evaluation    PATIENT EDUCATION:   The results of today's results and recommendations were reviewed with the patient and his hearing thresholds were explained at length. Treatment options, including amplification and communication strategies, were discussed as appropriate. The patient voiced understanding of his test results. Questions were addressed and the patient was encouraged to contact our department should concerns arise.      Trisha Kincaid., [facility]3]-A  Clinical Audiologist  [facility]  [place] [facility]  [address] [address]

## 2024-06-10 NOTE — PROGRESS NOTES
Hearing Aid Evaluation  Name:  Matias Rodriguez  :  1935  Age:  88 y.o.  MRN:  [id number]  Date of Evaluation: 06/10/24     HISTORY:    Matias Rodriguez was seen today for a hearing aid evaluation following his audiometric testing performed on 6/10/2024. Matias was accompanied by his daughter to today's visit.. Matias was referred by Dr. Reeder.     RESULTS REVIEW:    The audiometric findings were reviewed with the patient . All of the patient's questions regarding his hearing status were addressed, and the importance of realistic expectations of hearing loss and amplification were discussed.      DEVICE REVIEW & RECOMMENDATION:     Strengths and limitations of amplification, including various hearing aid styles, technology, options, and accessories were discussed at length with patient. Hearing aids are assistive devices and are not designed to restore normal hearing. The patient was counseled on the importance of self-advocacy, motivation, as well as effective communication strategies that can be used to optimize hearing aid success. Based on the degree of his hearing loss, preferences, and lifestyle needs, the following hearing recommendations were made:    The first hearing aid recommendation is Oticon Intent 3 miniRITE-R.    [facility]'s office policies regarding our hearing aid program were reviewed, including the 45 day trial period, non-refundable return fees, as well as the  warranties and service plan. Hearing aid cost, and payment, as well as insurance benefit (if applicable) were discussed with the patient.     *See attached quote sheet    DEVICE SELECTION:    At this time, patient wishes to proceed with the purchase of the below listed hearing aid(s).     The patient selected the Oticon Intent 3 miniRITE-R hearing aids.    Level: Intermediate   Color: silver    size: Right 3X85 / Left 3X85   Dome size: 8 mm DB   Accessories:     Devices ordered as specified above (order  # [id number] ).    Patient paid full $3,800.00 today.    Trisha Kincaid., CCC-A  Clinical Audiologist  [facility]  [address] [address]

## 2024-06-11 NOTE — PROGRESS NOTES
Progress Note    Name:  Matias Rodriguez  :  1935  Age:  88 y.o.  MRN:  [id number]  Date of Evaluation: 24     Hearing aids arrived.  Right: [id number]  Left: [id number]  : [id number]  7/10/2027    Patient is scheduled for Friday.    Trisha Kincaid., CCC-A  Clinical Audiologist

## 2024-06-14 ENCOUNTER — OFFICE VISIT (OUTPATIENT)
Dept: AUDIOLOGY | Age: 89
End: 2024-06-14

## 2024-06-14 DIAGNOSIS — H90.3 SENSORY HEARING LOSS, BILATERAL: Primary | ICD-10-CM

## 2024-06-14 NOTE — PROGRESS NOTES
Hearing Aid Fitting    Name:  Matias Rodriguez  :  1935  Age:  88 y.o.  MRN:  [id number]  Date of Evaluation: 24     HISTORY:    Matias Rodriguez was seen today for a binaural hearing aid fitting of his Oticon Intent 3 miniRITE-R  in the canal ([place]) hearing aid(s). Matias was accompanied by his daughter to today's visit.. Hearing aid purchase is being paid by private Pay.    DEVICE INFORMATION:     Left Device Right Device   Hearing Aid Make: Oticon  Oticon    Hearing Aid Model: Intent 3 miniRITE-R Intent 3 miniRITE-R   Serial Number: [id number]   Repair Warranty Date: 7/10/2027 7/10/2027   Loss/Damage Warranty Status: Active  Active        Length/Output 3X85 3X85   Wax System: Pro Wax miniFIT Pro Wax miniFIT   Dome Size/Style: 8 mm DB 8 mm DB   Battery: Lithium-ion Rechargeable Lithium-ion Rechargeable      Earmold Serial Number: N/A N/A   Earmold Warranty Date:  N/A N/A    Serial Number:  [id number]    Warranty Date:  07/10/2027     Accessories: N/A       DEVICE SETTINGS:    Hearing aid(s) were programmed using NAL-NL2 fitting formula and were adjusted based on the patient's perceived comfort level. Hearing aid(s) were set to  100% of his prescription  per patient's subjective listening preference. The patient noted good sound quality, and was happy with the overall sound quality and fit of the hearing aid(s).    DEVICE ORIENTATION:    The patient was counseled on device components and component function. Proper insertion and removal of the aid(s) was demonstrated. The patient practiced insertion and removal of the devices in the office, they demonstrated good ability to manipulate the hearing aids. The patient  was given the devices users manual that reviews aid usage and operation, hearing aid cleaning tools, and hearing aid carrying case.     The hearing aid warranty, including unlimited repair and a one time loss and damage per hearing aid, through the , as  well as [facility] hearing aid service plan, including unlimited office visits, and supplies were outlined thoroughly. The patient agreed to the terms of sale listed on the purchase agreement containing device specifications, warranties, pricing information, as well as [facility]'s 45-day trial period timeline. After this period has elapsed, hearing aids cannot be returned.      DEVICE EXPECTATIONS & USAGE:     Hearing aids are assistive devices and are not designed to restore normal hearing sensitivity. The importance of realistic expectations, especially in the presence of background noise, was emphasized. The need for daily, consistent usage (8-12 hours per day) for proper device adjustment, as well as the importance of self-advocacy and practicing effective communication strategies was outlined.     Effective communication strategies include:  1.) Maintaining face-to-face communication, allowing for speechreading of facial expressions, lips, and gestures.  2.) Reducing background noise and distance between communication partners.  3.) Having communication partners reduce their rate of speech when appropriate.  4.) Beginning conversation by getting communication partner's attention.  5.) Asking for rephrasing of missed aspects of conversation rather than asking for repetition.    RECOMMENDATIONS:  The patient demonstrated understanding of all the topics discussed. The patientis to follow-up in 2-3 weeks for a hearing aid check within the trial period as scheduled.     Trisha Kincaid., [facility]-A  Clinical Audiologist   [facility]  [address]  [address] [address]

## 2024-07-10 ENCOUNTER — REMOTE DEVICE CLINIC VISIT (OUTPATIENT)
Dept: CARDIOLOGY CLINIC | Facility: CLINIC | Age: 89
End: 2024-07-10
Payer: MEDICARE

## 2024-07-10 DIAGNOSIS — Z95.0 PRESENCE OF CARDIAC PACEMAKER: Primary | ICD-10-CM

## 2024-07-10 PROCEDURE — 93296 REM INTERROG EVL PM/IDS: CPT | Performed by: INTERNAL MEDICINE

## 2024-07-10 PROCEDURE — 93294 REM INTERROG EVL PM/LDLS PM: CPT | Performed by: INTERNAL MEDICINE

## 2024-07-10 NOTE — PROGRESS NOTES
Results for orders placed or performed in visit on 07/10/24   Cardiac EP device report    Narrative    MDT DC PM/ ACTIVE SYSTEM IS MRI CONDITIONAL  CARELINK TRANSMISSION: BATTERY VOLTAGE ADEQUATE (3.8 YRS).  99% (>40% CHB/SSS/ VVIR 70PPM). KNOWN ATRIAL LEAD FX, ALL OTHER LEAD PARAMETERS WITHIN NORMAL LIMITS. 8,967 DEVICE CLASSIFIED AT/AF EPISODES w/AF IN PROGRESS. INTERMITTENT ATRIAL UNDERSENSING & LEAD NOISE. AT/AF BURDEN 64.2% BUT LIKELY UNDERESTIMATED. HX: PAF, S/P WATCHMAN & PT TAKING ASA, AMIODORONE. NORMAL DEVICE FUNCTION.  ES

## 2024-07-12 ENCOUNTER — OFFICE VISIT (OUTPATIENT)
Dept: CARDIOLOGY CLINIC | Facility: MEDICAL CENTER | Age: 89
End: 2024-07-12
Payer: MEDICARE

## 2024-07-12 ENCOUNTER — TELEPHONE (OUTPATIENT)
Age: 89
End: 2024-07-12

## 2024-07-12 ENCOUNTER — APPOINTMENT (OUTPATIENT)
Dept: LAB | Facility: MEDICAL CENTER | Age: 89
End: 2024-07-12
Payer: MEDICARE

## 2024-07-12 VITALS
HEIGHT: 65 IN | HEART RATE: 90 BPM | DIASTOLIC BLOOD PRESSURE: 72 MMHG | OXYGEN SATURATION: 97 % | WEIGHT: 160 LBS | BODY MASS INDEX: 26.66 KG/M2 | SYSTOLIC BLOOD PRESSURE: 122 MMHG

## 2024-07-12 DIAGNOSIS — I73.89 OTHER SPECIFIED PERIPHERAL VASCULAR DISEASES (HCC): ICD-10-CM

## 2024-07-12 DIAGNOSIS — I48.19 PERSISTENT ATRIAL FIBRILLATION (HCC): ICD-10-CM

## 2024-07-12 DIAGNOSIS — I50.32 CHRONIC DIASTOLIC HEART FAILURE (HCC): Primary | ICD-10-CM

## 2024-07-12 DIAGNOSIS — Z95.0 PRESENCE OF PERMANENT CARDIAC PACEMAKER: ICD-10-CM

## 2024-07-12 DIAGNOSIS — I35.0 NONRHEUMATIC AORTIC VALVE STENOSIS: ICD-10-CM

## 2024-07-12 DIAGNOSIS — Z95.818 PRESENCE OF WATCHMAN LEFT ATRIAL APPENDAGE CLOSURE DEVICE: ICD-10-CM

## 2024-07-12 DIAGNOSIS — I50.32 CHRONIC DIASTOLIC HEART FAILURE (HCC): ICD-10-CM

## 2024-07-12 DIAGNOSIS — T82.110S: ICD-10-CM

## 2024-07-12 LAB
ANION GAP SERPL CALCULATED.3IONS-SCNC: 9 MMOL/L (ref 4–13)
BUN SERPL-MCNC: 30 MG/DL (ref 5–25)
CALCIUM SERPL-MCNC: 9.3 MG/DL (ref 8.4–10.2)
CHLORIDE SERPL-SCNC: 103 MMOL/L (ref 96–108)
CO2 SERPL-SCNC: 27 MMOL/L (ref 21–32)
CREAT SERPL-MCNC: 1.25 MG/DL (ref 0.6–1.3)
GFR SERPL CREATININE-BSD FRML MDRD: 51 ML/MIN/1.73SQ M
GLUCOSE SERPL-MCNC: 116 MG/DL (ref 65–140)
POTASSIUM SERPL-SCNC: 4.6 MMOL/L (ref 3.5–5.3)
SODIUM SERPL-SCNC: 139 MMOL/L (ref 135–147)

## 2024-07-12 PROCEDURE — 99214 OFFICE O/P EST MOD 30 MIN: CPT | Performed by: INTERNAL MEDICINE

## 2024-07-12 PROCEDURE — 80048 BASIC METABOLIC PNL TOTAL CA: CPT

## 2024-07-12 PROCEDURE — 36415 COLL VENOUS BLD VENIPUNCTURE: CPT

## 2024-07-12 NOTE — PROGRESS NOTES
[facility] [place]   [address]  [address]  [facility] [address]                                            Cardiology Office Follow up  Matias Rodriguez, 88 y.o. male  YOB: 1935  MRN: [id number] Encounter: [id number]      PCP - Angel Reeder DO  Referring Provider - No ref. provider found    Chief Complaint   Patient presents with   • Follow-up     6 month f/up       Assessment  Chronic diastolic heart failure  Aortic stenosis  Cardiac pacemaker in-situ  originally implanted n 4/2018  Had RV lead malfunction leading syncope (12/2022)--> needed TVP (12/19) --> s/p lead revision   Persistent Atrial Fibrillation  S/p WATCHMAN ALDO closure  CKD  H/o SDH    Plan  Chronic diastolic heart failure  Overview  He diuresed well with torsemide prescribed by Dr. Pradhan and then was already approaching euvolemia by the time I saw him earlier this year  8/2023: initial visit with me  Appeared euvolemic/mildly dehydrated on exam --> asked to completed BMP --> Cr elevated to 1.72 --> diuretics to 2-3 times/wk  Cr 1.72 --> 1.40  11/2023: Now euvolemic on exam and using torsemide as needed  2/1/2024: saw nephrologist , was taking torsemide 20 mg daily --> Cr up to 1.6  7/15/24: follow up visit with me  Reports taking torsemide 1-2 times/day  Last BMP in 2/2024 was mildly elevated at 1.61, no further follow up since then --> I have asked him to repeat BMP now  Plan  It is unclear how he is taking his torsemide dosing as he states he is taking it 1-2 times a day every day instead of the as needed  I have asked him to only take the torsemide once a day for now and repeat the BMP today itself  If he has any weight gain greater than 3 pounds, only then he should take a second dose of torsemide   Low salt diet    Aortic stenosis  8/2023 TTE - Mild AS   Remains asymptomatic  Clinically mild   Follow-up on echocardiogram next year    Persistent Atrial Fibrillation, cardiac pacemaker  in-situ, s/p WATCHMAN device, Atrial lead fracture  Reviewed recent device check   8/2/23 - AP 93%,  > 99% (dependent)  10/11/23: AP 22.1%,  99.7%, AT/Af burden 77% since 8/2/23. Back in Afib since mid Aug 2023, but remains V-paced  7/10/24:  99%, known atrial lead fracture, has been mostly in in A-fib since August 2023 , AT/AF burden 64.2%  Plan  At this point since he is still in A-fib despite being on amiodarone and he is asymptomatic with it, I believe the amiodarone is not helping in any way and is just putting him at risk of side effects from the same  As a result, I recommend discontinuing amiodarone at this time and except that he will be chronic A-fib from here on  He has known atrial lead to fracture, but at this point he is mostly V-paced and is in chronic A-fib and as a result atrial lead revision may not be useful --> monitor on device checks, EP recs  Continue aspirin 81 mg daily    No results found for this visit on 07/12/24.    Orders Placed This Encounter   Procedures   • Basic metabolic panel           Return in about 4 months (around 11/12/2024), or if symptoms worsen or fail to improve.      History of Present Illness   88 y.o. male comes in as a new patient for consultation and establishment of care regarding heart failure.  He was following with EP until now for atrial fibrillation, pacemaker, but developed issues with heart failure recently and as a result is referred to see general cardiology for further evaluation.    Over the last couple of months he was having increased symptoms of shortness of breath and lower extremity edema and contacted EP office.  He was initiated on torsemide daily, and with that has been diuresing well over the last 1 to 2 weeks.  His edema is much improved today.    He recently had a pacemaker implanted in 2018, when he presented with syncope and intracranial bleed and was found to have sick sinus syndrome and chronotropic incompetence along with atrial  fibrillation.  He underwent pacemaker placement and was doing well.  In December 2022 he underwent Watchman procedure due to his history of bleeding.  A few weeks later he returned with an episode of syncope and was found to have RV lead malfunction, and needed lead revision.  Since then he reports he has had some lower extremity edema and shortness of breath but has not had any other episodes of dizziness, near-syncope or syncope.    Interval history - 11/2/2023  He returns for follow-up after about 3 months.  In the interim, he has been using the diuretics at a lower frequency and with this his creatinine has improved.  His weight has remained stable around 161 pounds and he has not had any pedal edema or shortness of breath.  His device check recently again started to show more atrial fibrillation, but he remains free of chest pain, shortness of breath, palpitations or dizziness    Interval history - 7/12/2024  He returns for follow-up with me after about 9 months.  In the interim, he continues to do well and has remained free of any chest pain, shortness of breath, palpitations or dizziness.  His renal function has been fluctuant, and he reports to me that he has been taking torsemide 1-2 times a day over the past few months.      Historical Information   Past Medical History:   Diagnosis Date   • A-fib ([facility])    • CHANA (acute kidney injury) ([facility]) 12/19/2022   • Hernia, abdominal    • Hypertension    • Subdural hematoma ([facility])      Past Surgical History:   Procedure Laterality Date   • A-V CARDIAC PACEMAKER INSERTION     • APPENDECTOMY      age 27   • CARDIAC CATHETERIZATION N/A 12/19/2022    Procedure: Cardiac temporary pacemaker;  Surgeon: Darien Panda MD;  Location: [facility] CARDIAC CATH LAB;  Service: Cardiology   • CARDIAC ELECTROPHYSIOLOGY PROCEDURE N/A 12/8/2022    Procedure: CARDIAC ATRIAL APPENDAGE CLOSURE (EP);  Surgeon: Jeremiah Pradhan MD;  Location: [facility] MAIN OR;  Service: Cardiology   • CARDIAC  ELECTROPHYSIOLOGY PROCEDURE Right 12/20/2022    Procedure: Cardiac lead revision;  Surgeon: Juan Wheeler MD;  Location:  CARDIAC CATH LAB;  Service: Cardiology   • CARDIAC PACEMAKER PLACEMENT     • CARPAL TUNNEL RELEASE     • COLONOSCOPY     • INGUINAL HERNIA REPAIR Left    • IR UPPER EXTREMITY VENOGRAM- DIAGNOSTIC  12/19/2022   • IA PERQ CLSR TCAT L ATR APNDGE W/ENDOCARDIAL IMPLNT N/A 12/8/2022    Procedure: CARDIAC ATRIAL APPENDAGE CLOSURE (CATH);  Surgeon: Jeremiah Pradhan MD;  Location:  MAIN OR;  Service: Cardiology   • IA XCAPSL CTRC RMVL INSJ IO LENS PROSTH W/O ECP Right 4/3/2017    Procedure: EXTRACTION EXTRACAPSULAR CATARACT PHACO INTRAOCULAR LENS (IOL);  Surgeon: Mario Power MD;  Location: [facility] MAIN OR;  Service: Ophthalmology   • IA XCAPSL CTRC RMVL INSJ IO LENS PROSTH W/O ECP Left 2/6/2023    Procedure: EXTRACTION EXTRACAPSULAR CATARACT PHACO INTRAOCULAR LENS (IOL);  Surgeon: Mario Power MD;  Location: [facility] MAIN OR;  Service: Ophthalmology   • TONSILLECTOMY      age 6     Family History   Problem Relation Age of Onset   • Cancer Mother         exp age 96   • Heart disease Father    • Heart disease Brother    • Hypertension Brother      Current Outpatient Medications on File Prior to Visit   Medication Sig Dispense Refill   • aspirin 81 mg chewable tablet Chew 81 mg daily     • gabapentin (NEURONTIN) 100 mg capsule Take 1 capsule (100 mg total) by mouth in the morning and 1 capsule (100 mg total) in the evening and 1 capsule (100 mg total) before bedtime. (Patient taking differently: Take 100 mg by mouth 2 (two) times a day) 1 capsule 0   • tamsulosin (FLOMAX) 0.4 mg Take 0.4 mg by mouth daily with dinner     • torsemide (DEMADEX) 20 mg tablet Take 20 mg by mouth as needed (for weight gain > 3lbs/day or wt > 165 lbs)       No current facility-administered medications on file prior to visit.     No Known Allergies  Social History     Socioeconomic History   • Marital status: /Civil  "Union     Spouse name: None   • Number of children: None   • Years of education: None   • Highest education level: None   Occupational History   • None   Tobacco Use   • Smoking status: Never   • Smokeless tobacco: Never   Vaping Use   • Vaping status: Never Used   Substance and Sexual Activity   • Alcohol use: Never   • Drug use: Never   • Sexual activity: None   Other Topics Concern   • None   Social History Narrative   • None     Social Determinants of Health     Financial Resource Strain: Not on file   Food Insecurity: No Food Insecurity (12/19/2022)    Hunger Vital Sign    • Worried About Running Out of Food in the Last Year: Never true    • Ran Out of Food in the Last Year: Never true   Transportation Needs: No Transportation Needs (12/19/2022)    PRAPARE - Transportation    • Lack of Transportation (Medical): No    • Lack of Transportation (Non-Medical): No   Physical Activity: Not on file   Stress: Not on file   Social Connections: Not on file   Intimate Partner Violence: Not on file   Housing Stability: Low Risk  (12/19/2022)    Housing Stability Vital Sign    • Unable to Pay for Housing in the Last Year: No    • Number of Places Lived in the Last Year: 1    • Unstable Housing in the Last Year: No        Review of Systems   All other systems reviewed and are negative.      Vitals:  Vitals:    07/12/24 0947   BP: 122/72   BP Location: Left arm   Patient Position: Sitting   Cuff Size: Adult   Pulse: 90   SpO2: 97%   Weight: 72.6 kg (160 lb)   Height: 5' 5\" (1.651 m)     BMI - Body mass index is 26.63 kg/m².  Wt Readings from Last 7 Encounters:   07/12/24 72.6 kg (160 lb)   02/01/24 74.4 kg (164 lb)   11/02/23 73 kg (161 lb)   08/15/23 75.3 kg (166 lb)   08/10/23 75.8 kg (167 lb)   05/12/23 76 kg (167 lb 9.6 oz)   01/26/23 73.5 kg (162 lb)       Physical Exam  Vitals and nursing note reviewed.   Constitutional:       General: He is not in acute distress.     Appearance: Normal appearance. He is well-developed. " He is not ill-appearing or diaphoretic.   HENT:      Head: Normocephalic and atraumatic.      Nose: No congestion.   Eyes:      General: No scleral icterus.     Conjunctiva/sclera: Conjunctivae normal.   Neck:      Vascular: No carotid bruit or JVD.   Cardiovascular:      Rate and Rhythm: Normal rate and regular rhythm.      Heart sounds: Murmur heard.      Systolic murmur is present with a grade of 2/6.      No friction rub. No gallop.      Comments: Right upper chest wall cardiac pacemaker noted in-situ  Pulmonary:      Effort: Pulmonary effort is normal. No respiratory distress.      Breath sounds: Normal breath sounds. No wheezing or rales.   Chest:      Chest wall: No tenderness.   Abdominal:      General: There is no distension.      Palpations: Abdomen is soft.      Tenderness: There is no abdominal tenderness.   Musculoskeletal:         General: No swelling, tenderness or deformity.      Cervical back: Neck supple. No muscular tenderness.      Right lower leg: Edema present.      Left lower leg: Edema present.      Comments: B/L 1-2+ edema upto just above ankle (L>R)   Skin:     General: Skin is warm.   Neurological:      General: No focal deficit present.      Mental Status: He is alert and oriented to person, place, and time. Mental status is at baseline.   Psychiatric:         Mood and Affect: Mood normal.         Behavior: Behavior normal.         Thought Content: Thought content normal.           Labs:  CBC:   Lab Results   Component Value Date    WBC 4.11 (L) 11/02/2023    RBC 5.62 11/02/2023    HGB 11.8 (L) 11/02/2023    HCT 37.9 11/02/2023    MCV 67 (L) 11/02/2023     11/02/2023    RDW 18.0 (H) 11/02/2023       CMP:   Lab Results   Component Value Date    K 4.6 07/12/2024     07/12/2024    CO2 27 07/12/2024    BUN 30 (H) 07/12/2024    CREATININE 1.25 07/12/2024    EGFR 51 07/12/2024    GLUCOSE 158 (H) 12/18/2022    CALCIUM 9.3 07/12/2024    AST 23 02/27/2024    ALT 15 02/27/2024     "ALKPHOS 76 02/27/2024       Magnesium:  Lab Results   Component Value Date    MG 2.2 12/21/2022       Lipid Profile:   Lab Results   Component Value Date    HDL 91 03/29/2023    TRIG 55 03/29/2023    LDLCALC 77 03/29/2023       Thyroid Studies:   Lab Results   Component Value Date    JSZ1VLVSCLKI 5.186 (H) 08/04/2023    FREET4 0.89 08/04/2023       A1c:  No components found for: \"HGA1C\"    INR:  Lab Results   Component Value Date    INR 1.04 12/20/2022    INR 1.32 (H) 12/18/2022    INR 1.99 (H) 11/30/2022   5    Imaging: Echo complete w/ contrast if indicated    Result Date: 8/10/2023  Narrative: •  Left Ventricle: Left ventricular cavity size is normal. Wall thickness is increased. There is mild to moderate concentric hypertrophy. The left ventricular ejection fraction is 50%. Systolic function is low normal. Wall motion is normal. Diastolic function is moderately abnormal, consistent with grade II (pseudonormal) relaxation. •  Left Atrium: The atrium is moderately dilated. •  Right Atrium: The atrium is mildly dilated. •  Aortic Valve: The aortic valve is trileaflet. The leaflets are moderately thickened. The leaflets are moderately calcified. There is moderately reduced mobility. There is mild stenosis. The aortic valve area is 1.82 cm2. The aortic valve velocity is increased due to stenosis but lower than expected due to the presence of decreased flow. •  Tricuspid Valve: There is mild regurgitation. The right ventricular systolic pressure is mildly elevated. The estimated right ventricular systolic pressure is 44.00 mmHg.     XR chest pa & lateral    Result Date: 8/6/2023  Narrative: CHEST INDICATION:   I48.91: Unspecified atrial fibrillation R60.0: Localized edema. COMPARISON: CXR 12/21/2022. EXAM PERFORMED/VIEWS:  XR CHEST PA & LATERAL. FINDINGS: Cardiomediastinal silhouette normal. Right subclavian pacemaker leads in the right atrial appendage with 2 leads in the right ventricular apex. Watchman device in " the expected location of the left atrial appendage. Lungs clear. No effusion or pneumothorax. Upper abdomen normal. Old left rib fractures.     Impression: No acute cardiopulmonary disease. Workstation performed: [id number]     Cardiac EP device report    Result Date: 2023  Narrative: MDT-DUAL CHAMBER PPM/ ACTIVE SYSTEM IS MRI CONDITIONAL CARELINK TRANSMISSION-NB: PT C/O LE EDEMA. AP/ @ 72 PPM ON CURRENT EGM. BATTERY VOLTAGE ADEQUATE (5 YRS). AP-93%, >99% (DEPENDENT). ALL AVAILABLE LEAD PARAMETERS WITHIN NORMAL LIMITS. NO SIGNIFICANT HIGH RATE EPISODES. NORMAL DEVICE FUNCTION. GV       Cardiac testing:   Results for orders placed during the hospital encounter of 18    Echo complete with contrast if indicated    Narrative  [place] [id number] [phone]    Transthoracic Echocardiogram  2D, M-mode, Doppler, and Color Doppler    Study date:  15-Apr-2018    Patient: DOLLY HOBBS  MR number: [id number]  Account number: [id number]  : 1935  Age: 82 years  Gender: Male  Status: Inpatient  Location: Bedside  Height:  Weight:  BP: 125/ 52 mmHg    Indications: Syncope.    Diagnoses: R55. - Syncope and collapse    Sonographer:  Salazar Nelson RDCS  Primary Physician:  Angel Reeder DO  Referring Physician:  Kris Bahena MD  Group:  [facility]  Cardiology Fellow:  Padma Mathis MD  Interpreting Physician:  Davy Parmar MD    SUMMARY    LEFT VENTRICLE:  Systolic function was normal. Ejection fraction was estimated to be 55 %.  There were no regional wall motion abnormalities.  Wall thickness was mildly to moderately increased.  The changes were consistent with concentric remodeling (increased wall thickness with normal wall mass).    MITRAL VALVE:  There was mild annular calcification.  There was mild regurgitation.    AORTIC VALVE:  The valve was trileaflet. Leaflets exhibited  sclerosis.  There was mild regurgitation.    TRICUSPID VALVE:  There was mild to moderate regurgitation.  Estimated peak PA pressure was 48 mmHg.  The findings suggest mild to moderate pulmonary hypertension.    IVC, HEPATIC VEINS:  Respirophasic changes were blunted (less than 50% variation).    HISTORY: PRIOR HISTORY: Atrial fibrillation, Hypertension.    PROCEDURE: The procedure was performed at the bedside. This was a routine study. The transthoracic approach was used. The study included complete 2D imaging, M-mode, complete spectral Doppler, and color Doppler. The heart rate was 50 bpm,  at the start of the study. Images were obtained from the parasternal, apical, subcostal, and suprasternal notch acoustic windows. Image quality was adequate.    LEFT VENTRICLE: Size was normal. Systolic function was normal. Ejection fraction was estimated to be 55 %. There were no regional wall motion abnormalities. Wall thickness was mildly to moderately increased. The changes were consistent  with concentric remodeling (increased wall thickness with normal wall mass). DOPPLER: Left ventricular diastolic function parameters were normal.    RIGHT VENTRICLE: The size was normal. Systolic function was normal.    LEFT ATRIUM: Size was normal.    RIGHT ATRIUM: Size was normal.    MITRAL VALVE: There was mild annular calcification. Valve structure was normal. There was normal leaflet separation. DOPPLER: The transmitral velocity was within the normal range. There was no evidence for stenosis. There was mild  regurgitation.    AORTIC VALVE: The valve was trileaflet. Leaflets exhibited sclerosis. DOPPLER: Transaortic velocity was within the normal range. There was no evidence for stenosis. There was mild regurgitation.    TRICUSPID VALVE: The valve structure was normal. There was normal leaflet separation. DOPPLER: The transtricuspid velocity was within the normal range. There was no evidence for stenosis. There was mild to moderate  regurgitation. The  regurgitant jet was directed centrally. Estimated peak PA pressure was 48 mmHg. The findings suggest mild to moderate pulmonary hypertension.    PULMONIC VALVE: DOPPLER: The transpulmonic velocity was within the normal range. There was no evidence for stenosis. There was trace regurgitation.    PERICARDIUM: There was no pericardial effusion.    AORTA: The root exhibited normal size.    SYSTEMIC VEINS: IVC: The inferior vena cava was normal in size. Respirophasic changes were blunted (less than 50% variation).    SYSTEM MEASUREMENT TABLES    2D  %FS: 27.33 %  Ao Diam: 3.74 cm  EDV(Teich): 95.08 ml  EF(Teich): 53.26 %  ESV(Teich): 44.44 ml  IVSd: 1.31 cm  LA Area: 18.96 cm2  LA Diam: 3.73 cm  LVEDV MOD A4C: 97.77 ml  LVEF MOD A4C: 57.52 %  LVESV MOD A4C: 41.53 ml  LVIDd: 4.55 cm  LVIDs: 3.31 cm  LVLd A4C: 8.11 cm  LVLs A4C: 6.6 cm  LVOT Diam: 2.21 cm  LVPWd: 0.92 cm  RA Area: 15.37 cm2  RVIDd: 3.7 cm  SV MOD A4C: 56.23 ml  SV(Teich): 50.63 ml    CW  AR Dec Ashe: 2.11 m/s2  AR Dec Time: 1731.3 ms  AR PHT: 502.08 ms  AR Vmax: 3.66 m/s  AR maxP.49 mmHg  TR Vmax: 3.09 m/s  TR maxP.29 mmHg    MM  TAPSE: 2.42 cm    PW  E': 0.07 m/s  E/E': 14.56  MV A Ilan: 0.69 m/s  MV Dec Ashe: 4.62 m/s2  MV DecT: 228.93 ms  MV E Ilan: 1.06 m/s  MV E/A Ratio: 1.53  MV PHT: 66.39 ms  MVA By PHT: 3.31 cm2    [employer] Accredited Echocardiography Laboratory    Prepared and electronically signed by    Davy Parmar MD  Signed 2018 11:02:16    No results found for this or any previous visit.    No results found for this or any previous visit.    No results found for this or any previous visit.      Cardiac EP device report  MDT DC PM/ ACTIVE SYSTEM IS MRI CONDITIONAL  CARELINK TRANSMISSION: BATTERY VOLTAGE ADEQUATE (3.8 YRS).  99% (>40% CHB/SSS/ VVIR 70PPM). KNOWN ATRIAL LEAD FX, ALL OTHER LEAD PARAMETERS WITHIN NORMAL LIMITS. 8,967 DEVICE CLASSIFIED AT/AF EPISODES w/AF IN PROGRESS.  INTERMITTENT ATRIAL UNDERSENSING & LEAD NOISE. AT/AF BURDEN 64.2% BUT LIKELY UNDERESTIMATED. HX: PAF, S/P WATCHMAN & PT TAKING ASA, AMIODORONE. NORMAL DEVICE FUNCTION.  ES

## 2024-07-12 NOTE — TELEPHONE ENCOUNTER
Pt's Philomena phoned in with questions about todays appt. States she normally goes with her dad to his appt's but she is ill and was not able to make it. Patient seemed a bit confused about conversation with Doctor today and Philomena would like clarification. Please give call at [phone]

## 2024-07-30 ENCOUNTER — TELEPHONE (OUTPATIENT)
Dept: NEPHROLOGY | Facility: CLINIC | Age: 89
End: 2024-07-30

## 2024-08-12 ENCOUNTER — OFFICE VISIT (OUTPATIENT)
Dept: AUDIOLOGY | Age: 89
End: 2024-08-12

## 2024-08-12 DIAGNOSIS — H90.3 SENSORY HEARING LOSS, BILATERAL: Primary | ICD-10-CM

## 2024-08-12 NOTE — PROGRESS NOTES
Hearing Aid Visit:    Name:  Matias Rodriguez  :  1935  Age:  88 y.o.  MRN:  [id number]  Date of Evaluation: 24     HISTORY:    Matias Rodriguez was seen today (2024) for a(n) in-warranty hearing aid check of his bilateral hearing aids. Today, Matias reports that the hearing aids are not working because he got them wet.    DEVICE INFORMATION:        Left Device Right Device   Hearing Aid Make: Oticon  Oticon    Hearing Aid Model: Intent 3 miniRITE-R Intent 3 miniRITE-R   Serial Number: [id number]   Repair Warranty Date: 7/10/2027 7/10/2027   Loss/Damage Warranty Status: Active  Active         Length/Output 3X85 3X85   Wax System: Pro Wax miniFIT Pro Wax miniFIT   Dome Size/Style: 8 mm DB 8 mm DB   Battery: Lithium-ion Rechargeable Lithium-ion Rechargeable       Earmold Serial Number: N/A N/A   Earmold Warranty Date:  N/A N/A    Serial Number:  [id number]    Warranty Date:  07/10/2027     Accessories: N/A           ACTION/ADJUSTMENTS:    Visual inspection of the device(s) revealed no noticeable damage or defects.     The hearing aids were cleaned and checked. The patients wax filters and domes were replaced bilaterally. Cleaning and maintenance was reviewed.    No adjustments were made at this time.     The hearing aids were placed in the  and pulsed orange. This indicates the the hearing aids are fully drained. Hearing aids were kept over night to charge. Once charged a listening check revealed good sound quality from the device(s).      RECOMMENDATIONS:     Trisha Zuniga., [facility]  Clinical Audiologist  [facility]  [address] [address]

## 2024-08-13 ENCOUNTER — OFFICE VISIT (OUTPATIENT)
Dept: AUDIOLOGY | Age: 89
End: 2024-08-13

## 2024-08-13 DIAGNOSIS — H90.3 SENSORY HEARING LOSS, BILATERAL: Primary | ICD-10-CM

## 2024-08-13 NOTE — PROGRESS NOTES
Progress Note    Name:  Matias Rodriguez  :  1935  Age:  88 y.o.  MRN:  [id number]  Date of Evaluation: 24     Patient picked up hearing aids and . Patient voiced good sound quality.    Patient will follow up in 1 month for adjustments if needed.        Trisha Kincaid., [facility]  Clinical Audiologist

## 2024-09-19 ENCOUNTER — TELEPHONE (OUTPATIENT)
Dept: NEPHROLOGY | Facility: CLINIC | Age: 89
End: 2024-09-19

## 2024-09-19 NOTE — TELEPHONE ENCOUNTER
Patient called back and took 9/26. He asked about having labs done for this appt. He plans on using the [facility]'s lab in [place]. Can you follow up with patient? Thank you

## 2024-09-20 ENCOUNTER — APPOINTMENT (OUTPATIENT)
Dept: LAB | Facility: MEDICAL CENTER | Age: 89
End: 2024-09-20
Payer: MEDICARE

## 2024-09-20 DIAGNOSIS — I12.9 BENIGN HYPERTENSION WITH CKD (CHRONIC KIDNEY DISEASE) STAGE III (HCC): ICD-10-CM

## 2024-09-20 DIAGNOSIS — I50.32 CHRONIC DIASTOLIC HEART FAILURE (HCC): ICD-10-CM

## 2024-09-20 DIAGNOSIS — R79.89 ELEVATED SERUM CREATININE: ICD-10-CM

## 2024-09-20 DIAGNOSIS — N18.30 BENIGN HYPERTENSION WITH CKD (CHRONIC KIDNEY DISEASE) STAGE III (HCC): ICD-10-CM

## 2024-09-20 LAB
ALBUMIN SERPL BCG-MCNC: 4 G/DL (ref 3.5–5)
ALP SERPL-CCNC: 80 U/L (ref 34–104)
ALT SERPL W P-5'-P-CCNC: 17 U/L (ref 7–52)
ANION GAP SERPL CALCULATED.3IONS-SCNC: 6 MMOL/L (ref 4–13)
AST SERPL W P-5'-P-CCNC: 17 U/L (ref 13–39)
BILIRUB SERPL-MCNC: 0.74 MG/DL (ref 0.2–1)
BUN SERPL-MCNC: 18 MG/DL (ref 5–25)
CALCIUM SERPL-MCNC: 8.8 MG/DL (ref 8.4–10.2)
CHLORIDE SERPL-SCNC: 106 MMOL/L (ref 96–108)
CO2 SERPL-SCNC: 28 MMOL/L (ref 21–32)
CREAT SERPL-MCNC: 1 MG/DL (ref 0.6–1.3)
CREAT UR-MCNC: 171.5 MG/DL
ERYTHROCYTE [DISTWIDTH] IN BLOOD BY AUTOMATED COUNT: 16.8 % (ref 11.6–15.1)
GFR SERPL CREATININE-BSD FRML MDRD: 67 ML/MIN/1.73SQ M
GLUCOSE P FAST SERPL-MCNC: 96 MG/DL (ref 65–99)
HCT VFR BLD AUTO: 33.8 % (ref 36.5–49.3)
HGB BLD-MCNC: 10.6 G/DL (ref 12–17)
MCH RBC QN AUTO: 20.4 PG (ref 26.8–34.3)
MCHC RBC AUTO-ENTMCNC: 31.4 G/DL (ref 31.4–37.4)
MCV RBC AUTO: 65 FL (ref 82–98)
MICROALBUMIN UR-MCNC: <7 MG/L
PLATELET # BLD AUTO: 155 THOUSANDS/UL (ref 149–390)
PMV BLD AUTO: 9.6 FL (ref 8.9–12.7)
POTASSIUM SERPL-SCNC: 4.4 MMOL/L (ref 3.5–5.3)
PROT SERPL-MCNC: 6.5 G/DL (ref 6.4–8.4)
RBC # BLD AUTO: 5.2 MILLION/UL (ref 3.88–5.62)
SODIUM SERPL-SCNC: 140 MMOL/L (ref 135–147)
WBC # BLD AUTO: 4.23 THOUSAND/UL (ref 4.31–10.16)

## 2024-09-20 PROCEDURE — 82043 UR ALBUMIN QUANTITATIVE: CPT

## 2024-09-20 PROCEDURE — 82570 ASSAY OF URINE CREATININE: CPT

## 2024-09-20 PROCEDURE — 85027 COMPLETE CBC AUTOMATED: CPT

## 2024-09-20 PROCEDURE — 36415 COLL VENOUS BLD VENIPUNCTURE: CPT

## 2024-09-20 PROCEDURE — 80053 COMPREHEN METABOLIC PANEL: CPT

## 2024-09-26 ENCOUNTER — OFFICE VISIT (OUTPATIENT)
Dept: NEPHROLOGY | Facility: CLINIC | Age: 89
End: 2024-09-26
Payer: MEDICARE

## 2024-09-26 VITALS — BODY MASS INDEX: 26.66 KG/M2 | HEIGHT: 65 IN | WEIGHT: 160 LBS

## 2024-09-26 DIAGNOSIS — I12.9 BENIGN HYPERTENSION WITH CKD (CHRONIC KIDNEY DISEASE), STAGE II: ICD-10-CM

## 2024-09-26 DIAGNOSIS — D53.8 OTHER SPECIFIED NUTRITIONAL ANEMIAS: ICD-10-CM

## 2024-09-26 DIAGNOSIS — I50.32 CHRONIC DIASTOLIC HEART FAILURE (HCC): Primary | ICD-10-CM

## 2024-09-26 DIAGNOSIS — N18.2 BENIGN HYPERTENSION WITH CKD (CHRONIC KIDNEY DISEASE), STAGE II: ICD-10-CM

## 2024-09-26 DIAGNOSIS — R71.8 LOW MEAN CORPUSCULAR VOLUME (MCV): ICD-10-CM

## 2024-09-26 PROBLEM — D64.89 OTHER SPECIFIED ANEMIAS: Status: ACTIVE | Noted: 2024-09-26

## 2024-09-26 PROBLEM — R79.89 ELEVATED SERUM CREATININE: Status: RESOLVED | Noted: 2022-12-19 | Resolved: 2024-09-26

## 2024-09-26 PROCEDURE — 99214 OFFICE O/P EST MOD 30 MIN: CPT | Performed by: INTERNAL MEDICINE

## 2024-09-26 NOTE — ASSESSMENT & PLAN NOTE
Lab Results   Component Value Date    EGFR 67 09/20/2024    EGFR 51 07/12/2024    EGFR 37 02/27/2024    EGFR 39 (L) 02/27/2024    CREATININE 1.00 09/20/2024    CREATININE 1.25 07/12/2024    CREATININE 1.61 (H) 02/27/2024   -- Baseline creatinine low to mid 1  -- Stage II/IIIA  -- Renal function currently stable with a creatinine of 1 mg/dL and a GFR greater than 60 mL/min  -- Presumed be secondary to age-related nephron loss, hypertension hypertension nephrosclerosis, cardiorenal syndrome  -- Renal function has improved since her last visit  -- Continue torsemide as needed for volume  -- Renal ultrasound showed symmetrically sized kidneys measuring about 9.9 and 9.2 cm.  Both kidneys are diffusely lobulated which is a normal variant.  Benign cyst on the right kidney noted.  No masses no lesions  -- Continue current management  -- Blood pressures currently under excellent control continue current management

## 2024-09-26 NOTE — ASSESSMENT & PLAN NOTE
-- Hemoglobin drifting down but overall stable check iron studies in the setting of low MCV  Orders:    Iron Panel (Includes Ferritin, Iron Sat%, Iron, and TIBC); Future    Albumin / creatinine urine ratio; Future    Comprehensive metabolic panel; Future    CBC and Platelet; Future

## 2024-09-26 NOTE — ASSESSMENT & PLAN NOTE
Wt Readings from Last 3 Encounters:   09/26/24 72.6 kg (160 lb)   07/12/24 72.6 kg (160 lb)   02/01/24 74.4 kg (164 lb)   -- Following with cardiology  -- Weight is stable  -- Continue torsemide 20 mg as needed if needed  -- Currently examines euvolemic  -- Blood pressure under excellent control  -- Continue current management    Orders:    Iron Panel (Includes Ferritin, Iron Sat%, Iron, and TIBC); Future    Albumin / creatinine urine ratio; Future    Comprehensive metabolic panel; Future    CBC and Platelet; Future

## 2024-09-26 NOTE — ASSESSMENT & PLAN NOTE
-- Slight drop in the hemoglobin MCV continues to remain low  --Check iron studies at the next visit  Orders:    Iron Panel (Includes Ferritin, Iron Sat%, Iron, and TIBC); Future    Albumin / creatinine urine ratio; Future    Comprehensive metabolic panel; Future    CBC and Platelet; Future

## 2024-10-09 ENCOUNTER — REMOTE DEVICE CLINIC VISIT (OUTPATIENT)
Dept: CARDIOLOGY CLINIC | Facility: CLINIC | Age: 89
End: 2024-10-09
Payer: MEDICARE

## 2024-10-09 DIAGNOSIS — Z95.0 PRESENCE OF PERMANENT CARDIAC PACEMAKER: Primary | ICD-10-CM

## 2024-10-09 PROCEDURE — 93296 REM INTERROG EVL PM/IDS: CPT | Performed by: INTERNAL MEDICINE

## 2024-10-09 PROCEDURE — 93294 REM INTERROG EVL PM/LDLS PM: CPT | Performed by: INTERNAL MEDICINE

## 2024-10-09 NOTE — PROGRESS NOTES
Results for orders placed or performed in visit on 10/09/24   Cardiac EP device report    Narrative    MDT DC PM/ ACTIVE SYSTEM IS MRI CONDITIONAL  CARELINK TRANSMISSION: BATTERY VOLTAGE ADEQUATE. (4 YRS)  97%. ALL AVAILABLE LEAD PARAMETERS WITHIN NORMAL LIMITS. NO SIGNIFICANT HIGH RATE EPISODES. 4216 AT/AF EPISODES DETECTED (19% OF TIME) ATRIAL UNDERSENSING ALSO NOTED. S/P WATCHMAN. NORMAL DEVICE FUNCTION.---LEONARD

## 2024-11-14 ENCOUNTER — APPOINTMENT (OUTPATIENT)
Dept: LAB | Facility: MEDICAL CENTER | Age: 89
End: 2024-11-14
Payer: MEDICARE

## 2024-11-14 ENCOUNTER — OFFICE VISIT (OUTPATIENT)
Dept: CARDIOLOGY CLINIC | Facility: MEDICAL CENTER | Age: 89
End: 2024-11-14
Payer: MEDICARE

## 2024-11-14 ENCOUNTER — TELEPHONE (OUTPATIENT)
Age: 89
End: 2024-11-14

## 2024-11-14 VITALS
OXYGEN SATURATION: 98 % | BODY MASS INDEX: 26.99 KG/M2 | HEIGHT: 65 IN | WEIGHT: 162 LBS | DIASTOLIC BLOOD PRESSURE: 60 MMHG | HEART RATE: 88 BPM | SYSTOLIC BLOOD PRESSURE: 116 MMHG

## 2024-11-14 DIAGNOSIS — Z95.0 CARDIAC PACEMAKER IN SITU: ICD-10-CM

## 2024-11-14 DIAGNOSIS — Z95.818 PRESENCE OF WATCHMAN LEFT ATRIAL APPENDAGE CLOSURE DEVICE: ICD-10-CM

## 2024-11-14 DIAGNOSIS — I48.0 PAROXYSMAL ATRIAL FIBRILLATION (HCC): Primary | ICD-10-CM

## 2024-11-14 DIAGNOSIS — D64.9 ANEMIA, UNSPECIFIED TYPE: ICD-10-CM

## 2024-11-14 DIAGNOSIS — I50.32 CHRONIC DIASTOLIC HEART FAILURE (HCC): ICD-10-CM

## 2024-11-14 LAB
ERYTHROCYTE [DISTWIDTH] IN BLOOD BY AUTOMATED COUNT: 17.1 % (ref 11.6–15.1)
HCT VFR BLD AUTO: 30.8 % (ref 36.5–49.3)
HGB BLD-MCNC: 9.4 G/DL (ref 12–17)
IRON SATN MFR SERPL: 32 % (ref 15–50)
IRON SERPL-MCNC: 84 UG/DL (ref 50–212)
MCH RBC QN AUTO: 19.7 PG (ref 26.8–34.3)
MCHC RBC AUTO-ENTMCNC: 30.5 G/DL (ref 31.4–37.4)
MCV RBC AUTO: 65 FL (ref 82–98)
PLATELET # BLD AUTO: 171 THOUSANDS/UL (ref 149–390)
PMV BLD AUTO: 9.5 FL (ref 8.9–12.7)
RBC # BLD AUTO: 4.76 MILLION/UL (ref 3.88–5.62)
TIBC SERPL-MCNC: 265 UG/DL (ref 250–450)
UIBC SERPL-MCNC: 181 UG/DL (ref 155–355)
WBC # BLD AUTO: 3.5 THOUSAND/UL (ref 4.31–10.16)

## 2024-11-14 PROCEDURE — 85027 COMPLETE CBC AUTOMATED: CPT

## 2024-11-14 PROCEDURE — 36415 COLL VENOUS BLD VENIPUNCTURE: CPT

## 2024-11-14 PROCEDURE — 99214 OFFICE O/P EST MOD 30 MIN: CPT | Performed by: INTERNAL MEDICINE

## 2024-11-14 PROCEDURE — 83540 ASSAY OF IRON: CPT

## 2024-11-14 PROCEDURE — 83550 IRON BINDING TEST: CPT

## 2024-11-14 NOTE — TELEPHONE ENCOUNTER
Spoke with patient's daughter Philomena, inquiring about patient's possible need for iron transfusions.    Reviewed office visit notes, relayed providers notes regarding iron levels, awaiting lab results and then to follow up with PCP depending on results.

## 2024-11-14 NOTE — PROGRESS NOTES
[facility]   [address]  [address]  [place] [address]                                            Cardiology Office Follow up  Matias Rodriguez, 88 y.o. male  YOB: 1935  MRN: [id number] Encounter: [id number]      PCP - Angel Reeder DO  Referring Provider - No ref. provider found    Chief Complaint   Patient presents with    Follow-up     4 month f/up       Assessment  Chronic diastolic heart failure  Aortic stenosis  Cardiac pacemaker in-situ  originally implanted n 4/2018  Had RV lead malfunction leading syncope (12/2022)--> needed TVP (12/19) --> s/p lead revision   Paroxysmal Atrial Fibrillation  S/p WATCHMAN ALDO closure  CKD  H/o SDH    Plan  Chronic diastolic heart failure  Overview  He diuresed well with torsemide prescribed by Dr. Pradhan and then was already approaching euvolemia by the time I saw him earlier this year  8/2023: initial visit with me  Appeared euvolemic/mildly dehydrated on exam --> asked to completed BMP --> Cr elevated to 1.72 --> diuretics to 2-3 times/wk  Cr 1.72 --> 1.40  11/2023: Now euvolemic on exam and using torsemide as needed  2/1/2024: saw nephrologist , was taking torsemide 20 mg daily --> Cr up to 1.6  7/15/24: follow up visit with me  Reports taking torsemide 1-2 times/day  Last BMP in 2/2024 was mildly elevated at 1.61, no further follow up since then --> I have asked him to repeat BMP now  11/2024: No major complaints of shortness of breath, continues to take torsemide 1-2 times per week and seems to be doing well with it and has good diuresis on those days  Plan  Continue torsemide 20 mg as needed and, about 1-2 times per week to help with weight gain or edema  Reduce sodium intake in diet  Monitor daily weight    Aortic stenosis  8/2023 TTE - Mild AS   11/2024: No major shortness of breath  Plan on follow-up echocardiogram next year    Paroxysmal Atrial Fibrillation, cardiac pacemaker in-situ, s/p WATCHMAN  device, Atrial lead fracture  Overview  12/2022: WATCHMAN closure  Device check   8/2/23 - AP 93%,  > 99% (dependent)  10/11/23: AP 22.1%,  99.7%, AT/Af burden 77% since 8/2/23. Back in Afib since mid Aug 2023, but remains V-paced  7/10/24:  99%, known atrial lead fracture, has been mostly in in A-fib since August 2023 , AT/AF burden 64.2%  10/9/24 - 97% , no significant high rate episodes, AT/AF 19% burden, atrial undersensing  11/2024: clinically appears to be in regular rhythm  Plan  Low burden AT AF without any high rate episodes, despite not being on rate control therapy  amiodarone previously discontinued  Not on anticoagulation, but has watchman in place  Clinically in sinus rhythm today  Continue aspirin 81 mg daily and monitor on device check   He has known atrial lead to fracture, but at this point he is mostly V-paced and is in chronic A-fib and as a result atrial lead revision may not be useful --> monitor on device checks, EP recs  Continue aspirin 81 mg daily    Anemia  Hb slightly down further to 10.6, has been 10-12  No gross bleeding  Has microcytic hypochromic profile with high RDW - suggestive of iron deficiency  He previously used to be on iron, but stopped it a while back due to concerns about constipation  Check iron panel, and follow up for treatment with PCP / nephrologist  Iron panel already ordered and pending through     No results found for this visit on 11/14/24.    Orders Placed This Encounter   Procedures    TIBC Panel (incl. Iron, TIBC, % Iron Saturation)    CBC and Platelet           Return in about 1 year (around 11/14/2025), or if symptoms worsen or fail to improve.      History of Present Illness   88 y.o. male comes in as a new patient for consultation and establishment of care regarding heart failure.  He was following with EP until now for atrial fibrillation, pacemaker, but developed issues with heart failure recently and as a result is referred to see general  cardiology for further evaluation.    Over the last couple of months he was having increased symptoms of shortness of breath and lower extremity edema and contacted EP office.  He was initiated on torsemide daily, and with that has been diuresing well over the last 1 to 2 weeks.  His edema is much improved today.    He recently had a pacemaker implanted in 2018, when he presented with syncope and intracranial bleed and was found to have sick sinus syndrome and chronotropic incompetence along with atrial fibrillation.  He underwent pacemaker placement and was doing well.  In December 2022 he underwent Watchman procedure due to his history of bleeding.  A few weeks later he returned with an episode of syncope and was found to have RV lead malfunction, and needed lead revision.  Since then he reports he has had some lower extremity edema and shortness of breath but has not had any other episodes of dizziness, near-syncope or syncope.    Interval history - 11/2/2023  He returns for follow-up after about 3 months.  In the interim, he has been using the diuretics at a lower frequency and with this his creatinine has improved.  His weight has remained stable around 161 pounds and he has not had any pedal edema or shortness of breath.  His device check recently again started to show more atrial fibrillation, but he remains free of chest pain, shortness of breath, palpitations or dizziness    Interval history - 7/12/2024  He returns for follow-up with me after about 9 months.  In the interim, he continues to do well and has remained free of any chest pain, shortness of breath, palpitations or dizziness.  His renal function has been fluctuant, and he reports to me that he has been taking torsemide 1-2 times a day over the past few months.    Interval history - 11/14/2024  He returns for follow-up after about 4 months.  No current complaints of chest pain, shortness of breath, palpitations or dizziness.  He does have on and  off increased lower extremity edema and takes torsemide as needed, which helps resolve the same.  No dizziness with this, but he has some fatigue and anemia      Historical Information   Past Medical History:   Diagnosis Date    A-fib (HCC)     CHANA (acute kidney injury) (HCC) 12/19/2022    Hernia, abdominal     Hypertension     Subdural hematoma (HCC)      Past Surgical History:   Procedure Laterality Date    A-V CARDIAC PACEMAKER INSERTION      APPENDECTOMY      age 27    CARDIAC CATHETERIZATION N/A 12/19/2022    Procedure: Cardiac temporary pacemaker;  Surgeon: Darien Panda MD;  [facility];  Service: Cardiology    CARDIAC ELECTROPHYSIOLOGY PROCEDURE N/A 12/8/2022    Procedure: CARDIAC ATRIAL APPENDAGE CLOSURE (EP);  Surgeon: Jeremiah Pradhan MD;  Location: [facility];  Service: Cardiology    CARDIAC ELECTROPHYSIOLOGY PROCEDURE Right 12/20/2022    Procedure: Cardiac lead revision;  Surgeon: Juan Wheeler MD;  Location: [facility];  Service: Cardiology    CARDIAC PACEMAKER PLACEMENT      CARPAL TUNNEL RELEASE      COLONOSCOPY      INGUINAL HERNIA REPAIR Left     IR UPPER EXTREMITY VENOGRAM- DIAGNOSTIC  12/19/2022    CT PERQ CLSR TCAT L ATR APNDGE W/ENDOCARDIAL IMPLNT N/A 12/8/2022    Procedure: CARDIAC ATRIAL APPENDAGE CLOSURE (CATH);  Surgeon: Jeremiah Pradhan MD;  Location: [facility];  Service: Cardiology    CT XCAPSL CTRC RMVL INSJ IO LENS PROSTH W/O ECP Right 4/3/2017    Procedure: EXTRACTION EXTRACAPSULAR CATARACT PHACO INTRAOCULAR LENS (IOL);  Surgeon: Mario Power MD;  Location: [facility] MAIN OR;  Service: Ophthalmology    CT XCAPSL CTRC RMVL INSJ IO LENS PROSTH W/O ECP Left 2/6/2023    Procedure: EXTRACTION EXTRACAPSULAR CATARACT PHACO INTRAOCULAR LENS (IOL);  Surgeon: Mario Power MD;  Location: [facility] MAIN OR;  Service: Ophthalmology    TONSILLECTOMY      age 6     Family History   Problem Relation Age of Onset    Cancer Mother         exp age 96    Heart disease Father      Heart disease Brother     Hypertension Brother      Current Outpatient Medications on File Prior to Visit   Medication Sig Dispense Refill    aspirin 81 mg chewable tablet Chew 81 mg daily      tamsulosin (FLOMAX) 0.4 mg Take 0.4 mg by mouth daily with dinner      torsemide (DEMADEX) 20 mg tablet Take 20 mg by mouth as needed (for weight gain > 3lbs/day or wt > 165 lbs)      gabapentin (NEURONTIN) 100 mg capsule Take 1 capsule (100 mg total) by mouth in the morning and 1 capsule (100 mg total) in the evening and 1 capsule (100 mg total) before bedtime. (Patient not taking: Reported on 9/26/2024) 1 capsule 0     No current facility-administered medications on file prior to visit.     No Known Allergies  Social History     Socioeconomic History    Marital status: /Civil Union     Spouse name: None    Number of children: None    Years of education: None    Highest education level: None   Occupational History    None   Tobacco Use    Smoking status: Never    Smokeless tobacco: Never   Vaping Use    Vaping status: Never Used   Substance and Sexual Activity    Alcohol use: Never    Drug use: Never    Sexual activity: None   Other Topics Concern    None   Social History Narrative    None     Social Drivers of Health     Financial Resource Strain: Not on file   Food Insecurity: No Food Insecurity (12/19/2022)    Hunger Vital Sign     Worried About Running Out of Food in the Last Year: Never true     Ran Out of Food in the Last Year: Never true   Transportation Needs: No Transportation Needs (12/19/2022)    PRAPARE - Transportation     Lack of Transportation (Medical): No     Lack of Transportation (Non-Medical): No   Physical Activity: Not on file   Stress: Not on file   Social Connections: Not on file   Intimate Partner Violence: Not on file   Housing Stability: Low Risk  (12/19/2022)    Housing Stability Vital Sign     Unable to Pay for Housing in the Last Year: No     Number of Places Lived in the Last Year: 1  "    Unstable Housing in the Last Year: No        Review of Systems   All other systems reviewed and are negative.      Vitals:  Vitals:    11/14/24 0827   BP: 116/60   BP Location: Left arm   Patient Position: Sitting   Cuff Size: Adult   Pulse: 88   SpO2: 98%   Weight: 73.5 kg (162 lb)   Height: 5' 5\" (1.651 m)     BMI - Body mass index is 26.96 kg/m².  Wt Readings from Last 7 Encounters:   11/14/24 73.5 kg (162 lb)   09/26/24 72.6 kg (160 lb)   07/12/24 72.6 kg (160 lb)   02/01/24 74.4 kg (164 lb)   11/02/23 73 kg (161 lb)   08/15/23 75.3 kg (166 lb)   08/10/23 75.8 kg (167 lb)       Physical Exam  Vitals and nursing note reviewed.   Constitutional:       General: He is not in acute distress.     Appearance: Normal appearance. He is well-developed. He is not ill-appearing or diaphoretic.   HENT:      Head: Normocephalic and atraumatic.      Nose: No congestion.   Eyes:      General: No scleral icterus.     Conjunctiva/sclera: Conjunctivae normal.   Neck:      Vascular: No carotid bruit or JVD.   Cardiovascular:      Rate and Rhythm: Normal rate and regular rhythm.      Heart sounds: Murmur heard.      Systolic murmur is present with a grade of 2/6.      No friction rub. No gallop.      Comments: Right upper chest wall cardiac pacemaker noted in-situ  Pulmonary:      Effort: Pulmonary effort is normal. No respiratory distress.      Breath sounds: Normal breath sounds. No wheezing or rales.   Chest:      Chest wall: No tenderness.   Abdominal:      General: There is no distension.      Palpations: Abdomen is soft.      Tenderness: There is no abdominal tenderness.   Musculoskeletal:         General: No swelling, tenderness or deformity.      Cervical back: Neck supple. No muscular tenderness.      Right lower leg: Edema present.      Left lower leg: Edema present.      Comments: B/L 1-2+ edema upto just above ankle (L>R)   Skin:     General: Skin is warm.   Neurological:      General: No focal deficit present.      " "Mental Status: He is alert and oriented to person, place, and time. Mental status is at baseline.   Psychiatric:         Mood and Affect: Mood normal.         Behavior: Behavior normal.         Thought Content: Thought content normal.           Labs:  CBC:   Lab Results   Component Value Date    WBC 3.50 (L) 11/14/2024    RBC 4.76 11/14/2024    HGB 9.4 (L) 11/14/2024    HCT 30.8 (L) 11/14/2024    MCV 65 (L) 11/14/2024     11/14/2024    RDW 17.1 (H) 11/14/2024       CMP:   Lab Results   Component Value Date    K 4.4 09/20/2024     09/20/2024    CO2 28 09/20/2024    BUN 18 09/20/2024    CREATININE 1.00 09/20/2024    EGFR 67 09/20/2024    GLUCOSE 158 (H) 12/18/2022    CALCIUM 8.8 09/20/2024    AST 17 09/20/2024    ALT 17 09/20/2024    ALKPHOS 80 09/20/2024       Magnesium:  Lab Results   Component Value Date    MG 2.2 12/21/2022       Lipid Profile:   Lab Results   Component Value Date    HDL 91 03/29/2023    TRIG 55 03/29/2023    LDLCALC 77 03/29/2023       Thyroid Studies:   Lab Results   Component Value Date    VUK5JFNUPIIC 5.186 (H) 08/04/2023    FREET4 0.89 08/04/2023       A1c:  No components found for: \"HGA1C\"    INR:  Lab Results   Component Value Date    INR 1.04 12/20/2022    INR 1.32 (H) 12/18/2022    INR 1.99 (H) 11/30/2022   5    Imaging: Echo complete w/ contrast if indicated    Result Date: 8/10/2023  Narrative:   Left Ventricle: Left ventricular cavity size is normal. Wall thickness is increased. There is mild to moderate concentric hypertrophy. The left ventricular ejection fraction is 50%. Systolic function is low normal. Wall motion is normal. Diastolic function is moderately abnormal, consistent with grade II (pseudonormal) relaxation.   Left Atrium: The atrium is moderately dilated.   Right Atrium: The atrium is mildly dilated.   Aortic Valve: The aortic valve is trileaflet. The leaflets are moderately thickened. The leaflets are moderately calcified. There is moderately reduced " mobility. There is mild stenosis. The aortic valve area is 1.82 cm2. The aortic valve velocity is increased due to stenosis but lower than expected due to the presence of decreased flow.   Tricuspid Valve: There is mild regurgitation. The right ventricular systolic pressure is mildly elevated. The estimated right ventricular systolic pressure is 44.00 mmHg.     XR chest pa & lateral    Result Date: 2023  Narrative: CHEST INDICATION:   I48.91: Unspecified atrial fibrillation R60.0: Localized edema. COMPARISON: CXR 2022. EXAM PERFORMED/VIEWS:  XR CHEST PA & LATERAL. FINDINGS: Cardiomediastinal silhouette normal. Right subclavian pacemaker leads in the right atrial appendage with 2 leads in the right ventricular apex. Watchman device in the expected location of the left atrial appendage. Lungs clear. No effusion or pneumothorax. Upper abdomen normal. Old left rib fractures.     Impression: No acute cardiopulmonary disease. Workstation performed: [id number]     Cardiac EP device report    Result Date: 2023  Narrative: MDT-DUAL CHAMBER PPM/ ACTIVE SYSTEM IS MRI CONDITIONAL CARELINK TRANSMISSION-NB: PT C/O LE EDEMA. AP/ @ 72 PPM ON CURRENT EGM. BATTERY VOLTAGE ADEQUATE (5 YRS). AP-93%, >99% (DEPENDENT). ALL AVAILABLE LEAD PARAMETERS WITHIN NORMAL LIMITS. NO SIGNIFICANT HIGH RATE EPISODES. NORMAL DEVICE FUNCTION. GV       Cardiac testing:   Results for orders placed during the hospital encounter of 18    Echo complete with contrast if indicated    Narrative  [address] [address] [phone]    Transthoracic Echocardiogram  2D, M-mode, Doppler, and Color Doppler    Study date:  15-Apr-2018    Patient: DOLLY HOBBS  MR number: [id number]  Account number: [id number]  : 1935  Age: 82 years  Gender: Male  Status: Inpatient  Location: Bedside  Height:  Weight:  BP: 125/ 52 mmHg    Indications: Syncope.    Diagnoses: R55. -  Syncope and collapse    Sonographer:  Salazar Nelson [facility]  Primary Physician:  Angel Reeder DO  Referring Physician:  Kris Bahena MD  Group:  [facility]  Cardiology Fellow:  Padma Mathis MD  Interpreting Physician:  Davy Parmar MD    SUMMARY    LEFT VENTRICLE:  Systolic function was normal. Ejection fraction was estimated to be 55 %.  There were no regional wall motion abnormalities.  Wall thickness was mildly to moderately increased.  The changes were consistent with concentric remodeling (increased wall thickness with normal wall mass).    MITRAL VALVE:  There was mild annular calcification.  There was mild regurgitation.    AORTIC VALVE:  The valve was trileaflet. Leaflets exhibited sclerosis.  There was mild regurgitation.    TRICUSPID VALVE:  There was mild to moderate regurgitation.  Estimated peak PA pressure was 48 mmHg.  The findings suggest mild to moderate pulmonary hypertension.    IVC, HEPATIC VEINS:  Respirophasic changes were blunted (less than 50% variation).    HISTORY: PRIOR HISTORY: Atrial fibrillation, Hypertension.    PROCEDURE: The procedure was performed at the bedside. This was a routine study. The transthoracic approach was used. The study included complete 2D imaging, M-mode, complete spectral Doppler, and color Doppler. The heart rate was 50 bpm,  at the start of the study. Images were obtained from the parasternal, apical, subcostal, and suprasternal notch acoustic windows. Image quality was adequate.    LEFT VENTRICLE: Size was normal. Systolic function was normal. Ejection fraction was estimated to be 55 %. There were no regional wall motion abnormalities. Wall thickness was mildly to moderately increased. The changes were consistent  with concentric remodeling (increased wall thickness with normal wall mass). DOPPLER: Left ventricular diastolic function parameters were normal.    RIGHT VENTRICLE: The size was normal. Systolic function  was normal.    LEFT ATRIUM: Size was normal.    RIGHT ATRIUM: Size was normal.    MITRAL VALVE: There was mild annular calcification. Valve structure was normal. There was normal leaflet separation. DOPPLER: The transmitral velocity was within the normal range. There was no evidence for stenosis. There was mild  regurgitation.    AORTIC VALVE: The valve was trileaflet. Leaflets exhibited sclerosis. DOPPLER: Transaortic velocity was within the normal range. There was no evidence for stenosis. There was mild regurgitation.    TRICUSPID VALVE: The valve structure was normal. There was normal leaflet separation. DOPPLER: The transtricuspid velocity was within the normal range. There was no evidence for stenosis. There was mild to moderate regurgitation. The  regurgitant jet was directed centrally. Estimated peak PA pressure was 48 mmHg. The findings suggest mild to moderate pulmonary hypertension.    PULMONIC VALVE: DOPPLER: The transpulmonic velocity was within the normal range. There was no evidence for stenosis. There was trace regurgitation.    PERICARDIUM: There was no pericardial effusion.    AORTA: The root exhibited normal size.    SYSTEMIC VEINS: IVC: The inferior vena cava was normal in size. Respirophasic changes were blunted (less than 50% variation).    SYSTEM MEASUREMENT TABLES    2D  %FS: 27.33 %  Ao Diam: 3.74 cm  EDV(Teich): 95.08 ml  EF(Teich): 53.26 %  ESV(Teich): 44.44 ml  IVSd: 1.31 cm  LA Area: 18.96 cm2  LA Diam: 3.73 cm  LVEDV MOD A4C: 97.77 ml  LVEF MOD A4C: 57.52 %  LVESV MOD A4C: 41.53 ml  LVIDd: 4.55 cm  LVIDs: 3.31 cm  LVLd A4C: 8.11 cm  LVLs A4C: 6.6 cm  LVOT Diam: 2.21 cm  LVPWd: 0.92 cm  RA Area: 15.37 cm2  RVIDd: 3.7 cm  SV MOD A4C: 56.23 ml  SV(Teich): 50.63 ml    CW  AR Dec [place]: 2.11 m/s2  AR Dec Time: 1731.3 ms  AR PHT: 502.08 ms  AR Vmax: 3.66 m/s  AR maxP.49 mmHg  TR Vmax: 3.09 m/s  TR maxP.29 mmHg    MM  TAPSE: 2.42 cm    PW  E': 0.07 m/s  E/E': 14.56  MV A [place]: 0.69  m/s  MV Dec [place]: 4.62 m/s2  MV DecT: 228.93 ms  MV E [place]: 1.06 m/s  MV E/A Ratio: 1.53  MV PHT: 66.39 ms  MVA By PHT: 3.31 cm2    [facility] Accredited Echocardiography Laboratory    Prepared and electronically signed by    Davy Parmar MD  Signed 16-Apr-2018 11:02:16    No results found for this or any previous visit.    No results found for this or any previous visit.    No results found for this or any previous visit.      Cardiac EP device report  MDT DC PM/ ACTIVE SYSTEM IS MRI CONDITIONAL  CARELINK TRANSMISSION: BATTERY VOLTAGE ADEQUATE. (4 YRS)  97%. ALL AVAILABLE LEAD PARAMETERS WITHIN NORMAL LIMITS. NO SIGNIFICANT HIGH RATE EPISODES. 4216 AT/AF EPISODES DETECTED (19% OF TIME) ATRIAL UNDERSENSING ALSO NOTED. S/P WATCHMAN. NORMAL DEVICE FUNCTION.---LEONARD

## 2024-11-15 NOTE — TELEPHONE ENCOUNTER
"Spoke with patient, inquiring about lab results. Relayed Dr Sparks's message:    \"His hemoglobin levels already came back and it is lower than before with hemoglobin down to 9.4.  Although iron panel is still pending, this is most likely related to iron deficiency.  He needs to follow this up at the earliest with his PCP to discuss further treatment plans.  If any bleeding concerns and that may need further evaluation as well. \"    Advised to contact PCP to follow up regarding blood counts, iron levels; verbalized understanding, will call PCP now.    Matias asked me to contact his daughter Philomena and relay the results.  "

## 2024-11-15 NOTE — TELEPHONE ENCOUNTER
Caller: Matias    Doctor: Dr. Sparks     Reason for call: Patient called to get results of his Blood work    Call back#: [phone]

## 2024-12-09 ENCOUNTER — RESULTS FOLLOW-UP (OUTPATIENT)
Dept: CARDIOLOGY CLINIC | Facility: CLINIC | Age: 89
End: 2024-12-09

## 2024-12-31 ENCOUNTER — APPOINTMENT (OUTPATIENT)
Dept: LAB | Facility: MEDICAL CENTER | Age: 89
End: 2024-12-31
Payer: MEDICARE

## 2024-12-31 DIAGNOSIS — D50.9 IRON DEFICIENCY ANEMIA, UNSPECIFIED IRON DEFICIENCY ANEMIA TYPE: ICD-10-CM

## 2024-12-31 LAB
BASOPHILS # BLD AUTO: 0.02 THOUSANDS/ΜL (ref 0–0.1)
BASOPHILS NFR BLD AUTO: 0 % (ref 0–1)
EOSINOPHIL # BLD AUTO: 0.05 THOUSAND/ΜL (ref 0–0.61)
EOSINOPHIL NFR BLD AUTO: 1 % (ref 0–6)
ERYTHROCYTE [DISTWIDTH] IN BLOOD BY AUTOMATED COUNT: 17.6 % (ref 11.6–15.1)
HCT VFR BLD AUTO: 35.2 % (ref 36.5–49.3)
HGB BLD-MCNC: 10.6 G/DL (ref 12–17)
IMM GRANULOCYTES # BLD AUTO: 0.02 THOUSAND/UL (ref 0–0.2)
IMM GRANULOCYTES NFR BLD AUTO: 0 % (ref 0–2)
LYMPHOCYTES # BLD AUTO: 1.35 THOUSANDS/ΜL (ref 0.6–4.47)
LYMPHOCYTES NFR BLD AUTO: 21 % (ref 14–44)
MCH RBC QN AUTO: 19.6 PG (ref 26.8–34.3)
MCHC RBC AUTO-ENTMCNC: 30.1 G/DL (ref 31.4–37.4)
MCV RBC AUTO: 65 FL (ref 82–98)
MONOCYTES # BLD AUTO: 0.9 THOUSAND/ΜL (ref 0.17–1.22)
MONOCYTES NFR BLD AUTO: 14 % (ref 4–12)
NEUTROPHILS # BLD AUTO: 4.12 THOUSANDS/ΜL (ref 1.85–7.62)
NEUTS SEG NFR BLD AUTO: 64 % (ref 43–75)
NRBC BLD AUTO-RTO: 0 /100 WBCS
PLATELET # BLD AUTO: 177 THOUSANDS/UL (ref 149–390)
PMV BLD AUTO: 9.8 FL (ref 8.9–12.7)
RBC # BLD AUTO: 5.41 MILLION/UL (ref 3.88–5.62)
WBC # BLD AUTO: 6.46 THOUSAND/UL (ref 4.31–10.16)

## 2024-12-31 PROCEDURE — 36415 COLL VENOUS BLD VENIPUNCTURE: CPT

## 2024-12-31 PROCEDURE — 85025 COMPLETE CBC W/AUTO DIFF WBC: CPT

## 2025-01-06 ENCOUNTER — APPOINTMENT (OUTPATIENT)
Dept: LAB | Facility: MEDICAL CENTER | Age: 89
End: 2025-01-06

## 2025-01-07 ENCOUNTER — APPOINTMENT (OUTPATIENT)
Dept: LAB | Facility: MEDICAL CENTER | Age: 89
End: 2025-01-07
Payer: MEDICARE

## 2025-01-07 DIAGNOSIS — K62.0 ANAL POLYP: ICD-10-CM

## 2025-01-07 DIAGNOSIS — D50.9 IRON DEFICIENCY ANEMIA, UNSPECIFIED IRON DEFICIENCY ANEMIA TYPE: ICD-10-CM

## 2025-01-08 ENCOUNTER — RESULTS FOLLOW-UP (OUTPATIENT)
Dept: CARDIOLOGY CLINIC | Facility: CLINIC | Age: 89
End: 2025-01-08

## 2025-01-08 ENCOUNTER — REMOTE DEVICE CLINIC VISIT (OUTPATIENT)
Dept: CARDIOLOGY CLINIC | Facility: CLINIC | Age: 89
End: 2025-01-08
Payer: MEDICARE

## 2025-01-08 DIAGNOSIS — Z95.0 PRESENCE OF PERMANENT CARDIAC PACEMAKER: Primary | ICD-10-CM

## 2025-01-08 PROCEDURE — 93294 REM INTERROG EVL PM/LDLS PM: CPT | Performed by: INTERNAL MEDICINE

## 2025-01-08 PROCEDURE — 93296 REM INTERROG EVL PM/IDS: CPT | Performed by: INTERNAL MEDICINE

## 2025-01-08 NOTE — PROGRESS NOTES
MDT DC PM/ACTIVE SYSTEM IS MRI CONDITIONAL   CARELINK TRANSMISSION:  BATTERY VOLTAGE ADEQUATE (4.0 YR.).   98.3% (VVIR 70 PPM).  ALL VENTRICULAR LEAD PARAMETERS WITHIN NORMAL LIMITS.  NO SIGNIFICANT HIGH RATE EPISODES.  NORMAL DEVICE FUNCTION.  RG

## 2025-01-21 ENCOUNTER — OFFICE VISIT (OUTPATIENT)
Dept: HEMATOLOGY ONCOLOGY | Facility: CLINIC | Age: 89
End: 2025-01-21
Payer: MEDICARE

## 2025-01-21 ENCOUNTER — APPOINTMENT (OUTPATIENT)
Dept: LAB | Facility: MEDICAL CENTER | Age: 89
End: 2025-01-21
Payer: MEDICARE

## 2025-01-21 VITALS
BODY MASS INDEX: 27.16 KG/M2 | SYSTOLIC BLOOD PRESSURE: 144 MMHG | TEMPERATURE: 98.1 F | RESPIRATION RATE: 17 BRPM | WEIGHT: 163 LBS | DIASTOLIC BLOOD PRESSURE: 88 MMHG | HEIGHT: 65 IN | HEART RATE: 87 BPM | OXYGEN SATURATION: 97 %

## 2025-01-21 DIAGNOSIS — D56.3 BETA THALASSEMIA MINOR: ICD-10-CM

## 2025-01-21 DIAGNOSIS — D53.9 NUTRITIONAL ANEMIA: ICD-10-CM

## 2025-01-21 DIAGNOSIS — D64.9 ANEMIA, UNSPECIFIED TYPE: Primary | ICD-10-CM

## 2025-01-21 DIAGNOSIS — D64.9 ANEMIA, UNSPECIFIED TYPE: ICD-10-CM

## 2025-01-21 PROBLEM — R71.8 LOW MEAN CORPUSCULAR VOLUME (MCV): Status: RESOLVED | Noted: 2022-12-19 | Resolved: 2025-01-21

## 2025-01-21 PROCEDURE — 82728 ASSAY OF FERRITIN: CPT

## 2025-01-21 PROCEDURE — 82784 ASSAY IGA/IGD/IGG/IGM EACH: CPT

## 2025-01-21 PROCEDURE — 85025 COMPLETE CBC W/AUTO DIFF WBC: CPT

## 2025-01-21 PROCEDURE — 36415 COLL VENOUS BLD VENIPUNCTURE: CPT

## 2025-01-21 PROCEDURE — 86334 IMMUNOFIX E-PHORESIS SERUM: CPT

## 2025-01-21 PROCEDURE — 80053 COMPREHEN METABOLIC PANEL: CPT

## 2025-01-21 PROCEDURE — 83550 IRON BINDING TEST: CPT

## 2025-01-21 PROCEDURE — 83521 IG LIGHT CHAINS FREE EACH: CPT

## 2025-01-21 PROCEDURE — 83540 ASSAY OF IRON: CPT

## 2025-01-21 PROCEDURE — 84165 PROTEIN E-PHORESIS SERUM: CPT

## 2025-01-21 PROCEDURE — 82607 VITAMIN B-12: CPT

## 2025-01-21 PROCEDURE — 82746 ASSAY OF FOLIC ACID SERUM: CPT

## 2025-01-21 PROCEDURE — 99203 OFFICE O/P NEW LOW 30 MIN: CPT

## 2025-01-21 NOTE — PROGRESS NOTES
Name: Matias Rodriguez      : 1935      MRN: [id number]  Encounter Provider: ARPIT Leach  Encounter Date: 2025   Encounter department: [facility] [place]  :  Assessment & Plan  Anemia, unspecified type    Orders:    CBC and differential; Future    Iron Panel (Includes Ferritin, Iron Sat%, Iron, and TIBC); Future    Vitamin B12; Future    Folate; Future    Protein electrophoresis, serum; Future    Immunoglobulin free LT chains blood; Future    IgG, IgA, IgM; Future    Comprehensive metabolic panel; Future    Nutritional anemia    Orders:    Vitamin B12; Future    Folate; Future    Beta thalassemia minor    89-year-old male with hypochromic microcytic anemia.  We discussed the etiologies of anemia, which includes, but not limited to, iron deficiency, malabsorption, diet, CKD, bone marrow disorder/malignancies.      We discussed the etiology of his anemia likely secondary to beta thalassemia.  Patients with beta thalassemia minor typically do not require interventions for anemia as anemia is very mild or absent.  Patient should not be placed on iron therapy if iron deficiency is not noted as there is a possibility of iron overload as patients with thalassemia absorb iron quicker.    We will further evaluate by obtaining the labs above.  I will call his daughter with the results and plan per his request.  Will tentatively set up a 3-month follow-up however, we will change the appointment if needed upon review of the labs.  Patient and his daughter are agreeable with the plan.  They are aware to contact us for any additional questions/concerns or worsening symptoms.    History of Present Illness   Chief Complaint   Patient presents with    Consult   Matias Rodriguez is a 89-year-old male who presents for initial consultation of his anemia.  Patient has PMH significant for CHF, pacemaker in situ,, Watchman closure, paroxysmal A-fib, CKD, aortic stenosis, hypertension.   Patient is here with his daughter.    Patient is a think background is [place].  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  Microcytic anemia is noted since 2016.  Patient has not required any blood transfusions or iron infusions.  Currently he is taking oral iron supplements every other day.    Patient has seen 2 colorectal surgeons regarding bloody stools.  His Hemoccult test was positive.  Per his colorectal surgeon, likely secondary to bleeding hemorrhoids.    Overall, patient is doing well.  He has some mild fatigue and shortness of breath.  He also has left rib pain.  Denies any dizziness, lightheadedness, CP, palpitations, decreased appetite or weight loss.  No bone pain.  Fevers, frequent infections or drenching night sweats.  Patient has no history of stroke, heart attack or blood clots.    Patient reports he has a family history of colon cancer.    Labs:  1/7/2025: Fecal occult positive    12/31/2024: Hgb 10.6, MCV 65, WBC 6.46, platelets 177,000    11/14/2024: Hgb 9.4, MCV 65, WBC 3.5, platelets 171,000, serum iron 84, iron saturation 32%    9/20/2024: Hgb 10.6, MCV 65, WBC 4.23, platelets 155,000, creatinine 1.0, EGFR 67, calcium 8.8,    Review of Systems   Constitutional:  Positive for fatigue. Negative for activity change, appetite change, diaphoresis, fever and unexpected weight change.   HENT:  Negative for nosebleeds.    Eyes: Negative.    Respiratory:  Positive for shortness of breath. Negative for cough and chest tightness.    Cardiovascular:  Positive for leg swelling. Negative for chest pain and palpitations.   Gastrointestinal:  Negative for abdominal pain and blood in stool.   Endocrine: Negative.    Genitourinary:  Negative for hematuria.   Musculoskeletal: Negative.    Skin: Negative.    Neurological:  Negative for dizziness, light-headedness and headaches.   Hematological: Negative.            Objective  "  /88 (BP Location: Left arm, Patient Position: Sitting, Cuff Size: Adult)   Pulse 87   Temp 98.1 °F (36.7 °C) (Temporal)   Resp 17   Ht 5' 5\" (1.651 m)   Wt 73.9 kg (163 lb)   SpO2 97%   BMI 27.12 kg/m²     Physical Exam  Constitutional:       Appearance: Normal appearance.   HENT:      Head: Normocephalic and atraumatic.   Cardiovascular:      Rate and Rhythm: Regular rhythm.      Heart sounds: Normal heart sounds.   Pulmonary:      Breath sounds: Normal breath sounds.   Musculoskeletal:      Cervical back: Normal range of motion.      Right lower leg: Edema present.      Left lower leg: Edema present.   Skin:     General: Skin is warm and dry.   Neurological:      Mental Status: He is alert and oriented to person, place, and time.   Psychiatric:         Mood and Affect: Mood normal.         Behavior: Behavior normal.         Thought Content: Thought content normal.         Judgment: Judgment normal.         Labs: I have reviewed the following labs:  Results for orders placed or performed in visit on 01/06/25   Occult Blood, Fecal Immunochemical   Result Value Ref Range    OCCULT BLD, FECAL IMMUNOLOGICAL Positive (A) Negative     I spent 40 minutes in chart review, counseling, coordination of care, and documentation.    This note has been generated by voice recognition software system.  Therefore, there may be spelling, grammar, and or syntax errors. Please contact if questions arise.      "

## 2025-01-21 NOTE — ASSESSMENT & PLAN NOTE
Orders:    CBC and differential; Future    Iron Panel (Includes Ferritin, Iron Sat%, Iron, and TIBC); Future    Vitamin B12; Future    Folate; Future    Protein electrophoresis, serum; Future    Immunoglobulin free LT chains blood; Future    IgG, IgA, IgM; Future    Comprehensive metabolic panel; Future

## 2025-01-22 ENCOUNTER — TELEPHONE (OUTPATIENT)
Dept: HEMATOLOGY ONCOLOGY | Facility: CLINIC | Age: 89
End: 2025-01-22

## 2025-01-22 DIAGNOSIS — E53.8 FOLATE DEFICIENCY: ICD-10-CM

## 2025-01-22 DIAGNOSIS — E53.8 VITAMIN B12 DEFICIENCY: ICD-10-CM

## 2025-01-22 DIAGNOSIS — D64.9 ANEMIA, UNSPECIFIED TYPE: Primary | ICD-10-CM

## 2025-01-22 DIAGNOSIS — N18.9 CHRONIC KIDNEY DISEASE, UNSPECIFIED CKD STAGE: ICD-10-CM

## 2025-01-22 LAB
ALBUMIN SERPL BCG-MCNC: 4 G/DL (ref 3.5–5)
ALBUMIN SERPL ELPH-MCNC: 4.08 G/DL (ref 3.2–5.1)
ALBUMIN SERPL ELPH-MCNC: 62.7 % (ref 48–70)
ALP SERPL-CCNC: 99 U/L (ref 34–104)
ALPHA1 GLOB SERPL ELPH-MCNC: 0.27 G/DL (ref 0.15–0.47)
ALPHA1 GLOB SERPL ELPH-MCNC: 4.2 % (ref 1.8–7)
ALPHA2 GLOB SERPL ELPH-MCNC: 0.57 G/DL (ref 0.42–1.04)
ALPHA2 GLOB SERPL ELPH-MCNC: 8.7 % (ref 5.9–14.9)
ALT SERPL W P-5'-P-CCNC: 23 U/L (ref 7–52)
ANION GAP SERPL CALCULATED.3IONS-SCNC: 9 MMOL/L (ref 4–13)
AST SERPL W P-5'-P-CCNC: 27 U/L (ref 13–39)
BASOPHILS # BLD AUTO: 0.03 THOUSANDS/ΜL (ref 0–0.1)
BASOPHILS NFR BLD AUTO: 1 % (ref 0–1)
BETA GLOB ABNORMAL SERPL ELPH-MCNC: 0.36 G/DL (ref 0.31–0.57)
BETA1 GLOB SERPL ELPH-MCNC: 5.6 % (ref 4.7–7.7)
BETA2 GLOB SERPL ELPH-MCNC: 4.5 % (ref 3.1–7.9)
BETA2+GAMMA GLOB SERPL ELPH-MCNC: 0.29 G/DL (ref 0.2–0.58)
BILIRUB SERPL-MCNC: 0.81 MG/DL (ref 0.2–1)
BUN SERPL-MCNC: 26 MG/DL (ref 5–25)
CALCIUM SERPL-MCNC: 9.4 MG/DL (ref 8.4–10.2)
CHLORIDE SERPL-SCNC: 103 MMOL/L (ref 96–108)
CO2 SERPL-SCNC: 25 MMOL/L (ref 21–32)
CREAT SERPL-MCNC: 1.01 MG/DL (ref 0.6–1.3)
EOSINOPHIL # BLD AUTO: 0.12 THOUSAND/ΜL (ref 0–0.61)
EOSINOPHIL NFR BLD AUTO: 3 % (ref 0–6)
ERYTHROCYTE [DISTWIDTH] IN BLOOD BY AUTOMATED COUNT: 16.9 % (ref 11.6–15.1)
FERRITIN SERPL-MCNC: 448 NG/ML (ref 24–336)
FOLATE SERPL-MCNC: 11.9 NG/ML
GAMMA GLOB ABNORMAL SERPL ELPH-MCNC: 0.93 G/DL (ref 0.4–1.66)
GAMMA GLOB SERPL ELPH-MCNC: 14.3 % (ref 6.9–22.3)
GFR SERPL CREATININE-BSD FRML MDRD: 66 ML/MIN/1.73SQ M
GLUCOSE SERPL-MCNC: 93 MG/DL (ref 65–140)
HCT VFR BLD AUTO: 33.6 % (ref 36.5–49.3)
HGB BLD-MCNC: 10.2 G/DL (ref 12–17)
IGA SERPL-MCNC: 107 MG/DL (ref 66–433)
IGG SERPL-MCNC: 1074 MG/DL (ref 635–1741)
IGG/ALB SER: 1.68 {RATIO} (ref 1.1–1.8)
IGM SERPL-MCNC: 137 MG/DL (ref 45–281)
IMM GRANULOCYTES # BLD AUTO: 0.01 THOUSAND/UL (ref 0–0.2)
IMM GRANULOCYTES NFR BLD AUTO: 0 % (ref 0–2)
INTERPRETATION UR IFE-IMP: NORMAL
IRON SATN MFR SERPL: 25 % (ref 15–50)
IRON SERPL-MCNC: 85 UG/DL (ref 50–212)
LYMPHOCYTES # BLD AUTO: 1.13 THOUSANDS/ΜL (ref 0.6–4.47)
LYMPHOCYTES NFR BLD AUTO: 23 % (ref 14–44)
MCH RBC QN AUTO: 19.4 PG (ref 26.8–34.3)
MCHC RBC AUTO-ENTMCNC: 30.4 G/DL (ref 31.4–37.4)
MCV RBC AUTO: 64 FL (ref 82–98)
MONOCYTES # BLD AUTO: 0.59 THOUSAND/ΜL (ref 0.17–1.22)
MONOCYTES NFR BLD AUTO: 12 % (ref 4–12)
NEUTROPHILS # BLD AUTO: 2.94 THOUSANDS/ΜL (ref 1.85–7.62)
NEUTS SEG NFR BLD AUTO: 61 % (ref 43–75)
NRBC BLD AUTO-RTO: 0 /100 WBCS
PLATELET # BLD AUTO: 149 THOUSANDS/UL (ref 149–390)
PMV BLD AUTO: 10 FL (ref 8.9–12.7)
POTASSIUM SERPL-SCNC: 4 MMOL/L (ref 3.5–5.3)
PROT PATTERN SERPL ELPH-IMP: NORMAL
PROT SERPL-MCNC: 6.5 G/DL (ref 6.4–8.2)
PROT SERPL-MCNC: 6.6 G/DL (ref 6.4–8.4)
RBC # BLD AUTO: 5.27 MILLION/UL (ref 3.88–5.62)
SODIUM SERPL-SCNC: 137 MMOL/L (ref 135–147)
TIBC SERPL-MCNC: 337.4 UG/DL (ref 250–450)
TRANSFERRIN SERPL-MCNC: 241 MG/DL (ref 203–362)
UIBC SERPL-MCNC: 252 UG/DL (ref 155–355)
VIT B12 SERPL-MCNC: 330 PG/ML (ref 180–914)
WBC # BLD AUTO: 4.82 THOUSAND/UL (ref 4.31–10.16)

## 2025-01-22 PROCEDURE — 86334 IMMUNOFIX E-PHORESIS SERUM: CPT | Performed by: STUDENT IN AN ORGANIZED HEALTH CARE EDUCATION/TRAINING PROGRAM

## 2025-01-22 PROCEDURE — 84165 PROTEIN E-PHORESIS SERUM: CPT | Performed by: STUDENT IN AN ORGANIZED HEALTH CARE EDUCATION/TRAINING PROGRAM

## 2025-01-22 NOTE — TELEPHONE ENCOUNTER
Spoke with patient's daughter, informed her that her dad is not iron deficient.  Patients with thalassemia should not be on oral iron supplements as they absorb iron quicker.  Recommend he stop taking his oral iron supplements as he is not iron deficient.  He is hypochromic microcytic anemia is from thalassemia.  This is his baseline.  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  We will repeat an SPEP, FLC and immunoglobulins in 3 months.  Advised patient to get his blood work a week before the appointment.  Patient's vitamin B12 and folic acid are on the low normal therefore recommend he start a multivitamin daily.  Patient's daughter voiced understanding and has no additional questions at this time.

## 2025-01-23 LAB
KAPPA LC FREE SER-MCNC: 24.9 MG/L (ref 3.3–19.4)
KAPPA LC FREE/LAMBDA FREE SER: 1.35 {RATIO} (ref 0.26–1.65)
LAMBDA LC FREE SERPL-MCNC: 18.5 MG/L (ref 5.7–26.3)

## 2025-03-24 ENCOUNTER — APPOINTMENT (OUTPATIENT)
Dept: LAB | Facility: MEDICAL CENTER | Age: 89
End: 2025-03-24
Payer: MEDICARE

## 2025-03-24 DIAGNOSIS — E53.8 VITAMIN B12 DEFICIENCY: ICD-10-CM

## 2025-03-24 DIAGNOSIS — D53.8 OTHER SPECIFIED NUTRITIONAL ANEMIAS: ICD-10-CM

## 2025-03-24 DIAGNOSIS — I50.32 CHRONIC DIASTOLIC HEART FAILURE (HCC): ICD-10-CM

## 2025-03-24 DIAGNOSIS — E53.8 FOLATE DEFICIENCY: ICD-10-CM

## 2025-03-24 DIAGNOSIS — R71.8 LOW MEAN CORPUSCULAR VOLUME (MCV): ICD-10-CM

## 2025-03-24 DIAGNOSIS — N18.9 CHRONIC KIDNEY DISEASE, UNSPECIFIED CKD STAGE: ICD-10-CM

## 2025-03-24 DIAGNOSIS — D64.9 ANEMIA, UNSPECIFIED TYPE: ICD-10-CM

## 2025-03-24 LAB
ALBUMIN SERPL BCG-MCNC: 4.2 G/DL (ref 3.5–5)
ALP SERPL-CCNC: 96 U/L (ref 34–104)
ALT SERPL W P-5'-P-CCNC: 25 U/L (ref 7–52)
ANION GAP SERPL CALCULATED.3IONS-SCNC: 7 MMOL/L (ref 4–13)
AST SERPL W P-5'-P-CCNC: 25 U/L (ref 13–39)
BASOPHILS # BLD AUTO: 0.03 THOUSANDS/ÂΜL (ref 0–0.1)
BASOPHILS NFR BLD AUTO: 1 % (ref 0–1)
BILIRUB SERPL-MCNC: 0.73 MG/DL (ref 0.2–1)
BUN SERPL-MCNC: 20 MG/DL (ref 5–25)
CALCIUM SERPL-MCNC: 9.1 MG/DL (ref 8.4–10.2)
CHLORIDE SERPL-SCNC: 107 MMOL/L (ref 96–108)
CO2 SERPL-SCNC: 29 MMOL/L (ref 21–32)
CREAT SERPL-MCNC: 0.87 MG/DL (ref 0.6–1.3)
CREAT UR-MCNC: 207.3 MG/DL
EOSINOPHIL # BLD AUTO: 0.3 THOUSAND/ÂΜL (ref 0–0.61)
EOSINOPHIL NFR BLD AUTO: 6 % (ref 0–6)
ERYTHROCYTE [DISTWIDTH] IN BLOOD BY AUTOMATED COUNT: 19.2 % (ref 11.6–15.1)
FERRITIN SERPL-MCNC: 462 NG/ML (ref 24–336)
FOLATE SERPL-MCNC: 19.6 NG/ML
GFR SERPL CREATININE-BSD FRML MDRD: 77 ML/MIN/1.73SQ M
GLUCOSE SERPL-MCNC: 99 MG/DL (ref 65–140)
HCT VFR BLD AUTO: 37.4 % (ref 36.5–49.3)
HGB BLD-MCNC: 11.2 G/DL (ref 12–17)
IGA SERPL-MCNC: 104 MG/DL (ref 66–433)
IGG SERPL-MCNC: 1043 MG/DL (ref 635–1741)
IGM SERPL-MCNC: 140 MG/DL (ref 45–281)
IMM GRANULOCYTES # BLD AUTO: 0.02 THOUSAND/UL (ref 0–0.2)
IMM GRANULOCYTES NFR BLD AUTO: 0 % (ref 0–2)
IRON SATN MFR SERPL: 36 % (ref 15–50)
IRON SERPL-MCNC: 112 UG/DL (ref 50–212)
LYMPHOCYTES # BLD AUTO: 1.32 THOUSANDS/ÂΜL (ref 0.6–4.47)
LYMPHOCYTES NFR BLD AUTO: 28 % (ref 14–44)
MCH RBC QN AUTO: 19.6 PG (ref 26.8–34.3)
MCHC RBC AUTO-ENTMCNC: 29.9 G/DL (ref 31.4–37.4)
MCV RBC AUTO: 65 FL (ref 82–98)
MICROALBUMIN UR-MCNC: 16.6 MG/L
MICROALBUMIN/CREAT 24H UR: 8 MG/G CREATININE (ref 0–30)
MONOCYTES # BLD AUTO: 0.5 THOUSAND/ÂΜL (ref 0.17–1.22)
MONOCYTES NFR BLD AUTO: 11 % (ref 4–12)
NEUTROPHILS # BLD AUTO: 2.49 THOUSANDS/ÂΜL (ref 1.85–7.62)
NEUTS SEG NFR BLD AUTO: 54 % (ref 43–75)
NRBC BLD AUTO-RTO: 0 /100 WBCS
PLATELET # BLD AUTO: 187 THOUSANDS/UL (ref 149–390)
PMV BLD AUTO: 9.7 FL (ref 8.9–12.7)
POTASSIUM SERPL-SCNC: 4.1 MMOL/L (ref 3.5–5.3)
PROT SERPL-MCNC: 6.5 G/DL (ref 6.4–8.4)
RBC # BLD AUTO: 5.72 MILLION/UL (ref 3.88–5.62)
SODIUM SERPL-SCNC: 143 MMOL/L (ref 135–147)
TIBC SERPL-MCNC: 315 UG/DL (ref 250–450)
TRANSFERRIN SERPL-MCNC: 225 MG/DL (ref 203–362)
UIBC SERPL-MCNC: 203 UG/DL (ref 155–355)
VIT B12 SERPL-MCNC: 284 PG/ML (ref 180–914)
WBC # BLD AUTO: 4.66 THOUSAND/UL (ref 4.31–10.16)

## 2025-03-24 PROCEDURE — 80053 COMPREHEN METABOLIC PANEL: CPT

## 2025-03-24 PROCEDURE — 84165 PROTEIN E-PHORESIS SERUM: CPT

## 2025-03-24 PROCEDURE — 82728 ASSAY OF FERRITIN: CPT

## 2025-03-24 PROCEDURE — 83521 IG LIGHT CHAINS FREE EACH: CPT

## 2025-03-24 PROCEDURE — 82784 ASSAY IGA/IGD/IGG/IGM EACH: CPT | Performed by: INTERNAL MEDICINE

## 2025-03-24 PROCEDURE — 82043 UR ALBUMIN QUANTITATIVE: CPT

## 2025-03-24 PROCEDURE — 82570 ASSAY OF URINE CREATININE: CPT

## 2025-03-24 PROCEDURE — 86334 IMMUNOFIX E-PHORESIS SERUM: CPT

## 2025-03-24 PROCEDURE — 85025 COMPLETE CBC W/AUTO DIFF WBC: CPT

## 2025-03-24 PROCEDURE — 82607 VITAMIN B-12: CPT

## 2025-03-24 PROCEDURE — 82746 ASSAY OF FOLIC ACID SERUM: CPT

## 2025-03-24 PROCEDURE — 83540 ASSAY OF IRON: CPT

## 2025-03-24 PROCEDURE — 36415 COLL VENOUS BLD VENIPUNCTURE: CPT

## 2025-03-24 PROCEDURE — 83550 IRON BINDING TEST: CPT

## 2025-03-24 PROCEDURE — 82784 ASSAY IGA/IGD/IGG/IGM EACH: CPT

## 2025-03-25 ENCOUNTER — OFFICE VISIT (OUTPATIENT)
Dept: NEPHROLOGY | Facility: CLINIC | Age: 89
End: 2025-03-25
Payer: MEDICARE

## 2025-03-25 VITALS
BODY MASS INDEX: 27.32 KG/M2 | DIASTOLIC BLOOD PRESSURE: 60 MMHG | WEIGHT: 164 LBS | HEIGHT: 65 IN | SYSTOLIC BLOOD PRESSURE: 122 MMHG

## 2025-03-25 DIAGNOSIS — N18.2 BENIGN HYPERTENSION WITH CKD (CHRONIC KIDNEY DISEASE), STAGE II: ICD-10-CM

## 2025-03-25 DIAGNOSIS — I50.32 CHRONIC DIASTOLIC HEART FAILURE (HCC): Primary | ICD-10-CM

## 2025-03-25 DIAGNOSIS — I12.9 BENIGN HYPERTENSION WITH CKD (CHRONIC KIDNEY DISEASE), STAGE II: ICD-10-CM

## 2025-03-25 PROBLEM — N18.30 BENIGN HYPERTENSION WITH CKD (CHRONIC KIDNEY DISEASE) STAGE III (HCC): Status: RESOLVED | Noted: 2025-03-25 | Resolved: 2025-03-25

## 2025-03-25 PROBLEM — N18.30 BENIGN HYPERTENSION WITH CKD (CHRONIC KIDNEY DISEASE) STAGE III (HCC): Status: ACTIVE | Noted: 2025-03-25

## 2025-03-25 LAB
ALBUMIN SERPL ELPH-MCNC: 3.87 G/DL (ref 3.2–5.1)
ALBUMIN SERPL ELPH-MCNC: 61.4 % (ref 48–70)
ALPHA1 GLOB SERPL ELPH-MCNC: 0.33 G/DL (ref 0.15–0.47)
ALPHA1 GLOB SERPL ELPH-MCNC: 5.2 % (ref 1.8–7)
ALPHA2 GLOB SERPL ELPH-MCNC: 0.54 G/DL (ref 0.42–1.04)
ALPHA2 GLOB SERPL ELPH-MCNC: 8.6 % (ref 5.9–14.9)
BETA GLOB ABNORMAL SERPL ELPH-MCNC: 0.35 G/DL (ref 0.31–0.57)
BETA1 GLOB SERPL ELPH-MCNC: 5.5 % (ref 4.7–7.7)
BETA2 GLOB SERPL ELPH-MCNC: 4.8 % (ref 3.1–7.9)
BETA2+GAMMA GLOB SERPL ELPH-MCNC: 0.3 G/DL (ref 0.2–0.58)
GAMMA GLOB ABNORMAL SERPL ELPH-MCNC: 0.91 G/DL (ref 0.4–1.66)
GAMMA GLOB SERPL ELPH-MCNC: 14.5 % (ref 6.9–22.3)
IGG/ALB SER: 1.59 {RATIO} (ref 1.1–1.8)
INTERPRETATION UR IFE-IMP: NORMAL
KAPPA LC FREE SER-MCNC: 24.3 MG/L (ref 3.3–19.4)
KAPPA LC FREE/LAMBDA FREE SER: 1.43 {RATIO} (ref 0.26–1.65)
LAMBDA LC FREE SERPL-MCNC: 17 MG/L (ref 5.7–26.3)
PROT PATTERN SERPL ELPH-IMP: ABNORMAL
PROT SERPL-MCNC: 6.3 G/DL (ref 6.4–8.2)

## 2025-03-25 PROCEDURE — 99213 OFFICE O/P EST LOW 20 MIN: CPT | Performed by: INTERNAL MEDICINE

## 2025-03-25 PROCEDURE — 86334 IMMUNOFIX E-PHORESIS SERUM: CPT | Performed by: PATHOLOGY

## 2025-03-25 PROCEDURE — 84165 PROTEIN E-PHORESIS SERUM: CPT | Performed by: PATHOLOGY

## 2025-03-25 RX ORDER — MULTIVITAMIN
1 TABLET ORAL DAILY
COMMUNITY

## 2025-03-25 NOTE — PROGRESS NOTES
Name: Matias Rodriguez      : 1935      MRN: [id number]  Encounter Provider: Sue Greene MD  Encounter Date: 3/25/2025   Encounter department: [facility] [place]  :  Assessment & Plan  Chronic diastolic heart failure (HCC)  Wt Readings from Last 3 Encounters:   25 74.4 kg (164 lb)   25 73.9 kg (163 lb)   24 73.5 kg (162 lb)   -- Currently compensated at this time.  -- Continue low-salt diet and fluid restriction  --Taking torsemide 20 mg as needed but has not required it recently.  Examines euvolemic.  -- Check daily weights    Orders:    Albumin / creatinine urine ratio; Future    Comprehensive metabolic panel; Future    CBC and Platelet; Future    Vitamin D 25 hydroxy; Future    PTH, intact; Future    Phosphorus; Future    Benign hypertension with CKD (chronic kidney disease), stage II  Lab Results   Component Value Date    EGFR 77 2025    EGFR 66 2025    EGFR 67 2024    CREATININE 0.87 2025    CREATININE 1.01 2025    CREATININE 1.00 2024     -- Baseline creatinine low to mid 1  -- Stage II/IIIA  --Renal function stable with a creatinine of 0.8 mg/dL and GFR greater than 60 mL/min.  -- Presumed be secondary to age-related nephron loss, hypertension hypertension nephrosclerosis, cardiorenal syndrome  -- Renal function has improved since her last visit  -- Continue torsemide as needed for volume  -- Renal ultrasound showed symmetrically sized kidneys measuring about 9.9 and 9.2 cm.  Both kidneys are diffusely lobulated which is a normal variant.  Benign cyst on the right kidney noted.  No masses no lesions  -- Blood pressure well-controlled on no medications.  Examines euvolemic.  -- Continue current management follow-up in 1 year.    Hypertension  -- Under excellent control not on any medications can monitor.    Anemia  -- Hemoglobin improving up to 11.2  -- Following with hematology  -- SPEP and free light chain assay ratio is  pending  -- B12 and folate are normal  -- Iron stores are adequate  -- History of beta thalassemia minor    Orders:    Albumin / creatinine urine ratio; Future    Comprehensive metabolic panel; Future    CBC and Platelet; Future    Vitamin D 25 hydroxy; Future    PTH, intact; Future    Phosphorus; Future        Patient Instructions   1.)  Low 2 g sodium diet    2.)  Monitor weights at home    3.)  Avoid NSAIDs (ibuprofen, Motrin, Advil, Aleve, naproxen)    4.)  Monitor blood pressure at home, call if blood pressure greater than 150/90 persistently    5.) I will plan to discuss all results including blood work, and/or imaging at our next visit, unless there is an urgent indication, in which case I will call you earlier. If you have any questions or concerns about your results, please feel free to call our office.          It was a pleasure evaluating your patient in the office today. Thank you for allowing our team to participate in the care of  Matias Rodriguez. Please do not hesitate to contact our team if further issues/questions shall arise in the interim.     History of Present Illness   HPI  Matias Rodriguez is a 89 y.o. male who presents follow-up of chronic kidney disease stage II and hypertension.    Since her last visit no ER visits or hospitalizations.    He currently feels well and has no new complaints.    He had blood work done yesterday March 24.  Renal function stable with a creatinine of 0.8 mg/dL GFR grain 60 mL/min.  The albumin/creatinine ratio was normal.  He has a very low risk of kidney failure based on the kidney failure risk equation for 2 and 5 years.  His electrolytes are all normal hemoglobin has improved to 11.2.    History obtained from: patient and patient's child  Pertinent Medical History         Review of Systems   Constitutional:  Negative for activity change and fever.   Respiratory:  Negative for cough, chest tightness, shortness of breath and wheezing.    Cardiovascular:   Negative for chest pain and leg swelling.   Gastrointestinal:  Negative for abdominal pain, diarrhea, nausea and vomiting.   Endocrine: Negative for polyuria.   Genitourinary:  Negative for difficulty urinating, dysuria, flank pain, frequency and urgency.   Skin:  Negative for rash.   Neurological:  Negative for dizziness, syncope, light-headedness and headaches.   All other systems reviewed and are negative.    Medical History Reviewed by provider this encounter:  Problems     .  Current Outpatient Medications on File Prior to Visit   Medication Sig Dispense Refill    aspirin 81 mg chewable tablet Chew 81 mg daily      Multiple Vitamin (multivitamin) tablet Take 1 tablet by mouth daily      tamsulosin (FLOMAX) 0.4 mg Take 0.4 mg by mouth daily with dinner      Ferrous Sulfate (IRON PO) Take by mouth OTC (Patient not taking: Reported on 3/25/2025)      gabapentin (NEURONTIN) 100 mg capsule Take 1 capsule (100 mg total) by mouth in the morning and 1 capsule (100 mg total) in the evening and 1 capsule (100 mg total) before bedtime. (Patient not taking: Reported on 9/26/2024) 1 capsule 0    torsemide (DEMADEX) 20 mg tablet Take 20 mg by mouth as needed (for weight gain > 3lbs/day or wt > 165 lbs) (Patient not taking: Reported on 3/25/2025)       No current facility-administered medications on file prior to visit.         Current Outpatient Medications on File Prior to Visit   Medication Sig Dispense Refill    aspirin 81 mg chewable tablet Chew 81 mg daily      Multiple Vitamin (multivitamin) tablet Take 1 tablet by mouth daily      tamsulosin (FLOMAX) 0.4 mg Take 0.4 mg by mouth daily with dinner      Ferrous Sulfate (IRON PO) Take by mouth OTC (Patient not taking: Reported on 3/25/2025)      gabapentin (NEURONTIN) 100 mg capsule Take 1 capsule (100 mg total) by mouth in the morning and 1 capsule (100 mg total) in the evening and 1 capsule (100 mg total) before bedtime. (Patient not taking: Reported on 9/26/2024) 1  "capsule 0    torsemide (DEMADEX) 20 mg tablet Take 20 mg by mouth as needed (for weight gain > 3lbs/day or wt > 165 lbs) (Patient not taking: Reported on 3/25/2025)       No current facility-administered medications on file prior to visit.     Objective   /60   Ht 5' 5\" (1.651 m)   Wt 74.4 kg (164 lb)   BMI 27.29 kg/m²      Physical Exam  Vitals and nursing note reviewed. Exam conducted with a chaperone present.   Constitutional:       General: He is not in acute distress.     Appearance: He is well-developed. He is not diaphoretic.   HENT:      Head: Normocephalic and atraumatic.   Eyes:      General: No scleral icterus.     Pupils: Pupils are equal, round, and reactive to light.   Cardiovascular:      Rate and Rhythm: Normal rate and regular rhythm.      Heart sounds: Normal heart sounds. No murmur heard.     No friction rub. No gallop.   Pulmonary:      Effort: Pulmonary effort is normal. No respiratory distress.      Breath sounds: Normal breath sounds. No wheezing or rales.   Chest:      Chest wall: No tenderness.   Abdominal:      General: Bowel sounds are normal. There is no distension.      Palpations: Abdomen is soft.      Tenderness: There is no abdominal tenderness. There is no rebound.   Musculoskeletal:         General: Normal range of motion.      Cervical back: Normal range of motion and neck supple.   Skin:     Findings: No rash.   Neurological:      Mental Status: He is alert and oriented to person, place, and time.           Laboratory Results:  Results from last 7 days   Lab Units 03/24/25  1021   WBC Thousand/uL 4.66   HEMOGLOBIN g/dL 11.2*   HEMATOCRIT % 37.4   PLATELETS Thousands/uL 187   POTASSIUM mmol/L 4.1   CHLORIDE mmol/L 107   CO2 mmol/L 29   BUN mg/dL 20   CREATININE mg/dL 0.87   CALCIUM mg/dL 9.1       Results for orders placed or performed in visit on 03/24/25   Albumin / creatinine urine ratio   Result Value Ref Range    Creatinine, Ur 207.3 Reference range not established. " mg/dL    Albumin,U,Random 16.6 <20.0 mg/L    Albumin Creat Ratio 8 0 - 30 mg/g creatinine   Comprehensive metabolic panel   Result Value Ref Range    Sodium 143 135 - 147 mmol/L    Potassium 4.1 3.5 - 5.3 mmol/L    Chloride 107 96 - 108 mmol/L    CO2 29 21 - 32 mmol/L    ANION GAP 7 4 - 13 mmol/L    BUN 20 5 - 25 mg/dL    Creatinine 0.87 0.60 - 1.30 mg/dL    Glucose 99 65 - 140 mg/dL    Calcium 9.1 8.4 - 10.2 mg/dL    AST 25 13 - 39 U/L    ALT 25 7 - 52 U/L    Alkaline Phosphatase 96 34 - 104 U/L    Total Protein 6.5 6.4 - 8.4 g/dL    Albumin 4.2 3.5 - 5.0 g/dL    Total Bilirubin 0.73 0.20 - 1.00 mg/dL    eGFR 77 ml/min/1.73sq m   CBC and differential   Result Value Ref Range    WBC 4.66 4.31 - 10.16 Thousand/uL    RBC 5.72 (H) 3.88 - 5.62 Million/uL    Hemoglobin 11.2 (L) 12.0 - 17.0 g/dL    Hematocrit 37.4 36.5 - 49.3 %    MCV 65 (L) 82 - 98 fL    MCH 19.6 (L) 26.8 - 34.3 pg    MCHC 29.9 (L) 31.4 - 37.4 g/dL    RDW 19.2 (H) 11.6 - 15.1 %    MPV 9.7 8.9 - 12.7 fL    Platelets 187 149 - 390 Thousands/uL    nRBC 0 /100 WBCs    Segmented % 54 43 - 75 %    Immature Grans % 0 0 - 2 %    Lymphocytes % 28 14 - 44 %    Monocytes % 11 4 - 12 %    Eosinophils Relative 6 0 - 6 %    Basophils Relative 1 0 - 1 %    Absolute Neutrophils 2.49 1.85 - 7.62 Thousands/µL    Absolute Immature Grans 0.02 0.00 - 0.20 Thousand/uL    Absolute Lymphocytes 1.32 0.60 - 4.47 Thousands/µL    Absolute Monocytes 0.50 0.17 - 1.22 Thousand/µL    Eosinophils Absolute 0.30 0.00 - 0.61 Thousand/µL    Basophils Absolute 0.03 0.00 - 0.10 Thousands/µL   Vitamin B12   Result Value Ref Range    Vitamin B-12 284 180 - 914 pg/mL   Folate   Result Value Ref Range    Folate 19.6 >5.9 ng/mL   IgG, IgA, IgM   Result Value Ref Range     66 - 433 mg/dL    IGG 1,043 635 - 1,741 mg/dL     45 - 281 mg/dL   TIBC Panel (incl. Iron, TIBC, % Iron Saturation)   Result Value Ref Range    Iron Saturation 36 15 - 50 %    TIBC 315 250 - 450 ug/dL    Iron 112 50  - 212 ug/dL    Transferrin 225 203 - 362 mg/dL    UIBC 203 155 - 355 ug/dL   Ferritin   Result Value Ref Range    Ferritin 462 (H) 24 - 336 ng/mL       Administrative Statements   I have spent a total time of 20 minutes in caring for this patient on the day of the visit/encounter including Impressions, Counseling / Coordination of care, Documenting in the medical record, Reviewing/placing orders in the medical record (including tests, medications, and/or procedures), and Obtaining or reviewing history  .

## 2025-03-25 NOTE — ASSESSMENT & PLAN NOTE
Lab Results   Component Value Date    EGFR 77 03/24/2025    EGFR 66 01/21/2025    EGFR 67 09/20/2024    CREATININE 0.87 03/24/2025    CREATININE 1.01 01/21/2025    CREATININE 1.00 09/20/2024     -- Baseline creatinine low to mid 1  -- Stage II/IIIA  --Renal function stable with a creatinine of 0.8 mg/dL and GFR greater than 60 mL/min.  -- Presumed be secondary to age-related nephron loss, hypertension hypertension nephrosclerosis, cardiorenal syndrome  -- Renal function has improved since her last visit  -- Continue torsemide as needed for volume  -- Renal ultrasound showed symmetrically sized kidneys measuring about 9.9 and 9.2 cm.  Both kidneys are diffusely lobulated which is a normal variant.  Benign cyst on the right kidney noted.  No masses no lesions  -- Blood pressure well-controlled on no medications.  Examines euvolemic.  -- Continue current management follow-up in 1 year.    Hypertension  -- Under excellent control not on any medications can monitor.    Anemia  -- Hemoglobin improving up to 11.2  -- Following with hematology  -- SPEP and free light chain assay ratio is pending  -- B12 and folate are normal  -- Iron stores are adequate  -- History of beta thalassemia minor    Orders:    Albumin / creatinine urine ratio; Future    Comprehensive metabolic panel; Future    CBC and Platelet; Future    Vitamin D 25 hydroxy; Future    PTH, intact; Future    Phosphorus; Future

## 2025-03-25 NOTE — ASSESSMENT & PLAN NOTE
Wt Readings from Last 3 Encounters:   03/25/25 74.4 kg (164 lb)   01/21/25 73.9 kg (163 lb)   11/14/24 73.5 kg (162 lb)   -- Currently compensated at this time.  -- Continue low-salt diet and fluid restriction  --Taking torsemide 20 mg as needed but has not required it recently.  Examines euvolemic.  -- Check daily weights    Orders:    Albumin / creatinine urine ratio; Future    Comprehensive metabolic panel; Future    CBC and Platelet; Future    Vitamin D 25 hydroxy; Future    PTH, intact; Future    Phosphorus; Future

## 2025-03-28 LAB — MISCELLANEOUS LAB TEST RESULT: NORMAL

## 2025-04-03 ENCOUNTER — TELEPHONE (OUTPATIENT)
Age: 89
End: 2025-04-03

## 2025-04-03 DIAGNOSIS — D64.9 ANEMIA, UNSPECIFIED TYPE: Primary | ICD-10-CM

## 2025-04-03 DIAGNOSIS — N18.9 CHRONIC KIDNEY DISEASE, UNSPECIFIED CKD STAGE: ICD-10-CM

## 2025-04-03 DIAGNOSIS — D53.9 NUTRITIONAL ANEMIA: ICD-10-CM

## 2025-04-03 DIAGNOSIS — E53.8 FOLATE DEFICIENCY: ICD-10-CM

## 2025-04-03 DIAGNOSIS — D56.3 BETA THALASSEMIA MINOR: ICD-10-CM

## 2025-04-03 DIAGNOSIS — E53.8 VITAMIN B12 DEFICIENCY: ICD-10-CM

## 2025-04-03 NOTE — TELEPHONE ENCOUNTER
Patient called requesting blood orders for his upcoming visit, as he has in his notes to her them done on 4/16. I reviewed last note from provider and ordered labs that were indicated for this visit.

## 2025-04-08 ENCOUNTER — RESULTS FOLLOW-UP (OUTPATIENT)
Dept: CARDIOLOGY CLINIC | Facility: CLINIC | Age: 89
End: 2025-04-08

## 2025-04-08 ENCOUNTER — REMOTE DEVICE CLINIC VISIT (OUTPATIENT)
Dept: CARDIOLOGY CLINIC | Facility: CLINIC | Age: 89
End: 2025-04-08
Payer: MEDICARE

## 2025-04-08 DIAGNOSIS — Z95.0 CARDIAC PACEMAKER IN SITU: Primary | ICD-10-CM

## 2025-04-08 PROCEDURE — 93296 REM INTERROG EVL PM/IDS: CPT | Performed by: INTERNAL MEDICINE

## 2025-04-08 PROCEDURE — 93294 REM INTERROG EVL PM/LDLS PM: CPT | Performed by: INTERNAL MEDICINE

## 2025-04-08 NOTE — PROGRESS NOTES
"Results for orders placed or performed in visit on 04/08/25   Cardiac EP device report    Narrative    MDT DC PM(VVIR 70)/ACTIVE SYSTEM IS MRI CONDITIONAL  CARELINK TRANSMISSION: PRESENTING EGRAM @ 78 BPM. BATTERY STATUS \"4 YRS.\"  100%. ALL AVAILABLE LEAD PARAMETERS WITHIN NORMAL LIMITS. KNOWN RA LEAD FX. 3 VHRS NOTED; NSVT VS RVR CAN'T BE EXCLUDED. EF 50% (ECHO 2023). WATCHMAN. NO BBLOCKER NOTED. NORMAL DEVICE FUNCTION. NC         "

## 2025-04-16 ENCOUNTER — APPOINTMENT (OUTPATIENT)
Dept: LAB | Facility: MEDICAL CENTER | Age: 89
End: 2025-04-16
Payer: MEDICARE

## 2025-04-16 DIAGNOSIS — D53.9 NUTRITIONAL ANEMIA: ICD-10-CM

## 2025-04-16 DIAGNOSIS — D56.3 BETA THALASSEMIA MINOR: ICD-10-CM

## 2025-04-16 DIAGNOSIS — N18.9 CHRONIC KIDNEY DISEASE, UNSPECIFIED CKD STAGE: ICD-10-CM

## 2025-04-16 DIAGNOSIS — E53.8 VITAMIN B12 DEFICIENCY: ICD-10-CM

## 2025-04-16 DIAGNOSIS — E53.8 FOLATE DEFICIENCY: ICD-10-CM

## 2025-04-16 DIAGNOSIS — D64.9 ANEMIA, UNSPECIFIED TYPE: ICD-10-CM

## 2025-04-16 LAB
ALBUMIN SERPL BCG-MCNC: 4.4 G/DL (ref 3.5–5)
ALP SERPL-CCNC: 92 U/L (ref 34–104)
ALT SERPL W P-5'-P-CCNC: 27 U/L (ref 7–52)
ANION GAP SERPL CALCULATED.3IONS-SCNC: 8 MMOL/L (ref 4–13)
AST SERPL W P-5'-P-CCNC: 32 U/L (ref 13–39)
BASOPHILS # BLD AUTO: 0.02 THOUSANDS/ÂΜL (ref 0–0.1)
BASOPHILS NFR BLD AUTO: 0 % (ref 0–1)
BILIRUB SERPL-MCNC: 0.72 MG/DL (ref 0.2–1)
BUN SERPL-MCNC: 27 MG/DL (ref 5–25)
CALCIUM SERPL-MCNC: 9.6 MG/DL (ref 8.4–10.2)
CHLORIDE SERPL-SCNC: 105 MMOL/L (ref 96–108)
CO2 SERPL-SCNC: 27 MMOL/L (ref 21–32)
CREAT SERPL-MCNC: 0.84 MG/DL (ref 0.6–1.3)
EOSINOPHIL # BLD AUTO: 0.18 THOUSAND/ÂΜL (ref 0–0.61)
EOSINOPHIL NFR BLD AUTO: 4 % (ref 0–6)
ERYTHROCYTE [DISTWIDTH] IN BLOOD BY AUTOMATED COUNT: 18.9 % (ref 11.6–15.1)
FERRITIN SERPL-MCNC: 376 NG/ML (ref 30–336)
FOLATE SERPL-MCNC: 22.1 NG/ML
GFR SERPL CREATININE-BSD FRML MDRD: 78 ML/MIN/1.73SQ M
GLUCOSE P FAST SERPL-MCNC: 88 MG/DL (ref 65–99)
HCT VFR BLD AUTO: 35.9 % (ref 36.5–49.3)
HGB BLD-MCNC: 11.4 G/DL (ref 12–17)
IGA SERPL-MCNC: 114 MG/DL (ref 66–433)
IGG SERPL-MCNC: 1092 MG/DL (ref 635–1741)
IGM SERPL-MCNC: 153 MG/DL (ref 45–281)
IMM GRANULOCYTES # BLD AUTO: 0.02 THOUSAND/UL (ref 0–0.2)
IMM GRANULOCYTES NFR BLD AUTO: 0 % (ref 0–2)
IRON SATN MFR SERPL: 32 % (ref 15–50)
IRON SERPL-MCNC: 109 UG/DL (ref 50–212)
LYMPHOCYTES # BLD AUTO: 1.63 THOUSANDS/ÂΜL (ref 0.6–4.47)
LYMPHOCYTES NFR BLD AUTO: 33 % (ref 14–44)
MCH RBC QN AUTO: 20.5 PG (ref 26.8–34.3)
MCHC RBC AUTO-ENTMCNC: 31.8 G/DL (ref 31.4–37.4)
MCV RBC AUTO: 65 FL (ref 82–98)
MONOCYTES # BLD AUTO: 0.58 THOUSAND/ÂΜL (ref 0.17–1.22)
MONOCYTES NFR BLD AUTO: 12 % (ref 4–12)
NEUTROPHILS # BLD AUTO: 2.55 THOUSANDS/ÂΜL (ref 1.85–7.62)
NEUTS SEG NFR BLD AUTO: 51 % (ref 43–75)
NRBC BLD AUTO-RTO: 0 /100 WBCS
PLATELET # BLD AUTO: 168 THOUSANDS/UL (ref 149–390)
PMV BLD AUTO: 9.5 FL (ref 8.9–12.7)
POTASSIUM SERPL-SCNC: 4.8 MMOL/L (ref 3.5–5.3)
PROT SERPL-MCNC: 6.9 G/DL (ref 6.4–8.4)
RBC # BLD AUTO: 5.57 MILLION/UL (ref 3.88–5.62)
SODIUM SERPL-SCNC: 140 MMOL/L (ref 135–147)
TIBC SERPL-MCNC: 338.8 UG/DL (ref 250–450)
TRANSFERRIN SERPL-MCNC: 242 MG/DL (ref 203–362)
UIBC SERPL-MCNC: 230 UG/DL (ref 155–355)
VIT B12 SERPL-MCNC: 308 PG/ML (ref 180–914)
WBC # BLD AUTO: 4.98 THOUSAND/UL (ref 4.31–10.16)

## 2025-04-16 PROCEDURE — 83550 IRON BINDING TEST: CPT

## 2025-04-16 PROCEDURE — 82784 ASSAY IGA/IGD/IGG/IGM EACH: CPT

## 2025-04-16 PROCEDURE — 83521 IG LIGHT CHAINS FREE EACH: CPT

## 2025-04-16 PROCEDURE — 85025 COMPLETE CBC W/AUTO DIFF WBC: CPT

## 2025-04-16 PROCEDURE — 80053 COMPREHEN METABOLIC PANEL: CPT

## 2025-04-16 PROCEDURE — 86334 IMMUNOFIX E-PHORESIS SERUM: CPT

## 2025-04-16 PROCEDURE — 82746 ASSAY OF FOLIC ACID SERUM: CPT

## 2025-04-16 PROCEDURE — 82728 ASSAY OF FERRITIN: CPT

## 2025-04-16 PROCEDURE — 83540 ASSAY OF IRON: CPT

## 2025-04-16 PROCEDURE — 84165 PROTEIN E-PHORESIS SERUM: CPT

## 2025-04-16 PROCEDURE — 36415 COLL VENOUS BLD VENIPUNCTURE: CPT

## 2025-04-16 PROCEDURE — 82607 VITAMIN B-12: CPT

## 2025-04-17 LAB
ALBUMIN SERPL ELPH-MCNC: 4.15 G/DL (ref 3.2–5.1)
ALBUMIN SERPL ELPH-MCNC: 62.9 % (ref 48–70)
ALPHA1 GLOB SERPL ELPH-MCNC: 0.24 G/DL (ref 0.15–0.47)
ALPHA1 GLOB SERPL ELPH-MCNC: 3.6 % (ref 1.8–7)
ALPHA2 GLOB SERPL ELPH-MCNC: 0.57 G/DL (ref 0.42–1.04)
ALPHA2 GLOB SERPL ELPH-MCNC: 8.6 % (ref 5.9–14.9)
BETA GLOB ABNORMAL SERPL ELPH-MCNC: 0.35 G/DL (ref 0.31–0.57)
BETA1 GLOB SERPL ELPH-MCNC: 5.3 % (ref 4.7–7.7)
BETA2 GLOB SERPL ELPH-MCNC: 4.7 % (ref 3.1–7.9)
BETA2+GAMMA GLOB SERPL ELPH-MCNC: 0.31 G/DL (ref 0.2–0.58)
GAMMA GLOB ABNORMAL SERPL ELPH-MCNC: 0.98 G/DL (ref 0.4–1.66)
GAMMA GLOB SERPL ELPH-MCNC: 14.9 % (ref 6.9–22.3)
IGG/ALB SER: 1.7 {RATIO} (ref 1.1–1.8)
KAPPA LC FREE SER-MCNC: 26.3 MG/L (ref 3.3–19.4)
KAPPA LC FREE/LAMBDA FREE SER: 1.75 {RATIO} (ref 0.26–1.65)
LAMBDA LC FREE SERPL-MCNC: 15 MG/L (ref 5.7–26.3)
PROT SERPL-MCNC: 6.6 G/DL (ref 6.4–8.4)

## 2025-04-17 PROCEDURE — 84165 PROTEIN E-PHORESIS SERUM: CPT | Performed by: PATHOLOGY

## 2025-04-17 PROCEDURE — 86334 IMMUNOFIX E-PHORESIS SERUM: CPT | Performed by: PATHOLOGY

## 2025-04-24 ENCOUNTER — OFFICE VISIT (OUTPATIENT)
Dept: HEMATOLOGY ONCOLOGY | Facility: CLINIC | Age: 89
End: 2025-04-24
Payer: MEDICARE

## 2025-04-24 VITALS
TEMPERATURE: 97.7 F | SYSTOLIC BLOOD PRESSURE: 120 MMHG | WEIGHT: 162.4 LBS | BODY MASS INDEX: 27.06 KG/M2 | OXYGEN SATURATION: 98 % | HEIGHT: 65 IN | DIASTOLIC BLOOD PRESSURE: 70 MMHG | RESPIRATION RATE: 18 BRPM | HEART RATE: 88 BPM

## 2025-04-24 DIAGNOSIS — D56.3 BETA THALASSEMIA MINOR: Primary | ICD-10-CM

## 2025-04-24 PROCEDURE — 99213 OFFICE O/P EST LOW 20 MIN: CPT

## 2025-04-24 NOTE — PROGRESS NOTES
Name: Matias Rodriguez      : 1935      MRN: [id number]  Encounter Provider: ARPIT Leach  Encounter Date: 2025   Encounter department: 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  Patients with beta thalassemia minor typically do not require interventions for anemia as anemia is very mild or absent.  Patient should not be placed on iron therapy if iron deficiency is not noted as there is a possibility of iron overload as patients with thalassemia absorb iron quicker.  Recommend he continue taking a multivitamin daily.    Furthermore, the SPEP did not show any monoclonal bands.  At this time, we will discharge patient back to his PCP for continued monitoring.  Advised patient and his daughter should the he have any symptoms or his hemoglobin drops below 10, to reach out to our office immediately.  He can see us on an as-needed basis.  Patient and his daughter are agreeable with the plan.  They are aware to contact us for any additional questions/concerns or worsening symptoms.    Return for as needed.    History of Present Illness   Chief Complaint   Patient presents with    Follow-up     Pertinent Medical History     25: Matias Rodriguez is a 89-year-old male was seen for initial consultation of his anemia on 2025.  He has PMH significant for CHF, pacemaker in place, Watchman closure, paroxysmal A-fib, CKD, aortic stenosis, hypertension.  Patient is here with his daughter for follow-up.    Patient is of [place] descent.  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  Microcytic anemia was noted since 2016.  Patient has not required any blood transfusions or iron infusion.  He was previously taking oral iron supplements, which we recommended he stop on 2025 due to his thalassemia.    Patient  has seen 2 colorectal surgeons regarding bloody stools. His Hemoccult test was positive. Per his colorectal surgeon, likely secondary to bleeding hemorrhoids.     After the last office visit, we had obtained some blood work for further evaluation of his anemia.  As mentioned, patient was not noted to be iron deficient.  We had recommended he stop taking his oral iron supplements.  In addition, his vitamin B-12 and folate were on the low end of normal therefore, I recommended patient start on a multivitamin daily, which she is taking.      Serum immunofixation was completed and pathologist recommended repeating the test in 3 to 6 months as they were not able to rule out a small monoclonal gammopathy.    Overall, patient reports he is doing well.  Denies any increased fatigue, fevers, frequent infections, drenching night sweats or decreased appetite.  No new bone pain.  No dizziness, lightheadedness, CP, SOB, palpitations.  Patient denies abnormal bleeding: epistaxis, gingival bleeding, hematuria, dark tarry stools.    Labs:  4/16/2025: Hgb 11.4, MCV 65, WBC 4.98, platelets 160,000, vitamin B12 308, folate 22.1, serum iron 109, iron saturation 32%, ferritin 376, creatinine 0.84, EGFR 78, calcium 9.6, serum immunofixation shows no monoclonal bands, FLC ratio 1.75, IgG 1092, IgA 114, IgM 153    1/21/2025: Hgb 10.2, MCV 64, WBC 4.82, platelets 149,000, s vitamin B12 330, folate 11.9, serum immunofixation cannot rule out monoclonal gammopathy, recommend repeat in 3 to 6 months, FLC ratio 1.35, IgA = 107, IgG = 1074, IgM = 137, creatinine 1.01, EGFR 66, calcium 9.4, serum iron 85, iron saturation 25%, ferritin 448,    1/7/2025: Fecal occult positive     12/31/2024: Hgb 10.6, MCV 65, WBC 6.46, platelets 177,000     11/14/2024: Hgb 9.4, MCV 65, WBC 3.5, platelets 171,000, serum iron 84, iron saturation 32%     9/20/2024: Hgb 10.6, MCV 65, WBC 4.23, platelets 155,000, creatinine 1.0, EGFR 67, calcium 8.8,     Review of  "Systems   Constitutional:  Negative for activity change, appetite change, diaphoresis, fatigue, fever and unexpected weight change.   HENT:  Negative for nosebleeds.    Eyes: Negative.    Respiratory:  Negative for cough, chest tightness and shortness of breath.    Cardiovascular:  Negative for chest pain, palpitations and leg swelling.   Gastrointestinal:  Negative for abdominal pain and blood in stool.   Endocrine: Negative.    Genitourinary:  Negative for hematuria.   Musculoskeletal: Negative.    Skin: Negative.    Neurological:  Negative for dizziness, light-headedness and headaches.   Hematological: Negative.            Objective   /70 (BP Location: Right arm, Patient Position: Sitting, Cuff Size: Adult)   Pulse 88   Temp 97.7 °F (36.5 °C) (Temporal)   Resp 18   Ht 5' 5\" (1.651 m)   Wt 73.7 kg (162 lb 6.4 oz)   SpO2 98%   BMI 27.02 kg/m²     Physical Exam  Constitutional:       Appearance: Normal appearance.   HENT:      Head: Normocephalic and atraumatic.   Cardiovascular:      Rate and Rhythm: Regular rhythm.      Heart sounds: Normal heart sounds.   Pulmonary:      Breath sounds: Normal breath sounds.   Musculoskeletal:      Cervical back: Normal range of motion.      Right lower leg: Edema present.      Left lower leg: Edema present.   Skin:     General: Skin is warm and dry.   Neurological:      Mental Status: He is alert and oriented to person, place, and time.   Psychiatric:         Mood and Affect: Mood normal.         Behavior: Behavior normal.         Thought Content: Thought content normal.         Judgment: Judgment normal.         Labs: I have reviewed the following labs:  Results for orders placed or performed in visit on 04/16/25   CBC and differential   Result Value Ref Range    WBC 4.98 4.31 - 10.16 Thousand/uL    RBC 5.57 3.88 - 5.62 Million/uL    Hemoglobin 11.4 (L) 12.0 - 17.0 g/dL    Hematocrit 35.9 (L) 36.5 - 49.3 %    MCV 65 (L) 82 - 98 fL    MCH 20.5 (L) 26.8 - 34.3 pg    " MCHC 31.8 31.4 - 37.4 g/dL    RDW 18.9 (H) 11.6 - 15.1 %    MPV 9.5 8.9 - 12.7 fL    Platelets 168 149 - 390 Thousands/uL    nRBC 0 /100 WBCs    Segmented % 51 43 - 75 %    Immature Grans % 0 0 - 2 %    Lymphocytes % 33 14 - 44 %    Monocytes % 12 4 - 12 %    Eosinophils Relative 4 0 - 6 %    Basophils Relative 0 0 - 1 %    Absolute Neutrophils 2.55 1.85 - 7.62 Thousands/µL    Absolute Immature Grans 0.02 0.00 - 0.20 Thousand/uL    Absolute Lymphocytes 1.63 0.60 - 4.47 Thousands/µL    Absolute Monocytes 0.58 0.17 - 1.22 Thousand/µL    Eosinophils Absolute 0.18 0.00 - 0.61 Thousand/µL    Basophils Absolute 0.02 0.00 - 0.10 Thousands/µL   Vitamin B12   Result Value Ref Range    Vitamin B-12 308 180 - 914 pg/mL   Result Value Ref Range    Folate 22.1 >5.9 ng/mL   Protein electrophoresis, serum   Result Value Ref Range    A/G Ratio 1.70 1.10 - 1.80    Albumin % 62.9 48.0 - 70.0 %    Albumin 4.15 3.20 - 5.10 g/dl    Alpha-1 Globulin % 3.6 1.8 - 7.0 %    Alpha-1 Globulin 0.24 0.15 - 0.47 g/dL    Alpha-2 Globulin % 8.6 5.9 - 14.9 %    Alpha-2 Globulin 0.57 0.42 - 1.04 g/dL    Beta-1 Globulin % 5.3 4.7 - 7.7 %    Beta-1 Globulin 0.35 0.31 - 0.57 g/dL    Beta-2 Globulin % 4.7 3.1 - 7.9 %    Beta-2 Globulin 0.31 0.20 - 0.58 g/dL    Gamma Globulin % 14.9 6.9 - 22.3 %    GAMMA CONC 0.98 0.40 - 1.66 g/dL    Total Protein 6.6 6.4 - 8.4 g/dL   Immunoglobulin free LT chains blood   Result Value Ref Range    Ig Kappa Free Light Chain 26.3 (H) 3.3 - 19.4 mg/L    Ig Lambda Free Light Chain 15.0 5.7 - 26.3 mg/L    Kappa/Lambda FluidC Ratio 1.75 (H) 0.26 - 1.65   IgG, IgA, IgM   Result Value Ref Range     66 - 433 mg/dL    IGG 1,092 635 - 1,741 mg/dL     45 - 281 mg/dL   Comprehensive metabolic panel   Result Value Ref Range    Sodium 140 135 - 147 mmol/L    Potassium 4.8 3.5 - 5.3 mmol/L    Chloride 105 96 - 108 mmol/L    CO2 27 21 - 32 mmol/L    ANION GAP 8 4 - 13 mmol/L    BUN 27 (H) 5 - 25 mg/dL    Creatinine 0.84 0.60  - 1.30 mg/dL    Glucose, Fasting 88 65 - 99 mg/dL    Calcium 9.6 8.4 - 10.2 mg/dL    AST 32 13 - 39 U/L    ALT 27 7 - 52 U/L    Alkaline Phosphatase 92 34 - 104 U/L    Total Protein 6.9 6.4 - 8.4 g/dL    Albumin 4.4 3.5 - 5.0 g/dL    Total Bilirubin 0.72 0.20 - 1.00 mg/dL    eGFR 78 ml/min/1.73sq m   TIBC Panel (incl. Iron, TIBC, % Iron Saturation)   Result Value Ref Range    Iron Saturation 32 15 - 50 %    TIBC 338.8 250 - 450 ug/dL    Iron 109 50 - 212 ug/dL    Transferrin 242 203 - 362 mg/dL    UIBC 230 155 - 355 ug/dL   Result Value Ref Range    Ferritin 376 (H) 30 - 336 ng/mL   Path Interpretation, Serum Protein Electrophoresis   Result Value Ref Range    Case Report       Electrophoresis Case                              Case: [id number]                                   Authorizing Provider:  ARPIT Leach          Collected:           04/16/2025 1231              Ordering Location:     [facility]      Received:            04/16/2025 1746                                     Services - Tram                                                          Pathologist:           Noel De Souza MD                                                          Specimen:    Arm, Left                                                                                  SPEP Interpretation       The SPEP shows an abnormal distribution in the gamma region. Immunofixation to be performed.       SPEP Immunofixation Interpretation       Serum immunofixation shows no monoclonal immunoglobulins.      I spent 20 minutes in chart review, counseling, coordination of care, and documentation.    This note has been generated by voice recognition software system.  Therefore, there may be spelling, grammar, and or syntax errors. Please contact if questions arise.

## 2025-05-30 ENCOUNTER — APPOINTMENT (OUTPATIENT)
Dept: LAB | Facility: MEDICAL CENTER | Age: 89
End: 2025-05-30
Attending: FAMILY MEDICINE
Payer: MEDICARE

## 2025-05-30 DIAGNOSIS — R41.82 ALTERED MENTAL STATUS, UNSPECIFIED ALTERED MENTAL STATUS TYPE: ICD-10-CM

## 2025-05-30 LAB
BACTERIA UR QL AUTO: ABNORMAL /HPF
BASOPHILS # BLD AUTO: 0.02 THOUSANDS/ÂΜL (ref 0–0.1)
BASOPHILS NFR BLD AUTO: 0 % (ref 0–1)
BILIRUB UR QL STRIP: NEGATIVE
CLARITY UR: CLEAR
COLOR UR: YELLOW
EOSINOPHIL # BLD AUTO: 0.06 THOUSAND/ÂΜL (ref 0–0.61)
EOSINOPHIL NFR BLD AUTO: 1 % (ref 0–6)
ERYTHROCYTE [DISTWIDTH] IN BLOOD BY AUTOMATED COUNT: 17.9 % (ref 11.6–15.1)
GLUCOSE UR STRIP-MCNC: NEGATIVE MG/DL
HCT VFR BLD AUTO: 33.2 % (ref 36.5–49.3)
HGB BLD-MCNC: 10 G/DL (ref 12–17)
HGB UR QL STRIP.AUTO: NEGATIVE
HYALINE CASTS #/AREA URNS LPF: ABNORMAL /LPF
IMM GRANULOCYTES # BLD AUTO: 0.03 THOUSAND/UL (ref 0–0.2)
IMM GRANULOCYTES NFR BLD AUTO: 1 % (ref 0–2)
KETONES UR STRIP-MCNC: NEGATIVE MG/DL
LEUKOCYTE ESTERASE UR QL STRIP: ABNORMAL
LYMPHOCYTES # BLD AUTO: 0.9 THOUSANDS/ÂΜL (ref 0.6–4.47)
LYMPHOCYTES NFR BLD AUTO: 15 % (ref 14–44)
MCH RBC QN AUTO: 19.8 PG (ref 26.8–34.3)
MCHC RBC AUTO-ENTMCNC: 30.1 G/DL (ref 31.4–37.4)
MCV RBC AUTO: 66 FL (ref 82–98)
MONOCYTES # BLD AUTO: 0.61 THOUSAND/ÂΜL (ref 0.17–1.22)
MONOCYTES NFR BLD AUTO: 10 % (ref 4–12)
MUCOUS THREADS UR QL AUTO: ABNORMAL
NEUTROPHILS # BLD AUTO: 4.3 THOUSANDS/ÂΜL (ref 1.85–7.62)
NEUTS SEG NFR BLD AUTO: 73 % (ref 43–75)
NITRITE UR QL STRIP: NEGATIVE
NON-SQ EPI CELLS URNS QL MICRO: ABNORMAL /HPF
NRBC BLD AUTO-RTO: 0 /100 WBCS
PH UR STRIP.AUTO: 6 [PH]
PLATELET # BLD AUTO: 181 THOUSANDS/UL (ref 149–390)
PMV BLD AUTO: 10.2 FL (ref 8.9–12.7)
PROT UR STRIP-MCNC: ABNORMAL MG/DL
RBC # BLD AUTO: 5.06 MILLION/UL (ref 3.88–5.62)
RBC #/AREA URNS AUTO: ABNORMAL /HPF
SP GR UR STRIP.AUTO: 1.03 (ref 1–1.03)
UROBILINOGEN UR STRIP-ACNC: <2 MG/DL
WBC # BLD AUTO: 5.92 THOUSAND/UL (ref 4.31–10.16)
WBC #/AREA URNS AUTO: ABNORMAL /HPF

## 2025-05-30 PROCEDURE — 81001 URINALYSIS AUTO W/SCOPE: CPT

## 2025-05-30 PROCEDURE — 85025 COMPLETE CBC W/AUTO DIFF WBC: CPT

## 2025-05-30 PROCEDURE — 36415 COLL VENOUS BLD VENIPUNCTURE: CPT

## 2025-05-30 PROCEDURE — 87086 URINE CULTURE/COLONY COUNT: CPT

## 2025-05-31 ENCOUNTER — HOSPITAL ENCOUNTER (EMERGENCY)
Facility: HOSPITAL | Age: 89
Discharge: HOME | End: 2025-05-31
Attending: EMERGENCY MEDICINE
Payer: MEDICARE

## 2025-05-31 VITALS
RESPIRATION RATE: 18 BRPM | DIASTOLIC BLOOD PRESSURE: 60 MMHG | TEMPERATURE: 98 F | OXYGEN SATURATION: 95 % | HEART RATE: 71 BPM | SYSTOLIC BLOOD PRESSURE: 133 MMHG

## 2025-05-31 DIAGNOSIS — F22 PARANOIA (HCC): Primary | ICD-10-CM

## 2025-05-31 LAB — BACTERIA UR CULT: NORMAL

## 2025-05-31 PROCEDURE — 99284 EMERGENCY DEPT VISIT MOD MDM: CPT | Performed by: EMERGENCY MEDICINE

## 2025-05-31 PROCEDURE — 99284 EMERGENCY DEPT VISIT MOD MDM: CPT

## 2025-06-01 NOTE — ED PROVIDER NOTES
Time reflects when diagnosis was documented in both MDM as applicable and the Disposition within this note       Time User Action Codes Description Comment    5/31/2025 10:35 PM Brandie Zimmerman Add [F22] Paranoia (HCC)           ED Disposition       ED Disposition   Discharge    Condition   Stable    Date/Time   Sat May 31, 2025 10:35 PM    Comment   Matias Rodriguez discharge to home/self care.                   Assessment & Plan       Medical Decision Making  Matias Rodriguez is an 89 year old male past medical history of CKD presenting to the ED with family for paranoia.  Paranoia can be secondary to primary psychiatric versus medical.  However, isolated paranoia is normally primarily psychiatric.  Patient's family doctor also recently did labs as well as UA all of which were unremarkable.  Considering patient had paranoid thoughts in the past, this is likely progressive primary psychiatric etiology.  Overall, patient denies any SI, HI, signs of psychosis and is able to perform all his ADLs.  Instructed patient and family to have him follow-up with primary care doctor for a possible small dose of antipsychotic.  Otherwise, safe for discharge home.    Dispo: discharge to home  Prescriptions: None    Discussed results and plan with patient. Patient was agreeable and expressed understanding. Discussed return precautions.         Medications - No data to display    ED Risk Strat Scores            No data recorded                  History of Present Illness       Chief Complaint   Patient presents with    Altered Mental Status     Pt family reports pt has increased paranoia that people are spying on him through his phone for the past week. UA sent out by PCP but results are not back yet. Had an episode of back pain that has resolved        Past Medical History[1]   Past Surgical History[2]   Family History[3]   Social History[4]   E-Cigarette/Vaping    E-Cigarette Use Never User       E-Cigarette/Vaping Substances      I  have reviewed and agree with the history as documented.     HPI    Matias Rodriguez is an 89 year old male past medical history of CKD presenting to the ED with family for paranoia.  Patient has shown signs of paranoia for years manifesting in believing that anyone who visits his home is trying to steal his things.  Family feels that over the last month has increased where he believes that neighbors breaking into his home, listening to him through his phone and trying to take his home from him.  He lives alone, but is able to complete his ADLs without difficulty.  He denies any other symptoms or recent traumas.  Denies any SI, HI or auditory/visual hallucinations  Patient's family brought concern to primary care doctor who did basic labs and UA all of which were negative on chart review.    Review of Systems   Constitutional:  Negative for chills and fever.   HENT:  Negative for congestion and rhinorrhea.    Eyes:  Negative for visual disturbance.   Respiratory:  Negative for cough and shortness of breath.    Cardiovascular:  Negative for chest pain and palpitations.   Gastrointestinal:  Negative for abdominal pain, nausea and vomiting.   Genitourinary:  Negative for dysuria and hematuria.   Musculoskeletal:  Negative for back pain and neck pain.   Neurological:  Negative for dizziness, weakness, numbness and headaches.           Objective       ED Triage Vitals   Temperature Pulse Blood Pressure Respirations SpO2 Patient Position - Orthostatic VS   05/31/25 2049 05/31/25 2048 05/31/25 2048 05/31/25 2048 05/31/25 2048 --   98 °F (36.7 °C) 77 133/60 18 97 %       Temp Source Heart Rate Source BP Location FiO2 (%) Pain Score    05/31/25 2049 05/31/25 2048 05/31/25 2048 -- --    Oral Monitor Right arm        Vitals      Date and Time Temp Pulse SpO2 Resp BP Pain Score FACES Pain Rating User   05/31/25 2102 -- 71 95 % -- 133/60 -- -- TW   05/31/25 2049 98 °F (36.7 °C) -- -- -- -- -- -- SHELLY   05/31/25 2048 -- 77 97 % 18  133/60 -- -- SHELLY            Physical Exam  Constitutional:       General: He is not in acute distress.     Appearance: He is normal weight.   HENT:      Head: Normocephalic and atraumatic.      Mouth/Throat:      Mouth: Mucous membranes are moist.      Pharynx: Oropharynx is clear.     Eyes:      Extraocular Movements: Extraocular movements intact.      Conjunctiva/sclera: Conjunctivae normal.      Pupils: Pupils are equal, round, and reactive to light.       Cardiovascular:      Rate and Rhythm: Normal rate and regular rhythm.      Pulses: Normal pulses.      Heart sounds: Normal heart sounds.   Pulmonary:      Effort: Pulmonary effort is normal. No respiratory distress.   Abdominal:      General: There is no distension.      Palpations: Abdomen is soft.      Tenderness: There is no abdominal tenderness.     Musculoskeletal:         General: No deformity. Normal range of motion.      Cervical back: Normal range of motion.     Skin:     General: Skin is warm and dry.      Capillary Refill: Capillary refill takes less than 2 seconds.     Neurological:      General: No focal deficit present.      Mental Status: He is alert and oriented to person, place, and time. Mental status is at baseline.         Results Reviewed       None            No orders to display       Procedures    ED Medication and Procedure Management   Prior to Admission Medications   Prescriptions Last Dose Informant Patient Reported? Taking?   Ferrous Sulfate (IRON PO)  Self Yes No   Sig: Take by mouth OTC   Patient not taking: Reported on 3/25/2025   Multiple Vitamin (multivitamin) tablet  Self Yes No   Sig: Take 1 tablet by mouth daily   aspirin 81 mg chewable tablet  Self Yes No   Sig: Chew 81 mg daily   gabapentin (NEURONTIN) 100 mg capsule  Self No No   Sig: Take 1 capsule (100 mg total) by mouth in the morning and 1 capsule (100 mg total) in the evening and 1 capsule (100 mg total) before bedtime.   Patient not taking: Reported on 9/26/2024    tamsulosin (FLOMAX) 0.4 mg  Self Yes No   Sig: Take 0.4 mg by mouth daily with dinner   torsemide (DEMADEX) 20 mg tablet  Self Yes No   Sig: Take 20 mg by mouth as needed (for weight gain > 3lbs/day or wt > 165 lbs)   Patient not taking: Reported on 3/25/2025      Facility-Administered Medications: None     Discharge Medication List as of 5/31/2025 10:36 PM        CONTINUE these medications which have NOT CHANGED    Details   aspirin 81 mg chewable tablet Chew 81 mg daily, Historical Med      Ferrous Sulfate (IRON PO) Take by mouth OTC, Historical Med      gabapentin (NEURONTIN) 100 mg capsule Take 1 capsule (100 mg total) by mouth in the morning and 1 capsule (100 mg total) in the evening and 1 capsule (100 mg total) before bedtime., Starting Tue 5/24/2022, Sample      Multiple Vitamin (multivitamin) tablet Take 1 tablet by mouth daily, Historical Med      tamsulosin (FLOMAX) 0.4 mg Take 0.4 mg by mouth daily with dinner, Historical Med      torsemide (DEMADEX) 20 mg tablet Take 20 mg by mouth as needed (for weight gain > 3lbs/day or wt > 165 lbs), Starting Fri 8/4/2023, Historical Med           No discharge procedures on file.  ED SEPSIS DOCUMENTATION   Time reflects when diagnosis was documented in both MDM as applicable and the Disposition within this note       Time User Action Codes Description Comment    5/31/2025 10:35 PM Brandie Zimmerman Add [F22] Paranoia (HCC)                      [1]   Past Medical History:  Diagnosis Date    A-fib (HCC)     CHANA (acute kidney injury) (HCC) 12/19/2022    Hernia, abdominal     Hypertension     Subdural hematoma (HCC)    [2]   Past Surgical History:  Procedure Laterality Date    A-V CARDIAC PACEMAKER INSERTION      APPENDECTOMY      age 27    CARDIAC CATHETERIZATION N/A 12/19/2022    Procedure: Cardiac temporary pacemaker;  Surgeon: Darien Panda MD;  Location: [facility];  Service: Cardiology    CARDIAC ELECTROPHYSIOLOGY PROCEDURE N/A 12/8/2022    Procedure:  CARDIAC ATRIAL APPENDAGE CLOSURE (EP);  Surgeon: Jeremiah Pradhan MD;  Location:  MAIN OR;  Service: Cardiology    CARDIAC ELECTROPHYSIOLOGY PROCEDURE Right 12/20/2022    Procedure: Cardiac lead revision;  Surgeon: Juan Wheeler MD;  Location: [facility];  Service: Cardiology    CARDIAC PACEMAKER PLACEMENT      CARPAL TUNNEL RELEASE      COLONOSCOPY      INGUINAL HERNIA REPAIR Left     IR UPPER EXTREMITY VENOGRAM- DIAGNOSTIC  12/19/2022    NC PERQ CLSR TCAT L ATR APNDGE W/ENDOCARDIAL IMPLNT N/A 12/8/2022    Procedure: CARDIAC ATRIAL APPENDAGE CLOSURE (CATH);  Surgeon: Jeremiah Pradhan MD;  Location:  MAIN OR;  Service: Cardiology    NC XCAPSL CTRC RMVL INSJ IO LENS PROSTH W/O ECP Right 4/3/2017    Procedure: EXTRACTION EXTRACAPSULAR CATARACT PHACO INTRAOCULAR LENS (IOL);  Surgeon: Mario Power MD;  Location: [facility] MAIN OR;  Service: Ophthalmology    NC XCAPSL CTRC RMVL INSJ IO LENS PROSTH W/O ECP Left 2/6/2023    Procedure: EXTRACTION EXTRACAPSULAR CATARACT PHACO INTRAOCULAR LENS (IOL);  Surgeon: Mario Power MD;  Location: [facility] MAIN OR;  Service: Ophthalmology    TONSILLECTOMY      age 6   [3]   Family History  Problem Relation Name Age of Onset    Cancer Mother          exp age 96    Heart disease Father      Heart disease Brother      Hypertension Brother     [4]   Social History  Tobacco Use    Smoking status: Never    Smokeless tobacco: Never   Vaping Use    Vaping status: Never Used   Substance Use Topics    Alcohol use: Never    Drug use: Never        Brandie Zimmerman MD  06/03/25 0126

## 2025-06-01 NOTE — ED ATTENDING ATTESTATION
5/31/2025  I, Garth Hess DO, saw and evaluated the patient. I have discussed the patient with the resident/non-physician practitioner and agree with the resident's/non-physician practitioner's findings, Plan of Care, and MDM as documented in the resident's/non-physician practitioner's note, except where noted. All available labs and Radiology studies were reviewed.  I was present for key portions of any procedure(s) performed by the resident/non-physician practitioner and I was immediately available to provide assistance.       At this point I agree with the current assessment done in the Emergency Department.  I have conducted an independent evaluation of this patient a history and physical is as follows:              1. Paranoia (HCC)            MDM  Number of Diagnoses or Management Options  Paranoia (HCC)  Diagnosis management comments:       Initial ED assessment:    89-year-old male increasing paranoia seems to be directed towards a neighbor.  Neighbor has no knowledge of this patient has not made any threats towards the neighbor but feels that his neighbors spying on him and tapping into his cell phone.  Eating well drinking well caring for himself otherwise.  No falls or injury.  Medical workup from PCP unremarkable    Pathology at risk for includes but is not limited to:    Paranoia, increased advanced age, considered medical cause of this was ruled out yesterday nothing suggestive of trauma and with this would not present as isolated paranoia    Initial ED plan:    Long conversation with family members, no indication for ED workup at this time.  We discussed 302 criteria which she does not have at this time.  We discussed things to look out for including threats towards neighbor and or others.  This would be grounds for 302 criteria        Final ED summary/disposition:   After evaluation and workup in the emergency department, ultimately discharged we discussed pros and cons of starting on  medications for this he will have a further conversation with a pain family doctor for this but may benefit from a low-dose antipsychotic               Time reflects when diagnosis was documented in both MDM as applicable and the Disposition within this note       Time User Action Codes Description Comment    5/31/2025 10:35 PM Brandie Zimmerman [[id number]] Paranoia (HCC)           ED Disposition       ED Disposition   Discharge    Condition   Stable    Date/Time   Sat May 31, 2025 10:35 PM    Comment   Matias Rodriguez discharge to home/self care.                   Follow-up Information       Follow up With Specialties Details Why Contact Info    Angel Reeder DO Family Medicine   [address] [place] [id number]  [phone]                            Chief Complaint   Patient presents with    Altered Mental Status     Pt family reports pt has increased paranoia that people are spying on him through his phone for the past week. UA sent out by PCP but results are not back yet. Had an episode of back pain that has resolved              89-year-old male brought in by family for increasing paranoia.,  Seems to be directed in a neighbor.  He has very limited interaction with the neighbor the neighbor has lived near him for multiple decades.  Patient believes the neighbors are spying on him.  Patient does not using his phone because he believes the neighbor has tapped his phone.  Has not made any violent threats or any threats at all towards the neighbor.  He expressed these thoughts to his son and daughter.  He otherwise lives independently and is high functioning.  Drives to the [employer] does his own shopping, spends his hours during the day tinkering around the house.  Family brought him to the hospital due to the paranoia patient himself is unsure why they brought him to the hospital stating he feels great and does not want to be here.  No history of falls injury or trauma family states he is otherwise acting  normally they did go to the family doctor for this yesterday he had a medical workup including blood work and urinalysis which were all normal.  Eating well drinking well normal level of hygiene      Altered Mental Status                Visit Vitals  /60   Pulse 71   Temp 98 °F (36.7 °C) (Oral)   Resp 18   SpO2 95%   Smoking Status Never        Physical Exam  Vitals reviewed.   Constitutional:       General: He is not in acute distress.     Appearance: He is well-developed. He is not diaphoretic.   HENT:      Head: Normocephalic and atraumatic.      Right Ear: External ear normal.      Left Ear: External ear normal.      Nose: Nose normal.     Eyes:      General:         Right eye: No discharge.         Left eye: No discharge.      Pupils: Pupils are equal, round, and reactive to light.     Neck:      Trachea: No tracheal deviation.     Cardiovascular:      Rate and Rhythm: Normal rate and regular rhythm.      Heart sounds: Normal heart sounds. No murmur heard.  Pulmonary:      Effort: Pulmonary effort is normal. No respiratory distress.      Breath sounds: Normal breath sounds. No stridor.   Abdominal:      General: There is no distension.      Palpations: Abdomen is soft.      Tenderness: There is no abdominal tenderness. There is no guarding or rebound.     Musculoskeletal:         General: Normal range of motion.      Cervical back: Normal range of motion and neck supple.     Skin:     General: Skin is warm and dry.      Coloration: Skin is not pale.      Findings: No erythema.     Neurological:      General: No focal deficit present.      Mental Status: He is alert and oriented to person, place, and time.      GCS: GCS eye subscore is 4. GCS verbal subscore is 5. GCS motor subscore is 6.                       Medications - No data to display          Labs Reviewed - No data to display      No orders to display                  Procedures

## 2025-06-02 ENCOUNTER — TELEPHONE (OUTPATIENT)
Age: 89
End: 2025-06-02

## 2025-06-02 NOTE — TELEPHONE ENCOUNTER
Patients daughter called and asked for Josi Marcano to look at Matias's most recent blood work and call her back to let her know if she should be doing anything else for her father.  Her PCP advised her to reach out.

## 2025-06-03 ENCOUNTER — APPOINTMENT (EMERGENCY)
Dept: CT IMAGING | Facility: HOSPITAL | Age: 89
End: 2025-06-03
Payer: MEDICARE

## 2025-06-03 ENCOUNTER — HOSPITAL ENCOUNTER (EMERGENCY)
Facility: HOSPITAL | Age: 89
End: 2025-06-05
Attending: EMERGENCY MEDICINE
Payer: MEDICARE

## 2025-06-03 DIAGNOSIS — D64.9 ANEMIA, UNSPECIFIED TYPE: Primary | ICD-10-CM

## 2025-06-03 DIAGNOSIS — R45.851 SUICIDAL IDEATION: ICD-10-CM

## 2025-06-03 DIAGNOSIS — F22 ACUTE PARANOIA (HCC): Primary | ICD-10-CM

## 2025-06-03 DIAGNOSIS — Z00.8 MEDICAL CLEARANCE FOR PSYCHIATRIC ADMISSION: ICD-10-CM

## 2025-06-03 DIAGNOSIS — D56.3 BETA THALASSEMIA MINOR: ICD-10-CM

## 2025-06-03 LAB
ALBUMIN SERPL BCG-MCNC: 4.4 G/DL (ref 3.5–5)
ALP SERPL-CCNC: 84 U/L (ref 34–104)
ALT SERPL W P-5'-P-CCNC: 57 U/L (ref 7–52)
AMPHETAMINES SERPL QL SCN: NEGATIVE
ANION GAP SERPL CALCULATED.3IONS-SCNC: 7 MMOL/L (ref 4–13)
AST SERPL W P-5'-P-CCNC: 59 U/L (ref 13–39)
BARBITURATES UR QL: NEGATIVE
BASOPHILS # BLD AUTO: 0.04 THOUSANDS/ÂΜL (ref 0–0.1)
BASOPHILS NFR BLD AUTO: 1 % (ref 0–1)
BENZODIAZ UR QL: NEGATIVE
BILIRUB SERPL-MCNC: 0.97 MG/DL (ref 0.2–1)
BUN SERPL-MCNC: 21 MG/DL (ref 5–25)
CALCIUM SERPL-MCNC: 9.1 MG/DL (ref 8.4–10.2)
CHLORIDE SERPL-SCNC: 106 MMOL/L (ref 96–108)
CO2 SERPL-SCNC: 25 MMOL/L (ref 21–32)
COCAINE UR QL: NEGATIVE
CREAT SERPL-MCNC: 1.15 MG/DL (ref 0.6–1.3)
EOSINOPHIL # BLD AUTO: 0.13 THOUSAND/ÂΜL (ref 0–0.61)
EOSINOPHIL NFR BLD AUTO: 2 % (ref 0–6)
ERYTHROCYTE [DISTWIDTH] IN BLOOD BY AUTOMATED COUNT: 19.4 % (ref 11.6–15.1)
ETHANOL SERPL-MCNC: <10 MG/DL
FENTANYL UR QL SCN: NEGATIVE
GFR SERPL CREATININE-BSD FRML MDRD: 56 ML/MIN/1.73SQ M
GLUCOSE SERPL-MCNC: 100 MG/DL (ref 65–140)
HCT VFR BLD AUTO: 35.8 % (ref 36.5–49.3)
HGB BLD-MCNC: 11.2 G/DL (ref 12–17)
HYDROCODONE UR QL SCN: NEGATIVE
IMM GRANULOCYTES # BLD AUTO: 0.03 THOUSAND/UL (ref 0–0.2)
IMM GRANULOCYTES NFR BLD AUTO: 1 % (ref 0–2)
LYMPHOCYTES # BLD AUTO: 1.45 THOUSANDS/ÂΜL (ref 0.6–4.47)
LYMPHOCYTES NFR BLD AUTO: 24 % (ref 14–44)
MCH RBC QN AUTO: 20.6 PG (ref 26.8–34.3)
MCHC RBC AUTO-ENTMCNC: 31.3 G/DL (ref 31.4–37.4)
MCV RBC AUTO: 66 FL (ref 82–98)
METHADONE UR QL: NEGATIVE
MONOCYTES # BLD AUTO: 0.7 THOUSAND/ÂΜL (ref 0.17–1.22)
MONOCYTES NFR BLD AUTO: 12 % (ref 4–12)
NEUTROPHILS # BLD AUTO: 3.74 THOUSANDS/ÂΜL (ref 1.85–7.62)
NEUTS SEG NFR BLD AUTO: 60 % (ref 43–75)
NRBC BLD AUTO-RTO: 0 /100 WBCS
OPIATES UR QL SCN: NEGATIVE
OXYCODONE+OXYMORPHONE UR QL SCN: NEGATIVE
PCP UR QL: NEGATIVE
PLATELET # BLD AUTO: 204 THOUSANDS/UL (ref 149–390)
PMV BLD AUTO: 9.6 FL (ref 8.9–12.7)
POTASSIUM SERPL-SCNC: 4.3 MMOL/L (ref 3.5–5.3)
PROT SERPL-MCNC: 6.9 G/DL (ref 6.4–8.4)
RBC # BLD AUTO: 5.44 MILLION/UL (ref 3.88–5.62)
SODIUM SERPL-SCNC: 138 MMOL/L (ref 135–147)
THC UR QL: NEGATIVE
TSH SERPL DL<=0.05 MIU/L-ACNC: 3.13 UIU/ML (ref 0.45–4.5)
WBC # BLD AUTO: 6.09 THOUSAND/UL (ref 4.31–10.16)

## 2025-06-03 PROCEDURE — 93005 ELECTROCARDIOGRAM TRACING: CPT

## 2025-06-03 PROCEDURE — 82077 ASSAY SPEC XCP UR&BREATH IA: CPT | Performed by: EMERGENCY MEDICINE

## 2025-06-03 PROCEDURE — 70450 CT HEAD/BRAIN W/O DYE: CPT

## 2025-06-03 PROCEDURE — 85025 COMPLETE CBC W/AUTO DIFF WBC: CPT | Performed by: EMERGENCY MEDICINE

## 2025-06-03 PROCEDURE — 84443 ASSAY THYROID STIM HORMONE: CPT | Performed by: EMERGENCY MEDICINE

## 2025-06-03 PROCEDURE — 36415 COLL VENOUS BLD VENIPUNCTURE: CPT | Performed by: EMERGENCY MEDICINE

## 2025-06-03 PROCEDURE — 99285 EMERGENCY DEPT VISIT HI MDM: CPT

## 2025-06-03 PROCEDURE — 80053 COMPREHEN METABOLIC PANEL: CPT | Performed by: EMERGENCY MEDICINE

## 2025-06-03 PROCEDURE — 80307 DRUG TEST PRSMV CHEM ANLYZR: CPT | Performed by: EMERGENCY MEDICINE

## 2025-06-03 PROCEDURE — 99285 EMERGENCY DEPT VISIT HI MDM: CPT | Performed by: EMERGENCY MEDICINE

## 2025-06-03 NOTE — LETTER
[facility] EMERGENCY DEPARTMENT   [facility] [place] [address]  Dept: [phone]      EMTALA TRANSFER CONSENT    NAME Matias DASH 1935                              MRN [id number]    I have been informed of my rights regarding examination, treatment, and transfer   by Dr. Noel Christiansen MD    Benefits: Specialized equipment and/or services available at the receiving facility (Include comment)________________________ (psychiatric treatment)    Risks: Potential for delay in receiving treatment    Transfer Request   I acknowledge that my medical condition has been evaluated and explained to me by the emergency department physician or other qualified medical person and/or my attending physician who has recommended and offered to me further medical examination and treatment. I understand the Hospital's obligation with respect to the treatment and stabilization of my emergency medical condition. I nevertheless request to be transferred. I release the Hospital, the doctor, and any other persons caring for me from all responsibility or liability for any injury or ill effects that may result from my transfer and agree to accept all responsibility for the consequences of my choice to transfer, rather than receive stabilizing treatment at the Hospital. I understand that because the transfer is my request, my insurance may not provide reimbursement for the services.  The Hospital will assist and direct me and my family in how to make arrangements for transfer, but the hospital is not liable for any fees charged by the transport service.  In spite of this understanding, I refuse to consent to further medical examination and treatment which has been offered to me, and request transfer to Accepting Facility Name, City & State : [facility], , [place]. I authorize the performance of emergency medical procedures and treatments upon me in both  transit and upon arrival at the receiving facility.  Additionally, I authorize the release of any and all medical records to the receiving facility and request they be transported with me, if possible.    I authorize the performance of emergency medical procedures and treatments upon me in both transit and upon arrival at the receiving facility.  Additionally, I authorize the release of any and all medical records to the receiving facility and request they be transported with me, if possible.  I understand that the safest mode of transportation during a medical emergency is an ambulance and that the Hospital advocates the use of this mode of transport. Risks of traveling to the receiving facility by car, including absence of medical control, life sustaining equipment, such as oxygen, and medical personnel has been explained to me and I fully understand them.    (WILLIAM CORRECT BOX BELOW)  [ X ]  I consent to the stated transfer and to be transported by ambulance/helicopter.  [  ]  I consent to the stated transfer, but refuse transportation by ambulance and accept full responsibility for my transportation by car.  I understand the risks of non-ambulance transfers and I exonerate the Hospital and its staff from any deterioration in my condition that results from this refusal.    X___________________________________________    DATE  25  TIME__14:57______  Signature of patient or legally responsible individual signing on patient behalf           RELATIONSHIP TO PATIENT_____self____________________      Provider Certification    NAME Matias Rodriguez                                        [facility] 1935                              MRN [id number]    A medical screening exam was performed on the above named patient.  Based on the examination:    Condition Necessitating Transfer The primary encounter diagnosis was Acute paranoia (HCC). Diagnoses of Suicidal ideation and Medical clearance for psychiatric admission were  also pertinent to this visit.    Patient Condition: The patient has been stabilized such that within reasonable medical probability, no material deterioration of the patient condition or the condition of the unborn child(emilee) is likely to result from the transfer    Reason for Transfer: Level of Care needed not available at this facility    Transfer Requirements: Facility [facility], [address] [place] [address]   Space available and qualified personnel available for treatment as acknowledged by Josefa U - [phone]  Agreed to accept transfer and to provide appropriate medical treatment as acknowledged by       Dr. Friend  Appropriate medical records of the examination and treatment of the patient are provided at the time of transfer   STAFF INITIAL WHEN COMPLETED _______  Transfer will be performed by qualified personnel from [facility]  and appropriate transfer equipment as required, including the use of necessary and appropriate life support measures.    Provider Certification: I have examined the patient and explained the following risks and benefits of being transferred/refusing transfer to the patient/family:  General risk, such as traffic hazards, adverse weather conditions, rough terrain or turbulence, possible failure of equipment (including vehicle or aircraft), or consequences of actions of persons outside the control of the transport personnel, The patient is stable for psychiatric transfer because they are medically stable, and is protected from harming him/herself or others during transport      Based on these reasonable risks and benefits to the patient and/or the unborn child(emilee), and based upon the information available at the time of the patient’s examination, I certify that the medical benefits reasonably to be expected from the provision of appropriate medical treatments at another medical facility outweigh the increasing risks, if any, to the individual’s medical condition, and in the case of labor to the unborn  child, from effecting the transfer.    X____________________________________________ DATE 06/05/25        TIME__15:00_____      ORIGINAL - SEND TO MEDICAL RECORDS   COPY - SEND WITH PATIENT DURING TRANSFER

## 2025-06-03 NOTE — ED NOTES
Pt encouraged to urinate, reports he is unable to at this time. Pt sitting on side of bed eating dinner and talking with family. Pt pleasant and cooperative at this time. Will continue to monitor.      Mariah Welch RN  06/03/25 1939

## 2025-06-03 NOTE — TELEPHONE ENCOUNTER
Spoke with her who was emotional as her father is having symptoms of paranoia.  This is all of a sudden.  The PCP did put in for urinalysis, which was negative.  Patient reports he was supposed to start on a medication, upon chart review likely gabapentin, which patient most likely threw out as patient's daughter was not able to find the medication.  Offered emotional support and asked her to take him back to the ER for some imaging.  In addition, in regards to patient's anemia, his hemoglobin did drop significantly, by 1 g.  We will obtain an iron study so we can treat his thalassemia and anemia appropriately.  I will call him with the results.  Daughter voiced understanding.

## 2025-06-03 NOTE — ED PROVIDER NOTES
Emergency Department Note- Matias Rodriguez 89 y.o. male MRN: [id number]    Unit/Bed#: ED-09 Encounter: [id number]      Final Diagnoses:     1. Acute paranoia (HCC)    2. Suicidal ideation      ED Course as of 06/03/25 2253 Tue Jun 03, 2025   1757 Chronic normocytic anemia, essentially unchanged from 4/16/2025, 1 g increased from 4 days ago   1809 ECG interpreted me shows ventricular paced rhythm, rate of 72, no acute change from 12/20/2022   1816 Patient reassessed, no change from the above findings, able to ambulate without difficulty   2140 Patient reassessed, daughter and patient's grandsons are concerned about the patient returning home, given that they have heard him make multiple suicidal statements.  The patient tells me he does not want to stay in the hospital or have psychiatric mission at this point but would return in several days for further psychiatric care.  Patient's son is on his way in and per patient's daughter, he is willing to be the petitioner on a 302. Crisis consulted, they will see   2158 Patient is medically appropriate for psychiatric admission   2249 Patient seen and evaluated by crisis staff member, they indicate patient is willing to sign 201 voluntary hospitalization   2251 I signed the 201 paperwork         HPI:   Patient is an 89-year-old male with a history of chronic diastolic heart failure, aortic stenosis, pacemaker, paroxysmal atrial fibrillation, status post watchman ALDO closure, CKD, accompanied by his daughter.  She says that the patient lives alone and she has noticed for the last 2 weeks that he has had a lot of paranoia about the neighbors spying on him in his house. The daughter says today that the patient wanted to sell his house and his dog to avoid the neighbors spying on him. She feels like he is taking care of himself in terms of activities of daily living, she has not seen a slurred speech, focal weakness or justina confusion, she is just concerned about the  paranoia.  She has her to make statements that he does not want to be around if the neighbors continue to spy on him a lot, and she has heard him make statements such as that he may end it if this continues.  She says that she and the family have placed multiple cameras around the house to help keep an eye on the patient as he does live alone and is 89 years old and he will frequently turn the cameras down so he cannot be seen because he is concerned that the neighbors are spying through these cameras on him.  The daughter says that when she spoke with the primary care physician about this, urinalysis was ordered as noted below, she also took all of the guns out of the house, and the PCP did prescribe him Seroquel.  He took 1 tablet yesterday which reportedly made him sleepy and said he did not want to take any more tablets.      The patient himself says that he is very concerned that the neighbors are spying on him through the cameras, he has no other physical complaints, no chest pain, shortness of breath, no fever, no chills, no dysuria hematuria.  Denies any current suicidal or homicidal ideations to me, denies audiovisual hallucinations.  He denies any falls or trauma.    Patient had a CBC on May 30 which showed a slight microcytic anemia hemoglobin 10.0, slight drop of 1.4 g over a month and a half.  Urinalysis showed no infection.      MEDICAL DECISION MAKING   Patient had some paranoid delusions as well as some suicidal statements to family, ultimately agrees to hospitalization for psychiatric evaluation.  I do not believe that there is a medical cause at this point that would preclude psychiatric hospitalization, there is no sign of intracranial hemorrhage, no sign of life-threatening metabolic abnormality, no focal neurodeficit.    COLLECTION AND INTERPRETATION OF DATA  I reviewed prior external notes, including patient blood work as noted above    I ordered each unique test  Tests reviewed personally by  me:  ECG: See my ED course  Labs: Ordered and reviewed  Imaging: I independently interpreted the head CT and found no acute intracranial hemorrhage.    The patient was evaluated for sepsis in the emergency department. After that assessment, at the time of admission, there was no evidence of severe sepsis or septic shock or indication that the patient should be included in the SEP-1 CMS quality measure.    Physical:     Vitals:    06/03/25 1447 06/03/25 1451 06/03/25 1800 06/03/25 2042   BP: 149/67  160/82 141/65   BP Location: Left arm   Right arm   Pulse: 81  69 72   Resp: 18  18 18   Temp:  97.6 °F (36.4 °C)  97.6 °F (36.4 °C)   TempSrc:  Oral  Oral   SpO2: 99%  100% 98%       General:  Patient is well-appearing  Head:  Atraumatic  Eyes:  Conjunctiva pink, Extraocular muscle intact, PERRL  ENT:  Mucous membranes are moist  Neck:  Supple  Cardiac:  S1-S2, without murmurs  Lungs:  Clear to auscultation bilaterally  Abdomen:  Soft, nontender, normal bowel sounds, no CVA tenderness, no tympany, no rigidity, no guarding  Extremities:  Normal range of motion  Neurologic:  Awake, fluent speech, normal comprehension. AAOx3. Cranial nerves 2-12 are intact, strength is 5/5 in the bilateral upper & lower extremities, no slurred speech, no facial droop, no deficit on finger-to-nose testing, no pronator drift.  Sensation to light touch is equal and symmetric throughout the whole body  Skin:  Pink warm and dry  Psychiatric: Appearance: Casually dressed.  Speech: Normal, not pressured.  Mood: Slightly agitated about being brought to the emergency department.  Affect: Mood congruent.  Thought process: Normal, goal-directed.  Thought content: As above          Critical Care Time:   Procedures      Past Medical:    has a past medical history of A-fib ([facility]), CHANA (acute kidney injury) ([facility]) (12/19/2022), Hernia, abdominal, Hypertension, and Subdural hematoma ([facility]).    Past Surgical:    has a past surgical history that includes  Appendectomy; Tonsillectomy; Colonoscopy; pr xcapsl ctrc rmvl insj io lens prosth w/o ecp (Right, 4/3/2017); Carpal tunnel release; Cardiac pacemaker placement; A-V cardiac pacemaker insertion; Inguinal hernia repair (Left); pr perq clsr tcat l atr apndge w/endocardial implnt (N/A, 12/8/2022); Cardiac electrophysiology procedure (N/A, 12/8/2022); IR upper extremity venogram- Diagnostic (12/19/2022); Cardiac catheterization (N/A, 12/19/2022); Cardiac electrophysiology procedure (Right, 12/20/2022); and pr xcapsl ctrc rmvl insj io lens prosth w/o ecp (Left, 2/6/2023).    Social:     Social History     Substance and Sexual Activity   Alcohol Use Never     Tobacco Use History[1]  Social History     Substance and Sexual Activity   Drug Use Never       Outpatient Medications:   Medications Ordered Prior to Encounter[2]  Prior to Admission Medications   Prescriptions Last Dose Informant Patient Reported? Taking?   Ferrous Sulfate (IRON PO)  Self Yes No   Sig: Take by mouth OTC   Patient not taking: Reported on 3/25/2025   Multiple Vitamin (multivitamin) tablet  Self Yes Yes   Sig: Take 1 tablet by mouth in the morning.   aspirin 81 mg chewable tablet  Self Yes Yes   Sig: Chew 81 mg in the morning.   gabapentin (NEURONTIN) 100 mg capsule  Self No No   Sig: Take 1 capsule (100 mg total) by mouth in the morning and 1 capsule (100 mg total) in the evening and 1 capsule (100 mg total) before bedtime.   Patient not taking: Reported on 9/26/2024   tamsulosin (FLOMAX) 0.4 mg  Self Yes Yes   Sig: Take 0.4 mg by mouth daily with dinner   torsemide (DEMADEX) 20 mg tablet  Self Yes No   Sig: Take 20 mg by mouth as needed (for weight gain > 3lbs/day or wt > 165 lbs)   Patient not taking: Reported on 3/25/2025      Facility-Administered Medications: None           Medications - No data to display  CT head without contrast   Final Result      1.  No acute intracranial hemorrhage, mass effect or edema.   2.  Mild, chronic microangiopathy  and small chronic right temporal lobe infarction are stable.                  Workstation performed: [id number]           Orders Placed This Encounter   Procedures    CT head without contrast    Rapid drug screen, urine    CBC and differential    Comprehensive metabolic panel    TSH    Ethanol    Diet Regular; Finger Foods    Nursing communication Prepare room for behavioral health patient    Q 15 min Patient Safety Checks    Consult to ED Crisis Worker    ECG 12 lead     Labs Reviewed   CBC AND DIFFERENTIAL - Abnormal       Result Value Ref Range Status    WBC 6.09  4.31 - 10.16 Thousand/uL Final    RBC 5.44  3.88 - 5.62 Million/uL Final    Hemoglobin 11.2 (*) 12.0 - 17.0 g/dL Final    Hematocrit 35.8 (*) 36.5 - 49.3 % Final    MCV 66 (*) 82 - 98 fL Final    MCH 20.6 (*) 26.8 - 34.3 pg Final    MCHC 31.3 (*) 31.4 - 37.4 g/dL Final    RDW 19.4 (*) 11.6 - 15.1 % Final    MPV 9.6  8.9 - 12.7 fL Final    Platelets 204  149 - 390 Thousands/uL Final    nRBC 0  /100 WBCs Final    Segmented % 60  43 - 75 % Final    Immature Grans % 1  0 - 2 % Final    Lymphocytes % 24  14 - 44 % Final    Monocytes % 12  4 - 12 % Final    Eosinophils Relative 2  0 - 6 % Final    Basophils Relative 1  0 - 1 % Final    Absolute Neutrophils 3.74  1.85 - 7.62 Thousands/µL Final    Absolute Immature Grans 0.03  0.00 - 0.20 Thousand/uL Final    Absolute Lymphocytes 1.45  0.60 - 4.47 Thousands/µL Final    Absolute Monocytes 0.70  0.17 - 1.22 Thousand/µL Final    Eosinophils Absolute 0.13  0.00 - 0.61 Thousand/µL Final    Basophils Absolute 0.04  0.00 - 0.10 Thousands/µL Final   COMPREHENSIVE METABOLIC PANEL - Abnormal    Sodium 138  135 - 147 mmol/L Final    Potassium 4.3  3.5 - 5.3 mmol/L Final    Chloride 106  96 - 108 mmol/L Final    CO2 25  21 - 32 mmol/L Final    ANION GAP 7  4 - 13 mmol/L Final    BUN 21  5 - 25 mg/dL Final    Creatinine 1.15  0.60 - 1.30 mg/dL Final    Comment: Standardized to IDMS reference method    Glucose 100  65 - 140  mg/dL Final    Comment: If the patient is fasting, the ADA then defines impaired fasting glucose as > 100 mg/dL and diabetes as > or equal to 123 mg/dL.    Calcium 9.1  8.4 - 10.2 mg/dL Final    AST 59 (*) 13 - 39 U/L Final    ALT 57 (*) 7 - 52 U/L Final    Comment: Specimen collection should occur prior to Sulfasalazine administration due to the potential for falsely depressed results.     Alkaline Phosphatase 84  34 - 104 U/L Final    Total Protein 6.9  6.4 - 8.4 g/dL Final    Albumin 4.4  3.5 - 5.0 g/dL Final    Total Bilirubin 0.97  0.20 - 1.00 mg/dL Final    Comment: Use of this assay is not recommended for patients undergoing treatment with eltrombopag due to the potential for falsely elevated results.  N-acetyl-p-benzoquinone imine (metabolite of Acetaminophen) will generate erroneously low results in samples for patients that have taken an overdose of Acetaminophen.    eGFR 56  ml/min/1.73sq m Final    Narrative:     National Kidney Disease Foundation guidelines for Chronic Kidney Disease (CKD):                     Stage 1 with normal or high GFR (GFR > 90 mL/min/1.73 square meters)                    Stage 2 Mild CKD (GFR = 60-89 mL/min/1.73 square meters)                    Stage 3A Moderate CKD (GFR = 45-59 mL/min/1.73 square meters)                    Stage 3B Moderate CKD (GFR = 30-44 mL/min/1.73 square meters)                    Stage 4 Severe CKD (GFR = 15-29 mL/min/1.73 square meters)                    Stage 5 End Stage CKD (GFR <15 mL/min/1.73 square meters)                  Note: GFR calculation is accurate only with a steady state creatinine   RAPID DRUG SCREEN, URINE - Normal    Amph/Meth UR Negative  Negative Final    Barbiturate Ur Negative  Negative Final    Benzodiazepine Urine Negative  Negative Final    Cocaine Urine Negative  Negative Final    Methadone Urine Negative  Negative Final    Opiate Urine Negative  Negative Final    PCP Ur Negative  Negative Final    THC Urine Negative   Negative Final    Oxycodone Urine Negative  Negative Final    Fentanyl Urine Negative  Negative Final    HYDROCODONE URINE Negative  Negative Final    Narrative:     FOR MEDICAL PURPOSES ONLY.                   IF CONFIRMATION NEEDED PLEASE CONTACT THE LAB WITHIN 5 DAYS.                                    Drug Screen Cutoff Levels:                  AMPHETAMINE/METHAMPHETAMINES  1000 ng/mL                  BARBITURATES     200 ng/mL                  BENZODIAZEPINES     200 ng/mL                  COCAINE      300 ng/mL                  METHADONE      300 ng/mL                  OPIATES      300 ng/mL                  PHENCYCLIDINE     25 ng/mL                  THC       50 ng/mL                  OXYCODONE      100 ng/mL                  FENTANYL      5 ng/mL                  HYDROCODONE     300 ng/mL   TSH, 3RD GENERATION - Normal    TSH 3RD GENERATON 3.134  0.450 - 4.500 uIU/mL Final    Comment: Adult TSH (3rd generation) reference range follows the recommended guidelines of the American Thyroid Association, January, 2020.   MEDICAL ALCOHOL - Normal    Ethanol Lvl <10  <10 mg/dL Final     Time reflects when diagnosis was documented in both MDM as applicable and the Disposition within this note       Time User Action Codes Description Comment    6/3/2025  9:59 PM Bari Yap [F22] Acute paranoia (HCC)     6/3/2025 10:50 PM Bari Yap [R45.851] Suicidal ideation           ED Disposition       ED Disposition   Transfer to Behavioral Health Condition   --    Date/Time   Tue Ralph 3, 2025 10:50 PM    Comment   Matias Rodriguez should be transferred out to  and has been medically cleared.               Follow-up Information    None       Patient's Medications   Discharge Prescriptions    No medications on file     No discharge procedures on file.                       Electronically signed by:  Bari Yap D.O.         [1]   Social History  Tobacco Use   Smoking Status Never   Smokeless Tobacco Never   [2]   No  current facility-administered medications on file prior to encounter.     Current Outpatient Medications on File Prior to Encounter   Medication Sig Dispense Refill    aspirin 81 mg chewable tablet Chew 81 mg in the morning.      Multiple Vitamin (multivitamin) tablet Take 1 tablet by mouth in the morning.      tamsulosin (FLOMAX) 0.4 mg Take 0.4 mg by mouth daily with dinner      Ferrous Sulfate (IRON PO) Take by mouth OTC (Patient not taking: Reported on 3/25/2025)      gabapentin (NEURONTIN) 100 mg capsule Take 1 capsule (100 mg total) by mouth in the morning and 1 capsule (100 mg total) in the evening and 1 capsule (100 mg total) before bedtime. (Patient not taking: Reported on 9/26/2024) 1 capsule 0    torsemide (DEMADEX) 20 mg tablet Take 20 mg by mouth as needed (for weight gain > 3lbs/day or wt > 165 lbs) (Patient not taking: Reported on 3/25/2025)          Bari Yap DO  06/03/25 1365

## 2025-06-04 LAB
BILIRUB UR QL STRIP: NEGATIVE
CLARITY UR: CLEAR
COLOR UR: COLORLESS
GLUCOSE UR STRIP-MCNC: NEGATIVE MG/DL
HGB UR QL STRIP.AUTO: NEGATIVE
KETONES UR STRIP-MCNC: NEGATIVE MG/DL
LEUKOCYTE ESTERASE UR QL STRIP: NEGATIVE
NITRITE UR QL STRIP: NEGATIVE
PH UR STRIP.AUTO: 6.5 [PH]
PROT UR STRIP-MCNC: NEGATIVE MG/DL
SP GR UR STRIP.AUTO: 1 (ref 1–1.03)
UROBILINOGEN UR STRIP-ACNC: <2 MG/DL

## 2025-06-04 PROCEDURE — 81003 URINALYSIS AUTO W/O SCOPE: CPT | Performed by: EMERGENCY MEDICINE

## 2025-06-04 RX ORDER — ASPIRIN 81 MG/1
81 TABLET, CHEWABLE ORAL ONCE
Status: COMPLETED | OUTPATIENT
Start: 2025-06-04 | End: 2025-06-04

## 2025-06-04 RX ADMIN — ASPIRIN 81 MG: 81 TABLET, CHEWABLE ORAL at 15:31

## 2025-06-04 NOTE — ED NOTES
Intake / safety assessment completed with patient and patient's family.      Patient is an 89 year old male who presents to ED with his family members due to increased paranoia. Patient's family is concerned as they report patient has not been acting himself. Family report that for the last 2 weeks patient has had increased paranoia. Per patient's daughter and son patient believes someone (the neighbor) has been spying on him and breaking into his home. Patient also believes the neighbor may be poisoning his food and listening to him on the phone. Also family reported that patient thought neighbor came into his home and took the dogs toy and replaced with another toy. As a result family reports that patient is up all night and hasn't been able to sleep until last evening when he took a seroquel which was recently prescribed by his PCP. Patient reported he doesn't wish to continue to take the medication anymore as he didn't like the way it made him feel. Patient's daughter reports she heard patient make statements that he does not want to be around and he wished to end his life if the neighbors are going to continue to spy on him. The daughter reports that she and the family have placed multiple cameras around the house to help keep an eye on the patient as he does live alone and that patient will frequently turn the cameras down so he cannot be seen as he is concerned that the neighbors are spying through these cameras on him.     Currently patient denies any suicidal or homicidal thoughts. Patient also denied any hallucinations. Patient was a bit agitated when he first arrived at the hospital, but has calmed down. Patient's family noted that patient is self sufficient and is able to care for himself; however they check on patient on a daily basis given his age. Patient doesn't have a psychiatric history or no previous inpatient admissions.     Family concerned patient is not himself given patient's paranoia .  They  have also heard patient make suicidal statements.  Patient's son stated he would be willing to petition 302 if father was not in agreement with treatment.  noted he would uphold 302 if patient was not agreeable.      Patient himself agreed to sign 201.  Rights were reviewed.      Family would like to be updated on patient's admission.

## 2025-06-04 NOTE — ED NOTES
Pt agitated with wait. Daughter remains at bedside. Provider at bedside speaking with pt and and daughter.      Mariah Welch RN  06/03/25 7949

## 2025-06-04 NOTE — ED NOTES
There are no in-network beds available today.  A call was placed to the patient's daughter, Philomena, to inform her that the patient will remain in the ED for the night.  The patient was informed that there were no beds available as well.  He showed frustration and despair about having to stay in the ED another night.  He is fearful that there will not be a bed available for him tomorrow.  The patient stated that some family members will be visiting him in the ED tonight, which makes him happy.

## 2025-06-04 NOTE — ED NOTES
"Pt reports he missed urinal and is requesting new underwear and pants. Patient assisted to BR and provided with new paper scrubs and underwear as well as wipes to clean with. After changing, pt walked back to room and assisted back into chair. This RN talked with pt regarding him not sleeping Pt reports \"I never sleep at night. I usually fall asleep to the morning news around 4 or 5 in the morning.\" Pt expressed that he was worried about his house stating \"I hate being stuck here with my house all wide open. The neighbors are all going to know if I am gone for 8 days that I went to the [employer]. They will be watching and they will all know.\"      Mariah Welch RN  06/04/25 4023    "

## 2025-06-04 NOTE — ED NOTES
Cw confirmed with intake, currently no in network beds available.  Pt requesting in network bed placement only at this time.  Pt aware that their currently no beds available in network.  Pt family wants to be kept aware when placement is found.

## 2025-06-04 NOTE — ED NOTES
"Pt speaking with daughter and two grandsons at bedside as well as his son on phone. Pt's son requesting pt to stay for psychiatric help regarding the paranoia and reports of SI statements pt made earlier today. Per family pt stated PTA \"If the neighbors don't stop watching me in these cameras I might just have to end my life. I can't go on this way.\" Pt reports he made these statements out of frustration and states \"Yes I said that, but I wouldn't do anything to hurt myself!\" Pt noted to be agitated and frustrated with family suggestions. And continues to refuse to stay in hospital. Pt continues to make statements about going home to lock the doors or check the house.   PT's son on phone reports if pt does not agree to voluntarily sign himself in then he will come to the hospital and sign a 302. Provider aware.      Mariah Welch RN  06/03/25 4249    "

## 2025-06-04 NOTE — ED NOTES
Pt C/O having restless legs and feeling uncomfortable on the stretcher. Pt set up with recliner reports feeling more comfortable. Continuous virtual monitoring remains.      Mariah Welch RN  06/04/25 0144

## 2025-06-04 NOTE — ED NOTES
Pt's family left for the night. Pt provided with snacks and drinks and helped with TV. Pt sitting on side of bed watching tv and remains pleasant and cooperative at this time.      Mariah Welch RN  06/03/25 4543

## 2025-06-05 ENCOUNTER — HOSPITAL ENCOUNTER (INPATIENT)
Facility: HOSPITAL | Age: 89
LOS: 7 days | Discharge: HOME | DRG: 881 | End: 2025-06-12
Attending: STUDENT IN AN ORGANIZED HEALTH CARE EDUCATION/TRAINING PROGRAM | Admitting: STUDENT IN AN ORGANIZED HEALTH CARE EDUCATION/TRAINING PROGRAM
Payer: MEDICARE

## 2025-06-05 VITALS
DIASTOLIC BLOOD PRESSURE: 64 MMHG | OXYGEN SATURATION: 97 % | SYSTOLIC BLOOD PRESSURE: 137 MMHG | HEART RATE: 72 BPM | RESPIRATION RATE: 18 BRPM | TEMPERATURE: 98.6 F

## 2025-06-05 DIAGNOSIS — Z00.8 MEDICAL CLEARANCE FOR PSYCHIATRIC ADMISSION: ICD-10-CM

## 2025-06-05 DIAGNOSIS — F39 UNSPECIFIED MOOD (AFFECTIVE) DISORDER (HCC): Primary | ICD-10-CM

## 2025-06-05 LAB
ATRIAL RATE: 72 BPM
QRS AXIS: 96 DEGREES
QRSD INTERVAL: 138 MS
QT INTERVAL: 434 MS
QTC INTERVAL: 479 MS
T WAVE AXIS: 42 DEGREES
VENTRICULAR RATE: 73 BPM

## 2025-06-05 PROCEDURE — GZHZZZZ GROUP PSYCHOTHERAPY: ICD-10-PCS | Performed by: STUDENT IN AN ORGANIZED HEALTH CARE EDUCATION/TRAINING PROGRAM

## 2025-06-05 PROCEDURE — 93005 ELECTROCARDIOGRAM TRACING: CPT

## 2025-06-05 PROCEDURE — 93010 ELECTROCARDIOGRAM REPORT: CPT | Performed by: INTERNAL MEDICINE

## 2025-06-05 PROCEDURE — GZ59ZZZ INDIVIDUAL PSYCHOTHERAPY, PSYCHOPHYSIOLOGICAL: ICD-10-PCS | Performed by: STUDENT IN AN ORGANIZED HEALTH CARE EDUCATION/TRAINING PROGRAM

## 2025-06-05 RX ORDER — LORAZEPAM 2 MG/ML
1 INJECTION INTRAMUSCULAR
Status: DISCONTINUED | OUTPATIENT
Start: 2025-06-05 | End: 2025-06-12 | Stop reason: HOSPADM

## 2025-06-05 RX ORDER — POLYETHYLENE GLYCOL 3350 17 G/17G
17 POWDER, FOR SOLUTION ORAL DAILY PRN
Status: CANCELLED | OUTPATIENT
Start: 2025-06-05

## 2025-06-05 RX ORDER — ACETAMINOPHEN 325 MG/1
650 TABLET ORAL EVERY 4 HOURS PRN
Status: CANCELLED | OUTPATIENT
Start: 2025-06-05

## 2025-06-05 RX ORDER — ACETAMINOPHEN 325 MG/1
650 TABLET ORAL EVERY 4 HOURS PRN
Status: DISCONTINUED | OUTPATIENT
Start: 2025-06-05 | End: 2025-06-12 | Stop reason: HOSPADM

## 2025-06-05 RX ORDER — TRAZODONE HYDROCHLORIDE 50 MG/1
50 TABLET ORAL
Status: CANCELLED | OUTPATIENT
Start: 2025-06-05

## 2025-06-05 RX ORDER — OLANZAPINE 5 MG/1
5 TABLET, FILM COATED ORAL
Status: CANCELLED | OUTPATIENT
Start: 2025-06-05

## 2025-06-05 RX ORDER — AMOXICILLIN 250 MG
1 CAPSULE ORAL DAILY PRN
Status: DISCONTINUED | OUTPATIENT
Start: 2025-06-05 | End: 2025-06-12 | Stop reason: HOSPADM

## 2025-06-05 RX ORDER — OLANZAPINE 10 MG/2ML
5 INJECTION, POWDER, FOR SOLUTION INTRAMUSCULAR
Status: DISCONTINUED | OUTPATIENT
Start: 2025-06-05 | End: 2025-06-12 | Stop reason: HOSPADM

## 2025-06-05 RX ORDER — HYDROXYZINE HYDROCHLORIDE 25 MG/1
50 TABLET, FILM COATED ORAL
Status: CANCELLED | OUTPATIENT
Start: 2025-06-05

## 2025-06-05 RX ORDER — OLANZAPINE 2.5 MG/1
2.5 TABLET, FILM COATED ORAL
Status: CANCELLED | OUTPATIENT
Start: 2025-06-05

## 2025-06-05 RX ORDER — TAMSULOSIN HYDROCHLORIDE 0.4 MG/1
0.4 CAPSULE ORAL
Status: DISCONTINUED | OUTPATIENT
Start: 2025-06-06 | End: 2025-06-12 | Stop reason: HOSPADM

## 2025-06-05 RX ORDER — LORAZEPAM 1 MG/1
1 TABLET ORAL
Status: CANCELLED | OUTPATIENT
Start: 2025-06-05

## 2025-06-05 RX ORDER — LORAZEPAM 1 MG/1
1 TABLET ORAL
Status: DISCONTINUED | OUTPATIENT
Start: 2025-06-05 | End: 2025-06-12 | Stop reason: HOSPADM

## 2025-06-05 RX ORDER — HYDROXYZINE HYDROCHLORIDE 25 MG/1
25 TABLET, FILM COATED ORAL
Status: CANCELLED | OUTPATIENT
Start: 2025-06-05

## 2025-06-05 RX ORDER — ASPIRIN 81 MG/1
81 TABLET, CHEWABLE ORAL DAILY
Status: DISCONTINUED | OUTPATIENT
Start: 2025-06-06 | End: 2025-06-12 | Stop reason: HOSPADM

## 2025-06-05 RX ORDER — OLANZAPINE 10 MG/2ML
5 INJECTION, POWDER, FOR SOLUTION INTRAMUSCULAR
Status: CANCELLED | OUTPATIENT
Start: 2025-06-05

## 2025-06-05 RX ORDER — LORAZEPAM 2 MG/ML
1 INJECTION INTRAMUSCULAR
Status: CANCELLED | OUTPATIENT
Start: 2025-06-05

## 2025-06-05 RX ORDER — POLYETHYLENE GLYCOL 3350 17 G/17G
17 POWDER, FOR SOLUTION ORAL DAILY PRN
Status: DISCONTINUED | OUTPATIENT
Start: 2025-06-05 | End: 2025-06-12 | Stop reason: HOSPADM

## 2025-06-05 RX ORDER — HYDROXYZINE HYDROCHLORIDE 50 MG/1
50 TABLET, FILM COATED ORAL
Status: DISCONTINUED | OUTPATIENT
Start: 2025-06-05 | End: 2025-06-12 | Stop reason: HOSPADM

## 2025-06-05 RX ORDER — ASPIRIN 81 MG/1
81 TABLET, CHEWABLE ORAL DAILY
Status: DISCONTINUED | OUTPATIENT
Start: 2025-06-05 | End: 2025-06-05 | Stop reason: HOSPADM

## 2025-06-05 RX ORDER — ACETAMINOPHEN 325 MG/1
975 TABLET ORAL EVERY 6 HOURS PRN
Status: DISCONTINUED | OUTPATIENT
Start: 2025-06-05 | End: 2025-06-12 | Stop reason: HOSPADM

## 2025-06-05 RX ORDER — OLANZAPINE 5 MG/1
5 TABLET, FILM COATED ORAL
Status: DISCONTINUED | OUTPATIENT
Start: 2025-06-05 | End: 2025-06-12 | Stop reason: HOSPADM

## 2025-06-05 RX ORDER — OLANZAPINE 2.5 MG/1
2.5 TABLET, FILM COATED ORAL
Status: DISCONTINUED | OUTPATIENT
Start: 2025-06-05 | End: 2025-06-12 | Stop reason: HOSPADM

## 2025-06-05 RX ORDER — ACETAMINOPHEN 325 MG/1
975 TABLET ORAL EVERY 6 HOURS PRN
Status: CANCELLED | OUTPATIENT
Start: 2025-06-05

## 2025-06-05 RX ORDER — HYDROXYZINE HYDROCHLORIDE 25 MG/1
25 TABLET, FILM COATED ORAL
Status: DISCONTINUED | OUTPATIENT
Start: 2025-06-05 | End: 2025-06-12 | Stop reason: HOSPADM

## 2025-06-05 RX ORDER — AMOXICILLIN 250 MG
1 CAPSULE ORAL DAILY PRN
Status: CANCELLED | OUTPATIENT
Start: 2025-06-05

## 2025-06-05 RX ORDER — TRAZODONE HYDROCHLORIDE 50 MG/1
50 TABLET ORAL
Status: DISCONTINUED | OUTPATIENT
Start: 2025-06-05 | End: 2025-06-12 | Stop reason: HOSPADM

## 2025-06-05 RX ADMIN — ASPIRIN 81 MG: 81 TABLET, CHEWABLE ORAL at 08:12

## 2025-06-05 RX ADMIN — MULTIPLE VITAMINS W/ MINERALS TAB 1 TABLET: TAB ORAL at 08:12

## 2025-06-05 RX ADMIN — TRAZODONE HYDROCHLORIDE 50 MG: 50 TABLET ORAL at 22:43

## 2025-06-05 NOTE — NURSING NOTE
Call placed to [facility] in [place] for medication reconciliation.    Patient has been filling the following:    Seroquel 25mg HS  Topamax 0.4mg daily    No other meds filled since December 2024.

## 2025-06-05 NOTE — ED CARE HANDOFF
Emergency Department Sign Out Note        Sign out and transfer of care from Dr. Bledsoe. See Separate Emergency Department note.     The patient, Matias Rodriguez, was evaluated by the previous provider for paranoia and SI.    Workup Completed:  Labs and imaging normal.  Medically cleared.  201 signed, pending placement.    ED Course / Workup Pending (followup):  Pending placement.        No data recorded                          ED Course as of 06/05/25 0721   Thu Jun 05, 2025   0156 Pt is an 89 yom who presented with 2 weeks of paranoia and SI.  No history of mental health disorders or dementia.  Workup unrevealing.  201 signed pending placement, however family is only willing to accept in network placement and no beds are available. Has not required prn behavioral medications.  Pt resting comfortably, has virtual 1:1. Pt has no complaints at this time.    0719 No events overnight.   Patient has no complaints at this time.  VS stable.  Signed out to Dr. Christiansen pending placement.     Procedures  Medical Decision Making  See ED course.    Amount and/or Complexity of Data Reviewed  Labs: ordered.  Radiology: ordered.    Risk  OTC drugs.  Decision regarding hospitalization.            Disposition  Final diagnoses:   Acute paranoia (HCC)   Suicidal ideation     Time reflects when diagnosis was documented in both MDM as applicable and the Disposition within this note       Time User Action Codes Description Comment    6/3/2025  9:59 PM Bari Yap [F22] Acute paranoia (HCC)     6/3/2025 10:50 PM Bari Yap [R45.851] Suicidal ideation           ED Disposition       ED Disposition   Transfer to Behavioral Health Condition   --    Date/Time   Tue Ralph 3, 2025 10:50 PM    Comment   Matias Rodriguez should be transferred out to  and has been medically cleared.               Follow-up Information    None       Patient's Medications   Discharge Prescriptions    No medications on file     No discharge procedures on  file.       ED Provider  Electronically Signed by     Ceci Garcia,   06/05/25 0766

## 2025-06-05 NOTE — ED NOTES
Patient is accepted at [facility].  Patient is accepted by Dr. Ronna Samson.     Transportation is arranged with [employer].     Transportation is scheduled for 17:00.   Patient may go to the floor at 16:00.          Nurse report is to be called to [phone] prior to patient transfer.

## 2025-06-05 NOTE — EMTALA/ACUTE CARE TRANSFER
Formerly [facility]ERGENCY DEPARTMENT   [facility] [address]  Dept: [phone]      EMTALA TRANSFER CONSENT    NAME Matias DASH 1935                              MRN [id number]    I have been informed of my rights regarding examination, treatment, and transfer   by Dr. Noel Christiansen MD    Benefits: Specialized equipment and/or services available at the receiving facility (Include comment)________________________    Risks: Potential for delay in receiving treatment      Transfer Request   I acknowledge that my medical condition has been evaluated and explained to me by the emergency department physician or other qualified medical person and/or my attending physician who has recommended and offered to me further medical examination and treatment. I understand the Hospital's obligation with respect to the treatment and stabilization of my emergency medical condition. I nevertheless request to be transferred. I release the Hospital, the doctor, and any other persons caring for me from all responsibility or liability for any injury or ill effects that may result from my transfer and agree to accept all responsibility for the consequences of my choice to transfer, rather than receive stabilizing treatment at the Hospital. I understand that because the transfer is my request, my insurance may not provide reimbursement for the services.  The Hospital will assist and direct me and my family in how to make arrangements for transfer, but the hospital is not liable for any fees charged by the transport service.  In spite of this understanding, I refuse to consent to further medical examination and treatment which has been offered to me, and request transfer to Accepting Facility Name, City & State : [facility] ER. I authorize the performance of emergency medical procedures and treatments upon me in both transit and upon  arrival at the receiving facility.  Additionally, I authorize the release of any and all medical records to the receiving facility and request they be transported with me, if possible.    I authorize the performance of emergency medical procedures and treatments upon me in both transit and upon arrival at the receiving facility.  Additionally, I authorize the release of any and all medical records to the receiving facility and request they be transported with me, if possible.  I understand that the safest mode of transportation during a medical emergency is an ambulance and that the Hospital advocates the use of this mode of transport. Risks of traveling to the receiving facility by car, including absence of medical control, life sustaining equipment, such as oxygen, and medical personnel has been explained to me and I fully understand them.    (WILLIAM CORRECT BOX BELOW)  [ x ]  I consent to the stated transfer and to be transported by ambulance/helicopter.  [  ]  I consent to the stated transfer, but refuse transportation by ambulance and accept full responsibility for my transportation by car.  I understand the risks of non-ambulance transfers and I exonerate the Hospital and its staff from any deterioration in my condition that results from this refusal.    X___________________________________________    DATE  25  TIME________  Signature of patient or legally responsible individual signing on patient behalf           RELATIONSHIP TO PATIENT_________________________          Provider Certification    NAME Matias Rodriguez                                        [facility] 1935                              MRN [id number]    A medical screening exam was performed on the above named patient.  Based on the examination:    Condition Necessitating Transfer The primary encounter diagnosis was Acute paranoia (HCC). Diagnoses of Suicidal ideation and Medical clearance for psychiatric admission were also pertinent to this  visit.    Patient Condition: The patient has been stabilized such that within reasonable medical probability, no material deterioration of the patient condition or the condition of the unborn child(emilee) is likely to result from the transfer    Reason for Transfer: Level of Care needed not available at this facility    Transfer Requirements: Facility [facility] available and qualified personnel available for treatment as acknowledged by pacs  Agreed to accept transfer and to provide appropriate medical treatment as acknowledged by       dr. patten  Appropriate medical records of the examination and treatment of the patient are provided at the time of transfer   STAFF INITIAL WHEN COMPLETED _______  Transfer will be performed by qualified personnel from [facility]  and appropriate transfer equipment as required, including the use of necessary and appropriate life support measures.    Provider Certification: I have examined the patient and explained the following risks and benefits of being transferred/refusing transfer to the patient/family:  General risk, such as traffic hazards, adverse weather conditions, rough terrain or turbulence, possible failure of equipment (including vehicle or aircraft), or consequences of actions of persons outside the control of the transport personnel      Based on these reasonable risks and benefits to the patient and/or the unborn child(emilee), and based upon the information available at the time of the patient’s examination, I certify that the medical benefits reasonably to be expected from the provision of appropriate medical treatments at another medical facility outweigh the increasing risks, if any, to the individual’s medical condition, and in the case of labor to the unborn child, from effecting the transfer.    X____________________________________________ DATE 06/05/25        TIME_______      ORIGINAL - SEND TO MEDICAL RECORDS   COPY - SEND WITH PATIENT DURING  TRANSFER

## 2025-06-05 NOTE — NURSING NOTE
Received call at admission time from Pt's daughter; Philomena who is very involved in care. Pt gave verbal permission to speak w/ Philomena, and plans to sign REDD. Philomena able to verify Pt med list. Along w/ verified meds she describes Pt as starting to take Seroquel one day prior to admission to ED, but had only received one dose. Philomena is unsure of dose at this time, but would like to be involved in care throughout admission. Philomena describes Pt as having HX of HTN and Afib. Pt has a cardiac pacemaker, and  is not on anticoagulants due to receiving watchman device. It is unclear why Pt is not currently on medications for HTN. Philomena can be reached at .

## 2025-06-06 PROBLEM — E53.8 B12 DEFICIENCY: Status: ACTIVE | Noted: 2025-06-06

## 2025-06-06 PROBLEM — E55.9 VITAMIN D DEFICIENCY: Status: ACTIVE | Noted: 2025-06-06

## 2025-06-06 PROBLEM — N40.0 BPH (BENIGN PROSTATIC HYPERPLASIA): Status: ACTIVE | Noted: 2025-06-06

## 2025-06-06 PROBLEM — R73.09 ELEVATED HEMOGLOBIN A1C: Status: ACTIVE | Noted: 2025-06-06

## 2025-06-06 PROBLEM — Z00.8 MEDICAL CLEARANCE FOR PSYCHIATRIC ADMISSION: Status: ACTIVE | Noted: 2025-06-06

## 2025-06-06 PROBLEM — F39 UNSPECIFIED MOOD (AFFECTIVE) DISORDER (HCC): Status: ACTIVE | Noted: 2025-06-06

## 2025-06-06 LAB
25(OH)D3 SERPL-MCNC: 22.2 NG/ML (ref 30–100)
ALBUMIN SERPL BCG-MCNC: 3.6 G/DL (ref 3.5–5)
ALP SERPL-CCNC: 62 U/L (ref 34–104)
ALT SERPL W P-5'-P-CCNC: 30 U/L (ref 7–52)
ANION GAP SERPL CALCULATED.3IONS-SCNC: 6 MMOL/L (ref 4–13)
AST SERPL W P-5'-P-CCNC: 23 U/L (ref 13–39)
BASOPHILS # BLD AUTO: 0.03 THOUSANDS/ÂΜL (ref 0–0.1)
BASOPHILS NFR BLD AUTO: 1 % (ref 0–1)
BILIRUB SERPL-MCNC: 1.03 MG/DL (ref 0.2–1)
BUN SERPL-MCNC: 17 MG/DL (ref 5–25)
CALCIUM SERPL-MCNC: 8.6 MG/DL (ref 8.4–10.2)
CHLORIDE SERPL-SCNC: 108 MMOL/L (ref 96–108)
CO2 SERPL-SCNC: 26 MMOL/L (ref 21–32)
CREAT SERPL-MCNC: 0.82 MG/DL (ref 0.6–1.3)
EOSINOPHIL # BLD AUTO: 0.2 THOUSAND/ÂΜL (ref 0–0.61)
EOSINOPHIL NFR BLD AUTO: 4 % (ref 0–6)
ERYTHROCYTE [DISTWIDTH] IN BLOOD BY AUTOMATED COUNT: 18 % (ref 11.6–15.1)
EST. AVERAGE GLUCOSE BLD GHB EST-MCNC: 117 MG/DL
FOLATE SERPL-MCNC: 20.7 NG/ML
GFR SERPL CREATININE-BSD FRML MDRD: 78 ML/MIN/1.73SQ M
GLUCOSE P FAST SERPL-MCNC: 102 MG/DL (ref 65–99)
GLUCOSE SERPL-MCNC: 102 MG/DL (ref 65–140)
HBA1C MFR BLD: 5.7 %
HCT VFR BLD AUTO: 31.4 % (ref 36.5–49.3)
HGB BLD-MCNC: 9.8 G/DL (ref 12–17)
IMM GRANULOCYTES # BLD AUTO: 0.02 THOUSAND/UL (ref 0–0.2)
IMM GRANULOCYTES NFR BLD AUTO: 0 % (ref 0–2)
LYMPHOCYTES # BLD AUTO: 1.34 THOUSANDS/ÂΜL (ref 0.6–4.47)
LYMPHOCYTES NFR BLD AUTO: 30 % (ref 14–44)
MAGNESIUM SERPL-MCNC: 2.3 MG/DL (ref 1.9–2.7)
MCH RBC QN AUTO: 20.5 PG (ref 26.8–34.3)
MCHC RBC AUTO-ENTMCNC: 31.2 G/DL (ref 31.4–37.4)
MCV RBC AUTO: 66 FL (ref 82–98)
MONOCYTES # BLD AUTO: 0.5 THOUSAND/ÂΜL (ref 0.17–1.22)
MONOCYTES NFR BLD AUTO: 11 % (ref 4–12)
NEUTROPHILS # BLD AUTO: 2.44 THOUSANDS/ÂΜL (ref 1.85–7.62)
NEUTS SEG NFR BLD AUTO: 54 % (ref 43–75)
NRBC BLD AUTO-RTO: 0 /100 WBCS
PHOSPHATE SERPL-MCNC: 3 MG/DL (ref 2.3–4.1)
PLATELET # BLD AUTO: 161 THOUSANDS/UL (ref 149–390)
PMV BLD AUTO: 9.1 FL (ref 8.9–12.7)
POTASSIUM SERPL-SCNC: 3.8 MMOL/L (ref 3.5–5.3)
PROT SERPL-MCNC: 5.7 G/DL (ref 6.4–8.4)
RBC # BLD AUTO: 4.77 MILLION/UL (ref 3.88–5.62)
SODIUM SERPL-SCNC: 140 MMOL/L (ref 135–147)
T4 FREE SERPL-MCNC: 0.9 NG/DL (ref 0.61–1.12)
TSH SERPL DL<=0.05 MIU/L-ACNC: 5.66 UIU/ML (ref 0.45–4.5)
VIT B12 SERPL-MCNC: 265 PG/ML (ref 180–914)
WBC # BLD AUTO: 4.53 THOUSAND/UL (ref 4.31–10.16)

## 2025-06-06 PROCEDURE — 97163 PT EVAL HIGH COMPLEX 45 MIN: CPT

## 2025-06-06 PROCEDURE — 99221 1ST HOSP IP/OBS SF/LOW 40: CPT | Performed by: NURSE PRACTITIONER

## 2025-06-06 PROCEDURE — 85025 COMPLETE CBC W/AUTO DIFF WBC: CPT

## 2025-06-06 PROCEDURE — 83735 ASSAY OF MAGNESIUM: CPT

## 2025-06-06 PROCEDURE — 84443 ASSAY THYROID STIM HORMONE: CPT

## 2025-06-06 PROCEDURE — 80053 COMPREHEN METABOLIC PANEL: CPT

## 2025-06-06 PROCEDURE — 82746 ASSAY OF FOLIC ACID SERUM: CPT

## 2025-06-06 PROCEDURE — 82306 VITAMIN D 25 HYDROXY: CPT

## 2025-06-06 PROCEDURE — 83036 HEMOGLOBIN GLYCOSYLATED A1C: CPT

## 2025-06-06 PROCEDURE — 84439 ASSAY OF FREE THYROXINE: CPT

## 2025-06-06 PROCEDURE — 82607 VITAMIN B-12: CPT

## 2025-06-06 PROCEDURE — 84100 ASSAY OF PHOSPHORUS: CPT

## 2025-06-06 PROCEDURE — 97167 OT EVAL HIGH COMPLEX 60 MIN: CPT

## 2025-06-06 PROCEDURE — 99223 1ST HOSP IP/OBS HIGH 75: CPT | Performed by: PSYCHIATRY & NEUROLOGY

## 2025-06-06 RX ORDER — ERGOCALCIFEROL 1.25 MG/1
50000 CAPSULE, LIQUID FILLED ORAL WEEKLY
Status: DISCONTINUED | OUTPATIENT
Start: 2025-06-07 | End: 2025-06-12 | Stop reason: HOSPADM

## 2025-06-06 RX ORDER — FOLIC ACID 1 MG/1
1 TABLET ORAL DAILY
Status: DISCONTINUED | OUTPATIENT
Start: 2025-06-07 | End: 2025-06-12 | Stop reason: HOSPADM

## 2025-06-06 RX ORDER — CYANOCOBALAMIN 1000 UG/ML
1000 INJECTION, SOLUTION INTRAMUSCULAR; SUBCUTANEOUS ONCE
Status: COMPLETED | OUTPATIENT
Start: 2025-06-07 | End: 2025-06-07

## 2025-06-06 RX ORDER — ARIPIPRAZOLE 2 MG/1
2 TABLET ORAL DAILY
Status: DISCONTINUED | OUTPATIENT
Start: 2025-06-06 | End: 2025-06-12 | Stop reason: HOSPADM

## 2025-06-06 RX ADMIN — TRAZODONE HYDROCHLORIDE 50 MG: 50 TABLET ORAL at 21:48

## 2025-06-06 RX ADMIN — ASPIRIN 81 MG CHEWABLE TABLET 81 MG: 81 TABLET CHEWABLE at 08:49

## 2025-06-06 RX ADMIN — ARIPIPRAZOLE 2 MG: 2 TABLET ORAL at 11:25

## 2025-06-06 RX ADMIN — TAMSULOSIN HYDROCHLORIDE 0.4 MG: 0.4 CAPSULE ORAL at 16:07

## 2025-06-06 NOTE — TREATMENT TEAM
06/06/25 0807   Team Members Present   Team Members Present Physician;Nurse;;   Physician Team Member Cachorro/Zurdo/Gina/Maura/   Nursing Team Member Sruthi   Care Management Team Member Noris Paz   Social Work Team Member Ernesto   Patient/Family Present   Patient Present No   Patient's Family Present No     Pt is a 201 due to SI and paranoia. Pt denies SI. Pt endorses anxiety and depression. Pt reports his wife of 57 years passed away 5 years ago. Pt  denies any previous psych treatment.Patient discharge is pending stabilization of mood and medications.      Subjective:       Patient ID: Veronique Vick is a 34 y.o. female.    Chief Complaint: No chief complaint on file.    The patient location is: [facility]   The chief complaint leading to consultation is: Virtual visit for nausea, vomiting that started last night. Has since developed sore throat. Occupational exposure to known influenza/covid. Took Promethazine to assist with nausea. Pushing fluids. Reports chills, but states no fever.     Visit type: audiovisual    Face to Face time with patient: 5 minutes   8 minutes of total time spent on the encounter, which includes face to face time and non-face to face time preparing to see the patient (eg, review of tests), Obtaining and/or reviewing separately obtained history, Documenting clinical information in the electronic or other health record, Independently interpreting results (not separately reported) and communicating results to the patient/family/caregiver, or Care coordination (not separately reported).         Each patient to whom he or she provides medical services by telemedicine is:  (1) informed of the relationship between the physician and patient and the respective role of any other health care provider with respect to management of the patient; and (2) notified that he or she may decline to receive medical services by telemedicine and may withdraw from such care at any time.    Notes:         Sore Throat   This is a new problem. The current episode started today. The problem has been rapidly worsening. The pain is worse on the left side. There has been no fever. The pain is at a severity of 4/10. The pain is mild. Associated symptoms include abdominal pain, diarrhea, headaches and vomiting. Pertinent negatives include no congestion, coughing, drooling, ear discharge, ear pain, hoarse voice, plugged ear sensation, neck pain, shortness of breath, stridor, swollen glands or trouble swallowing. She has had no exposure to strep or mono. She has tried  acetaminophen and gargles for the symptoms. The treatment provided moderate relief.   Review of Systems   HENT:  Positive for sore throat. Negative for congestion, drooling, ear discharge, ear pain, hoarse voice and trouble swallowing.    Respiratory:  Negative for cough, shortness of breath and stridor.    Gastrointestinal:  Positive for abdominal pain, diarrhea and vomiting.   Musculoskeletal:  Negative for neck pain.   Neurological:  Positive for headaches.     Patient Active Problem List   Diagnosis    Wrist fracture    Closed fracture of distal end of left radius with routine healing    Pap smear of cervix with ASCUS, cannot exclude HGSIL    Migraine    Irritable bowel syndrome    Decreased strength    Muscle tightness    Posture abnormality       Objective:      Physical Exam  Constitutional:       General: She is not in acute distress.     Appearance: Normal appearance.   Eyes:      Conjunctiva/sclera: Conjunctivae normal.   Pulmonary:      Effort: Pulmonary effort is normal. No respiratory distress.   Neurological:      General: No focal deficit present.      Mental Status: She is alert and oriented to person, place, and time.   Psychiatric:         Mood and Affect: Mood normal.       Lab Results   Component Value Date    WBC 6.22 10/04/2022    HGB 12.7 10/04/2022    HCT 37.9 10/04/2022     10/04/2022    CHOL 208 (H) 10/04/2022    TRIG 76 10/04/2022    HDL 64 10/04/2022    ALT 23 10/04/2022    AST 19 10/04/2022     10/04/2022    K 3.8 10/04/2022     10/04/2022    CREATININE 0.8 10/04/2022    BUN 9 10/04/2022    CO2 22 (L) 10/04/2022    TSH 0.312 (L) 10/04/2022     The ASCVD Risk score (Willard DK, et al., 2019) failed to calculate for the following reasons:    The 2019 ASCVD risk score is only valid for ages 40 to 79  There were no vitals taken for this visit.   Assessment:       1. Exposure to influenza    2. Acute sore throat    3. Nausea and vomiting in adult    4. Viral gastritis           Plan:       1. Exposure to influenza  -     POCT Influenza A/B Molecular  -     POCT COVID-19 Rapid Screening; Future; Expected date: 10/27/2022  Obtain swabs  Push fluids  BRAT/clear liquid diet- advance as tolerated, Pepto for symptoms- avoid Imodium- allow to run its course.   2. Acute sore throat   Warm salt water gargles/throat lozenges.   No follow-ups on file.      Future Appointments       Date Provider Specialty Appt Notes    2/7/2023 Yevgeniy Fields MD Obstetrics and Gynecology repeat pap smear

## 2025-06-06 NOTE — TREATMENT TEAM
06/06/25 0915 06/06/25 1100 06/06/25 1330   Activity/Group Checklist   Group Other (Comment)  (Rec Tx: Open Recreation) Music Other (Comment)  (Positive reflection and gratitiude)   Attendance Attended Attended Did not attend   Attendance Duration (min) 31-45 16-30  --    Interactions Interacted appropriately Interacted appropriately  --    Affect/Mood Appropriate Appropriate  --    Goals Achieved Able to engage in interactions;Able to listen to others Able to listen to others;Able to engage in interactions  --

## 2025-06-06 NOTE — ASSESSMENT & PLAN NOTE
Has history of paroxysmal atrial fibrillation  Status post watchman's ALDO closure  Patient is only on aspirin 81 mg p.o. daily  Continue to monitor closely

## 2025-06-06 NOTE — TREATMENT TEAM
06/06/25 1442   Team Meeting   Meeting Type Daily Rounds   Team Members Present   Team Members Present Physician;Nurse;;   Physician Team Member Jeniffer   Nursing Team Member Katelyn   Care Management Team Member Noris Paz   Patient/Family Present   Patient Present No   Patient's Family Present No   OTHER   Team Meeting - Additional Comments Pt revied , agreed to, and signed the treatment plan.     Pt revied , agreed to, and signed the treatment plan.

## 2025-06-06 NOTE — PROGRESS NOTES
Progress Note - Behavioral Health   Name: Matias Rodriguez 89 y.o. male I MRN: [id number]  Unit/Bed#: [facility] 655-02 I Date of Admission: 6/5/2025   Date of Service: 6/7/2025 I Hospital Day: 2     Assessment & Plan  Unspecified mood (affective) disorder (HCC)  Matias Rodriguez is a 89 y.o. male, , living alone, retired, w/ PMH of HTN, CKD stage 2, afib s/p pacemaker, HFpEF, beta thalassemia minor, BPH, pre-diabetes, subclinical hypothyroidism and PPH of insomnia, no prior psychiatric admissions, no prior SA, no h/o self-injurious behavior, who presented to [facility] ED with paranoia and SI on a 201 basis after being BIB family. The patient admitted to the inpatient psychiatry unit 6B for further psychiatric stabilization. Per family, paranoia has been ongoing for several years but had a subacute gradually worsening course. Differential diagnosis include MDD with psychotic features, MNCD with behavioral disturbance and psychosis.     Start Abilify 2 mg daily with plan to likely titrate, started 6/6/25  Atrial fibrillation (HCC)  Per [facility]  Beta thalassemia minor  Per [facility]  Medical clearance for psychiatric admission  Per [facility]  BPH (benign prostatic hyperplasia)  Per [facility]  Vitamin D deficiency  Per [facility]  B12 deficiency  Per [facility]  Elevated hemoglobin A1c  Per [facility]    Current Medications:    Current Facility-Administered Medications:     ARIPiprazole (ABILIFY) tablet 2 mg, Oral, Daily    aspirin chewable tablet 81 mg, Oral, Daily    [START ON 6/8/2025] cyanocobalamin (VITAMIN B-12) tablet 1,000 mcg, Oral, Daily    cyanocobalamin injection 1,000 mcg, Intramuscular, Once    ergocalciferol (VITAMIN D2) capsule 50,000 Units, Oral, Weekly    folic acid (FOLVITE) tablet 1 mg, Oral, Daily    tamsulosin (FLOMAX) capsule 0.4 mg, Oral, Daily With Dinner      Risks/Benefits of Treatment:     Risks, benefits, and possible side effects of medications explained to patient and patient verbalizes understanding and agreement for  treatment.    Progress Toward Goals: progressing    Treatment Planning:      - Encourage early mobility and having a structured day  - Provide frequent re-orientation, and cognitive stimulation  - Ensure assistive devices are in proper working order (eye-glasses, hearing aids)  - Encourage adequate hydration, nutrition and monitor bowel movements  - Maintain sleep-wake cycle: Uninterrupted sleep time; low-level lighting at night  - Fall precaution  - Follow up with [facility] regarding the medical problems   - Continue medication titration and treatment plan; adjust medication to optimize treatment response and as clinically indicated.   - Observation: Routine  - VS: as per unit protocol  - Encourage group attendance and milieu therapy  - Dispo: To be determined  - Estimated Discharge Day: 6/20/2025   - Legal Status : Voluntary 201 commitment.  - Long Stay Certification : Not Applicable    Subjective     Per staff, Matias has been calm and cooperative on the unit.  He is withdrawn to himself but social with roommate.  He has been compliant with medications. He is attending groups. He received Atarax 50mg PO at 0022 for anxiety and insomnia, which was effective.     Matias was seen today privately for psychiatric follow up.  He was calm, pleasant and cooperative with the interview today.  His thought process was linear and goal oriented.  He reports that he was having some problems before he came in thinking that his neighbors were stealing from him.  He reports now recognizing that the problem may be his memory.  He reports his son was able to point out things that he thought had been stolen or misplaced, that were actually still at his house.  He admits to being more isolated recently as he has many friends who have passed already, his wide passed in 2020 and his mom who he was close with passed in 2014.  He reports that much of his family (children) lives close and take good care of him.  His daughter takes him to his  "appointments.  He reports that he spends a lot of time with his dog and misses his dog currently.  He reports that his SI statement was out of frustration as he felt deceived by his children, related to reasoning behind coming to the hospital.  He adamantly denies suicidal or homicidal ideation intent or plan.  He admits to having anxiety which he reports he has had all his life.  He admits to \"over thinking\"/ruminating when he is at home alone.  He reports he tries to stay busy working on his house to reduce anxiety.  He reports taking pride in taking good care of himself, attending regular medical appointments, showering and shaving daily.  He reports feeling anxious on the unit, as he feels \"locked up\".  He is able to reality test, noting he feels now that his memory is causing him to lose things and it is not likely his neighbors are stealing from him. He is looking forward to his daughter and son visiting and is hoping not to be on the unit long.  He denies any hallucinations and does not appear internally stimulated.  He is agreeable to continue on current medication and denies any side effects at this time.       Sleep: normal, required PRN Atarax  Appetite: normal  Medication side effects: No  ROS: review of systems as noted above in HPI/Subjective report, all other systems are negative    Objective :  Temp:  [96.8 °F (36 °C)-98 °F (36.7 °C)] 96.8 °F (36 °C)  HR:  [74-81] 81  BP: (115-120)/(58-72) 115/72  Resp:  [16-17] 17  SpO2:  [95 %-96 %] 96 %  O2 Device: None (Room air)    Mental Status Evaluation:    Appearance:  age appropriate, casually dressed, adequate grooming   Behavior:  pleasant, cooperative, calm   Speech:  normal rate and volume   Mood:  mildly anxious   Affect:  mood-congruent   Thought Process:  goal directed, linear   Thought Content:  no overt delusions, ruminating thoughts   Perceptual Disturbances: none   Risk Potential: Suicidal Ideation -  None  Homicidal Ideation -  None  Potential " "for Aggression - No   Sensorium:  oriented to person, place, and time/date   Memory:  recent and remote memory grossly intact   Consciousness:  alert and awake   Attention/Concentration: attention span and concentration are age appropriate   Insight:  partial   Judgment: partial   Gait/Station: normal gait/station   Motor Activity: no abnormal movements         Lab Results: I have reviewed the following results:  Results from the past 24 hours:   No results found for this or any previous visit (from the past 24 hours).    Most Recent Labs:   Lab Results   Component Value Date    WBC 4.53 06/06/2025    RBC 4.77 06/06/2025    HGB 9.8 (L) 06/06/2025    HCT 31.4 (L) 06/06/2025     06/06/2025    RDW 18.0 (H) 06/06/2025    NEUTROABS 2.44 06/06/2025    SODIUM 140 06/06/2025    K 3.8 06/06/2025     06/06/2025    CO2 26 06/06/2025    BUN 17 06/06/2025    CREATININE 0.82 06/06/2025    GLUC 102 06/06/2025    CALCIUM 8.6 06/06/2025    AST 23 06/06/2025    ALT 30 06/06/2025    ALKPHOS 62 06/06/2025    TP 5.7 (L) 06/06/2025    ALB 3.6 06/06/2025    TBILI 1.03 (H) 06/06/2025    CHOLESTEROL 179 03/29/2023    HDL 91 03/29/2023    TRIG 55 03/29/2023    LDLCALC 77 03/29/2023    NONHDLC 88 03/29/2023    [id number] 5.658 (H) 06/06/2025    FREET4 0.90 06/06/2025    HGBA1C 5.7 (H) 06/06/2025     06/06/2025       Administrative Statements     Counseling / Coordination of Care:   I have spent a total time of 30 minutes in caring for this patient on the day of the visit/encounter including:  Patient's progress discussed with staff in treatment team meeting.  Medication changes reviewed with staff in treatment team meeting.    Portions of the record may have been created with voice recognition software. Occasional wrong word or \"sound a like\" substitutions may have occurred due to the inherent limitations of voice recognition software. Read the chart carefully and recognize, using context, where substitutions have " occurred.    ARPIT Erwin 06/07/25

## 2025-06-06 NOTE — TREATMENT TEAM
06/06/25 1440   Readmission Root Cause   30 Day Readmission No   Patient Information   Mental Status Alert   Assessment information provided by: Patient   Primary Caregiver Self   Support Systems Self;Son;Daughter   County of Residence [place]   What city do you live in? [place]   Type of Current Residence Other (Comment)  (Home)   Living Arrangements Lives Alone   Is patient a ? Yes   Is patient active with [facility] ([employer] [facility])? No   Activities of Daily Living Prior to Admission   Functional Status Independent   Completes ADLs independently? Yes   Ambulates independently? Yes   Does patient use assisted devices? No   Does patient have a history of Outpatient Therapy (PT/OT)? No   Does the patient have a history of Short-Term Rehab? No   Patient Information Continued   Income Source Pension/[facility]   Current Status: 201   Does patient have a history of Mental Health Diagnosis? No   Means of Transportation   Means of Transport to Appts: Drives Self   Referral Data   Referral Reason Psych

## 2025-06-06 NOTE — TREATMENT TEAM
06/06/25 1439   Housing Stability   In the last 12 months, was there a time when you were not able to pay the mortgage or rent on time? N   In the past 12 months, how many times have you moved where you were living? 0   At any time in the past 12 months, were you homeless or living in a shelter (including now)? N   Transportation Needs   In the past 12 months, has lack of transportation kept you from medical appointments or from getting medications? no   In the past 12 months, has lack of transportation kept you from meetings, work, or from getting things needed for daily living? No   Food Insecurity   Within the past 12 months, you worried that your food would run out before you got the money to buy more. Never true   Within the past 12 months, the food you bought just didn't last and you didn't have money to get more. Never true   Utilities   In the past 12 months has the electric, gas, oil, or water company threatened to shut off services in your home? No

## 2025-06-06 NOTE — NURSING NOTE
Patient is visible on the unit.  Minimal socialization.  Endorses anxiety and depression. Denies SI/HI, A/VH.  Patient continues to be paranoid and he is guarded.   Scant in conversation.   Patient did attend groups and appetite is fair.

## 2025-06-06 NOTE — H&P
H&P - Behavioral Health   Name: Matias Rodriguez 89 y.o. male I MRN: [id number]  Unit/Bed#: [facility] 655-02 I Date of Admission: 6/5/2025   Date of Service: 6/6/2025 I Hospital Day: 1     Assessment & Plan  Unspecified mood (affective) disorder (HCC)  Matias Rodriguez is a 89 y.o. male, , living alone, retired, w/ PMH of HTN, CKD stage 2, afib s/p pacemaker, HFpEF, beta thalassemia minor, BPH, pre-diabetes, subclinical hypothyroidism and PPH of insomnia, no prior psychiatric admissions, no prior SA, no h/o self-injurious behavior, who presented to [facility] ED with paranoia and SI on a 201 basis after being BIB family. The patient admitted to the inpatient psychiatry unit 6B for further psychiatric stabilization. Per family, paranoia has been ongoing for several years but had a subacute gradually worsening course. Differential diagnosis include MDD with psychotic features, MNCD with behavioral disturbance and psychosis.     Start Abilify 2 mg daily with plan to likely titrate, started 6/6/25    Current Medications:    Current Facility-Administered Medications:     ARIPiprazole (ABILIFY) tablet 2 mg, Oral, Daily    aspirin chewable tablet 81 mg, Oral, Daily    tamsulosin (FLOMAX) capsule 0.4 mg, Oral, Daily With Dinner      Risks/Benefits of Treatment:     Risks, benefits, and possible side effects of medications explained to patient including risk of parkinsonian symptoms, Tardive Dyskinesia and metabolic syndrome related to treatment with antipsychotic medications. The patient verbalizes understanding and agreement for treatment.    Treatment Planning:      - Encourage early mobility and having a structured day  - Provide frequent re-orientation, and cognitive stimulation  - Ensure assistive devices are in proper working order (eye-glasses, hearing aids)  - Encourage adequate hydration, nutrition and monitor bowel movements  - Maintain sleep-wake cycle: Uninterrupted sleep time; low-level lighting at night  -  "Fall precaution  - Follow up with [facility] regarding the medical problems   - Continue medication titration and treatment plan; adjust medication to optimize treatment response and as clinically indicated.   - Observation: Routine  - VS: as per unit protocol  - Encourage group attendance and milieu therapy  - Dispo: To be determined  - Estimated Discharge Day: 6/20/2025   - Legal Status on Admission: Voluntary 201 commitment.    Psychiatric Evaluation    Chief Complaint: \"I'm having trouble with my neighbor\"    History of Present Illness     Matias Rodriguez is a 89 y.o. male, , living alone, retired, w/ PMH of HTN, CKD stage 2, afib s/p pacemaker, HFpEF, beta thalassemia minor, BPH, pre-diabetes, subclinical hypothyroidism and PPH of insomnia, no prior psychiatric admissions, no prior SA, no h/o self-injurious behavior, who presented to [facility] ED with paranoia and SI on a 201 basis after being BIB family. The patient admitted to the inpatient psychiatry unit 6B for further psychiatric stabilization.     Symptoms prior to admission included depression, suicidal ideation, poor appetite, difficulty sleeping, paranoid ideation, delusional thinking with persecutory delusions, and anxiety symptoms. Onset of symptoms was gradual starting several years ago with progressively worsening course in the last 1-2 weeks. Stressors preceding admission included death of family member (wife, 2020).     Per ED Note:  Patient is an 89-year-old male with a history of chronic diastolic heart failure, aortic stenosis, pacemaker, paroxysmal atrial fibrillation, status post watchman ADLO closure, CKD, accompanied by his daughter.  She says that the patient lives alone and she has noticed for the last 2 weeks that he has had a lot of paranoia about the neighbors spying on him in his house. The daughter says today that the patient wanted to sell his house and his dog to avoid the neighbors spying on him. She feels like he is taking " "care of himself in terms of activities of daily living, she has not seen a slurred speech, focal weakness or justina confusion, she is just concerned about the paranoia.  She has her to make statements that he does not want to be around if the neighbors continue to spy on him a lot, and she has heard him make statements such as that he may end it if this continues.  She says that she and the family have placed multiple cameras around the house to help keep an eye on the patient as he does live alone and is 89 years old and he will frequently turn the cameras down so he cannot be seen because he is concerned that the neighbors are spying through these cameras on him.  The daughter says that when she spoke with the primary care physician about this, urinalysis was ordered as noted below, she also took all of the guns out of the house, and the PCP did prescribe him Seroquel.  He took 1 tablet yesterday which reportedly made him sleepy and said he did not want to take any more tablets.       The patient himself says that he is very concerned that the neighbors are spying on him through the cameras, he has no other physical complaints, no chest pain, shortness of breath, no fever, no chills, no dysuria hematuria.  Denies any current suicidal or homicidal ideations to me, denies audiovisual hallucinations.  He denies any falls or trauma.     Patient had a CBC on May 30 which showed a slight microcytic anemia hemoglobin 10.0, slight drop of 1.4 g over a month and a half.  Urinalysis showed no infection.    Per ED Nursing Note:   Pt speaking with daughter and two grandsons at bedside as well as his son on phone. Pt's son requesting pt to stay for psychiatric help regarding the paranoia and reports of SI statements pt made earlier today. Per family pt stated PTA \"If the neighbors don't stop watching me in these cameras I might just have to end my life. I can't go on this way.\" Pt reports he made these statements out of " "frustration and states \"Yes I said that, but I wouldn't do anything to hurt myself!\" Pt noted to be agitated and frustrated with family suggestions. And continues to refuse to stay in hospital. Pt continues to make statements about going home to lock the doors or check the house.     PT's son on phone reports if pt does not agree to voluntarily sign himself in then he will come to the hospital and sign a 302. Provider aware.    Per Crisis Note:  Intake / safety assessment completed with patient and patient's family.       Patient is an 89 year old male who presents to ED with his family members due to increased paranoia. Patient's family is concerned as they report patient has not been acting himself. Family report that for the last 2 weeks patient has had increased paranoia. Per patient's daughter and son patient believes someone (the neighbor) has been spying on him and breaking into his home. Patient also believes the neighbor may be poisoning his food and listening to him on the phone. Also family reported that patient thought neighbor came into his home and took the dogs toy and replaced with another toy. As a result family reports that patient is up all night and hasn't been able to sleep until last evening when he took a seroquel which was recently prescribed by his PCP. Patient reported he doesn't wish to continue to take the medication anymore as he didn't like the way it made him feel. Patient's daughter reports she heard patient make statements that he does not want to be around and he wished to end his life if the neighbors are going to continue to spy on him. The daughter reports that she and the family have placed multiple cameras around the house to help keep an eye on the patient as he does live alone and that patient will frequently turn the cameras down so he cannot be seen as he is concerned that the neighbors are spying through these cameras on him.      Currently patient denies any suicidal or " "homicidal thoughts. Patient also denied any hallucinations. Patient was a bit agitated when he first arrived at the hospital, but has calmed down. Patient's family noted that patient is self sufficient and is able to care for himself; however they check on patient on a daily basis given his age. Patient doesn't have a psychiatric history or no previous inpatient admissions.      Family concerned patient is not himself given patient's paranoia .  They have also heard patient make suicidal statements.  Patient's son stated he would be willing to petition 302 if father was not in agreement with treatment.  noted he would uphold 302 if patient was not agreeable.       Patient himself agreed to sign 201.  Rights were reviewed.       Family would like to be updated on patient's admission.    On interview today:  On initial evaluation after admission to the inpatient psychiatric unit Matias states that for the last couple of weeks he has been having trouble with his neighbor.  He feels that his neighbor wants his property and asked his daughter about buying it from her.  He states that for a while, things have gone missing from his house. He also believes people have been coming through his house.  At times, he will find the door unlocked when he knows that he locked it.  At one point, his social security card went missing from the kitchen drawer.  He does have cameras on his property installed by his daughter's .  He feels that the neighbor \"knows what the cameras are showing\" and is watching him on his property.  He also feels that his neighbor has taken over his facial recognition on his phone and is monitoring his phone.  For this reason, he has been afraid to look at his phone.  He worries what his neighbor will be able to do with his face and his social security. He also feels that his neighbor at times is following him when he leaves his home.  He states recently his car stalled out and he pulled over on " "the side and saw a car behind him in his mirror.  He feels that if he tells the police, they would think he was crazy as he has no evidence.  He still feels that his neighbor is after his property and has been messing with him for this reason. He does not feel the neighbor is watching him here in the hospital because he is currently in [place] with his son.     Regarding other psychiatric symptoms, patient reports increasing depression and anxiety since he believes his neighbor has begun monitoring him.  He has had trouble sleeping at home, better since arriving in the hospital.  He also has chronically low appetite, also better since coming to the hospital.  Regarding SI, patient states he has never been suicidal, depressed, or overly anxious before recently.  He states that he \"got mad the other night\" because his daughter/son brought him to the hospital to \"take some blood\" and they \"would not let me go and I said I wanted to end it\". Patient adamantly denies current SI and states he plans to live until at least 89.  He states he was recently prescribed Seroquel and he took it once, did not get a chance to take it any more than that.  It was prescribed for his insomnia.  Patient denies history of auditory or visual hallucinations.  Denies history of decreased need for sleep with increased energy    Regarding traumatic events in his life, patient states that his wife had dementia for 10 years and  in .  He helped care for her and visited her frequently in a nursing home.  His wife had a son prior to meeting him and this son took on Matias's last name.  The son  at 34 from AIDS after a long vang with drugs.  He also states that his brother  at age 56.  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  He did not pass the physical training and was honorably discharged.  He did not experience any combat and did not go beyond training in the [place].    He worked at the [employer] as a  up until 1998 when he retired.  Shortly after that, he began  and retired around 2016.  He has been living alone, states that he manages his own finances, cooks on his own, gets his own groceries, and manages his home. He gets assistance from his daughter with medical decision making/appointments.    Patient is oriented to self, date, location, and situation. Denies subjective memory issues.    Denies history of tobacco, alcohol, or substance use.     Psychiatric Review Of Systems:    Pertinent items are noted in HPI; all others negative    Historical Information     Past Psychiatric History:     Past Inpatient Psychiatric Treatment:   No history of past inpatient psychiatric admissions  Past Outpatient Psychiatric Treatment:    No history of past outpatient psychiatric treatment  Past Suicide Attempts: no  Past Violent Behavior: no  Past Psychiatric Medication Trials: Seroquel (once for insomnia prior to June 2025 hospitalization)    Substance Abuse History:    Tobacco, Alcohol and Drug Use History     Tobacco Use    Smoking status: Never    Smokeless tobacco: Never   Vaping Use    Vaping status: Never Used   Substance Use Topics    Alcohol use: Never    Drug use: Never      Additional Substance Use Detail       Questions Responses    Alcohol Use Frequency Denies use in past 12 months    Cannabis frequency Never used    Comment:  Never used on 6/5/2025     Heroin Frequency Denies use in past 12 months    Cocaine frequency Never used    Comment:  Never used on 6/5/2025     Crack Cocaine Frequency Denies use in past 12 months    Methamphetamine Frequency Denies use in past 12 months    Narcotic Frequency Denies use in past 12 months    Benzodiazepine Frequency Denies use in past 12 months    Amphetamine frequency Denies use in past 12 months    Barbituate Frequency Denies use use in past 12 months    Inhalant frequency Never used    Comment:  Never used on 6/5/2025      Hallucinogen frequency Never used    Comment:  Never used on 6/5/2025     Ecstasy frequency Never used    Comment:  Never used on 6/5/2025     Other drug frequency Never used    Comment:  Never used on 6/5/2025     Opiate frequency Denies use in past 12 months    Last reviewed by Linda Mayne, LPN on 6/5/2025           I have assessed this patient for substance use within the past 12 months        Family Psychiatric History:     Family History[1]    Social History:    Education: 12th grade, did not graduate and was not given a diploma as family couldn't afford cap and gown  Learning Disabilities: none  Marital History:   Children: 4 adult children  Living Arrangement: lives alone  Occupational History: worked as a ,  in the past, retired  Functioning Relationships: good support system  Legal History: none   History: Yes. Branch: [employer]. Discharge year: 1950s. Discharge type: honorable discharge. Combat history: no.  Access to firearms: none    Traumatic History:     Abuse:none  Other Traumatic Events: none    Past Medical History      Past Medical History[2]  Past Surgical History[3]  Meds/Allergies      No Known Allergies   Prior to Admission Medications   Prescriptions Last Dose Informant Patient Reported? Taking?   Ferrous Sulfate (IRON PO) Past Week Self Yes Yes   Sig: Take by mouth OTC   Multiple Vitamin (multivitamin) tablet Past Week Self Yes Yes   Sig: Take 1 tablet by mouth in the morning.   aspirin 81 mg chewable tablet Past Week Self Yes Yes   Sig: Chew 81 mg in the morning.   gabapentin (NEURONTIN) 100 mg capsule Not Taking Self No No   Sig: Take 1 capsule (100 mg total) by mouth in the morning and 1 capsule (100 mg total) in the evening and 1 capsule (100 mg total) before bedtime.   Patient not taking: Reported on 9/26/2024   tamsulosin (FLOMAX) 0.4 mg Past Week Self Yes Yes   Sig: Take 0.4 mg by mouth daily with dinner   torsemide (DEMADEX) 20 mg tablet Not Taking  Self Yes No   Sig: Take 20 mg by mouth as needed (for weight gain > 3lbs/day or wt > 165 lbs)   Patient not taking: Reported on 3/25/2025      Facility-Administered Medications: None       Objective :  Temp:  [97.3 °F (36.3 °C)-98.9 °F (37.2 °C)] 98.9 °F (37.2 °C)  HR:  [70-82] 70  BP: ()/(60-79) 99/60  Resp:  [16-18] 18  SpO2:  [94 %-97 %] 94 %  O2 Device: None (Room air)    Mental Status Evaluation:    Appearance:  age appropriate, casually dressed   Behavior:  normal, pleasant, cooperative, calm   Speech:  normal rate, normal volume, normal pitch, spontaneous   Mood:  somewhat anxious   Affect:  constricted   Language: naming objects, repeating phrases   Thought Process:  organized, goal directed, logical   Thought Content:  paranoid ideation   Perceptual Disturbances: no auditory hallucinations, no visual hallucinations, does not appear responding to internal stimuli   Risk Potential: Suicidal Ideation - None at present  Homicidal Ideations - None at present  Potential for Aggression - Not at present   Sensorium:  oriented to person, place, time/date, and situation   Memory:  recent and remote memory grossly intact   Consciousness:  alert and awake   Attention/Concentration: attention span and concentration are age appropriate   Intellect: within normal limits   Fund of Knowledge: awareness of current events: yes  past history: yes  vocabulary: yes   Insight:  limited   Judgment: limited   Muscle Strength:  Muscle Tone: normal  normal   Gait/Station: normal gait/station   Motor Activity: no abnormal movements             Patient Strengths/Assets: average or above intelligence, capable of independent living, cooperative, communication skills, family ties, motivation for treatment/growth, patient is on a voluntary commitment, patient is willing to work on problems, stable housing, supportive family, work skills    Patient Barriers/Limitations: limited insight    Suicide/Homicide Risk Assessment:    Risk of  Harm to Self:   Based on today's assessment, Matias presents the following risk of harm to self: low    Risk of Harm to Others:  Based on today's assessment, Matias presents the following risk of harm to others: low    The following interventions are recommended: Behavioral Health Checks for safety monitoring, continued hospitalization on locked unit      Lab Results: I have reviewed the following results:  Admission Labs:   Admission on 06/05/2025   Component Date Value    Ventricular Rate 06/05/2025 73     Atrial Rate 06/05/2025 72     QRSD Interval 06/05/2025 138     QT Interval 06/05/2025 434     QTC Interval 06/05/2025 479     QRS Axis 06/05/2025 96     T Wave [place] 06/05/2025 42     Hemoglobin A1C 06/06/2025 5.7 (H)     EAG 06/06/2025 117     Sodium 06/06/2025 140     Potassium 06/06/2025 3.8     Chloride 06/06/2025 108     CO2 06/06/2025 26     ANION GAP 06/06/2025 6     BUN 06/06/2025 17     Creatinine 06/06/2025 0.82     Glucose 06/06/2025 102     Glucose, Fasting 06/06/2025 102 (H)     Calcium 06/06/2025 8.6     AST 06/06/2025 23     ALT 06/06/2025 30     Alkaline Phosphatase 06/06/2025 62     Total Protein 06/06/2025 5.7 (L)     Albumin 06/06/2025 3.6     Total Bilirubin 06/06/2025 1.03 (H)     eGFR 06/06/2025 78     Magnesium 06/06/2025 2.3     Phosphorus 06/06/2025 3.0     WBC 06/06/2025 4.53     RBC 06/06/2025 4.77     Hemoglobin 06/06/2025 9.8 (L)     Hematocrit 06/06/2025 31.4 (L)     MCV 06/06/2025 66 (L)     MCH 06/06/2025 20.5 (L)     MCHC 06/06/2025 31.2 (L)     RDW 06/06/2025 18.0 (H)     MPV 06/06/2025 9.1     Platelets 06/06/2025 161     nRBC 06/06/2025 0     Segmented % 06/06/2025 54     Immature Grans % 06/06/2025 0     Lymphocytes % 06/06/2025 30     Monocytes % 06/06/2025 11     Eosinophils Relative 06/06/2025 4     Basophils Relative 06/06/2025 1     Absolute Neutrophils 06/06/2025 2.44     Absolute Immature Grans 06/06/2025 0.02     Absolute Lymphocytes 06/06/2025 1.34     Absolute  Monocytes 06/06/2025 0.50     Eosinophils Absolute 06/06/2025 0.20     Basophils Absolute 06/06/2025 0.03     TSH 3RD GENERATON 06/06/2025 5.658 (H)     Vitamin B-12 06/06/2025 265     Folate 06/06/2025 20.7     Vit D, 25-Hydroxy 06/06/2025 22.2 (L)     Free T4 06/06/2025 0.90        Imaging Results Review: I reviewed radiology reports from this admission including: CT head.  Other Study Results Review: EKG was reviewed.     Diet:       Diet Orders   (From admission, onward)                 Start     Ordered    06/06/25 1024  Diet Cardiovascular; Sodium 2 GM; Fluid Restriction 2000 ML  Diet effective now        References:    Adult Nutrition Support Algorithm    RD Therapeutic Diet Order Protocol   Question Answer Comment   Diet Type Cardiovascular    Cardiac Sodium 2 GM    Other Restriction(s): Fluid Restriction 2000 ML    Allow Registered Dietitian to adjust diet order per protocol Yes        06/06/25 1024                     Code Status: Level 1 - Full Code  Advance Directive and Living Will: <no information>  Next of Kin: Extended Emergency Contact Information  Primary Emergency Contact: Philomena Dave   [facility]  Mobile Phone: [phone]  Relation: Daughter  Secondary Emergency Contact: Santos Hearn  Mobile Phone: [phone]  Relation: Son    Administrative Statements     Counseling / Coordination of Care:   Patient's progress discussed with staff in treatment team meeting.  Medication changes reviewed with staff in treatment team meeting.  Patient's presentation on admission and proposed treatment plan discussed with treatment team.  Events leading to admission reviewed with patient.  Discussed with patient acceptance of mental illness diagnosis and need for ongoing treatment after discharge.  Reassurance and supportive therapy provided.  Reoriented to reality and reassured.    Inpatient Psychiatric Certification:  Estimated length of stay: >2 midnights  Based upon physical, mental and  "social evaluations, I certify that inpatient psychiatric services are medically necessary for this patient for a duration of >2 midnights for the treatment of Unspecified mood (affective) disorder (HCC)  Available alternative community resources do not meet the patient's mental health care needs.  I further attest that an established written individualized plan of care has been implemented and is outlined in the patient's medical records.    Portions of the record may have been created with voice recognition software. Occasional wrong word or \"sound a like\" substitutions may have occurred due to the inherent limitations of voice recognition software. Read the chart carefully and recognize, using context, where substitutions have occurred.    Jessi Gerardo MD 06/06/25         [1]   Family History  Problem Relation Name Age of Onset    Cancer Mother          exp age 96    Heart disease Father      Heart disease Brother      Hypertension Brother     [2]   Past Medical History:  Diagnosis Date    A-fib (HCC)     CHANA (acute kidney injury) (HCC) 12/19/2022    Hernia, abdominal     Hypertension     Subdural hematoma (HCC)    [3]   Past Surgical History:  Procedure Laterality Date    A-V CARDIAC PACEMAKER INSERTION      APPENDECTOMY      age 27    CARDIAC CATHETERIZATION N/A 12/19/2022    Procedure: Cardiac temporary pacemaker;  Surgeon: Darien Panda MD;  [facility]: [facility];  Service: Cardiology    CARDIAC ELECTROPHYSIOLOGY PROCEDURE N/A 12/8/2022    Procedure: CARDIAC ATRIAL APPENDAGE CLOSURE (EP);  Surgeon: Jeremiah Pradhan MD;  Location: [facility];  Service: Cardiology    CARDIAC ELECTROPHYSIOLOGY PROCEDURE Right 12/20/2022    Procedure: Cardiac lead revision;  Surgeon: Juan Wheeler MD;  [facility]: [facility];  Service: Cardiology    CARDIAC PACEMAKER PLACEMENT      CARPAL TUNNEL RELEASE      COLONOSCOPY      INGUINAL HERNIA REPAIR Left     IR UPPER EXTREMITY VENOGRAM- DIAGNOSTIC  " 12/19/2022    TX PERQ CLSR TCAT L ATR APNDGE W/ENDOCARDIAL IMPLNT N/A 12/8/2022    Procedure: CARDIAC ATRIAL APPENDAGE CLOSURE (CATH);  Surgeon: Jeremiah Pradhan MD;  Location:  MAIN OR;  Service: Cardiology    TX XCAPSL CTRC RMVL INSJ IO LENS PROSTH W/O ECP Right 4/3/2017    Procedure: EXTRACTION EXTRACAPSULAR CATARACT PHACO INTRAOCULAR LENS (IOL);  Surgeon: Mario Power MD;  Location: [facility] MAIN OR;  Service: Ophthalmology    TX XCAPSL CTRC RMVL INSJ IO LENS PROSTH W/O ECP Left 2/6/2023    Procedure: EXTRACTION EXTRACAPSULAR CATARACT PHACO INTRAOCULAR LENS (IOL);  Surgeon: Mario Power MD;  Location: [facility] MAIN OR;  Service: Ophthalmology    TONSILLECTOMY      age 6

## 2025-06-06 NOTE — TREATMENT PLAN
TREATMENT PLAN REVIEW - Behavioral Health Matias Rodriguez 89 y.o. 1935 male MRN: [id number]    [facility] 6B OABHU Room / Bed: [facility] 655/[facility] 655-02 Encounter: [id number]          Admit Date/Time:  6/5/2025  6:13 PM    Treatment Team:   MD Bharat Turpin RN Melissa Hawkey, RN    Diagnosis: Principal Problem:    Unspecified mood (affective) disorder (HCC)      Patient Strengths/Assets: average or above intelligence, capable of independent living, cooperative, communication skills, family ties, motivation for treatment/growth, patient is on a voluntary commitment, patient is willing to work on problems, stable housing, supportive family, work skills     Patient Barriers/Limitations: limited insight    Short Term Goals: decrease in depressive symptoms, decrease in anxiety symptoms, decrease in paranoid thoughts, decrease in psychotic symptoms, decrease in suicidal thoughts    Long Term Goals: improvement in depression, improvement in anxiety, resolution of depressive symptoms, free of suicidal thoughts, resolution of psychotic symptoms, improvement in reality testing, improvement in reasoning ability, improved insight, acceptance of need for psychiatric medications, acceptance of need for psychiatric treatment, acceptance of need for psychiatric follow up after discharge, acceptance of psychiatric diagnosis, adequate sleep, adequate appetite, appropriate interaction with peers, appropriate interaction with family    Progress Towards Goals: starting psychiatric medications as prescribed    Recommended Treatment: medication management, patient medication education, group therapy, milieu therapy, continued Behavioral Health psychiatric evaluation/assessment process    Treatment Frequency: daily medication monitoring, group and milieu therapy daily, monitoring through interdisciplinary rounds, monitoring through weekly  patient care conferences    Expected Discharge Date:  TBD    Discharge Plan: return to previous living arrangement    Treatment Plan Created/Updated By: Jessi Gerardo MD

## 2025-06-06 NOTE — ASSESSMENT & PLAN NOTE
Matias Rodriguez is a 89 y.o. male, , living alone, retired, w/ PMH of HTN, CKD stage 2, afib s/p pacemaker, HFpEF, beta thalassemia minor, BPH, pre-diabetes, subclinical hypothyroidism and PPH of insomnia, no prior psychiatric admissions, no prior SA, no h/o self-injurious behavior, who presented to [facility] ED with paranoia and SI on a 201 basis after being BIB family. The patient admitted to the inpatient psychiatry unit 6B for further psychiatric stabilization. Per family, paranoia has been ongoing for several years but had a subacute gradually worsening course. Differential diagnosis include MDD with psychotic features, MNCD with behavioral disturbance and psychosis.     Start Abilify 2 mg daily with plan to likely titrate, started 6/6/25

## 2025-06-06 NOTE — PLAN OF CARE
Problem: PAIN - ADULT  Goal: Verbalizes/displays adequate comfort level or baseline comfort level  Description: Interventions:  - Encourage patient to monitor pain and request assistance  - Assess pain using appropriate pain scale  - Administer analgesics as ordered based on type and severity of pain and evaluate response  - Implement non-pharmacological measures as appropriate and evaluate response  - Consider cultural and social influences on pain and pain management  - Notify physician/advanced practitioner if interventions unsuccessful or patient reports new pain  - Educate patient/family on pain management process including their role and importance of  reporting pain   - Provide non-pharmacologic/complimentary pain relief interventions  Outcome: Progressing     Problem: SAFETY ADULT  Goal: Patient will remain free of falls  Description: INTERVENTIONS:  - Educate patient/family on patient safety including physical limitations  - Instruct patient to call for assistance with activity   - Consider consulting OT/PT to assist with strengthening/mobility based on AM PAC & JH-HLM score  - Consult OT/PT to assist with strengthening/mobility   - Keep Call bell within reach  - Keep bed low and locked with side rails adjusted as appropriate  - Keep care items and personal belongings within reach  - Initiate and maintain comfort rounds  - Make Fall Risk Sign visible to staff  Apply yellow socks and bracelet for high fall risk patients  - Consider moving patient to room near nurses station  Outcome: Progressing     Problem: Knowledge Deficit  Goal: Patient/family/caregiver demonstrates understanding of disease process, treatment plan, medications, and discharge instructions  Description: Complete learning assessment and assess knowledge base.  Interventions:  - Provide teaching at level of understanding  - Provide teaching via preferred learning methods  Outcome: Progressing     Problem: Ineffective Coping  Goal:  Cooperates with admission process  Description: Interventions:   - Complete admission process  Outcome: Completed

## 2025-06-06 NOTE — CASE MANAGEMENT
"Admission Status    Status of admission 201   [facility]     Patient Intake   [place] [id number]    Living Arrangement Pt lives alone.   Can patient return home Yes   Patient's Telephone Number [phone]   Patient's e-mail Address No e-mail address on record   Insurance MEDICARE/MEDICARE A AND B   PCP Angel Reeder, DO   Education Up to 12th grade.    Type of work Retired    History  Yes. [employer] and was Discharged in the 1950s. Discharge type: honorable discharge.    Access to Firearms Pt denies   Marital Status/Children    Spirituality/Congregational None   Transportation Self   Preferred Pharmacy [facility]     Patient History   Presenting Problem paranoia and SI    Stressor/Trigger \"Neighbors\"   Treatment History Past Inpatient Psychiatric Treatment:   No history of past inpatient psychiatric admissions  Past Outpatient Psychiatric Treatment:    No history of past outpatient psychiatric treatment   Current psychiatrist/therapist Pt denies   ACT/ICM Pt denies   Family History of Mental Health Wife had dimentia, denies immediate family member.   Suicide Attempts Pt denies.   Legal Issues Pt denies   Trauma/Psychosocial loss Pt denies     Substance Abuse Assessment   UDS: Negative everything.  Audit Score:0  Nicotine/Tobacco:Pt denies.   Substance First use Last Use and amount Frequency Amount Used How long Longest period of sobriety and when Method of use   THC            Heroin            Cocaine            ETOH            Meth            Benzos            Other:            History of POLI Pt denies.   Family History of POLI Pt denies   Prior Inpatient POLI Treatment Pt denies.   Current Outpatient treatment Pt denies.   Response to Referral N/A     Referrals/ROIs   Referrals Needed Psychiatry   ROIs Signed Daughter/Son/PCP       "

## 2025-06-06 NOTE — ASSESSMENT & PLAN NOTE
Vital signs stable afebrile patient seen and examined by myself at the bedside labs from today were reviewed by myself sodium 140 potassium 3.8 creatinine 0.82  Patient medically stable for admit  Please reach out to ANNA MARIE IM with any medical questions or concerns   [Initial Visit] : an initial visit for [Neck Pain] : neck pain [Radiculopathy] : radiculopathy

## 2025-06-06 NOTE — CONSULTS
Consultation - Hospitalist   Name: Matias Rodriguez 89 y.o. male I MRN: [id number]  Unit/Bed#: OABHU 655-02 I Date of Admission: 6/5/2025   Date of Service: 6/6/2025 I Hospital Day: 1   Inpatient consult for Medical Clearance for  patient  Consult performed by: ARPIT Norris  Consult ordered by: ARPIT Baum        Physician Requesting Evaluation: Zainab Friend MD   Reason for Evaluation / Principal Problem: Medical clearance for psychiatric admission    Assessment & Plan  Medical clearance for psychiatric admission  Vital signs stable afebrile patient seen and examined by myself at the bedside labs from today were reviewed by myself sodium 140 potassium 3.8 creatinine 0.82  Patient medically stable for admit  Please reach out to  IM with any medical questions or concerns  Atrial fibrillation (HCC)  Has history of paroxysmal atrial fibrillation  Status post watchman's ALDO closure  Patient is only on aspirin 81 mg p.o. daily  Continue to monitor closely  Beta thalassemia minor  Patient has recently been seen by heme-onc  Stable  Hemoglobin 9.8 today hematocrit 31.4  BPH (benign prostatic hyperplasia)  Patient will continue on his home dose of Flomax 0.4 mg p.o. daily  Vitamin D deficiency  Vitamin D level today 22.2  Start ergocalciferol 50,000 units p.o. weekly x 8 weeks  Patient needs a repeat vitamin D level drawn with his PCP in 10 to 12 weeks  B12 deficiency  Patient's vitamin B12 level today 265  Patient will receive one-time IM dose of vitamin B12 tomorrow  On Sunday patient was started on oral vitamin B12 supplementation daily  Patient needs a repeat vitamin B12 level drawn with his PCP in 10 to 12 weeks  Elevated hemoglobin A1c  Patient's A1c is 5.7  Dietary education        Collaboration of Care: Were Recommendations Directly Discussed with Primary Treatment Team? - No     History of Present Illness   Matias Rodriguez is a 89 y.o. male who is originally admitted to the psychiatry  service due to paranoia. We are consulted for medical clearance for admission to Behavioral Health Unit and treatment of underlying psychiatric illness.      Patient presents to [facility] on 6/3/2025 with his family.  Patient endorsing paranoia.  Patient's daughter reports that she recently had cameras installed into his home to keep an eye on him and his dog.  Patient has been thinking that the neighbors are spying on him and that people are watching him through the cameras.  He is now admitted to the [facility] for stabilization of his mental health issues.  Patient's past medical history is significant for chronic diastolic heart failure, aortic stenosis, PPM secondary to sick sinus syndrome, PAF status post watchman's procedure, CKD stage II, BPH as well as beta thalassemia minor.    Review of Systems   Constitutional:  Negative for chills and fever.   HENT:  Negative for ear pain and sore throat.    Eyes:  Negative for pain and visual disturbance.   Respiratory:  Negative for cough and shortness of breath.    Cardiovascular:  Negative for chest pain and palpitations.   Gastrointestinal:  Negative for abdominal pain and vomiting.   Genitourinary:  Negative for dysuria and hematuria.   Musculoskeletal:  Negative for arthralgias and back pain.   Skin:  Negative for color change and rash.   Neurological:  Negative for seizures and syncope.   Psychiatric/Behavioral:  Positive for decreased concentration.    All other systems reviewed and are negative.      Historical Information   Past Medical History[1]  Past Surgical History[2]  Social History[3]  E-Cigarette/Vaping    E-Cigarette Use Never User      E-Cigarette/Vaping Substances       Marital Status: /Civil Union    Meds/Allergies   I have reviewed home medications using recent Epic encounter.  Prior to Admission medications    Medication Sig Start Date End Date Taking? Authorizing Provider   aspirin 81 mg chewable tablet Chew 81 mg in the  "morning.   Yes Historical Provider, MD   Ferrous Sulfate (IRON PO) Take by mouth OTC   Yes Historical Provider, MD   Multiple Vitamin (multivitamin) tablet Take 1 tablet by mouth in the morning.   Yes Historical Provider, MD   tamsulosin (FLOMAX) 0.4 mg Take 0.4 mg by mouth daily with dinner   Yes Historical Provider, MD   gabapentin (NEURONTIN) 100 mg capsule Take 1 capsule (100 mg total) by mouth in the morning and 1 capsule (100 mg total) in the evening and 1 capsule (100 mg total) before bedtime.  Patient not taking: Reported on 9/26/2024 5/24/22   Luke FriendBASIL   torsemide (DEMADEX) 20 mg tablet Take 20 mg by mouth as needed (for weight gain > 3lbs/day or wt > 165 lbs)  Patient not taking: Reported on 3/25/2025 8/4/23   Historical Provider, MD     No Known Allergies    Objective :  Temp:  [97.3 °F (36.3 °C)-98.9 °F (37.2 °C)] 98.9 °F (37.2 °C)  HR:  [70-82] 70  BP: ()/(60-79) 99/60  Resp:  [16-18] 18  SpO2:  [94 %-97 %] 94 %  O2 Device: None (Room air)    Height: 5' 5\" (165.1 cm) (06/05/25 1814)  Weight - Scale: 71.4 kg (157 lb 6.5 oz) (06/05/25 1814)  Physical Exam  Vitals and nursing note reviewed.   Constitutional:       Appearance: Normal appearance. He is normal weight.   HENT:      Head: Normocephalic and atraumatic.      Right Ear: Tympanic membrane normal.      Left Ear: Tympanic membrane normal.      Nose: Nose normal.      Mouth/Throat:      Mouth: Mucous membranes are dry.     Eyes:      Extraocular Movements: Extraocular movements intact.      Pupils: Pupils are equal, round, and reactive to light.       Cardiovascular:      Rate and Rhythm: Normal rate and regular rhythm.      Pulses: Normal pulses.      Heart sounds: Normal heart sounds.   Pulmonary:      Effort: Pulmonary effort is normal.      Breath sounds: Normal breath sounds.   Abdominal:      General: Abdomen is flat. Bowel sounds are normal.      Palpations: Abdomen is soft.     Musculoskeletal:         General: Normal range of " motion.      Cervical back: Normal range of motion and neck supple.     Skin:     General: Skin is warm and dry.      Capillary Refill: Capillary refill takes 2 to 3 seconds.     Neurological:      Mental Status: He is alert and oriented to person, place, and time.     Psychiatric:         Attention and Perception: Attention normal.         Mood and Affect: Mood is depressed.         Speech: Speech normal.         Behavior: Behavior is cooperative.         Thought Content: Thought content is paranoid.         Cognition and Memory: Memory is impaired.           Lab Results: I have reviewed the following results:  Results from last 7 days   Lab Units 06/06/25  0446   WBC Thousand/uL 4.53   HEMOGLOBIN g/dL 9.8*   HEMATOCRIT % 31.4*   PLATELETS Thousands/uL 161   SEGS PCT % 54   LYMPHO PCT % 30   MONO PCT % 11   EOS PCT % 4     Results from last 7 days   Lab Units 06/06/25  0446   SODIUM mmol/L 140   POTASSIUM mmol/L 3.8   CHLORIDE mmol/L 108   CO2 mmol/L 26   BUN mg/dL 17   CREATININE mg/dL 0.82   ANION GAP mmol/L 6   CALCIUM mg/dL 8.6   ALBUMIN g/dL 3.6   TOTAL BILIRUBIN mg/dL 1.03*   ALK PHOS U/L 62   ALT U/L 30   AST U/L 23   GLUCOSE RANDOM mg/dL 102             Lab Results   Component Value Date/Time    HGBA1C 5.7 (H) 06/06/2025 04:46 AM    HGBA1C 5.7 (H) 11/02/2023 09:10 AM    HGBA1C 5.7 (H) 03/29/2023 10:03 AM           Imaging Results Review: No pertinent imaging studies reviewed.  Other Study Results Review: EKG was reviewed.   6/5/2025 twelve-lead EKG reviewed by myself as well as interpreted by myself ventricular rate 73 QT interval 434 QTc 479 ventricularly paced.  Administrative Statements   I have spent a total time of 45 minutes in caring for this patient on the day of the visit/encounter including Diagnostic results, Prognosis, Risks and benefits of tx options, Instructions for management, Patient and family education, Importance of tx compliance, Risk factor reductions, Impressions, Counseling /  Coordination of care, Documenting in the medical record, Reviewing/placing orders in the medical record (including tests, medications, and/or procedures), Obtaining or reviewing history  , and Communicating with other healthcare professionals .  ** Please Note: This note has been constructed using a voice recognition system. **       [1]   Past Medical History:  Diagnosis Date    A-fib (HCC)     CHANA (acute kidney injury) (HCC) 12/19/2022    Hernia, abdominal     Hypertension     Subdural hematoma (HCC)    [2]   Past Surgical History:  Procedure Laterality Date    A-V CARDIAC PACEMAKER INSERTION      APPENDECTOMY      age 27    CARDIAC CATHETERIZATION N/A 12/19/2022    Procedure: Cardiac temporary pacemaker;  Surgeon: Darien Panda MD;  [facility];  Service: Cardiology    CARDIAC ELECTROPHYSIOLOGY PROCEDURE N/A 12/8/2022    Procedure: CARDIAC ATRIAL APPENDAGE CLOSURE (EP);  Surgeon: Jeremiah Pradhan MD;  Location: [facility] MAIN OR;  Service: Cardiology    CARDIAC ELECTROPHYSIOLOGY PROCEDURE Right 12/20/2022    Procedure: Cardiac lead revision;  Surgeon: Juan Wheeler MD;  [facility]: [facility] CARD[facility]AC C[facility];  Service: Cardiology    CARDIAC PACEMAKER PLACEMENT      CARPAL TUNNEL RELEASE      COLONOSCOPY      INGUINAL HERNIA REPAIR Left     IR UPPER EXTREMITY VENOGRAM- DIAGNOSTIC  12/19/2022    IL PERQ CLSR TCAT L ATR APNDGE W/ENDOCARDIAL IMPLNT N/A 12/8/2022    Procedure: CARDIAC ATRIAL APPENDAGE CLOSURE (CATH);  Surgeon: Jeremiah Pradhan MD;  [facility]: [facility] MAIN OR;  Service: Cardiology    IL XCAPSL CTRC RMVL INSJ IO LENS PROSTH W/O ECP Right 4/3/2017    Procedure: EXTRACTION EXTRACAPSULAR CATARACT PHACO INTRAOCULAR LENS (IOL);  Surgeon: Mario Power MD;  Location: [facility] MAIN OR;  Service: Ophthalmology    IL XCAPSL CTRC RMVL INSJ IO LENS PROSTH W/O ECP Left 2/6/2023    Procedure: EXTRACTION EXTRACAPSULAR CATARACT PHACO INTRAOCULAR LENS (IOL);  Surgeon: Mario Power MD;  Location: [facility] MAIN OR;   Service: Ophthalmology    TONSILLECTOMY      age 6   [3]   Social History  Tobacco Use    Smoking status: Never    Smokeless tobacco: Never   Vaping Use    Vaping status: Never Used   Substance and Sexual Activity    Alcohol use: Never    Drug use: Never

## 2025-06-06 NOTE — NURSING NOTE
Matias is 201 for SI and paranoid behaviors.Patient is A&O and denies SI. Reports anxiety and depression. Reports his wife of 57 years passed away 5 years ago. He reports that his daughter is helpful with his care and lives close to him. Patient denies any previous psych treatment. Patient with medical hx of HTN, Afib and pacemaker,s/p watchman ALDO, and CKD. Skin is intact except few scabs on his legs. Oriented to room and unit. Will monitor.

## 2025-06-06 NOTE — ASSESSMENT & PLAN NOTE
Patient's vitamin B12 level today 265  Patient will receive one-time IM dose of vitamin B12 tomorrow  On Sunday patient was started on oral vitamin B12 supplementation daily  Patient needs a repeat vitamin B12 level drawn with his PCP in 10 to 12 weeks

## 2025-06-06 NOTE — CASE MANAGEMENT
Cm placed a call to Son/Daughter of the Pt to introduce self, provide family with CM direct line, Nurses station number, as well as updates on the Pt.

## 2025-06-06 NOTE — ASSESSMENT & PLAN NOTE
Matias Rodriguez is a 89 y.o. male, , living alone, retired, w/ PMH of HTN, CKD stage 2, afib s/p pacemaker, HFpEF, beta thalassemia minor, BPH, pre-diabetes, subclinical hypothyroidism and PPH of insomnia, no prior psychiatric admissions, no prior SA, no h/o self-injurious behavior, who presented to [facility] ED with paranoia and SI on a 201 basis after being BIB family. The patient admitted to the inpatient psychiatry unit 6B for further psychiatric stabilization. Per family, paranoia has been ongoing for several years but had a subacute gradually worsening course. Differential diagnosis include MDD with psychotic features, MNCD with behavioral disturbance and psychosis.     Start Abilify 2 mg daily with plan to likely titrate, started 6/6/25

## 2025-06-06 NOTE — ASSESSMENT & PLAN NOTE
Vitamin D level today 22.2  Start ergocalciferol 50,000 units p.o. weekly x 8 weeks  Patient needs a repeat vitamin D level drawn with his PCP in 10 to 12 weeks

## 2025-06-07 PROCEDURE — 99232 SBSQ HOSP IP/OBS MODERATE 35: CPT

## 2025-06-07 RX ADMIN — TRAZODONE HYDROCHLORIDE 50 MG: 50 TABLET ORAL at 21:08

## 2025-06-07 RX ADMIN — ACETAMINOPHEN 650 MG: 325 TABLET, FILM COATED ORAL at 09:18

## 2025-06-07 RX ADMIN — HYDROXYZINE HYDROCHLORIDE 50 MG: 50 TABLET ORAL at 00:22

## 2025-06-07 RX ADMIN — ARIPIPRAZOLE 2 MG: 2 TABLET ORAL at 09:19

## 2025-06-07 RX ADMIN — CYANOCOBALAMIN 1000 MCG: 1000 INJECTION INTRAMUSCULAR; SUBCUTANEOUS at 09:20

## 2025-06-07 RX ADMIN — ASPIRIN 81 MG CHEWABLE TABLET 81 MG: 81 TABLET CHEWABLE at 09:18

## 2025-06-07 RX ADMIN — ERGOCALCIFEROL 50000 UNITS: 1.25 CAPSULE ORAL at 09:19

## 2025-06-07 RX ADMIN — FOLIC ACID 1 MG: 1 TABLET ORAL at 09:19

## 2025-06-07 RX ADMIN — TAMSULOSIN HYDROCHLORIDE 0.4 MG: 0.4 CAPSULE ORAL at 17:21

## 2025-06-07 NOTE — ASSESSMENT & PLAN NOTE
Matias Rodriguez is a 89 y.o. male, , living alone, retired, w/ PMH of HTN, CKD stage 2, afib s/p pacemaker, HFpEF, beta thalassemia minor, BPH, pre-diabetes, subclinical hypothyroidism and PPH of insomnia, no prior psychiatric admissions, no prior SA, no h/o self-injurious behavior, who presented to [facility] ED with paranoia and SI on a 201 basis after being BIB family. The patient admitted to the inpatient psychiatry unit 6B for further psychiatric stabilization. Per family, paranoia has been ongoing for several years but had a subacute gradually worsening course. Differential diagnosis include MDD with psychotic features, MNCD with behavioral disturbance and psychosis.     Start Abilify 2 mg daily with plan to likely titrate, started 6/6/25

## 2025-06-07 NOTE — NURSING NOTE
Patient is alert and oriented able to make his needs known. He is calm and pleasant on approach. He is visible intermittently for meals otherwise withdrawn to his room. He is medication and meal compliant.  He denies all psych s/s. No behaviors observed this shift. Will cont to monitor. Safety checks ongoing.

## 2025-06-07 NOTE — PLAN OF CARE
Problem: SAFETY ADULT  Goal: Patient will remain free of falls  Description: INTERVENTIONS:  - Educate patient/family on patient safety including physical limitations  - Instruct patient to call for assistance with activity   - Consider consulting OT/PT to assist with strengthening/mobility based on AM PAC & JH-HLM score  - Consult OT/PT to assist with strengthening/mobility   - Keep Call bell within reach  - Keep bed low and locked with side rails adjusted as appropriate  - Keep care items and personal belongings within reach  - Initiate and maintain comfort rounds  - Make Fall Risk Sign visible to staff  Apply yellow socks and bracelet for high fall risk patients  - Consider moving patient to room near nurses station  Outcome: Progressing     Problem: Alteration in Thoughts and Perception  Goal: Verbalize thoughts and feelings  Description: Interventions:  - Promote a nonjudgmental and trusting relationship with the patient through active listening and therapeutic communication  - Assess patient's level of functioning, behavior and potential for risk  - Engage patient in 1 on 1 interactions  - Encourage patient to express fears, feelings, frustrations, and discuss symptoms    - [place] patient to reality, help patient recognize reality-based thinking   - Administer medications as ordered and assess for potential side effects  - Provide the patient education related to the signs and symptoms of the illness and desired effects of prescribed medications  Outcome: Progressing     Problem: Depression  Goal: Treatment Goal: Demonstrate behavioral control of depressive symptoms, verbalize feelings of improved mood/affect, and adopt new coping skills prior to discharge  Outcome: Progressing  Goal: Verbalize thoughts and feelings  Description: Interventions:  - Assess and re-assess patient's level of risk   - Engage patient in 1:1 interactions, daily, for a minimum of 15 minutes   - Encourage patient to express feelings,  fears, frustrations, hopes   Outcome: Progressing  Goal: Refrain from harming self  Description: Interventions:  - Monitor patient closely, per order   - Supervise medication ingestion, monitor effects and side effects   Outcome: Progressing  Goal: Refrain from isolation  Description: Interventions:  - Develop a trusting relationship   - Encourage socialization   Outcome: Progressing  Goal: Refrain from self-neglect  Outcome: Progressing  Goal: Attend and participate in unit activities, including therapeutic, recreational, and educational groups  Description: Interventions:  - Provide therapeutic and educational activities daily, encourage attendance and participation, and document same in the medical record   Outcome: Progressing  Goal: Complete daily ADLs, including personal hygiene independently, as able  Description: Interventions:  - Observe, teach, and assist patient with ADLS  -  Monitor and promote a balance of rest/activity, with adequate nutrition and elimination   Outcome: Progressing

## 2025-06-07 NOTE — PROGRESS NOTES
Progress Note - Behavioral Health   Name: Matias Rodriguez 89 y.o. male I MRN: [id number]  Unit/Bed#: [facility] 655-02 I Date of Admission: 6/5/2025   Date of Service: 6/8/2025 I Hospital Day: 3     Assessment & Plan  Unspecified mood (affective) disorder (HCC)  Matias Rodriguez is a 89 y.o. male, , living alone, retired, w/ PMH of HTN, CKD stage 2, afib s/p pacemaker, HFpEF, beta thalassemia minor, BPH, pre-diabetes, subclinical hypothyroidism and PPH of insomnia, no prior psychiatric admissions, no prior SA, no h/o self-injurious behavior, who presented to [facility] ED with paranoia and SI on a 201 basis after being BIB family. The patient admitted to the inpatient psychiatry unit 6B for further psychiatric stabilization. Per family, paranoia has been ongoing for several years but had a subacute gradually worsening course. Differential diagnosis include MDD with psychotic features, MNCD with behavioral disturbance and psychosis.     Start Abilify 2 mg daily with plan to likely titrate, started 6/6/25  Atrial fibrillation (HCC)  Per [facility]  Beta thalassemia minor  Per [facility]IM  Medical clearance for psychiatric admission  Per [facility]IM  BPH (benign prostatic hyperplasia)  Per SLIM  Vitamin D deficiency  Per SLIM  B12 deficiency  Per SLIM  Elevated hemoglobin A1c  Per [facility]IM    Current Medications:    Current Facility-Administered Medications:     ARIPiprazole (ABILIFY) tablet 2 mg, Oral, Daily    aspirin chewable tablet 81 mg, Oral, Daily    cyanocobalamin (VITAMIN B-12) tablet 1,000 mcg, Oral, Daily    ergocalciferol (VITAMIN D2) capsule 50,000 Units, Oral, Weekly    fluticasone (FLONASE) 50 mcg/act nasal spray 1 spray, Each Nare, Daily    folic acid (FOLVITE) tablet 1 mg, Oral, Daily    loratadine (CLARITIN) tablet 10 mg, Oral, Daily    tamsulosin (FLOMAX) capsule 0.4 mg, Oral, Daily With Dinner    Risks/Benefits of Treatment:     Risks, benefits, and possible side effects of medications explained to patient and patient  "verbalizes understanding and agreement for treatment.    Progress Toward Goals: progressing    Treatment Planning:      - Encourage early mobility and having a structured day  - Provide frequent re-orientation, and cognitive stimulation  - Ensure assistive devices are in proper working order (eye-glasses, hearing aids)  - Encourage adequate hydration, nutrition and monitor bowel movements  - Maintain sleep-wake cycle: Uninterrupted sleep time; low-level lighting at night  - Fall precaution  - Follow up with SLIM regarding the medical problems   - Continue medication titration and treatment plan; adjust medication to optimize treatment response and as clinically indicated.   - Observation: Routine  - VS: as per unit protocol  - Encourage group attendance and milieu therapy  - Dispo: To be determined  - Estimated Discharge Day: 6/20/2025   - Legal Status : Voluntary 201 commitment.  - Long Stay Certification : Not Applicable    Subjective     Per staff Matias has been pleasant and calm on the unit.  He is intermittently visible on the unit and socializes with his roommate mainly.  He received as needed trazodone last night for insomnia at 2108 which was effective.    Matias was seen privately for psychiatric follow-up today.  He reports this morning that he is \"not too good\".  He reports some ongoing \"tightness\" in his throat and also some pain when coughing.  Most of his symptoms are sounding like allergies but to be cautious reported to medical NP to assess.  He also reports that he was upset because his bed alarm was on last night and went off unexpectedly and scared him.  He continues to report some anxiety however relates it mostly to being on the unit.  He does continue to ask for discharge.  He reports feeling embarrassed this morning because he had an episode of urinary incontinence.  He is on Flomax and reports a history of urinary incontinence at home as well occasional bowel incontinence.  He continues to " have some insight into behaviors leading up to admission.  He continues to believe that his memory and eyesight contribute to losing things more often at home.  He does acknowledged that his neighbors are unlikely stealing from him or spying on him.  He continues to adamantly deny that he was ever suicidal or homicidal however made a comment out of frustration with his children.  He was encouraged to address discharge timeframe and disposition with primary team tomorrow.  Matias denies any auditory or visual hallucinations and does not present as internally stimulated.  He is agreeable to continue his current medications and denies any side effects at this time.    Sleep: slept off and on  Appetite: normal  Medication side effects: No  ROS: review of systems as noted above in HPI/Subjective report, all other systems are negative    Objective :  Temp:  [96.6 °F (35.9 °C)-97.3 °F (36.3 °C)] 96.6 °F (35.9 °C)  HR:  [69-78] 71  BP: (102-126)/(59-61) 126/61  Resp:  [17-18] 17  SpO2:  [94 %-96 %] 95 %  O2 Device: None (Room air)    Mental Status Evaluation:    Appearance:  casually dressed, adequate grooming   Behavior:  pleasant, cooperative, calm   Speech:  normal rate and volume   Mood:  anxious   Affect:  mood-congruent   Thought Process:  goal directed, linear   Thought Content:  mild paranoia, ruminating thoughts   Perceptual Disturbances: denies auditory or visual hallucinations when asked, does not appear responding to internal stimuli   Risk Potential: Suicidal Ideation -  None  Homicidal Ideation -  None  Potential for Aggression - No   Sensorium:  oriented to person, place, and time/date   Memory:  recent and remote memory grossly intact   Consciousness:  alert and awake   Attention/Concentration: attention span and concentration are age appropriate   Insight:  partial   Judgment: partial   Gait/Station: normal gait/station   Motor Activity: no abnormal movements         Lab Results: I have reviewed the  "following results:  Results from the past 24 hours: No results found for this or any previous visit (from the past 24 hours).  Most Recent Labs:   Lab Results   Component Value Date    WBC 4.53 06/06/2025    RBC 4.77 06/06/2025    HGB 9.8 (L) 06/06/2025    HCT 31.4 (L) 06/06/2025     06/06/2025    RDW 18.0 (H) 06/06/2025    NEUTROABS 2.44 06/06/2025    SODIUM 140 06/06/2025    K 3.8 06/06/2025     06/06/2025    CO2 26 06/06/2025    BUN 17 06/06/2025    CREATININE 0.82 06/06/2025    GLUC 102 06/06/2025    CALCIUM 8.6 06/06/2025    AST 23 06/06/2025    ALT 30 06/06/2025    ALKPHOS 62 06/06/2025    TP 5.7 (L) 06/06/2025    ALB 3.6 06/06/2025    TBILI 1.03 (H) 06/06/2025    CHOLESTEROL 179 03/29/2023    HDL 91 03/29/2023    TRIG 55 03/29/2023    LDLCALC 77 03/29/2023    NONHDLC 88 03/29/2023    ANV6PRJJPOAZ 5.658 (H) 06/06/2025    FREET4 0.90 06/06/2025    HGBA1C 5.7 (H) 06/06/2025     06/06/2025       Administrative Statements     Counseling / Coordination of Care:   I have spent a total time of 30 minutes in caring for this patient on the day of the visit/encounter including:  Patient's progress discussed with staff in treatment team meeting.  Medication changes reviewed with staff in treatment team meeting.    Portions of the record may have been created with voice recognition software. Occasional wrong word or \"sound a like\" substitutions may have occurred due to the inherent limitations of voice recognition software. Read the chart carefully and recognize, using context, where substitutions have occurred.    ARPIT Erwin 06/08/25  "

## 2025-06-07 NOTE — NURSING NOTE
Patient calm, cooperative, and medication compliant.  Visible but withdrawn to self. Denies all s/s including  SI/HI. Monitor for safety and support.     Pt request PRN to help him sleep. Trazodone 50 mg given . Monitor for safety and support.

## 2025-06-07 NOTE — NURSING NOTE
C/o anxiety r/t inability to fall asleep with a Bauer score of 18, atarax 50 mg given at 0022 was effective.

## 2025-06-08 LAB
BACTERIA UR QL AUTO: ABNORMAL /HPF
BILIRUB UR QL STRIP: NEGATIVE
CLARITY UR: CLEAR
COLOR UR: ABNORMAL
GLUCOSE UR STRIP-MCNC: ABNORMAL MG/DL
HGB UR QL STRIP.AUTO: NEGATIVE
KETONES UR STRIP-MCNC: ABNORMAL MG/DL
LEUKOCYTE ESTERASE UR QL STRIP: 25
MUCOUS THREADS UR QL AUTO: ABNORMAL
NITRITE UR QL STRIP: NEGATIVE
NON-SQ EPI CELLS URNS QL MICRO: ABNORMAL /HPF
PH UR STRIP.AUTO: 5 [PH]
PROT UR STRIP-MCNC: ABNORMAL MG/DL
RBC #/AREA URNS AUTO: ABNORMAL /HPF
SP GR UR STRIP.AUTO: 1.02 (ref 1–1.04)
UROBILINOGEN UA: 1 MG/DL
WBC #/AREA URNS AUTO: ABNORMAL /HPF

## 2025-06-08 PROCEDURE — 99232 SBSQ HOSP IP/OBS MODERATE 35: CPT

## 2025-06-08 PROCEDURE — 81001 URINALYSIS AUTO W/SCOPE: CPT | Performed by: NURSE PRACTITIONER

## 2025-06-08 RX ORDER — FLUTICASONE PROPIONATE 50 MCG
1 SPRAY, SUSPENSION (ML) NASAL DAILY
Status: DISCONTINUED | OUTPATIENT
Start: 2025-06-08 | End: 2025-06-12 | Stop reason: HOSPADM

## 2025-06-08 RX ORDER — LORATADINE 10 MG/1
10 TABLET ORAL DAILY
Status: DISCONTINUED | OUTPATIENT
Start: 2025-06-08 | End: 2025-06-12 | Stop reason: HOSPADM

## 2025-06-08 RX ADMIN — TAMSULOSIN HYDROCHLORIDE 0.4 MG: 0.4 CAPSULE ORAL at 17:29

## 2025-06-08 RX ADMIN — Medication 1000 MCG: at 08:54

## 2025-06-08 RX ADMIN — ARIPIPRAZOLE 2 MG: 2 TABLET ORAL at 08:54

## 2025-06-08 RX ADMIN — LORATADINE 10 MG: 10 TABLET ORAL at 13:09

## 2025-06-08 RX ADMIN — FLUTICASONE PROPIONATE 1 SPRAY: 50 SPRAY, METERED NASAL at 13:09

## 2025-06-08 RX ADMIN — ASPIRIN 81 MG CHEWABLE TABLET 81 MG: 81 TABLET CHEWABLE at 08:54

## 2025-06-08 RX ADMIN — FOLIC ACID 1 MG: 1 TABLET ORAL at 08:54

## 2025-06-08 NOTE — NURSING NOTE
Patient was visible and social mostly with his roommate in the milieu. Denies all psych s/s. No behaviors noted. No c/o pain. No needs expressed. Calm and cooperative with care. Safety checks ongoing.

## 2025-06-08 NOTE — NURSING NOTE
Patient pleasant, calm and cooperative, able to make needs known. Is visible on the module, social with peers, attends groups. Denies SI/HI/AVH, anxiety or depression. Medication compliant. Support provided. Urine cup provided for specimen collection and directed pt to contact staff for assistance. Pt verbalized understanding.

## 2025-06-08 NOTE — PLAN OF CARE
Problem: SAFETY ADULT  Goal: Patient will remain free of falls  Description: INTERVENTIONS:  - Educate patient/family on patient safety including physical limitations  - Instruct patient to call for assistance with activity   - Consider consulting OT/PT to assist with strengthening/mobility based on AM PAC & JH-HLM score  - Consult OT/PT to assist with strengthening/mobility   - Keep Call bell within reach  - Keep bed low and locked with side rails adjusted as appropriate  - Keep care items and personal belongings within reach  - Initiate and maintain comfort rounds  - Make Fall Risk Sign visible to staff  Apply yellow socks and bracelet for high fall risk patients  - Consider moving patient to room near nurses station  Outcome: Progressing  Goal: Maintain or return to baseline ADL function  Description: INTERVENTIONS:  -  Assess patient's ability to carry out ADLs; assess patient's baseline for ADL function and identify physical deficits which impact ability to perform ADLs (bathing, care of mouth/teeth, toileting, grooming, dressing, etc.)  - Assess/evaluate cause of self-care deficits   - Assess range of motion  - Assess patient's mobility; develop plan if impaired  - Assess patient's need for assistive devices and provide as appropriate  - Encourage maximum independence but intervene and supervise when necessary  - Involve family in performance of ADLs  - Assess for home care needs following discharge   - Consider OT consult to assist with ADL evaluation and planning for discharge  - Provide patient education as appropriate  - Monitor functional capacity and physical performance, use of AM PAC & JH-HLM   - Monitor gait, balance and fatigue with ambulation    Outcome: Progressing  Goal: Maintains/Returns to pre admission functional level  Description: INTERVENTIONS:  - Perform AM-PAC 6 Click Basic Mobility/ Daily Activity assessment daily.  - Set and communicate daily mobility goal to care team and  patient/family/caregiver.   - Collaborate with rehabilitation services on mobility goals if consulted    - Out of bed for toileting  - Record patient progress and toleration of activity level   Outcome: Progressing     Problem: Alteration in Thoughts and Perception  Goal: Verbalize thoughts and feelings  Description: Interventions:  - Promote a nonjudgmental and trusting relationship with the patient through active listening and therapeutic communication  - Assess patient's level of functioning, behavior and potential for risk  - Engage patient in 1 on 1 interactions  - Encourage patient to express fears, feelings, frustrations, and discuss symptoms    - [place] patient to reality, help patient recognize reality-based thinking   - Administer medications as ordered and assess for potential side effects  - Provide the patient education related to the signs and symptoms of the illness and desired effects of prescribed medications  Outcome: Progressing     Problem: Anxiety  Goal: Anxiety is at manageable level  Description: Interventions:  - Assess and monitor patient's anxiety level.   - Monitor for signs and symptoms (heart palpitations, chest pain, shortness of breath, headaches, nausea, feeling jumpy, restlessness, irritable, apprehensive).   - Collaborate with interdisciplinary team and initiate plan and interventions as ordered.  - [place] patient to unit/surroundings  - Explain treatment plan  - Encourage participation in care  - Encourage verbalization of concerns/fears  - Identify coping mechanisms  - Assist in developing anxiety-reducing skills  - Administer/offer alternative therapies  - Limit or eliminate stimulants  Outcome: Progressing

## 2025-06-08 NOTE — NURSING NOTE
Pt request urinal, reports does not use a brief at home but the medications that he was started on has desensitized the sensation of knowing when to urinate.

## 2025-06-08 NOTE — QUICK NOTE
Called by NP pt with tickle in throat and ? Allergies.  I saw pt Lungs CTA tells we he is urinating more than ever. Claritin, Flonase and UA ordered.

## 2025-06-09 PROBLEM — R35.0 URINARY FREQUENCY: Status: ACTIVE | Noted: 2025-06-09

## 2025-06-09 PROCEDURE — 99232 SBSQ HOSP IP/OBS MODERATE 35: CPT | Performed by: PSYCHIATRY & NEUROLOGY

## 2025-06-09 PROCEDURE — 97535 SELF CARE MNGMENT TRAINING: CPT

## 2025-06-09 PROCEDURE — 87086 URINE CULTURE/COLONY COUNT: CPT | Performed by: PSYCHIATRY & NEUROLOGY

## 2025-06-09 RX ORDER — CIPROFLOXACIN 250 MG/1
250 TABLET, FILM COATED ORAL EVERY 12 HOURS SCHEDULED
Status: DISCONTINUED | OUTPATIENT
Start: 2025-06-09 | End: 2025-06-12 | Stop reason: HOSPADM

## 2025-06-09 RX ADMIN — ASPIRIN 81 MG CHEWABLE TABLET 81 MG: 81 TABLET CHEWABLE at 08:14

## 2025-06-09 RX ADMIN — FOLIC ACID 1 MG: 1 TABLET ORAL at 08:14

## 2025-06-09 RX ADMIN — Medication 1000 MCG: at 08:14

## 2025-06-09 RX ADMIN — CIPROFLOXACIN HYDROCHLORIDE 250 MG: 250 TABLET, FILM COATED ORAL at 21:30

## 2025-06-09 RX ADMIN — LORATADINE 10 MG: 10 TABLET ORAL at 08:14

## 2025-06-09 RX ADMIN — CIPROFLOXACIN HYDROCHLORIDE 250 MG: 250 TABLET, FILM COATED ORAL at 08:14

## 2025-06-09 RX ADMIN — FLUTICASONE PROPIONATE 1 SPRAY: 50 SPRAY, METERED NASAL at 09:19

## 2025-06-09 RX ADMIN — ARIPIPRAZOLE 2 MG: 2 TABLET ORAL at 08:14

## 2025-06-09 RX ADMIN — TAMSULOSIN HYDROCHLORIDE 0.4 MG: 0.4 CAPSULE ORAL at 16:48

## 2025-06-09 NOTE — PROGRESS NOTES
Progress Note - Behavioral Health   Name: Matias Rodriguez 89 y.o. male I MRN: [id number]  Unit/Bed#: [facility] 655-02 I Date of Admission: 6/5/2025   Date of Service: 6/9/2025 I Hospital Day: 4    Assessment & Plan  Unspecified mood (affective) disorder (HCC)  Matias Rodriguez is a 89 y.o. male, , living alone, retired, w/ PMH of HTN, CKD stage 2, afib s/p pacemaker, HFpEF, beta thalassemia minor, BPH, pre-diabetes, subclinical hypothyroidism and PPH of insomnia, no prior psychiatric admissions, no prior SA, no h/o self-injurious behavior, who presented to [facility] ED with paranoia and SI on a 201 basis after being BIB family. The patient admitted to the inpatient psychiatry unit 6B for further psychiatric stabilization. Per family, paranoia has been ongoing for several years but had a subacute gradually worsening course. Differential diagnosis include MDD with psychotic features, MNCD with behavioral disturbance and psychosis.     Plan:  Continue Abilify 2 mg for paranoid ideation and mood  Urinary frequency  Patient reporting urinary frequency and incontinence   UA revealing leukocytes, occasional bacteria  Ordered follow-up urine culture   SLIM placed patient on ciprofloxacin 250 mg twice daily for 7 days   Atrial fibrillation (HCC)  Per [facility]IM  Beta thalassemia minor  Per [facility]IM  Medical clearance for psychiatric admission  Per [facility]  BPH (benign prostatic hyperplasia)  Per SLIM  Vitamin D deficiency  Per SLIM  B12 deficiency  Per SLIM  Elevated hemoglobin A1c  Per SLIM    Current Medications:    Current Facility-Administered Medications:     ARIPiprazole (ABILIFY) tablet 2 mg, Oral, Daily    aspirin chewable tablet 81 mg, Oral, Daily    ciprofloxacin (CIPRO) tablet 250 mg, Oral, Q12H STELLA    cyanocobalamin (VITAMIN B-12) tablet 1,000 mcg, Oral, Daily    ergocalciferol (VITAMIN D2) capsule 50,000 Units, Oral, Weekly    fluticasone (FLONASE) 50 mcg/act nasal spray 1 spray, Each Nare, Daily    folic acid  "(FOLVITE) tablet 1 mg, Oral, Daily    loratadine (CLARITIN) tablet 10 mg, Oral, Daily    tamsulosin (FLOMAX) capsule 0.4 mg, Oral, Daily With Dinner      Risks/Benefits of Treatment:     Risks, benefits, and possible side effects of medications explained to patient and patient verbalizes understanding and agreement for treatment.    Progress Toward Goals: improving    Treatment Planning:  - Follow up with SLKELLY regarding the medical problems   - Continue medication titration and treatment plan; adjust medication to optimize treatment response and as clinically indicated.   - Observation: Routine  - VS: as per unit protocol  - Encourage group attendance and milieu therapy  - Dispo: To be determined  - Estimated Discharge Day: 6/20/2025       Subjective     Per nursing, Matias has been pleasant and cooperative. He is visible in the milieu and attending groups. He is medication and meal compliant. No behavioral health prns in the past 24 hours.     Matias was seen and evaluated for continuity of care. He reports that he is feeling \"good\" today. He was asked about his paranoid thoughts regarding his neighbors, he appears to have improved insight. He states that he spoke with his son who had suggested that it might just be all in his head and he appears to be accepting of this, recognizing that his neighbor is good person. He is looking forward to his eventual discharge, reporting that he misses his dog. He otherwise denies issues with sleep or appetite. He reports urinary frequency and incontinence which is new. SLIM aware. UA positive, no culture yet. He is presently being treated for a UTI and his symptoms will continue to be monitored. He denies active or passive SI as well as AVH.     Sleep: normal  Appetite: normal  Medication side effects: No  ROS: review of systems as noted above in HPI/Subjective report, all other systems are negative    Objective :  Temp:  [97.1 °F (36.2 °C)-98.6 °F (37 °C)] 97.5 °F (36.4 °C)  HR: " " [68-80] 68  BP: (117-147)/(54-71) 147/71  Resp:  [17-18] 18  SpO2:  [92 %-96 %] 95 %  O2 Device: None (Room air)    Mental Status Evaluation:    Appearance:  age appropriate, casually dressed, adequate grooming   Behavior:  pleasant, cooperative   Speech:  normal rate, normal volume, fluent, clear, coherent   Mood:  \"Good\"   Affect:  mood-congruent   Thought Process:  organized, goal directed, linear   Thought Content:  Resolving paranoia, no overt delusions   Perceptual Disturbances: no auditory hallucinations, no visual hallucinations   Risk Potential: Suicidal Ideation -  None  Homicidal Ideation -  None  Potential for Aggression - No   Sensorium:  grossly oriented   Memory:  recent and remote memory grossly intact   Consciousness:  alert and awake   Attention/Concentration: attention span and concentration are age appropriate   Insight:  improving   Judgment: partial   Gait/Station: normal gait/station, normal balance   Motor Activity: no abnormal movements           Lab Results: I have reviewed the following results:  Results from the past 24 hours:   Recent Results (from the past 24 hours)   Urinalysis with microscopic    Collection Time: 06/08/25  3:17 PM   Result Value Ref Range    Clarity, UA Clear Clear, Other    Color, UA Brown (A) Straw, Yellow, Pale Yellow    Specific Gravity, UA 1.020 1.003 - 1.040    pH, UA 5.0 4.5, 5.0, 5.5, 6.0, 6.5, 7.0, 7.5, 8.0    Glucose,  (1/4%) (A) Negative mg/dl    Ketones, UA 5 (Trace) (A) Negative mg/dl    Occult Blood, UA Negative Negative    Protein, UA 15 (Trace) (A) Negative mg/dl    Nitrite, UA Negative Negative    Bilirubin, UA Negative Negative    Leukocytes, UA 25.0 (A) Negative    WBC, UA 1-2 (A) None Seen, 2-4, 5-60 /hpf    RBC, UA 0-1 (A) None Seen, 2-4 /hpf    Bacteria, UA Occasional None Seen, Occasional /hpf    Epithelial Cells Occasional None Seen, Occasional /hpf    MUCUS THREADS Innumerable (A) None Seen    UROBILINOGEN UA 1.0 1.0, Negative mg/dL "       Administrative Statements     Counseling / Coordination of Care:   Patient's progress discussed with staff in treatment team meeting.  Medication education provided to patient.  Patient's diagnosis and treatment indicated reviewed with patient.  Group attendance encouraged.  Encouraged participation in milieu and group therapy on the unit.    Eneida Zimmerman DO 06/09/25

## 2025-06-09 NOTE — PROGRESS NOTES
Pt attended groups.  Scant interactions and stated he does not know peers on unit. Provided introductions.      06/09/25 0900 06/09/25 1000 06/09/25 1100   Activity/Group Checklist   Group Exercise Other (Comment)  (Commuity meeting: my self care plan) Admission/Discharge   Attendance Attended Attended Did not attend   Attendance Duration (min) 31-45 31-45  --    Interactions Interacted appropriately Interacted appropriately  --    Affect/Mood Appropriate Appropriate  --    Goals Achieved Able to listen to others;Able to engage in interactions Identified feelings;Identified triggers;Identified relapse prevention strategies;Discussed coping strategies;Discussed self-esteem issues;Able to self-disclose;Verbalized increased hopefulness;Able to manage/cope with feelings;Able to reflect/comment on own behavior;Able to engage in interactions  --

## 2025-06-09 NOTE — PLAN OF CARE
Problem: PAIN - ADULT  Goal: Verbalizes/displays adequate comfort level or baseline comfort level  Description: Interventions:  - Encourage patient to monitor pain and request assistance  - Assess pain using appropriate pain scale  - Administer analgesics as ordered based on type and severity of pain and evaluate response  - Implement non-pharmacological measures as appropriate and evaluate response  - Consider cultural and social influences on pain and pain management  - Notify physician/advanced practitioner if interventions unsuccessful or patient reports new pain  - Educate patient/family on pain management process including their role and importance of  reporting pain   - Provide non-pharmacologic/complimentary pain relief interventions  Outcome: Progressing     Problem: INFECTION - ADULT  Goal: Absence of fever/infection during neutropenic period  Description: INTERVENTIONS:  - Monitor WBC  - Perform strict hand hygiene  - Limit to healthy visitors only  - No plants, dried, fresh or silk flowers with wong in patient room  Outcome: Progressing     Problem: SAFETY ADULT  Goal: Patient will remain free of falls  Description: INTERVENTIONS:  - Educate patient/family on patient safety including physical limitations  - Instruct patient to call for assistance with activity   - Consider consulting OT/PT to assist with strengthening/mobility based on AM PAC & JH-HLM score  - Consult OT/PT to assist with strengthening/mobility   - Keep Call bell within reach  - Keep bed low and locked with side rails adjusted as appropriate  - Keep care items and personal belongings within reach  - Initiate and maintain comfort rounds  - Make Fall Risk Sign visible to staff  Apply yellow socks and bracelet for high fall risk patients  - Consider moving patient to room near nurses station  Outcome: Progressing     Problem: Alteration in Thoughts and Perception  Goal: Verbalize thoughts and feelings  Description: Interventions:  -  Promote a nonjudgmental and trusting relationship with the patient through active listening and therapeutic communication  - Assess patient's level of functioning, behavior and potential for risk  - Engage patient in 1 on 1 interactions  - Encourage patient to express fears, feelings, frustrations, and discuss symptoms    - [place] patient to reality, help patient recognize reality-based thinking   - Administer medications as ordered and assess for potential side effects  - Provide the patient education related to the signs and symptoms of the illness and desired effects of prescribed medications  Outcome: Progressing     Problem: Depression  Goal: Verbalize thoughts and feelings  Description: Interventions:  - Assess and re-assess patient's level of risk   - Engage patient in 1:1 interactions, daily, for a minimum of 15 minutes   - Encourage patient to express feelings, fears, frustrations, hopes   Outcome: Progressing  Goal: Refrain from harming self  Description: Interventions:  - Monitor patient closely, per order   - Supervise medication ingestion, monitor effects and side effects   Outcome: Progressing  Goal: Refrain from isolation  Description: Interventions:  - Develop a trusting relationship   - Encourage socialization   Outcome: Progressing

## 2025-06-09 NOTE — TREATMENT TEAM
06/09/25 0837   Team Meeting   Meeting Type Daily Rounds   Team Members Present   Team Members Present Physician;Nurse;;   Physician Team Member Gina/Cachorro/Sammi/Zurdo   Nursing Team Member Sruthi/Pj   Care Management Team Member Noris Paz   Social Work Team Member Ernesto   Patient/Family Present   Patient Present No   Patient's Family Present No     Pt is a 201. Pt pleasant, calm and cooperative, able to make needs known. Pt is visible on the milieu. Pt is attending groyups and social with peers. Denies all s/s. Pt denies anxiety or depression. Pt is medication compliant. Patient discharge is pending stabilization of mood and medications.

## 2025-06-09 NOTE — PLAN OF CARE
Problem: OCCUPATIONAL THERAPY ADULT  Goal: Performs self-care activities at highest level of function for planned discharge setting.  See evaluation for individualized goals.  Description: Treatment Interventions: ADL retraining, Functional transfer training, UE strengthening/ROM, Endurance training, Continued evaluation, Energy conservation, Activityengagement          See flowsheet documentation for full assessment, interventions and recommendations.   Outcome: Progressing  Note: Limitation: Decreased high-level ADLs     Assessment: Pt is a 89 y.o. male seen for OT evaluation s/p admit to [facility] on 6/5/2025 w/ Unspecified mood (affective) disorder (HCC).  See medical history above for extensive list of comorbidities affecting pt's functional performance at time of assessment. Personal factors affecting Pt at time of IE include:behavioral pattern and health management . Upon evaluation: Pt Xiang for functional ambulation including bathroom mobility and negotiation of small spaces, Xiang for ADL transfers.  Pt requires Xiang for ADLs in standing. Pt's primary barrier(s) at this time: decreased insight, health literacy, safety. The following deficits impact occupational performance: impaired memory, impaired sequencing, impaired problem solving, and decreased safety awareness. Pt to benefit from continued skilled OT services while in the hospital to address deficits as defined above and maximize level of functional independence w ADL's and functional mobility. Occupational performance areas to address include: health maintenance, functional mobility, community mobility, clothing management, and household maintenance. From OT standpoint, recommendation at time of d/c would be minimum resource intensity.       Rehab Resource Intensity Level, OT: III (Minimum Resource Intensity)

## 2025-06-09 NOTE — OCCUPATIONAL THERAPY NOTE
Occupational Therapy Evaluation & Treatment Note      Patient Name: Matias Rodriguez  Today's Date: 6/9/2025  Problem List  Principal Problem:    Unspecified mood (affective) disorder (HCC)  Active Problems:    Atrial fibrillation (HCC)    Beta thalassemia minor    Medical clearance for psychiatric admission    BPH (benign prostatic hyperplasia)    Vitamin D deficiency    B12 deficiency    Elevated hemoglobin A1c    Urinary frequency    Past Medical History  Past Medical History[1]  Past Surgical History  Past Surgical History[2]          06/09/25 1135   OT Last Visit   OT Visit Date 06/09/25   Note Type   Note type Evaluation   Pain Assessment   Pain Assessment Tool 0-10   Restrictions/Precautions   Weight Bearing Precautions Per Order No   Other Precautions Fall Risk   Home Living   Type of Home House   Home Layout Two level   Bathroom Shower/Tub Tub/shower unit   Bathroom Toilet Standard   Home Equipment   (none)   Prior Function   Level of [place] Independent with ADLs;Independent with IADLS   Lives With Alone   Receives Help From Family   Comments Pt reports being very independent with ADL and IADLs. Family assists as needed.   ADL   Grooming Assistance 6  Modified Independent   UB Bathing Assistance 6  Modified Independent   LB Bathing Assistance 6  Modified Independent   UB Dressing Assistance 6  Modified independent   LB Dressing Assistance 6  Modified independent   Toileting Assistance  6  Modified independent   Bed Mobility   Supine to Sit 6  Modified independent   Sit to Supine 6  Modified independent   Transfers   Sit to Stand 6  Modified independent   Stand to Sit 6  Modified independent   Functional Mobility   Functional Mobility 6  Modified independent   Balance   Static Sitting Good   Dynamic Sitting Fair +   Static Standing Fair   Dynamic Standing Fair   Ambulatory Fair   Activity Tolerance   Activity Tolerance Patient tolerated treatment well   RUE Assessment   RUE Assessment WFL   LUE  Assessment   LUE Assessment WFL   Cognition   Arousal/Participation Alert;Cooperative   Attention Within functional limits   Orientation Level Oriented X4   Memory Within functional limits   Following Commands Follows all commands and directions without difficulty   Assessment   Limitation Decreased high-level ADLs   Assessment   Pt is a 89 y.o. male seen for OT evaluation s/p admit to [facility] on 6/5/2025 w/ Unspecified mood (affective) disorder (HCC).  See medical history above for extensive list of comorbidities affecting pt's functional performance at time of assessment. Personal factors affecting Pt at time of IE include:behavioral pattern and health management . Upon evaluation: Pt Xiang for functional ambulation including bathroom mobility and negotiation of small spaces, Xiang for ADL transfers.  Pt requires Xiang for ADLs in standing. Pt's primary barrier(s) at this time: decreased insight, health literacy, safety. The following deficits impact occupational performance: impaired memory, impaired sequencing, impaired problem solving, and decreased safety awareness. Pt to benefit from continued skilled OT services while in the hospital to address deficits as defined above and maximize level of functional independence w ADL's and functional mobility. Occupational performance areas to address include: health maintenance, functional mobility, community mobility, clothing management, and household maintenance. From OT standpoint, recommendation at time of d/c would be minimum resource intensity.       Goals   STG Time Frame   (2 weeks)   Short Term Goals Pt will complete IADL Xiang/I.   Pt will complete med management task Xiang/I.   Pt will verbalize 3 positive coping strategies to utilize outside of the hospital.    Plan   Treatment Interventions ADL retraining;Functional transfer training;UE strengthening/ROM;Endurance training;Continued evaluation;Energy conservation;Activityengagement   Goal Expiration Date 06/23/25    OT Treatment Day 1   OT Frequency 1-2x/wk   Discharge Recommendation   Rehab Resource Intensity Level, OT III (Minimum Resource Intensity)   Additional Treatment Session   Treatment Assessment Pt seen for OT txt. Pt completed full ADL including item retrieval from various heights. Pt demonstrated fair stability. Pt requires increased time to complete tasks and for all mobility transitions. Pt able to sequence steps of dressing, toileting without assist. Pt would benefit from continued OT services to further assess IADLs.                  [1]   Past Medical History:  Diagnosis Date    A-fib ([facility])     CHANA (acute kidney injury) ([facility]) 12/19/2022    Hernia, abdominal     Hypertension     Subdural hematoma ([facility])    [2]   Past Surgical History:  Procedure Laterality Date    A-V CARDIAC PACEMAKER INSERTION      APPENDECTOMY      age 27    CARDIAC CATHETERIZATION N/A 12/19/2022    Procedure: Cardiac temporary pacemaker;  Surgeon: Darien Panda MD;  [facility]: [facility];  Service: Cardiology    CARDIAC ELECTROPHYSIOLOGY PROCEDURE N/A 12/8/2022    Procedure: CARDIAC ATRIAL APPENDAGE CLOSURE (EP);  Surgeon: Jeremiah Pradhan MD;  Location: [facility] MAIN OR;  Service: Cardiology    CARDIAC ELECTROPHYSIOLOGY PROCEDURE Right 12/20/2022    Procedure: Cardiac lead revision;  Surgeon: Juan Wheeler MD;  Location: [facility]ARDIAC C[facility];  Service: Cardiology    CARDIAC PACEMAKER PLACEMENT      CARPAL TUNNEL RELEASE      COLONOSCOPY      INGUINAL HERNIA REPAIR Left     IR UPPER EXTREMITY VENOGRAM- DIAGNOSTIC  12/19/2022    NH PERQ CLSR TCAT L ATR APNDGE W/ENDOCARDIAL IMPLNT N/A 12/8/2022    Procedure: CARDIAC ATRIAL APPENDAGE CLOSURE (CATH);  Surgeon: Jeremiah Pradhan MD;  Location:  MAIN OR;  Service: Cardiology    NH XCAPSL CTRC RMVL INSJ IO LENS PROSTH W/O ECP Right 4/3/2017    Procedure: EXTRACTION EXTRACAPSULAR CATARACT PHACO INTRAOCULAR LENS (IOL);  Surgeon: Mario Power MD;  Location: [facility] MAIN OR;  Service:  Ophthalmology    IN XCAPSL CTRC RMVL INSJ IO LENS PROSTH W/O ECP Left 2/6/2023    Procedure: EXTRACTION EXTRACAPSULAR CATARACT PHACO INTRAOCULAR LENS (IOL);  Surgeon: Mario Power MD;  Location: [facility] MAIN OR;  Service: Ophthalmology    TONSILLECTOMY      age 6

## 2025-06-09 NOTE — ASSESSMENT & PLAN NOTE
Patient reporting urinary frequency and incontinence   UA revealing leukocytes, occasional bacteria  Ordered follow-up urine culture   SLIM placed patient on ciprofloxacin 250 mg twice daily for 7 days

## 2025-06-09 NOTE — NURSING NOTE
Patient visible throughout milieu interacting with staff and peers. Patient pleasant and calm, denied SI/HI/AVH. Patient educated on new medication, Cipro and need for urine culture. Patent compliant and provided urine culture. Urine culture sent to laboratory. Patient accepted scheduled medications. Patient displays steady gait. Patient able to and encouraged to inform staff of any needs.

## 2025-06-09 NOTE — NURSING NOTE
Patient had difficulty falling asleep due to noise at desk and staff entering room. Sat on side of bed until 0100 then slept rest of night.

## 2025-06-09 NOTE — NURSING NOTE
Patient visible in milieu and social with peers. Calm and cooperative on approach and making appropriate eye contact. Currently denies SI/HI and AH/VH. Does not currently verbalize any paranoid ideations. Displays a steady gait and adequate appetite. VSS. Denies any additional needs at this time.

## 2025-06-09 NOTE — ASSESSMENT & PLAN NOTE
Matias Rodriguez is a 89 y.o. male, , living alone, retired, w/ PMH of HTN, CKD stage 2, afib s/p pacemaker, HFpEF, beta thalassemia minor, BPH, pre-diabetes, subclinical hypothyroidism and PPH of insomnia, no prior psychiatric admissions, no prior SA, no h/o self-injurious behavior, who presented to [facility] ED with paranoia and SI on a 201 basis after being BIB family. The patient admitted to the inpatient psychiatry unit 6B for further psychiatric stabilization. Per family, paranoia has been ongoing for several years but had a subacute gradually worsening course. Differential diagnosis include MDD with psychotic features, MNCD with behavioral disturbance and psychosis.     Plan:  Continue Abilify 2 mg for paranoid ideation and mood

## 2025-06-09 NOTE — QUICK NOTE
On 6/8/2025 patient complained to me that he has frequent urination he has never urinated so much in his life.  I got a urinalysis it has some leukocytes but given the fact the patient is 89 years old I have started him on Cipro 250 p.o. twice daily x 7 days for a UTI.

## 2025-06-10 LAB
ATRIAL RATE: 72 BPM
QRS AXIS: 124 DEGREES
QRSD INTERVAL: 134 MS
QT INTERVAL: 418 MS
QTC INTERVAL: 457 MS
T WAVE AXIS: 1 DEGREES
VENTRICULAR RATE: 72 BPM

## 2025-06-10 PROCEDURE — 99232 SBSQ HOSP IP/OBS MODERATE 35: CPT | Performed by: STUDENT IN AN ORGANIZED HEALTH CARE EDUCATION/TRAINING PROGRAM

## 2025-06-10 PROCEDURE — 93010 ELECTROCARDIOGRAM REPORT: CPT | Performed by: INTERNAL MEDICINE

## 2025-06-10 RX ORDER — ECHINACEA PURPUREA EXTRACT 125 MG
1 TABLET ORAL
Status: DISCONTINUED | OUTPATIENT
Start: 2025-06-10 | End: 2025-06-12 | Stop reason: HOSPADM

## 2025-06-10 RX ADMIN — ASPIRIN 81 MG CHEWABLE TABLET 81 MG: 81 TABLET CHEWABLE at 08:32

## 2025-06-10 RX ADMIN — FLUTICASONE PROPIONATE 1 SPRAY: 50 SPRAY, METERED NASAL at 09:27

## 2025-06-10 RX ADMIN — LORATADINE 10 MG: 10 TABLET ORAL at 08:33

## 2025-06-10 RX ADMIN — FOLIC ACID 1 MG: 1 TABLET ORAL at 08:33

## 2025-06-10 RX ADMIN — Medication 1000 MCG: at 08:33

## 2025-06-10 RX ADMIN — CIPROFLOXACIN HYDROCHLORIDE 250 MG: 250 TABLET, FILM COATED ORAL at 08:32

## 2025-06-10 RX ADMIN — CIPROFLOXACIN HYDROCHLORIDE 250 MG: 250 TABLET, FILM COATED ORAL at 21:43

## 2025-06-10 RX ADMIN — ARIPIPRAZOLE 2 MG: 2 TABLET ORAL at 08:34

## 2025-06-10 RX ADMIN — Medication 3 MG: at 21:43

## 2025-06-10 RX ADMIN — TAMSULOSIN HYDROCHLORIDE 0.4 MG: 0.4 CAPSULE ORAL at 16:18

## 2025-06-10 NOTE — PROGRESS NOTES
Progress Note - Behavioral Health   Name: Matias Rodriguez 89 y.o. male I MRN: [id number]  Unit/Bed#: [facility] 655-02 I Date of Admission: 6/5/2025   Date of Service: 6/10/2025 I Hospital Day: 5     Assessment & Plan  Unspecified mood (affective) disorder (HCC)  Matias Rodriguez is a 89 y.o. male, , living alone, retired, w/ PMH of HTN, CKD stage 2, afib s/p pacemaker, HFpEF, beta thalassemia minor, BPH, pre-diabetes, subclinical hypothyroidism and PPH of insomnia, no prior psychiatric admissions, no prior SA, no h/o self-injurious behavior, who presented to [facility] ED with paranoia and SI on a 201 basis after being BIB family. The patient admitted to the inpatient psychiatry unit 6B for further psychiatric stabilization. Per family, paranoia has been ongoing for several years but had a subacute gradually worsening course. Differential diagnosis include MDD with psychotic features, MNCD with behavioral disturbance and psychosis.     Plan:  Continue Abilify 2 mg for paranoid ideation and mood  Melatonin 3 mg nightly to adjust sleep-wake cycle  Group and milieu therapy  Urinary frequency  Patient reporting urinary frequency and incontinence   UA revealing leukocytes, occasional bacteria  Ordered follow-up urine culture   SLIM placed patient on ciprofloxacin 250 mg twice daily for 7 days   Atrial fibrillation (HCC)  Per SLIM  Beta thalassemia minor  Per SLIM  Medical clearance for psychiatric admission  Per SLIM  BPH (benign prostatic hyperplasia)  Per SLIM  Vitamin D deficiency  Per SLIM  B12 deficiency  Per SLIM  Elevated hemoglobin A1c  Per SLIM      Current Medications:    Current Facility-Administered Medications:     ARIPiprazole (ABILIFY) tablet 2 mg, Oral, Daily    aspirin chewable tablet 81 mg, Oral, Daily    ciprofloxacin (CIPRO) tablet 250 mg, Oral, Q12H STELLA    cyanocobalamin (VITAMIN B-12) tablet 1,000 mcg, Oral, Daily    ergocalciferol (VITAMIN D2) capsule 50,000 Units, Oral, Weekly    fluticasone  (FLONASE) 50 mcg/act nasal spray 1 spray, Each Nare, Daily    folic acid (FOLVITE) tablet 1 mg, Oral, Daily    loratadine (CLARITIN) tablet 10 mg, Oral, Daily    tamsulosin (FLOMAX) capsule 0.4 mg, Oral, Daily With Dinner    Current Facility-Administered Medications:     acetaminophen (TYLENOL) tablet 650 mg, Oral, Q4H PRN    acetaminophen (TYLENOL) tablet 650 mg, Oral, Q4H PRN    acetaminophen (TYLENOL) tablet 975 mg, Oral, Q6H PRN    hydrOXYzine HCL (ATARAX) tablet 25 mg, Oral, Q6H PRN Max 4/day    hydrOXYzine HCL (ATARAX) tablet 50 mg, Oral, Q6H PRN Max 4/day    LORazepam (ATIVAN) injection 1 mg, Intramuscular, Q6H PRN Max 3/day    LORazepam (ATIVAN) tablet 1 mg, Oral, Q6H PRN Max 3/day    nicotine polacrilex (NICORETTE) gum 2 mg, Oral, Q4H PRN    OLANZapine (ZyPREXA) IM injection 5 mg, Intramuscular, Q3H PRN Max 3/day    OLANZapine (ZyPREXA) tablet 2.5 mg, Oral, Q4H PRN Max 6/day    OLANZapine (ZyPREXA) tablet 5 mg, Oral, Q4H PRN Max 3/day    OLANZapine (ZyPREXA) tablet 5 mg, Oral, Q3H PRN Max 3/day    polyethylene glycol (MIRALAX) packet 17 g, Oral, Daily PRN    senna-docusate sodium (SENOKOT S) 8.6-50 mg per tablet 1 tablet, Oral, Daily PRN    sodium chloride (OCEAN) 0.65 % nasal spray 1 spray, Each Nare, Q1H PRN    traZODone (DESYREL) tablet 50 mg, Oral, HS PRN    Risks/Benefits of Treatment:   Risks, benefits, and possible side effects of medications explained to patient and patient verbalizes understanding and agreement for treatment.    Progress Toward Goals: improving    Treatment Planning:    - Encourage early mobility and having a structured day  - Provide frequent re-orientation, and cognitive stimulation  - Ensure assistive devices are in proper working order (eye-glasses, hearing aids)  - Encourage adequate hydration, nutrition and monitor bowel movements  - Maintain sleep-wake cycle: Uninterrupted sleep time; low-level lighting at night  - Fall precaution  - f/u SLIM recs regarding the medical  problems   - Continue medication titration and treatment plan; adjust medication to optimize treatment response and as clinically indicated. .   - Observation: routine            - VS: as per unit protocol  - Encourage group attendance and milieu therapy  - Dispo: To be determined  - Estimated Discharge Day: 6/12/2025   - Legal Status : Voluntary 201 commitment.  -     Subjective     Patient was visited on unit for continuing care; chart reviewed and discussed with multidisciplinary treatment team.  On approach, the patient was calm, pleasant and cooperative. Endorsed better mood and less anxiety sxs. Continues to report paranoid ideations about neighbors trying to have his house, but was more redirectable and receptive to reassurance. Endorsed feeling safe in the hospital. Denied any changes in appetite, and energy level. No problem initiating and maintaining sleep.  Denied A/VH currently.  Denied SI/HI, intent or plan upon direct inquiry at this time.    Patient continues to be visible in the milieu and remains interactive with staff and peers but requires frequent redirection and reorientation by the staff. No reports of aggression or self-injurious behavior on unit. No PRN medications used in the past 24 hours.    Patient accepted all offered medications and no adverse effects of medications noted or reported. Started on Melatonin 3 mg nightly to adjust sleep wake cycle and preventing sun-downing. Will continue antibiotic treatment for UTI as per SLIM. CM is assertively coordinating safe dispo plan with the patient's family.    Sleep: improved  Appetite: normal  Medication side effects: No  ROS: review of systems as noted above in HPI/Subjective report, all other systems are negative    Objective :  Temp:  [97.2 °F (36.2 °C)-97.6 °F (36.4 °C)] 97.2 °F (36.2 °C)  HR:  [77-90] 90  BP: (135-163)/(63-79) 163/79  Resp:  [18] 18  SpO2:  [97 %-98 %] 98 %  O2 Device: None (Room air)    Mental Status  "Evaluation:  Appearance and attitude: appeared as stated age, dressed in hospital attire, with good hygiene  Eye contact: good  Motor Function: within normal limits, intact gait, No PMA/PMR  Gait/station: normal gait/station and normal balance  Speech: normal for rate, rhythm, volume, and latency with decreased amount  Language: No overt abnormality  Mood/affect: \"good\" / Affect was constricted but reactive, mood congruent  Thought Processes: concrete, slowing of thoughts  Thought content: denied suicidal ideations or homicidal ideations, paranoid ideation, paucity of thought  Associations: concrete associations  Perceptual disturbances: denies Auditory/Visual/Tactile Hallucinations  Orientation: oriented to person, place, and time/date  Cognitive Function: intact  Attention/Concentration: attention span and concentration appear shorter than expected for age  Memory: not cooperative with formal MMSE  Fund of knowledge: diminished  Impulse control: good  Insight/judgment: limited/limited            Lab Results: I have reviewed the following results:  Results from the past 24 hours: No results found for this or any previous visit (from the past 24 hours).    Administrative Statements     Counseling / Coordination of Care:   Patient's progress discussed with staff in treatment team meeting.  Medications, treatment progress and treatment plan reviewed with patient.  Medication changes discussed with patient.  Medication education provided to patient.  Supportive therapy provided to patient.  Cognitive techniques utilized during the session.  Reassurance and supportive therapy provided.  Reoriented to reality and reassured.  Encouraged participation in milieu and group therapy on the unit.  Crisis/safety plan discussed with patient.         Zainab Friend MD  Attending Psychiatrist   [facility]    06/10/25  "

## 2025-06-10 NOTE — ASSESSMENT & PLAN NOTE
Matias Rodriguez is a 89 y.o. male, , living alone, retired, w/ PMH of HTN, CKD stage 2, afib s/p pacemaker, HFpEF, beta thalassemia minor, BPH, pre-diabetes, subclinical hypothyroidism and PPH of insomnia, no prior psychiatric admissions, no prior SA, no h/o self-injurious behavior, who presented to [facility] ED with paranoia and SI on a 201 basis after being BIB family. The patient admitted to the inpatient psychiatry unit 6B for further psychiatric stabilization. Per family, paranoia has been ongoing for several years but had a subacute gradually worsening course. Differential diagnosis include MDD with psychotic features, MNCD with behavioral disturbance and psychosis.     Plan:  Continue Abilify 2 mg for paranoid ideation and mood  Melatonin 3 mg nightly to adjust sleep-wake cycle  Group and milieu therapy

## 2025-06-10 NOTE — TREATMENT TEAM
06/10/25 1057    Admission Notification   Notification of Admission Provided to: Family  (Daughter:)   Family Notified via: Phone call     CM placed a call to daughter of the Pt. Daughter confirmed there are no firearms in the home and that the father voluntarily gave the gun to his children to put away. D/C was discussed for 6/12/2025 @1:00PM to his home. Daughter will .

## 2025-06-10 NOTE — PHYSICAL THERAPY NOTE
Physical Therapy Evaluation    Patient's Name: Matias Rodriguez    Admitting Diagnosis  Major depressive disorder, single episode, unspecified [F32.9]    Problem List  Problem List[1]    Past Medical History  Past Medical History[2]    Past Surgical History  Past Surgical History[3]    Recent Imaging  No orders to display       Recent Vital Signs  Vitals:    06/08/25 2032 06/09/25 0731 06/09/25 1532 06/09/25 2043   BP: 117/54 147/71 135/63 163/79   BP Location: Left arm Right arm Left arm Right arm   Pulse: 80 68 77 90   Resp: 17 18 18 18   Temp: (!) 97.1 °F (36.2 °C) 97.5 °F (36.4 °C) 97.6 °F (36.4 °C) (!) 97.2 °F (36.2 °C)   TempSrc: Temporal Temporal Temporal Temporal   SpO2: 92% 95% 97% 98%   Weight:       Height:            06/06/25 1430   PT Last Visit   PT Visit Date 06/06/25   Note Type   Note type Evaluation   Pain Assessment   Pain Assessment Tool 0-10   Pain Score No Pain   Restrictions/Precautions   Weight Bearing Precautions Per Order No   Home Living   Type of Home House   Home Layout Two level   Bathroom Shower/Tub Tub/shower unit   Bathroom Toilet Standard   Prior Function   Level of [place] Independent with ADLs;Independent with functional mobility   Lives With Alone   Receives Help From Family   General   Family/Caregiver Present No   Cognition   Arousal/Participation Alert   Attention Within functional limits   Orientation Level Oriented X4   Memory Within functional limits   Following Commands Follows all commands and directions without difficulty   RLE Assessment   RLE Assessment   (4/5)   LLE Assessment   LLE Assessment   (4/5)   Coordination   Movements are Fluid and Coordinated 0   Coordination and Movement Description mild coordination deficit   Sensation X   Light Touch   RLE Light Touch Impaired   LLE Light Touch Impaired   Bed Mobility   Supine to Sit 7  Independent   Sit to Supine 7  Independent   Transfers   Sit to Stand 7  Independent   Stand to Sit 7  Independent    Ambulation/Elevation   Gait pattern Step through pattern;Decreased heel strike;Decreased toe off;Excessively slow;Short stride;Decreased foot clearance   Gait Assistance 7  Independent   Assistive Device None   Distance 200ft   Balance   Static Sitting Good   Dynamic Sitting Fair +   Static Standing Fair   Dynamic Standing Fair   Ambulatory Fair   Endurance Deficit   Endurance Deficit Yes   Endurance Deficit Description reduced from baseline   Activity Tolerance   Activity Tolerance Patient tolerated treatment well   Medical Staff Made Aware spoke to CM   Nurse Made Aware spoke to RN   Assessment   Prognosis Good   Problem List Decreased strength;Decreased endurance;Impaired balance;Decreased mobility;Decreased coordination;Impaired sensation   Barriers to Discharge Inaccessible home environment;Decreased caregiver support   Goals   Patient Goals to go home   Discharge Recommendation   Rehab Resource Intensity Level, PT No post-acute rehabilitation needs   AM-PAC Basic Mobility Inpatient   Turning in Flat Bed Without Bedrails 4   Lying on Back to Sitting on Edge of Flat Bed Without Bedrails 4   Moving Bed to Chair 4   Standing Up From Chair Using Arms 4   Walk in Room 4   Climb 3-5 Stairs With Railing 3   Basic Mobility Inpatient Raw Score 23   Basic Mobility Standardized Score 50.88   [place] Highest Level Of Mobility   -HL Goal 7: Walk 25 feet or more   -HL Achieved 7: Walk 25 feet or more   End of Consult   Patient Position at End of Consult All needs within reach;Bedside chair           ASSESSMENT                                                                                                                     Matias Rodriguez is a 89 y.o. male admitted to [facility] on 6/5/2025 for Unspecified mood (affective) disorder ([facility]). Pt  has a past medical history of A-fib ([facility]), CHANA (acute kidney injury) ([facility]) (12/19/2022), Hernia, abdominal, Hypertension, and Subdural hematoma ([facility]).. PT was consulted and pt  was seen on 6/10/2025 for mobility assessment and d/c planning.  Impairments limiting pt at this time include impaired balance, decreased endurance, decreased coordination, decreased sensation, and decreased strength. Pt is currently functioning at a independent level for bed mobility, independent level for transfers, independent level for ambulation with no assistive device. The patient's AM-[facility] Basic Mobility Inpatient Short Form Raw Score is 23. A Raw score of greater than 16 suggests the patient may benefit from discharge to home. Please also refer to the recommendation of the Physical Therapist for safe discharge planning.    Recommendations                                                                                                                DME: None    Discharge Disposition:  Home with no needs      Marcus Montana PT, DPT         [1]   Patient Active Problem List  Diagnosis    Subdural hematoma (HCC)    Pneumocephalus    Closed nondisplaced fracture of distal phalanx of right thumb    Essential hypertension    Atrial fibrillation (HCC)    Temporal bone fracture (HCC)    On amiodarone therapy    Chest pain    Pre-op evaluation    Sick sinus syndrome (HCC)    Presence of left atrial appendage closure device    Syncope and collapse    Paresthesias    Closed fracture of nasal bone    Dizziness    Pacemaker lead fracture    Other specified peripheral vascular diseases (HCC)    Benign hypertension with CKD (chronic kidney disease), stage II    Chronic diastolic heart failure (HCC)    Nutritional anemia    Beta thalassemia minor    Unspecified mood (affective) disorder (HCC)    Medical clearance for psychiatric admission    BPH (benign prostatic hyperplasia)    Vitamin D deficiency    B12 deficiency    Elevated hemoglobin A1c    Urinary frequency   [2]   Past Medical History:  Diagnosis Date    A-fib (HCC)     CHANA (acute kidney injury) (HCC) 12/19/2022    Hernia, abdominal     Hypertension     Subdural  hematoma (HCC)    [3]   Past Surgical History:  Procedure Laterality Date    A-V CARDIAC PACEMAKER INSERTION      APPENDECTOMY      age 27    CARDIAC CATHETERIZATION N/A 12/19/2022    Procedure: Cardiac temporary pacemaker;  Surgeon: Darien Panda MD;  [facility];  Service: Cardiology    CARDIAC ELECTROPHYSIOLOGY PROCEDURE N/A 12/8/2022    Procedure: CARDIAC ATRIAL APPENDAGE CLOSURE (EP);  Surgeon: Jeremiah Pradhan MD;  Location: [facility] MAIN OR;  Service: Cardiology    CARDIAC ELECTROPHYSIOLOGY PROCEDURE Right 12/20/2022    Procedure: Cardiac lead revision;  Surgeon: Juan Wheeler MD;  [facility]: [facility];  Service: Cardiology    CARDIAC PACEMAKER PLACEMENT      CARPAL TUNNEL RELEASE      COLONOSCOPY      INGUINAL HERNIA REPAIR Left     IR UPPER EXTREMITY VENOGRAM- DIAGNOSTIC  12/19/2022    IN PERQ CLSR TCAT L ATR APNDGE W/ENDOCARDIAL IMPLNT N/A 12/8/2022    Procedure: CARDIAC ATRIAL APPENDAGE CLOSURE (CATH);  Surgeon: Jeremiah Pradhan MD;  Location: [facility];  Service: Cardiology    IN XCAPSL CTRC RMVL INSJ IO LENS PROSTH W/O ECP Right 4/3/2017    Procedure: EXTRACTION EXTRACAPSULAR CATARACT PHACO INTRAOCULAR LENS (IOL);  Surgeon: Mario Power MD;  Location: [facility] MAIN OR;  Service: Ophthalmology    IN XCAPSL CTRC RMVL INSJ IO LENS PROSTH W/O ECP Left 2/6/2023    Procedure: EXTRACTION EXTRACAPSULAR CATARACT PHACO INTRAOCULAR LENS (IOL);  Surgeon: Mario Power MD;  Location: [facility] MAIN OR;  Service: Ophthalmology    TONSILLECTOMY      age 6

## 2025-06-10 NOTE — NURSING NOTE
Patient awake and at nurses desk at 0130 asking for Flonase.  Stated he felt stuffy and thought he could use it whenever he wanted. Explained to patient that it is scheduled for once a day. No cough noted.

## 2025-06-10 NOTE — TREATMENT TEAM
06/10/25 0815   Team Meeting   Meeting Type Daily Rounds   Team Members Present   Team Members Present Physician;Nurse;;   Physician Team Member Ronna/Cachorro/Sammi/Ludivina   Nursing Team Member Pj/Sruthi   Care Management Team Member Noris Paz   Social Work Team Member Ernesto   Patient/Family Present   Patient Present No   Patient's Family Present No     Pt is a 201. Pt is attending groups. Pt  visible throughout milieu interacting with staff and peers. Pt is  pleasant and calm, denies all s/s. Pt meals is meal compliant.  Patient discharge is pending stabilization of mood and medications.

## 2025-06-10 NOTE — PLAN OF CARE
Problem: PAIN - ADULT  Goal: Verbalizes/displays adequate comfort level or baseline comfort level  Description: Interventions:  - Encourage patient to monitor pain and request assistance  - Assess pain using appropriate pain scale  - Administer analgesics as ordered based on type and severity of pain and evaluate response  - Implement non-pharmacological measures as appropriate and evaluate response  - Consider cultural and social influences on pain and pain management  - Notify physician/advanced practitioner if interventions unsuccessful or patient reports new pain  - Educate patient/family on pain management process including their role and importance of  reporting pain   - Provide non-pharmacologic/complimentary pain relief interventions  Outcome: Progressing     Problem: SAFETY ADULT  Goal: Patient will remain free of falls  Description: INTERVENTIONS:  - Educate patient/family on patient safety including physical limitations  - Instruct patient to call for assistance with activity   - Consider consulting OT/PT to assist with strengthening/mobility based on AM PAC & JH-HLM score  - Consult OT/PT to assist with strengthening/mobility   - Keep Call bell within reach  - Keep bed low and locked with side rails adjusted as appropriate  - Keep care items and personal belongings within reach  - Initiate and maintain comfort rounds  - Make Fall Risk Sign visible to staff  Apply yellow socks and bracelet for high fall risk patients  - Consider moving patient to room near nurses station  Outcome: Progressing  Goal: Maintain or return to baseline ADL function  Description: INTERVENTIONS:  -  Assess patient's ability to carry out ADLs; assess patient's baseline for ADL function and identify physical deficits which impact ability to perform ADLs (bathing, care of mouth/teeth, toileting, grooming, dressing, etc.)  - Assess/evaluate cause of self-care deficits   - Assess range of motion  - Assess patient's mobility; develop  plan if impaired  - Assess patient's need for assistive devices and provide as appropriate  - Encourage maximum independence but intervene and supervise when necessary  - Involve family in performance of ADLs  - Assess for home care needs following discharge   - Consider OT consult to assist with ADL evaluation and planning for discharge  - Provide patient education as appropriate  - Monitor functional capacity and physical performance, use of AM PAC & JH-HLM   - Monitor gait, balance and fatigue with ambulation    Outcome: Progressing     Problem: Ineffective Coping  Goal: Participates in unit activities  Description: Interventions:  - Provide therapeutic environment   - Provide required programming   - Redirect inappropriate behaviors   Outcome: Progressing  Goal: Free from restraint events  Description: - Utilize least restrictive measures   - Provide behavioral interventions   - Redirect inappropriate behaviors   Outcome: Progressing     Problem: Depression  Goal: Verbalize thoughts and feelings  Description: Interventions:  - Assess and re-assess patient's level of risk   - Engage patient in 1:1 interactions, daily, for a minimum of 15 minutes   - Encourage patient to express feelings, fears, frustrations, hopes   Outcome: Progressing  Goal: Refrain from harming self  Description: Interventions:  - Monitor patient closely, per order   - Supervise medication ingestion, monitor effects and side effects   Outcome: Progressing  Goal: Refrain from isolation  Description: Interventions:  - Develop a trusting relationship   - Encourage socialization   Outcome: Progressing

## 2025-06-10 NOTE — NURSING NOTE
Patient was visible in the milieu socializing mostly with his roommate. Denies all psych s/s. No behaviors noted. No c/o pain. No needs expressed. Had 25% for dinner. Took his medications. Safety checks ongoing.

## 2025-06-10 NOTE — NURSING NOTE
Pt very pleasant and med-compliant with good appetite and steady gait.  VSS.  Did not attend group.  I = 480cc.  Pt denied SI, no paranoia noted.  Monitored for safety and support.

## 2025-06-11 LAB — BACTERIA UR CULT: NORMAL

## 2025-06-11 PROCEDURE — 99232 SBSQ HOSP IP/OBS MODERATE 35: CPT | Performed by: STUDENT IN AN ORGANIZED HEALTH CARE EDUCATION/TRAINING PROGRAM

## 2025-06-11 RX ADMIN — FOLIC ACID 1 MG: 1 TABLET ORAL at 09:05

## 2025-06-11 RX ADMIN — Medication 3 MG: at 21:31

## 2025-06-11 RX ADMIN — CIPROFLOXACIN HYDROCHLORIDE 250 MG: 250 TABLET, FILM COATED ORAL at 09:05

## 2025-06-11 RX ADMIN — ASPIRIN 81 MG CHEWABLE TABLET 81 MG: 81 TABLET CHEWABLE at 09:05

## 2025-06-11 RX ADMIN — CIPROFLOXACIN HYDROCHLORIDE 250 MG: 250 TABLET, FILM COATED ORAL at 21:31

## 2025-06-11 RX ADMIN — FLUTICASONE PROPIONATE 1 SPRAY: 50 SPRAY, METERED NASAL at 09:05

## 2025-06-11 RX ADMIN — Medication 1000 MCG: at 09:05

## 2025-06-11 RX ADMIN — TAMSULOSIN HYDROCHLORIDE 0.4 MG: 0.4 CAPSULE ORAL at 15:33

## 2025-06-11 RX ADMIN — ARIPIPRAZOLE 2 MG: 2 TABLET ORAL at 09:05

## 2025-06-11 RX ADMIN — LORATADINE 10 MG: 10 TABLET ORAL at 09:05

## 2025-06-11 NOTE — PLAN OF CARE
Problem: SAFETY ADULT  Goal: Patient will remain free of falls  Description: INTERVENTIONS:  - Educate patient/family on patient safety including physical limitations  - Instruct patient to call for assistance with activity   - Consider consulting OT/PT to assist with strengthening/mobility based on AM PAC & JH-HLM score  - Consult OT/PT to assist with strengthening/mobility   - Keep Call bell within reach  - Keep bed low and locked with side rails adjusted as appropriate  - Keep care items and personal belongings within reach  - Initiate and maintain comfort rounds  - Make Fall Risk Sign visible to staff  Apply yellow socks and bracelet for high fall risk patients  - Consider moving patient to room near nurses station  Outcome: Progressing     Problem: Alteration in Thoughts and Perception  Goal: Verbalize thoughts and feelings  Description: Interventions:  - Promote a nonjudgmental and trusting relationship with the patient through active listening and therapeutic communication  - Assess patient's level of functioning, behavior and potential for risk  - Engage patient in 1 on 1 interactions  - Encourage patient to express fears, feelings, frustrations, and discuss symptoms    - [place] patient to reality, help patient recognize reality-based thinking   - Administer medications as ordered and assess for potential side effects  - Provide the patient education related to the signs and symptoms of the illness and desired effects of prescribed medications  Outcome: Progressing     Problem: Ineffective Coping  Goal: Participates in unit activities  Description: Interventions:  - Provide therapeutic environment   - Provide required programming   - Redirect inappropriate behaviors   Outcome: Progressing

## 2025-06-11 NOTE — ASSESSMENT & PLAN NOTE
Matias Rodriguez is a 89 y.o. male, , living alone, retired, w/ PMH of HTN, CKD stage 2, afib s/p pacemaker, HFpEF, beta thalassemia minor, BPH, pre-diabetes, subclinical hypothyroidism and PPH of insomnia, no prior psychiatric admissions, no prior SA, no h/o self-injurious behavior, who presented to [facility] ED with paranoia and SI on a 201 basis after being BIB family. The patient admitted to the inpatient psychiatry unit 6B for further psychiatric stabilization. Per family, paranoia has been ongoing for several years but had a subacute gradually worsening course. Differential diagnosis include MDD with psychotic features, MNCD with behavioral disturbance and psychosis.     Plan:  Abilify 2 mg for paranoid ideation and mood stabilization  Melatonin 3 mg nightly to adjust sleep-wake cycle  Group and milieu therapy

## 2025-06-11 NOTE — NURSING NOTE
Pt very pleasant and jovial, social with roommate.  Pt did not attend group.  I = 600cc.  VSS.  Good appetite and steady gait.  Med-compliant.  Monitored for safety and support.

## 2025-06-11 NOTE — TREATMENT TEAM
06/11/25 0810   Team Meeting   Meeting Type Daily Rounds   Team Members Present   Team Members Present Physician;Nurse;;   Physician Team Member Ronna/Cachorro/Sammi/Zurdo   Nursing Team Member Pj   Care Management Team Member Noris Paz   Social Work Team Member Ernesto   Patient/Family Present   Patient Present No   Patient's Family Present No     Pt is a 201. Pt very pleasant and med-compliant. Pt did not attend to group. Pt denies s/s. Pt is visible in the milieu. Pt had 25% for dinner. Patient discharge is 06/12/2025 @ 1:00PM .

## 2025-06-11 NOTE — PROGRESS NOTES
Progress Note - Behavioral Health   Name: Matias Rodriguez 89 y.o. male I MRN: [id number]  Unit/Bed#: [facility] 660-01 I Date of Admission: 6/5/2025   Date of Service: 6/11/2025 I Hospital Day: 6    Assessment & Plan  Unspecified mood (affective) disorder (HCC)  Matias Rodriguez is a 89 y.o. male, , living alone, retired, w/ PMH of HTN, CKD stage 2, afib s/p pacemaker, HFpEF, beta thalassemia minor, BPH, pre-diabetes, subclinical hypothyroidism and PPH of insomnia, no prior psychiatric admissions, no prior SA, no h/o self-injurious behavior, who presented to [facility] ED with paranoia and SI on a 201 basis after being BIB family. The patient admitted to the inpatient psychiatry unit 6B for further psychiatric stabilization. Per family, paranoia has been ongoing for several years but had a subacute gradually worsening course. Differential diagnosis include MDD with psychotic features, MNCD with behavioral disturbance and psychosis.     Plan:  Abilify 2 mg for paranoid ideation and mood stabilization  Melatonin 3 mg nightly to adjust sleep-wake cycle  Group and milieu therapy  Urinary frequency  Patient reporting urinary frequency and incontinence   UA revealing leukocytes, occasional bacteria  Ordered follow-up urine culture   SLIM placed patient on ciprofloxacin 250 mg twice daily for 7 days   Atrial fibrillation (HCC)  Per SLIM  Beta thalassemia minor  Per SLIM  Medical clearance for psychiatric admission  Per SLIM  BPH (benign prostatic hyperplasia)  Per SLIM  Vitamin D deficiency  Per SLIM  B12 deficiency  Per SLIM  Elevated hemoglobin A1c  Per SLIM    Current Medications:    Current Facility-Administered Medications:     ARIPiprazole (ABILIFY) tablet 2 mg, Oral, Daily    aspirin chewable tablet 81 mg, Oral, Daily    ciprofloxacin (CIPRO) tablet 250 mg, Oral, Q12H STELLA    cyanocobalamin (VITAMIN B-12) tablet 1,000 mcg, Oral, Daily    ergocalciferol (VITAMIN D2) capsule 50,000 Units, Oral, Weekly     "fluticasone (FLONASE) 50 mcg/act nasal spray 1 spray, Each Nare, Daily    folic acid (FOLVITE) tablet 1 mg, Oral, Daily    loratadine (CLARITIN) tablet 10 mg, Oral, Daily    melatonin tablet 3 mg, Oral, HS    tamsulosin (FLOMAX) capsule 0.4 mg, Oral, Daily With Dinner      Risks/Benefits of Treatment:     Risks, benefits, and possible side effects of medications explained to patient. Patient has limited understanding of risks and benefits of treatment at this time and needs ongoing explanation of treatment benefits and treatment plan.    Progress Toward Goals: improving    Treatment Planning:      - Encourage early mobility and having a structured day  - Provide frequent re-orientation, and cognitive stimulation  - Ensure assistive devices are in proper working order (eye-glasses, hearing aids)  - Encourage adequate hydration, nutrition and monitor bowel movements  - Maintain sleep-wake cycle: Uninterrupted sleep time; low-level lighting at night  - Fall precaution  - Follow up with SLIM regarding the medical problems   - Continue medication titration and treatment plan; adjust medication to optimize treatment response and as clinically indicated.   - Observation: Routine  - VS: as per unit protocol  - Encourage group attendance and milieu therapy  - Dispo: Tentative discharge tomorrow  - Estimated Discharge Day: 6/12/2025   - Legal Status : Voluntary 201 commitment.  - Long Stay Certification : Not Applicable    Subjective     Patient was visited on unit for continuing care; chart reviewed and discussed with multidisciplinary treatment team.  On approach, the patient was calm and cooperative. AAOx3, patient discharge tomorrow discussed whereas patient endorsed safety and stated \" I can't wait to be home\". Paranoia less intense, patient intermittently discusses neighbors but is willing to agree that they will not harm him. Medication regimen well, no changes at this time. . Endorsed better mood and less anxiety sxs. " Denied any changes in appetite, and energy level. No problem initiating and maintaining sleep.  Denied A/VH currently.  Denied active SI/HI, intent or plan upon direct inquiry at this time. The patient consented for safety and agreed to verbalize any frustration or concerns to the nursing staff, immediately.    Patient continues to be visible in the milieu and interacts with staff and peers. No reports of aggression or self-injurious behavior on unit. No PRN medications used in the past 24 hours.    Patient accepted all offered medications and no adverse effects of medications noted or reported. Remains on fall precaution. Tentative discharge tomorrow.    Sleep: normal  Appetite: normal  Medication side effects: No  ROS: review of systems as noted above in HPI/Subjective report, all other systems are negative    Objective :  Temp:  [97.5 °F (36.4 °C)-98.1 °F (36.7 °C)] 97.5 °F (36.4 °C)  HR:  [68-84] 84  BP: (121-155)/(56-80) 155/75  Resp:  [16-18] 18  SpO2:  [95 %-98 %] 95 %  O2 Device: None (Room air)    Mental Status Evaluation:    Appearance:  age appropriate, casually dressed   Behavior:  cooperative, calm, somewhat guarded   Speech:  normal rate and volume   Mood:  less anxious, less labile   Affect:  constricted   Thought Process:  slowing of thoughts, impaired abstract reasoning, concrete, illogical at times   Thought Content:  mild paranoia   Perceptual Disturbances: denies auditory hallucinations when asked, does not appear responding to internal stimuli   Risk Potential: Suicidal Ideation -  None  Homicidal Ideation -  None  Potential for Aggression - No   Sensorium:  oriented to person, place, and time/date   Memory:  intermittent confusion   Consciousness:  alert and awake   Attention/Concentration: decreased attention span and decreased concentration   Insight:  impaired   Judgment: impaired   Gait/Station: normal gait/station   Motor Activity: no abnormal movements               Lab Results: I have  "reviewed the following results:  Results from the past 24 hours: No results found for this or any previous visit (from the past 24 hours).  Most Recent Labs:   Lab Results   Component Value Date    WBC 4.53 06/06/2025    RBC 4.77 06/06/2025    HGB 9.8 (L) 06/06/2025    HCT 31.4 (L) 06/06/2025     06/06/2025    RDW 18.0 (H) 06/06/2025    NEUTROABS 2.44 06/06/2025    SODIUM 140 06/06/2025    K 3.8 06/06/2025     06/06/2025    CO2 26 06/06/2025    BUN 17 06/06/2025    CREATININE 0.82 06/06/2025    GLUC 102 06/06/2025    CALCIUM 8.6 06/06/2025    AST 23 06/06/2025    ALT 30 06/06/2025    ALKPHOS 62 06/06/2025    TP 5.7 (L) 06/06/2025    ALB 3.6 06/06/2025    TBILI 1.03 (H) 06/06/2025    CHOLESTEROL 179 03/29/2023    HDL 91 03/29/2023    TRIG 55 03/29/2023    LDLCALC 77 03/29/2023    NONHDLC 88 03/29/2023    [id number] 5.658 (H) 06/06/2025    FREET4 0.90 06/06/2025    HGBA1C 5.7 (H) 06/06/2025     06/06/2025       Administrative Statements     Counseling / Coordination of Care:   Patient's progress discussed with staff in treatment team meeting.  Medication changes reviewed with staff in treatment team meeting.    Portions of the record may have been created with voice recognition software. Occasional wrong word or \"sound a like\" substitutions may have occurred due to the inherent limitations of voice recognition software. Read the chart carefully and recognize, using context, where substitutions have occurred.    ARPIT Gerardo 06/11/25  "

## 2025-06-12 ENCOUNTER — TELEPHONE (OUTPATIENT)
Age: 89
End: 2025-06-12

## 2025-06-12 VITALS
BODY MASS INDEX: 27.54 KG/M2 | DIASTOLIC BLOOD PRESSURE: 95 MMHG | OXYGEN SATURATION: 94 % | RESPIRATION RATE: 18 BRPM | TEMPERATURE: 96.7 F | SYSTOLIC BLOOD PRESSURE: 141 MMHG | HEIGHT: 65 IN | WEIGHT: 165.31 LBS | HEART RATE: 71 BPM

## 2025-06-12 PROCEDURE — 99238 HOSP IP/OBS DSCHRG MGMT 30/<: CPT | Performed by: STUDENT IN AN ORGANIZED HEALTH CARE EDUCATION/TRAINING PROGRAM

## 2025-06-12 RX ORDER — FOLIC ACID 1 MG/1
1 TABLET ORAL DAILY
Start: 2025-06-12 | End: 2025-06-23 | Stop reason: SDDI

## 2025-06-12 RX ORDER — ECHINACEA PURPUREA EXTRACT 125 MG
1 TABLET ORAL
Start: 2025-06-12

## 2025-06-12 RX ORDER — FLUTICASONE PROPIONATE 50 MCG
1 SPRAY, SUSPENSION (ML) NASAL DAILY
Qty: 11.1 ML | Refills: 0 | Status: SHIPPED | OUTPATIENT
Start: 2025-06-12 | End: 2025-06-23 | Stop reason: SDDI

## 2025-06-12 RX ORDER — TRAZODONE HYDROCHLORIDE 50 MG/1
50 TABLET ORAL
Qty: 31 TABLET | Refills: 1 | Status: SHIPPED | OUTPATIENT
Start: 2025-06-12 | End: 2025-06-23

## 2025-06-12 RX ORDER — ARIPIPRAZOLE 2 MG/1
2 TABLET ORAL DAILY
Qty: 30 TABLET | Refills: 0 | Status: SHIPPED | OUTPATIENT
Start: 2025-06-12

## 2025-06-12 RX ORDER — ERGOCALCIFEROL 1.25 MG/1
50000 CAPSULE, LIQUID FILLED ORAL WEEKLY
Qty: 4 CAPSULE | Refills: 0 | Status: SHIPPED | OUTPATIENT
Start: 2025-06-14 | End: 2025-06-23 | Stop reason: SDDI

## 2025-06-12 RX ORDER — LORATADINE 10 MG/1
10 TABLET ORAL DAILY
Qty: 30 TABLET | Refills: 0 | Status: SHIPPED | OUTPATIENT
Start: 2025-06-12

## 2025-06-12 RX ORDER — CIPROFLOXACIN 250 MG/1
250 TABLET, FILM COATED ORAL EVERY 12 HOURS SCHEDULED
Qty: 8 TABLET | Refills: 0 | Status: SHIPPED | OUTPATIENT
Start: 2025-06-12 | End: 2025-06-16

## 2025-06-12 RX ADMIN — FLUTICASONE PROPIONATE 1 SPRAY: 50 SPRAY, METERED NASAL at 08:21

## 2025-06-12 RX ADMIN — ASPIRIN 81 MG CHEWABLE TABLET 81 MG: 81 TABLET CHEWABLE at 08:21

## 2025-06-12 RX ADMIN — CIPROFLOXACIN HYDROCHLORIDE 250 MG: 250 TABLET, FILM COATED ORAL at 08:21

## 2025-06-12 RX ADMIN — FOLIC ACID 1 MG: 1 TABLET ORAL at 08:21

## 2025-06-12 RX ADMIN — TRAZODONE HYDROCHLORIDE 50 MG: 50 TABLET ORAL at 01:51

## 2025-06-12 RX ADMIN — LORATADINE 10 MG: 10 TABLET ORAL at 08:21

## 2025-06-12 RX ADMIN — Medication 1000 MCG: at 08:21

## 2025-06-12 RX ADMIN — ARIPIPRAZOLE 2 MG: 2 TABLET ORAL at 08:21

## 2025-06-12 NOTE — TREATMENT TEAM
06/12/25 0838   Team Meeting   Meeting Type Daily Rounds   Team Members Present   Team Members Present Physician;Nurse;;   Physician Team Member Ronna/Cachorro/Sammi/Zurdo   Nursing Team Member Sruthi/Pj   Care Management Team Member Noris Paz   Social Work Team Member Ernesto   Patient/Family Present   Patient Present No   Patient's Family Present No     Pt is a 201. Pt is withdrawn to his room, social with roommate. Pt denies all s/s. Pt is meal and medication compliant. Pt did not attend any groups. Pt is discharging today 6/12/2025 @1:00PM and will be picked up by his daughter.

## 2025-06-12 NOTE — PLAN OF CARE
Problem: DISCHARGE PLANNING - CARE MANAGEMENT  Goal: Discharge to post-acute care or home with appropriate resources  Description: INTERVENTIONS:  - Conduct assessment to determine patient/family and health care team treatment goals, and need for post-acute services based on payer coverage, community resources, and patient preferences, and barriers to discharge  - Address psychosocial, clinical, and financial barriers to discharge as identified in assessment in conjunction with the patient/family and health care team  - Arrange appropriate level of post-acute services according to patient’s   needs and preference and payer coverage in collaboration with the physician and health care team  - Communicate with and update the patient/family, physician, and health care team regarding progress on the discharge plan  - Arrange appropriate transportation to post-acute venues  Outcome: Adequate for Discharge   Pt to D/C today. Pt denies SI/HI/AVH. Pt oriented x3. Pt to d/c to home  and daughter will  upon discharge. Pt to follow up with PCP on 6/24/2025 @ 2:00Pand Medication Management 6/19 at 10am . Pt to follow up with Psychiatry (Medication Management) referral. Scripts sent to preferred pharmacy.     Discharge Address:  [place] [address]   Phone:    [phone] (M) [phone] (H

## 2025-06-12 NOTE — TELEPHONE ENCOUNTER
[facility] sent to     Misti Warner  Randi Hamm Jackson  Good Morning,    Referral received for patient. Patient is scheduled for MM on 6/19 at 10am with Phillip Hernandez at [facility].    Thank you

## 2025-06-12 NOTE — DISCHARGE SUMMARY
Discharge Summary - Behavioral Health   Matias Rodriguez 89 y.o. male MRN: [id number]  Unit/Bed#: [facility] 660-01 Encounter: [id number]     Admission Date:   Admission Orders (From admission, onward)       Ordered        06/05/25 1815  ED TO DIFFERENT CAMPUS [facility] UNIT or INPATIENT MEDICAL UNIT to [facility] UNIT (using Discharge Readmit Navigator) - Admit Patient to IP Behavioral Health Unit  Once                                Discharge Date: 06/12/25     Attending Psychiatrist: Zainab Friend MD     Admission Diagnosis:    Principal Problem:    Unspecified mood (affective) disorder (HCC)  Active Problems:    Atrial fibrillation (HCC)    Beta thalassemia minor    Medical clearance for psychiatric admission    BPH (benign prostatic hyperplasia)    Vitamin D deficiency    B12 deficiency    Elevated hemoglobin A1c    Urinary frequency      Discharge Diagnosis:     Assessment & Plan  Unspecified mood (affective) disorder (HCC)  Matias Rodriguez is a 89 y.o. male, , living alone, retired, w/ PMH of HTN, CKD stage 2, afib s/p pacemaker, HFpEF, beta thalassemia minor, BPH, pre-diabetes, subclinical hypothyroidism and PPH of insomnia, no prior psychiatric admissions, no prior SA, no h/o self-injurious behavior, who presented to [facility] ED with paranoia and SI on a 201 basis after being BIB family. The patient admitted to the inpatient psychiatry unit 6B for further psychiatric stabilization. Per family, paranoia has been ongoing for several years but had a subacute gradually worsening course. Differential diagnosis include MDD with psychotic features, MNCD with behavioral disturbance and psychosis.     Plan:  Abilify 2 mg for paranoid ideation and mood stabilization  Melatonin 3 mg nightly to adjust sleep-wake cycle  Urinary frequency  Patient reporting urinary frequency and incontinence   UA revealing leukocytes, occasional bacteria  Ordered follow-up urine culture   SLIM placed patient on ciprofloxacin 250 mg twice  "daily for 7 days   Atrial fibrillation (HCC)  Per SLIM  Beta thalassemia minor  Per SLIM  Medical clearance for psychiatric admission  Per SLIM  BPH (benign prostatic hyperplasia)  Per SLIM  Vitamin D deficiency  Per SLIM  B12 deficiency  Per SLIM  Elevated hemoglobin A1c  Per SLIM    Reason for Admission/HPI:     \"Matias Rodriguez is a 89 y.o. male, , living alone, retired, w/ PMH of HTN, CKD stage 2, afib s/p pacemaker, HFpEF, beta thalassemia minor, BPH, pre-diabetes, subclinical hypothyroidism and PPH of insomnia, no prior psychiatric admissions, no prior SA, no h/o self-injurious behavior, who presented to [facility] ED with paranoia and SI on a 201 basis after being BIB family. The patient admitted to the inpatient psychiatry unit 6B for further psychiatric stabilization.      Symptoms prior to admission included depression, suicidal ideation, poor appetite, difficulty sleeping, paranoid ideation, delusional thinking with persecutory delusions, and anxiety symptoms. Onset of symptoms was gradual starting several years ago with progressively worsening course in the last 1-2 weeks. Stressors preceding admission included death of family member (wife, 2020).      Per ED Note:  Patient is an 89-year-old male with a history of chronic diastolic heart failure, aortic stenosis, pacemaker, paroxysmal atrial fibrillation, status post watchman ALDO closure, CKD, accompanied by his daughter.  She says that the patient lives alone and she has noticed for the last 2 weeks that he has had a lot of paranoia about the neighbors spying on him in his house. The daughter says today that the patient wanted to sell his house and his dog to avoid the neighbors spying on him. She feels like he is taking care of himself in terms of activities of daily living, she has not seen a slurred speech, focal weakness or justina confusion, she is just concerned about the paranoia.  She has her to make statements that he does not want to be " "around if the neighbors continue to spy on him a lot, and she has heard him make statements such as that he may end it if this continues.  She says that she and the family have placed multiple cameras around the house to help keep an eye on the patient as he does live alone and is 89 years old and he will frequently turn the cameras down so he cannot be seen because he is concerned that the neighbors are spying through these cameras on him.  The daughter says that when she spoke with the primary care physician about this, urinalysis was ordered as noted below, she also took all of the guns out of the house, and the PCP did prescribe him Seroquel.  He took 1 tablet yesterday which reportedly made him sleepy and said he did not want to take any more tablets.       The patient himself says that he is very concerned that the neighbors are spying on him through the cameras, he has no other physical complaints, no chest pain, shortness of breath, no fever, no chills, no dysuria hematuria.  Denies any current suicidal or homicidal ideations to me, denies audiovisual hallucinations.  He denies any falls or trauma.     Patient had a CBC on May 30 which showed a slight microcytic anemia hemoglobin 10.0, slight drop of 1.4 g over a month and a half.  Urinalysis showed no infection.     Per ED Nursing Note:   Pt speaking with daughter and two grandsons at bedside as well as his son on phone. Pt's son requesting pt to stay for psychiatric help regarding the paranoia and reports of SI statements pt made earlier today. Per family pt stated PTA \"If the neighbors don't stop watching me in these cameras I might just have to end my life. I can't go on this way.\" Pt reports he made these statements out of frustration and states \"Yes I said that, but I wouldn't do anything to hurt myself!\" Pt noted to be agitated and frustrated with family suggestions. And continues to refuse to stay in hospital. Pt continues to make statements about " going home to lock the doors or check the house.      PT's son on phone reports if pt does not agree to voluntarily sign himself in then he will come to the hospital and sign a 302. Provider aware.     Per Crisis Note:  Intake / safety assessment completed with patient and patient's family.       Patient is an 89 year old male who presents to ED with his family members due to increased paranoia. Patient's family is concerned as they report patient has not been acting himself. Family report that for the last 2 weeks patient has had increased paranoia. Per patient's daughter and son patient believes someone (the neighbor) has been spying on him and breaking into his home. Patient also believes the neighbor may be poisoning his food and listening to him on the phone. Also family reported that patient thought neighbor came into his home and took the dogs toy and replaced with another toy. As a result family reports that patient is up all night and hasn't been able to sleep until last evening when he took a seroquel which was recently prescribed by his PCP. Patient reported he doesn't wish to continue to take the medication anymore as he didn't like the way it made him feel. Patient's daughter reports she heard patient make statements that he does not want to be around and he wished to end his life if the neighbors are going to continue to spy on him. The daughter reports that she and the family have placed multiple cameras around the house to help keep an eye on the patient as he does live alone and that patient will frequently turn the cameras down so he cannot be seen as he is concerned that the neighbors are spying through these cameras on him.      Currently patient denies any suicidal or homicidal thoughts. Patient also denied any hallucinations. Patient was a bit agitated when he first arrived at the hospital, but has calmed down. Patient's family noted that patient is self sufficient and is able to care for  "himself; however they check on patient on a daily basis given his age. Patient doesn't have a psychiatric history or no previous inpatient admissions.      Family concerned patient is not himself given patient's paranoia .  They have also heard patient make suicidal statements.  Patient's son stated he would be willing to petition 302 if father was not in agreement with treatment.  noted he would uphold 302 if patient was not agreeable.       Patient himself agreed to sign 201.  Rights were reviewed.       Family would like to be updated on patient's admission.     On interview today:  On initial evaluation after admission to the inpatient psychiatric unit Matias states that for the last couple of weeks he has been having trouble with his neighbor.  He feels that his neighbor wants his property and asked his daughter about buying it from her.  He states that for a while, things have gone missing from his house. He also believes people have been coming through his house.  At times, he will find the door unlocked when he knows that he locked it.  At one point, his social security card went missing from the kitchen drawer.  He does have cameras on his property installed by his daughter's .  He feels that the neighbor \"knows what the cameras are showing\" and is watching him on his property.  He also feels that his neighbor has taken over his facial recognition on his phone and is monitoring his phone.  For this reason, he has been afraid to look at his phone.  He worries what his neighbor will be able to do with his face and his social security. He also feels that his neighbor at times is following him when he leaves his home.  He states recently his car stalled out and he pulled over on the side and saw a car behind him in his mirror.  He feels that if he tells the police, they would think he was crazy as he has no evidence.  He still feels that his neighbor is after his property and has been messing with him " "for this reason. He does not feel the neighbor is watching him here in the hospital because he is currently in [place] with his son.      Regarding other psychiatric symptoms, patient reports increasing depression and anxiety since he believes his neighbor has begun monitoring him.  He has had trouble sleeping at home, better since arriving in the hospital.  He also has chronically low appetite, also better since coming to the hospital.  Regarding SI, patient states he has never been suicidal, depressed, or overly anxious before recently.  He states that he \"got mad the other night\" because his daughter/son brought him to the hospital to \"take some blood\" and they \"would not let me go and I said I wanted to end it\". Patient adamantly denies current SI and states he plans to live until at least 89.  He states he was recently prescribed Seroquel and he took it once, did not get a chance to take it any more than that.  It was prescribed for his insomnia.  Patient denies history of auditory or visual hallucinations.  Denies history of decreased need for sleep with increased energy     Regarding traumatic events in his life, patient states that his wife had dementia for 10 years and  in .  He helped care for her and visited her frequently in a nursing home.  His wife had a son prior to meeting him and this son took on Matias's last name.  The son  at 34 from AIDS after a long vang with drugs.  He also states that his brother  at age 56.  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  He did not pass the physical training and was honorably discharged.  He did not experience any combat and did not go beyond training in the [place].     He worked at the [employer] as a  up until  when he retired.  Shortly after that, he began  and retired around 2016.  He has been living alone, states that he manages his own finances, cooks on his own, " "gets his own groceries, and manages his home. He gets assistance from his daughter with medical decision making/appointments.     Patient is oriented to self, date, location, and situation. Denies subjective memory issues.     Denies history of tobacco, alcohol, or substance use. \"    Past Medical History[1]  Past Surgical History[2]    Medications:    All current active medications have been reviewed.    Allergies:     Allergies[3]    Please refer to the initial H&P for full details.      Vital signs in last 24 hours:    Temp:  [96.7 °F (35.9 °C)-97.4 °F (36.3 °C)] 96.7 °F (35.9 °C)  HR:  [71-75] 71  BP: (129-147)/(65-95) 141/95  Resp:  [18] 18  SpO2:  [94 %-95 %] 94 %  O2 Device: None (Room air)      Intake/Output Summary (Last 24 hours) at 6/12/2025 0811  Last data filed at 6/11/2025 1710  Gross per 24 hour   Intake 1140 ml   Output --   Net 1140 ml         Hospital Course:   On admission, Matias was admitted to the inpatient psychiatric unit and started on Behavioral Health checks for safety monitoring and close observation for safety monitoring. During the hospitalization he was attending individual therapy, group therapy, milieu therapy and occupational therapy..  Upon admission he was seen by medical service for medical clearance for inpatient treatment and medical follow up.    Matias was started on Abilify 2 mg daily for paranoid ideation and melatonin 3 mg nightly for insomnia.  Medication tolerated well with no current adverse effects.  Patient continued to improve and denied any SI/HI or AVH.  No overt delusions reported, patient continues to experience mild paranoia which is easily redirectable.  Patient noted to be at his baseline, treatment team and family in agreement for patient to be discharged today with follow-up services.  They are also made aware of results from UA and 7-day trial of Cipro which is to end on 6/16/2025.  No SI/HI.  No AVH.  Crisis and safety plan reviewed including reaching out to " 911 or crisis hotline if feeling unsafe.  No access to guns or any other weaponry verbalized upon direct questioning.  We will continue with plan to discharge at this time..    Matias's medications were titrated as appropriate until discharge, including:    Abilify 2 mg for paranoid ideation and mood stabilization  Melatonin 3 mg nightly to adjust sleep-wake cycle    Prior to beginning of treatment medications risks and benefits and possible side effects including risk of parkinsonian symptoms, Tardive Dyskinesia and metabolic syndrome related to treatment with antipsychotic medications and risk of cardiovascular events in elderly related to treatment with antipsychotic medications were reviewed with Matias. Matias verbalized understanding and agreement for treatment.  Matias tolerated these medications with no acute side effects. The patient's mood brightened over the course of treatment, and he was seen in [facility] interacting appropriately with peers. Matias did not demonstrate dangerous behavior to self or others during his inpatient stay.     On the day of discharge, he denied suicidal ideation, intent or plan at the time of discharge and denied homicidal ideation, intent or plan at the time of discharge. He was participating appropriately in milieu at the time of discharge. Behavior was appropriate on the unit at the time of discharge. Sleep and appetite were improved.      Since he was doing well at the end of the hospitalization, treatment team felt that he could be safely discharged to outpatient care. The outpatient follow up with family physician was arranged by the unit  upon discharge.    I reviewed with Matias the importance of compliance with medications and outpatient treatment after discharge., I discussed the medication regimen and possible side effects of the medications with Matias prior to discharge. At the time of discharge he was tolerating psychiatric medications., I discussed outpatient  follow up with Matias., I reviewed with Matias crisis plan and safety plan upon discharge., Matias was competent to understand risks and benefits of withholding information and risks and benefits of his actions., and Matias agreed to abstain from drug and alcohol use after discharge. The patient understands the importance of taking their medications and attending their outpatient appointments. The patient knows that if there are concerns for safety to utilize their coping skills and can call the suicide hotline as well as 911 if there are concerns for safety.      Behavioral Health Medications: all current active meds have been reviewed and continue current psychiatric medications.  Discharge on Two Antipsychotic Medications : No    Labs/Imaging:   I have personally reviewed all pertinent laboratory/tests results.  Most Recent Labs:   Lab Results   Component Value Date    WBC 4.53 06/06/2025    RBC 4.77 06/06/2025    HGB 9.8 (L) 06/06/2025    HCT 31.4 (L) 06/06/2025     06/06/2025    RDW 18.0 (H) 06/06/2025    NEUTROABS 2.44 06/06/2025    SODIUM 140 06/06/2025    K 3.8 06/06/2025     06/06/2025    CO2 26 06/06/2025    BUN 17 06/06/2025    CREATININE 0.82 06/06/2025    GLUC 102 06/06/2025    GLUF 102 (H) 06/06/2025    CALCIUM 8.6 06/06/2025    AST 23 06/06/2025    ALT 30 06/06/2025    ALKPHOS 62 06/06/2025    TP 5.7 (L) 06/06/2025    ALB 3.6 06/06/2025    TBILI 1.03 (H) 06/06/2025    CHOLESTEROL 179 03/29/2023    HDL 91 03/29/2023    TRIG 55 03/29/2023    LDLCALC 77 03/29/2023    NONHDLC 88 03/29/2023    QPV8HLDDYNYX 5.658 (H) 06/06/2025    FREET4 0.90 06/06/2025    HGBA1C 5.7 (H) 06/06/2025     06/06/2025       Mental Status at time of Discharge:   Appearance:  age appropriate, dressed appropriately, looks stated age, sitting comfortably in chair, adequate hygiene and grooming, cooperative with interview, good eye contact    Behavior:  cooperative   Speech:  normal rate, normal volume, normal pitch,  "fluent, clear, and coherent   Mood:  \"Good\"   Affect:  mood-congruent   Language Within normal limits   Thought Process:  normal rate of thoughts, perseverative, more logical   Associations: intact associations      Thought Content:  No verbalized delusions and Delusions less intense   Perceptual Disturbances: Denies auditory or visual hallucinations and Does not appear to be responding to internal stimuli   Risk Potential: Denies suicidal or homicidal ideation, plan, or intent   Sensorium:  person and place   Cognition:  Impaired   Consciousness:  alert and awake   Attention: attention span and concentration were age appropriate   Insight:  impaired due to neurocognitive disorder   Judgment: impaired due to neurocognitive disorder   Intellect appears to be of average intelligence   Gait/Station: normal gait/station   Motor Activity: no abnormal movements     Suicide/Homicide Risk Assessment:  Risk of Harm to Self:   The following ratings are based on assessment at the time of discharge  Demographic risk factors include: , age: over 50 or older  Historical Risk Factors include: history of cognitive impairment  Current Specific Risk Factors include: recent inpatient psychiatric admission - being discharged today  Protective Factors: no current suicidal ideation  Weapons/Firearms: none. The following steps have been taken to ensure weapons are properly secured: not applicable  Based on today's assessment, Matias presents the following risk of harm to self: minimal    Risk of Harm to Others:  The following ratings are based on assessment at the time of discharge  Demographic Risk Factors include: none.  Historical Risk Factors include: none.  Current Specific Risk Factors include: none  Protective Factors: no current homicidal ideation  Weapons/Firearms: none. The following steps have been taken to ensure weapons are properly secured: not applicable  Based on today's assessment, Matias presents the following " risk of harm to others: minimal    The following interventions are recommended: outpatient follow up with a psychiatrist, or family physician    Discharge Medications:  See list above, as well as the after visit summary for all reconciled discharge medications provided to patient and family.      Discharge instructions/Information to patient and family:   See after visit summary for information provided to patient and family.      Provisions for Follow-Up Care:  See after visit summary for information related to follow-up care and any pertinent home health orders.        ARPIT Gerardo 06/12/25      This note was completed in part utilizing Dragon dictation Software. Grammatical, translation, syntax errors, random word insertions, spelling mistakes, and incomplete sentences may be an occasional consequence of this system secondary to software limitations with voice recognition, ambient noise, and hardware issues. If you have any questions or concerns about the content, text, or information contained within the body of this dictation, please contact the provider for clarification.          [1]   Past Medical History:  Diagnosis Date    A-fib (HCC)     CHANA (acute kidney injury) (HCC) 12/19/2022    Hernia, abdominal     Hypertension     Subdural hematoma (HCC)    [2]   Past Surgical History:  Procedure Laterality Date    A-V CARDIAC PACEMAKER INSERTION      APPENDECTOMY      age 27    CARDIAC CATHETERIZATION N/A 12/19/2022    Procedure: Cardiac temporary pacemaker;  Surgeon: Darien Panda MD;  [facility]: [facility];  Service: Cardiology    CARDIAC ELECTROPHYSIOLOGY PROCEDURE N/A 12/8/2022    Procedure: CARDIAC ATRIAL APPENDAGE CLOSURE (EP);  Surgeon: Jeremiah Pradhan MD;  Location: [facility] MAIN OR;  Service: Cardiology    CARDIAC ELECTROPHYSIOLOGY PROCEDURE Right 12/20/2022    Procedure: Cardiac lead revision;  Surgeon: Juan Wheeler MD;  Location: [facility];  Service: Cardiology     CARDIAC PACEMAKER PLACEMENT      CARPAL TUNNEL RELEASE      COLONOSCOPY      INGUINAL HERNIA REPAIR Left     IR UPPER EXTREMITY VENOGRAM- DIAGNOSTIC  12/19/2022    UT PERQ CLSR TCAT L ATR APNDGE W/ENDOCARDIAL IMPLNT N/A 12/8/2022    Procedure: CARDIAC ATRIAL APPENDAGE CLOSURE (CATH);  Surgeon: Jeremiah Pradhan MD;  Location:  MAIN OR;  Service: Cardiology    UT XCAPSL CTRC RMVL INSJ IO LENS PROSTH W/O ECP Right 4/3/2017    Procedure: EXTRACTION EXTRACAPSULAR CATARACT PHACO INTRAOCULAR LENS (IOL);  Surgeon: Mario Power MD;  Location: [facility] MAIN OR;  Service: Ophthalmology    UT XCAPSL CTRC RMVL INSJ IO LENS PROSTH W/O ECP Left 2/6/2023    Procedure: EXTRACTION EXTRACAPSULAR CATARACT PHACO INTRAOCULAR LENS (IOL);  Surgeon: Mario Power MD;  Location: [facility] MAIN OR;  Service: Ophthalmology    TONSILLECTOMY      age 6   [3] No Known Allergies

## 2025-06-12 NOTE — NURSING NOTE
Patient was withdrawn to his room lying in bed quietly. Denies all psych s/s. No behaviors noted. No c/o pain. No needs expressed. Took his HS medications. Safety checks ongoing.

## 2025-06-12 NOTE — DISCHARGE INSTR - APPOINTMENTS
Behavioral Health Nurse Navigator, Kristy or Mariluz will be calling you after your discharge, on the phone number that you provided.  They will be available as an additional support, if needed.   If you wish to speak with Kristy, you may contact her at [phone].

## 2025-06-12 NOTE — ASSESSMENT & PLAN NOTE
Matias Rodriguez is a 89 y.o. male, , living alone, retired, w/ PMH of HTN, CKD stage 2, afib s/p pacemaker, HFpEF, beta thalassemia minor, BPH, pre-diabetes, subclinical hypothyroidism and PPH of insomnia, no prior psychiatric admissions, no prior SA, no h/o self-injurious behavior, who presented to [facility] ED with paranoia and SI on a 201 basis after being BIB family. The patient admitted to the inpatient psychiatry unit 6B for further psychiatric stabilization. Per family, paranoia has been ongoing for several years but had a subacute gradually worsening course. Differential diagnosis include MDD with psychotic features, MNCD with behavioral disturbance and psychosis.     Plan:  Abilify 2 mg for paranoid ideation and mood stabilization  Melatonin 3 mg nightly to adjust sleep-wake cycle

## 2025-06-12 NOTE — TELEPHONE ENCOUNTER
Patient's daughter calling to reschedule HDC.    Writer successfully warm transferred patient to intake dept.

## 2025-06-12 NOTE — BH TRANSITION RECORD
Contact Information: If you have any questions, concerns, pended studies, tests and/or procedures, or emergencies regarding your inpatient behavioral health visit. Please contact [place] [facility] [phone] and ask to speak to a , nurse or physician. A contact is available 24 hours/ 7 days a week at this number.     Summary of Procedures Performed During your Stay:  Below is a list of major procedures performed during your hospital stay and a summary of results:  - Cardiac Procedures/Studies: ECG- V Paced Rhythm.    Pending Studies (From admission, onward)      None          Please follow up on the above pending studies with your PCP and/or referring provider.

## 2025-06-12 NOTE — NURSING NOTE
Patient reports being unable to fall back asleep after waking up d/t coughing. PRN trazodone 50 mg administered at 0151.

## 2025-06-12 NOTE — NURSING NOTE
Pt pleasant and med-compliant with good appetite and steady gait.  VSS.  Denied SI, no paranoia noted.  Pt expressed understanding of d/c meds and f/u instructions.  Pt left unit with all his belongings and accompanied by staff to meet his daughter in lobby for transport home at 1300.  Monitored for safety and support.

## 2025-06-12 NOTE — NURSING NOTE
PRN trazodone effective. Patient currently resting in bed with no outward signs of distress. Respirations even and unlabored.

## 2025-06-12 NOTE — PLAN OF CARE
Problem: Ineffective Coping  Goal: Identifies ineffective coping skills  Outcome: Progressing  Goal: Identifies healthy coping skills  Outcome: Progressing  Goal: Demonstrates healthy coping skills  Outcome: Progressing     Problem: Depression  Goal: Verbalize thoughts and feelings  Description: Interventions:  - Assess and re-assess patient's level of risk   - Engage patient in 1:1 interactions, daily, for a minimum of 15 minutes   - Encourage patient to express feelings, fears, frustrations, hopes   Outcome: Progressing  Goal: Refrain from harming self  Description: Interventions:  - Monitor patient closely, per order   - Supervise medication ingestion, monitor effects and side effects   Outcome: Progressing  Goal: Refrain from isolation  Description: Interventions:  - Develop a trusting relationship   - Encourage socialization   Outcome: Progressing  Goal: Refrain from self-neglect  Outcome: Progressing

## 2025-06-23 ENCOUNTER — OFFICE VISIT (OUTPATIENT)
Dept: PSYCHIATRY | Facility: CLINIC | Age: 89
End: 2025-06-23
Payer: MEDICARE

## 2025-06-23 DIAGNOSIS — F51.04 PSYCHOPHYSIOLOGICAL INSOMNIA: ICD-10-CM

## 2025-06-23 DIAGNOSIS — F22 PARANOIA (HCC): ICD-10-CM

## 2025-06-23 DIAGNOSIS — F39 UNSPECIFIED MOOD (AFFECTIVE) DISORDER (HCC): Primary | ICD-10-CM

## 2025-06-23 PROCEDURE — 90792 PSYCH DIAG EVAL W/MED SRVCS: CPT | Performed by: PHYSICIAN ASSISTANT

## 2025-06-23 RX ORDER — TRAZODONE HYDROCHLORIDE 50 MG/1
25 TABLET ORAL
Qty: 31 TABLET | Refills: 1 | Status: SHIPPED | OUTPATIENT
Start: 2025-06-23 | End: 2025-07-23
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
## 2025-06-23 NOTE — ASSESSMENT & PLAN NOTE
Not yet at goal - decrease trazodone to 25 mg qhs 2/2 grogginess; continue aripiprazole 2 mg qd; f/u in 1 month  Orders:    traZODone (DESYREL) 50 mg tablet; Take 0.5 tablets (25 mg total) by mouth daily at bedtime as needed for sleep

## 2025-07-28 ENCOUNTER — OFFICE VISIT (OUTPATIENT)
Dept: PSYCHIATRY | Facility: CLINIC | Age: 89
End: 2025-07-28
Payer: MEDICARE

## 2025-07-28 DIAGNOSIS — F39 UNSPECIFIED MOOD (AFFECTIVE) DISORDER (HCC): Primary | ICD-10-CM

## 2025-07-28 DIAGNOSIS — F51.04 PSYCHOPHYSIOLOGICAL INSOMNIA: ICD-10-CM

## 2025-07-28 DIAGNOSIS — F22 PARANOIA (HCC): ICD-10-CM

## 2025-07-28 PROCEDURE — G2211 COMPLEX E/M VISIT ADD ON: HCPCS | Performed by: PHYSICIAN ASSISTANT

## 2025-07-28 PROCEDURE — 99214 OFFICE O/P EST MOD 30 MIN: CPT | Performed by: PHYSICIAN ASSISTANT

## 2025-07-29 ENCOUNTER — TELEPHONE (OUTPATIENT)
Dept: PSYCHIATRY | Facility: CLINIC | Age: 89
End: 2025-07-29

## 2025-07-29 ENCOUNTER — IN-CLINIC DEVICE VISIT (OUTPATIENT)
Dept: CARDIOLOGY CLINIC | Facility: MEDICAL CENTER | Age: 89
End: 2025-07-29
Payer: MEDICARE

## 2025-07-29 DIAGNOSIS — Z95.0 PRESENCE OF PERMANENT CARDIAC PACEMAKER: Primary | ICD-10-CM

## 2025-07-29 PROCEDURE — 93280 PM DEVICE PROGR EVAL DUAL: CPT | Performed by: INTERNAL MEDICINE

## (undated) DEVICE — AIR INJECT CANNULA 27GA: Brand: OPHTHALMIC CANNULA

## (undated) DEVICE — SLITTER ADJUSTABLE

## (undated) DEVICE — Device: Brand: MALYUGIN RING SYSTEM 6.25MM

## (undated) DEVICE — CLEARCUT® SLIT KNIFE INTREPID MICRO-COAXIAL SYSTEM 2.4 SB: Brand: CLEARCUT®; INTREPID

## (undated) DEVICE — THE MONARCH® "D" CARTRIDGE IS A SINGLE-USE POLYPROPYLENE CARTRIDGE FOR POSTERIOR CHAMBER IOL DELIVERY: Brand: MONARCH® III

## (undated) DEVICE — GLOVE INDICATOR PI UNDERGLOVE SZ 7 BLUE

## (undated) DEVICE — DGW .035 FC J3MM 150CM TEF: Brand: EMERALD

## (undated) DEVICE — B-H IRRIGATING CAN 19GA FLAT ANGLED 8MM: Brand: OPHTHALMIC CANNULA

## (undated) DEVICE — 3000CC GUARDIAN II: Brand: GUARDIAN

## (undated) DEVICE — CATH GUIDING FIXED SHAPE 43CM -NC

## (undated) DEVICE — ACCESS SHEATH WITH DILATOR: Brand: WATCHMAN FXD CURVE™ ACCESS SYSTEM

## (undated) DEVICE — 3M™ TEGADERM™ TRANSPARENT FILM DRESSING FRAME STYLE, 1626W, 4 IN X 4-3/4 IN (10 CM X 12 CM), 50/CT 4CT/CASE: Brand: 3M™ TEGADERM™

## (undated) DEVICE — GLOVE SRG BIOGEL 7.5

## (undated) DEVICE — MICROSURGICAL INSTRUMENT IRR. CYSTITOME 25GA STRAIGHT-REVERSE CUTTING: Brand: ALCON

## (undated) DEVICE — GAUZE SPONGES,USP TYPE VII GAUZE, 12 PLY: Brand: CURITY

## (undated) DEVICE — ACTIVE FMS W/ INTREPID* ULTRA SLEEVES, 0.9MM 45° ABS* INTREPID* BALANCED TIP: Brand: ALCON

## (undated) DEVICE — CAPIT WATCHMAN FLX TRUSEAL PROCEDURE ACCESS SYSTEM

## (undated) DEVICE — 0.9MM MICROSMOOTH NULTRA INFUSION SLEEVE KIT: Brand: INFINIT, MICROSMOOTH, ALCON

## (undated) DEVICE — PLASMABLADE PS200-040 4.0: Brand: PLASMABLADE™

## (undated) DEVICE — PAD GROUNDING ADULT

## (undated) DEVICE — PINNACLE INTRODUCER SHEATH: Brand: PINNACLE

## (undated) DEVICE — Device

## (undated) DEVICE — GLOVE PI ULTRA TOUCH SZ.7.0

## (undated) DEVICE — SWAN-GANZ BIPOLAR PACING CATHETER: Brand: SWAN-GANZ

## (undated) DEVICE — CATH SIZING 5FR 100CM PERFORMA 2 BAND

## (undated) DEVICE — 45° KELMAN®, 0.9 MM TURBOSONICS® MINI-FLARED ABS® TIP: Brand: ALCON, KELMAN, TURBOSONICS, MINI-FLARED ABS

## (undated) DEVICE — EYE PACK CUSTOM -FINNEGAN

## (undated) DEVICE — BASIC ULTRASOUND: Brand: ALCON, INFINITI

## (undated) DEVICE — INTRO SHEATH PEEL AWAY 7FR

## (undated) DEVICE — CAPIT WATCHMAN FLX LAA PROCEDURE DEVICE

## (undated) DEVICE — 3M™ DEFIBRULATOR PADS 2346N: Brand: 3M™

## (undated) DEVICE — 1 X VERSACROSS CONNECT TRANSSEPTAL DILATOR (INCLUDING 1 X J-TIP MECHANICAL GUIDEWIRE); 1 X VERSACROSS RF WIRE (INCLUDING 1 X CONNECTOR CABLE (SINGLE USE)); 1 X DISPERSIVE ELECTRODE: Brand: VERSACROSS CONNECT LAAC ACCESS SOLUTION

## (undated) DEVICE — LEAD 383069 MRI US
Type: IMPLANTABLE DEVICE | Site: HEART | Status: NON-FUNCTIONAL
Brand: SELECTSECURE™ MRI SURESCAN™
Removed: 2022-12-20

## (undated) DEVICE — INTREPID® TRANSFORMER IA HP: Brand: INTREPID®